# Patient Record
Sex: MALE | Race: WHITE | Employment: FULL TIME | URBAN - NONMETROPOLITAN AREA
[De-identification: names, ages, dates, MRNs, and addresses within clinical notes are randomized per-mention and may not be internally consistent; named-entity substitution may affect disease eponyms.]

---

## 2017-07-03 ENCOUNTER — HOSPITAL ENCOUNTER (OUTPATIENT)
Dept: GENERAL RADIOLOGY | Age: 42
Discharge: HOME | End: 2017-07-03
Attending: UROLOGY | Admitting: UROLOGY

## 2017-07-03 DIAGNOSIS — N20.0 CALCULUS OF KIDNEY: ICD-10-CM

## 2017-08-28 ENCOUNTER — HOSPITAL ENCOUNTER (OUTPATIENT)
Dept: OTHER | Age: 42
Discharge: HOME | End: 2017-08-28
Attending: UROLOGY | Admitting: UROLOGY

## 2017-09-03 LAB
STONE COMPOSITION: NORMAL
STONE DESCRIPTION: NORMAL
STONE MASS: 51
STONE NUMBER: 2
STONE SIZE: NORMAL

## 2020-08-15 ENCOUNTER — APPOINTMENT (OUTPATIENT)
Dept: CT IMAGING | Age: 45
End: 2020-08-15
Payer: COMMERCIAL

## 2020-08-15 ENCOUNTER — HOSPITAL ENCOUNTER (EMERGENCY)
Age: 45
Discharge: ANOTHER HOSPITAL | End: 2020-08-15
Attending: EMERGENCY MEDICINE
Payer: COMMERCIAL

## 2020-08-15 ENCOUNTER — HOSPITAL ENCOUNTER (INPATIENT)
Age: 45
LOS: 6 days | Discharge: HOME | DRG: 872 | End: 2020-08-21
Attending: INTERNAL MEDICINE | Admitting: INTERNAL MEDICINE
Payer: COMMERCIAL

## 2020-08-15 VITALS
HEIGHT: 71 IN | SYSTOLIC BLOOD PRESSURE: 130 MMHG | HEART RATE: 88 BPM | BODY MASS INDEX: 30.8 KG/M2 | RESPIRATION RATE: 18 BRPM | DIASTOLIC BLOOD PRESSURE: 84 MMHG | WEIGHT: 220 LBS | OXYGEN SATURATION: 97 % | TEMPERATURE: 99.5 F

## 2020-08-15 PROBLEM — K57.92 DIVERTICULITIS: Status: ACTIVE | Noted: 2020-08-15

## 2020-08-15 PROBLEM — K57.20 PERFORATION OF SIGMOID COLON DUE TO DIVERTICULITIS: Status: ACTIVE | Noted: 2020-08-15

## 2020-08-15 LAB
ALBUMIN SERPL-MCNC: 4 GM/DL (ref 3.4–5)
ALP BLD-CCNC: 63 IU/L (ref 40–129)
ALT SERPL-CCNC: 13 U/L (ref 10–40)
ANION GAP SERPL CALCULATED.3IONS-SCNC: 6 MMOL/L (ref 4–16)
AST SERPL-CCNC: 18 IU/L (ref 15–37)
BASOPHILS ABSOLUTE: 0 K/CU MM
BASOPHILS RELATIVE PERCENT: 0.2 % (ref 0–1)
BILIRUB SERPL-MCNC: 2.1 MG/DL (ref 0–1)
BUN BLDV-MCNC: 11 MG/DL (ref 6–23)
CALCIUM SERPL-MCNC: 8.9 MG/DL (ref 8.3–10.6)
CHLORIDE BLD-SCNC: 94 MMOL/L (ref 99–110)
CO2: 34 MMOL/L (ref 21–32)
CREAT SERPL-MCNC: 1.1 MG/DL (ref 0.9–1.3)
DIFFERENTIAL TYPE: ABNORMAL
EOSINOPHILS ABSOLUTE: 0 K/CU MM
EOSINOPHILS RELATIVE PERCENT: 0 % (ref 0–3)
GFR AFRICAN AMERICAN: >60 ML/MIN/1.73M2
GFR NON-AFRICAN AMERICAN: >60 ML/MIN/1.73M2
GLUCOSE BLD-MCNC: 95 MG/DL (ref 70–99)
HCT VFR BLD CALC: 50.1 % (ref 42–52)
HEMOGLOBIN: 16.6 GM/DL (ref 13.5–18)
IMMATURE NEUTROPHIL %: 0.4 % (ref 0–0.43)
LACTATE: 1.2 MMOL/L (ref 0.4–2)
LYMPHOCYTES ABSOLUTE: 1.6 K/CU MM
LYMPHOCYTES RELATIVE PERCENT: 9.8 % (ref 24–44)
MCH RBC QN AUTO: 30.9 PG (ref 27–31)
MCHC RBC AUTO-ENTMCNC: 33.1 % (ref 32–36)
MCV RBC AUTO: 93.1 FL (ref 78–100)
MONOCYTES ABSOLUTE: 1.3 K/CU MM
MONOCYTES RELATIVE PERCENT: 7.7 % (ref 0–4)
PDW BLD-RTO: 12.2 % (ref 11.7–14.9)
PLATELET # BLD: 220 K/CU MM (ref 140–440)
PMV BLD AUTO: 11.1 FL (ref 7.5–11.1)
POTASSIUM SERPL-SCNC: 4 MMOL/L (ref 3.5–5.1)
RBC # BLD: 5.38 M/CU MM (ref 4.6–6.2)
SEGMENTED NEUTROPHILS ABSOLUTE COUNT: 13.7 K/CU MM
SEGMENTED NEUTROPHILS RELATIVE PERCENT: 81.9 % (ref 36–66)
SODIUM BLD-SCNC: 134 MMOL/L (ref 135–145)
TOTAL IMMATURE NEUTOROPHIL: 0.07 K/CU MM
TOTAL PROTEIN: 7.7 GM/DL (ref 6.4–8.2)
TOTAL RETICULOCYTE COUNT: 0.07 K/CU MM
WBC # BLD: 16.8 K/CU MM (ref 4–10.5)

## 2020-08-15 PROCEDURE — 96375 TX/PRO/DX INJ NEW DRUG ADDON: CPT

## 2020-08-15 PROCEDURE — 83605 ASSAY OF LACTIC ACID: CPT

## 2020-08-15 PROCEDURE — 96376 TX/PRO/DX INJ SAME DRUG ADON: CPT

## 2020-08-15 PROCEDURE — 74176 CT ABD & PELVIS W/O CONTRAST: CPT

## 2020-08-15 PROCEDURE — 99284 EMERGENCY DEPT VISIT MOD MDM: CPT

## 2020-08-15 PROCEDURE — 6360000002 HC RX W HCPCS: Performed by: NURSE PRACTITIONER

## 2020-08-15 PROCEDURE — C9113 INJ PANTOPRAZOLE SODIUM, VIA: HCPCS | Performed by: NURSE PRACTITIONER

## 2020-08-15 PROCEDURE — 96365 THER/PROPH/DIAG IV INF INIT: CPT

## 2020-08-15 PROCEDURE — 6360000002 HC RX W HCPCS: Performed by: EMERGENCY MEDICINE

## 2020-08-15 PROCEDURE — 6360000002 HC RX W HCPCS: Performed by: SURGERY

## 2020-08-15 PROCEDURE — 2580000003 HC RX 258: Performed by: NURSE PRACTITIONER

## 2020-08-15 PROCEDURE — 99255 IP/OBS CONSLTJ NEW/EST HI 80: CPT | Performed by: SURGERY

## 2020-08-15 PROCEDURE — 1200000000 HC SEMI PRIVATE

## 2020-08-15 PROCEDURE — 80053 COMPREHEN METABOLIC PANEL: CPT

## 2020-08-15 PROCEDURE — 2500000003 HC RX 250 WO HCPCS: Performed by: SURGERY

## 2020-08-15 PROCEDURE — 2580000003 HC RX 258: Performed by: EMERGENCY MEDICINE

## 2020-08-15 PROCEDURE — 85025 COMPLETE CBC W/AUTO DIFF WBC: CPT

## 2020-08-15 RX ORDER — SODIUM CHLORIDE 0.9 % (FLUSH) 0.9 %
10 SYRINGE (ML) INJECTION PRN
Status: DISCONTINUED | OUTPATIENT
Start: 2020-08-15 | End: 2020-08-21 | Stop reason: HOSPADM

## 2020-08-15 RX ORDER — MORPHINE SULFATE 4 MG/ML
4 INJECTION, SOLUTION INTRAMUSCULAR; INTRAVENOUS EVERY 30 MIN PRN
Status: DISCONTINUED | OUTPATIENT
Start: 2020-08-15 | End: 2020-08-15 | Stop reason: HOSPADM

## 2020-08-15 RX ORDER — KETOROLAC TROMETHAMINE 30 MG/ML
30 INJECTION, SOLUTION INTRAMUSCULAR; INTRAVENOUS EVERY 6 HOURS PRN
Status: DISCONTINUED | OUTPATIENT
Start: 2020-08-15 | End: 2020-08-18

## 2020-08-15 RX ORDER — DIPHENHYDRAMINE HYDROCHLORIDE 50 MG/ML
50 INJECTION INTRAMUSCULAR; INTRAVENOUS ONCE
Status: COMPLETED | OUTPATIENT
Start: 2020-08-15 | End: 2020-08-15

## 2020-08-15 RX ORDER — KETOROLAC TROMETHAMINE 30 MG/ML
30 INJECTION, SOLUTION INTRAMUSCULAR; INTRAVENOUS ONCE
Status: COMPLETED | OUTPATIENT
Start: 2020-08-15 | End: 2020-08-15

## 2020-08-15 RX ORDER — ONDANSETRON 2 MG/ML
4 INJECTION INTRAMUSCULAR; INTRAVENOUS EVERY 30 MIN PRN
Status: DISCONTINUED | OUTPATIENT
Start: 2020-08-15 | End: 2020-08-15 | Stop reason: HOSPADM

## 2020-08-15 RX ORDER — SODIUM CHLORIDE 9 MG/ML
INJECTION, SOLUTION INTRAVENOUS CONTINUOUS
Status: DISCONTINUED | OUTPATIENT
Start: 2020-08-15 | End: 2020-08-15 | Stop reason: HOSPADM

## 2020-08-15 RX ORDER — SODIUM CHLORIDE 9 MG/ML
INJECTION, SOLUTION INTRAVENOUS CONTINUOUS
Status: DISCONTINUED | OUTPATIENT
Start: 2020-08-15 | End: 2020-08-21 | Stop reason: HOSPADM

## 2020-08-15 RX ORDER — ONDANSETRON 2 MG/ML
4 INJECTION INTRAMUSCULAR; INTRAVENOUS EVERY 6 HOURS PRN
Status: DISCONTINUED | OUTPATIENT
Start: 2020-08-15 | End: 2020-08-21 | Stop reason: HOSPADM

## 2020-08-15 RX ORDER — SODIUM CHLORIDE 0.9 % (FLUSH) 0.9 %
10 SYRINGE (ML) INJECTION EVERY 12 HOURS SCHEDULED
Status: DISCONTINUED | OUTPATIENT
Start: 2020-08-15 | End: 2020-08-21 | Stop reason: HOSPADM

## 2020-08-15 RX ORDER — PROMETHAZINE HYDROCHLORIDE 25 MG/1
12.5 TABLET ORAL EVERY 6 HOURS PRN
Status: DISCONTINUED | OUTPATIENT
Start: 2020-08-15 | End: 2020-08-21 | Stop reason: HOSPADM

## 2020-08-15 RX ORDER — POLYETHYLENE GLYCOL 3350 17 G/17G
17 POWDER, FOR SOLUTION ORAL DAILY PRN
Status: DISCONTINUED | OUTPATIENT
Start: 2020-08-15 | End: 2020-08-21 | Stop reason: HOSPADM

## 2020-08-15 RX ORDER — PANTOPRAZOLE SODIUM 40 MG/10ML
40 INJECTION, POWDER, LYOPHILIZED, FOR SOLUTION INTRAVENOUS DAILY
Status: DISCONTINUED | OUTPATIENT
Start: 2020-08-15 | End: 2020-08-21 | Stop reason: HOSPADM

## 2020-08-15 RX ORDER — ACETAMINOPHEN 325 MG/1
650 TABLET ORAL EVERY 6 HOURS PRN
Status: DISCONTINUED | OUTPATIENT
Start: 2020-08-15 | End: 2020-08-21 | Stop reason: HOSPADM

## 2020-08-15 RX ORDER — 0.9 % SODIUM CHLORIDE 0.9 %
1000 INTRAVENOUS SOLUTION INTRAVENOUS ONCE
Status: COMPLETED | OUTPATIENT
Start: 2020-08-15 | End: 2020-08-15

## 2020-08-15 RX ORDER — ACETAMINOPHEN 650 MG/1
650 SUPPOSITORY RECTAL EVERY 6 HOURS PRN
Status: DISCONTINUED | OUTPATIENT
Start: 2020-08-15 | End: 2020-08-21 | Stop reason: HOSPADM

## 2020-08-15 RX ADMIN — ONDANSETRON 4 MG: 2 INJECTION INTRAMUSCULAR; INTRAVENOUS at 13:37

## 2020-08-15 RX ADMIN — SODIUM CHLORIDE: 9 INJECTION, SOLUTION INTRAVENOUS at 18:33

## 2020-08-15 RX ADMIN — METRONIDAZOLE 500 MG: 500 INJECTION, SOLUTION INTRAVENOUS at 19:50

## 2020-08-15 RX ADMIN — SODIUM CHLORIDE, PRESERVATIVE FREE 10 ML: 5 INJECTION INTRAVENOUS at 19:48

## 2020-08-15 RX ADMIN — KETOROLAC TROMETHAMINE 30 MG: 30 INJECTION, SOLUTION INTRAMUSCULAR; INTRAVENOUS at 13:33

## 2020-08-15 RX ADMIN — MORPHINE SULFATE 4 MG: 4 INJECTION, SOLUTION INTRAMUSCULAR; INTRAVENOUS at 14:47

## 2020-08-15 RX ADMIN — PIPERACILLIN SODIUM AND TAZOBACTAM SODIUM 3.38 G: 3; .375 INJECTION, POWDER, LYOPHILIZED, FOR SOLUTION INTRAVENOUS at 14:28

## 2020-08-15 RX ADMIN — KETOROLAC TROMETHAMINE 30 MG: 30 INJECTION, SOLUTION INTRAMUSCULAR; INTRAVENOUS at 18:33

## 2020-08-15 RX ADMIN — ENOXAPARIN SODIUM 40 MG: 40 INJECTION, SOLUTION INTRAVENOUS; SUBCUTANEOUS at 19:50

## 2020-08-15 RX ADMIN — SODIUM CHLORIDE 1000 ML: 9 INJECTION, SOLUTION INTRAVENOUS at 13:37

## 2020-08-15 RX ADMIN — DIPHENHYDRAMINE HYDROCHLORIDE 50 MG: 50 INJECTION, SOLUTION INTRAMUSCULAR; INTRAVENOUS at 13:33

## 2020-08-15 RX ADMIN — PIPERACILLIN AND TAZOBACTAM 3.38 G: 3; .375 INJECTION, POWDER, LYOPHILIZED, FOR SOLUTION INTRAVENOUS at 21:44

## 2020-08-15 RX ADMIN — HYDROMORPHONE HYDROCHLORIDE 1 MG: 1 INJECTION, SOLUTION INTRAMUSCULAR; INTRAVENOUS; SUBCUTANEOUS at 22:06

## 2020-08-15 RX ADMIN — HYDROMORPHONE HYDROCHLORIDE 1 MG: 1 INJECTION, SOLUTION INTRAMUSCULAR; INTRAVENOUS; SUBCUTANEOUS at 19:48

## 2020-08-15 RX ADMIN — MORPHINE SULFATE 4 MG: 4 INJECTION, SOLUTION INTRAMUSCULAR; INTRAVENOUS at 13:37

## 2020-08-15 RX ADMIN — PANTOPRAZOLE SODIUM 40 MG: 40 INJECTION, POWDER, FOR SOLUTION INTRAVENOUS at 18:33

## 2020-08-15 RX ADMIN — SODIUM CHLORIDE: 9 INJECTION, SOLUTION INTRAVENOUS at 13:38

## 2020-08-15 ASSESSMENT — PAIN DESCRIPTION - PAIN TYPE: TYPE: ACUTE PAIN

## 2020-08-15 ASSESSMENT — PAIN DESCRIPTION - DESCRIPTORS: DESCRIPTORS: ACHING;THROBBING

## 2020-08-15 ASSESSMENT — PAIN SCALES - GENERAL
PAINLEVEL_OUTOF10: 8
PAINLEVEL_OUTOF10: 8
PAINLEVEL_OUTOF10: 0
PAINLEVEL_OUTOF10: 7
PAINLEVEL_OUTOF10: 6
PAINLEVEL_OUTOF10: 7

## 2020-08-15 ASSESSMENT — PAIN DESCRIPTION - LOCATION: LOCATION: ABDOMEN

## 2020-08-15 ASSESSMENT — PAIN DESCRIPTION - ORIENTATION: ORIENTATION: LOWER

## 2020-08-15 NOTE — H&P
History and Physical  URSULA Menjivar-BC   Internal Medicine Hospitalist      Name:  Jere Wallace /Age/Sex: 1975  (39 y.o. male)   MRN & CSN:  [id number] & [id number] Admission Date/Time: 8/15/2020  5:00 PM   [facility] P[facility]: No primary care provider on file. Hospital Day: 1      Supervising Physician: Dr. Guadalupe Ohara     Chief Complaint: Lower abdominal pain. .     Assessment and Plan:   Jere Wallace is a 39 y.o. male who presents with Perforation of sigmoid colon due to diverticulitis     Lower abdominal pain, 2/2 Acute Perforation of sigmoid colon due to diverticulitis - as per CT A/P.         - no abscess, no fever, leukocytosis (16.8)         - General Surgeon, Dr. Yarely Pyle, consulted in [facility] ED         - NPO effective now          - cont IVF NS         - cont Zosyn, initial dose in [facility] ED         - IV protonix         - control pain         - checks lab works in AM          - pending cx     Current diagnosis and plan of management discussed with the patient at the time of admission in lay language who agree to the above plan and disposition of admission for further care. All concerns and questions addressed. Patient assessment and plan in conjunction with supervising physician - Dr. Demarcus Parker Effective Now    DVT Prophylaxis [x] Lovenox, []  Heparin, [] SCDs, [] Ambulation  [] Long term AC   GI Prophylaxis [x] PPI,  [] H2 Blocker,  [] Carafate,  [] Diet,  [] No GI prophylaxis, N/A: patient is not under significant medical stress, non-ICU or is receiving a diet/tube feeds   Code Status Full Code    Disposition Patient requires continued admission due to Perforation of sigmoid colon due to diverticulitis. Discharge Plan: Patient plans to return home upon discharge.    MDM [] Low, [x] Moderate,[]  High  Patient's risk as above due to:      [x] One or more chronic illnesses with mild exacerbation progression      [] Two or more stable chronic illnesses      [] Undiagnosed new problem with uncertain prognosis      [] Elective major surgery      []Prescription drug management     History of Present Illness:     Principal Problem: Perforation of sigmoid colon due to diverticulitis  Nas Honeycutt is a 39 y.o. male with no known past medical history except for kidney stone was a direct admit from [facility] ED with complaints of lower abdominal pain that started 3 days ago. Patient reports that he was in good state of health until he experienced a dull and sharp pain across his lower abdomen, nonradiating pain, rates 7/10, and felt better with morphine. He denies fever, nausea, vomiting, diarrhea, constipation, dysuria, hematuria, and frequency or urgency. Denies cough, SOB or difficulty breathing. Upon interview, the patient provided the history as above. ROS: Ten point ROS reviewed and negative, unless as noted above per HPI. Objective:   No intake or output data in the 24 hours ending 08/15/20 1757     Vitals:   Vitals:    08/15/20 1700 08/15/20 1710   BP: 124/85    Pulse: 86    Resp: 16    Temp: 97 °F (36.1 °C)    TempSrc: Oral    SpO2: 97%    Weight:  220 lb (99.8 kg)   Height:  5' 11\" (1.803 m)     Physical Exam: 08/15/20    GEN  -Awake, alert, appearing male, lying in bed, cooperative, able to give adequate history. Appears given age. EYES -Pupils are equally round. No vision changes. No scleral erythema, discharge, or conjunctivitis. HENT -MM are moist. Oral pharynx without exudates, no evidence of thrush. NECK -Supple, no apparent thyromegaly or masses. RESP -LS CTA equal bilat, no wheezes, rales or rhonchi. Symmetric chest movement. No respiratory distress noted. C/V  -S1/S2 auscultated. RRR without appreciable M/R/G. No JVD or carotid bruits. Peripheral pulses equal bilaterally and palpable. Peripheral pulses equal bilaterally and palpable. No peripheral edema.     GI  -Abdomen is soft, round, non-distended, mild tenderness 08/15/20  1315   AST 18   ALT 13   BILITOT 2.1*   ALKPHOS 63     No results for input(s): INR in the last 72 hours. No results for input(s): CKTOTAL, CKMB, CKMBINDEX in the last 72 hours. Invalid input(s): Rayabeba Durand input(s): PRO-BNP    Radiology this visit:  Reviewed. Ct Abdomen Pelvis Wo Contrast    Result Date: 8/15/2020  EXAMINATION: CT OF THE ABDOMEN AND PELVIS WITHOUT CONTRAST 8/15/2020 1:49 pm TECHNIQUE: CT of the abdomen and pelvis was performed without the administration of intravenous contrast. Multiplanar reformatted images are provided for review. Dose modulation, iterative reconstruction, and/or weight based adjustment of the mA/kV was utilized to reduce the radiation dose to as low as reasonably achievable. COMPARISON: CT abdomen/pelvis 06/10/2017 HISTORY: ORDERING SYSTEM PROVIDED HISTORY: flank pain TECHNOLOGIST PROVIDED HISTORY: Reason for exam:->flank pain Reason for Exam: flank pain Acuity: Acute Type of Exam: Initial Additional signs and symptoms: pt sts lower abd pain x 3 days, hx of difficulty, pressure,and frequent with urintaion, several bilateral kidney stones. FINDINGS: Thorax base:  Normal heart size with no pericardial effusion. The distal esophagus demonstrates stable mild circumferential thickening without distention. No focal mass. The lung bases demonstrate no acute consolidation or pleural effusion. Abdomen:  Diffuse hepatic steatosis. Anterior left hepatic lobe stable cyst, no follow-up imaging is recommended. No intrahepatic biliary dilatation. Cholelithiasis with no adjacent inflammatory changes. The common bile duct, pancreas, adrenals, and spleen are normal.  The abdominal aorta is normal. Nonobstructing right staghorn calculus. No hydronephrosis. The right ureter is normal.  Nonobstructing left nephrolithiasis with no hydronephrosis. The ureter is nondistended with left retroperitoneal periureteral stranding.  Stomach, small bowel, and appendix are normal.  The ascending and transverse colon are normal.  Sigmoid colonic diverticulosis. In the mid sigmoid colon there is short segment circumferential wall thickening with eccentric focal fat inflammation and adjacent peritoneal free air. There is adjacent moderate mesenteric fat stranding with trace ascites. The distal sigmoid colon is normal. No abscess. Pelvis:  Borderline enlarged prostate. The bladder and rectum are normal. No abscess. Musculoskeletal structures:  Symmetric subareolar soft tissue density consistent with gynecomastia. No significant enlarged inguinal lymph nodes. Normal bone mineral density. Chronic bilateral L5 spondylolysis with grade 1 anterolisthesis L5 with respect to a transitional S1 vertebral body. No acute osseous abnormality. 1. Acute perforated sigmoid diverticulitis. No abscess. 2. Bilateral non-obstructing nephrolithiasis with a right staghorn calculus. 3. Stable circumferential thickening of the distal esophagus which is nonspecific, this could relate to esophagitis. 4. Cholelithiasis without evidence of acute cholecystitis or biliary obstruction. Critical results were called by Dr. Thais Minaya. Alyce Doyle MD to Jack Doe on 8/15/2020 at 14:07. EKG this visit:   EKG: No available ECG to review during assessment/interview. Please see ED notes. Current Treatment Team:  Treatment Team: Attending Provider: Nyla Monroy MD; Registered Nurse:  [facility], RN; Consulting Physician: MD Hanane Villalpando APRN-BC [facility]  8/15/2020 5:57 PM      Electronically signed by URSULA Tovar CNP on 8/15/2020 at 5:57 PM

## 2020-08-15 NOTE — ED PROVIDER NOTES
Emergency Department Encounter  Location: [facility] [address] [place] [address]    Patient: Inna Bose  MRN: [id number]  : 1975  Date of evaluation: 8/15/2020  ED Provider: Mirna Parker DO, FACEP    Chief Complaint:    Abdominal Pain (lower abd pain since  pt states he has had 40 + kidney stones and feels it is the same )    [place]:  Inna Bose is a 39 y.o. male that presents to the emergency department with complaints of low abdominal pain that started 3 days ago. The patient states he has had fevers with this pain. He states that started yesterday. He states his temperature was high as 101. The patient has been nauseated but denies vomiting. He has had many kidney stones and states this feels like his kidney stone pain. He sees Dr. Delphina Goodell. He denies urinary symptoms otherwise. ROS - see HPI, below listed is current ROS at time of my eval:  At least 10 systems reviewed and otherwise acutely negative except as in the [address] General:  No fevers, no chills, no weakness  Eyes:  No recent vison changes, no discharge  ENT:  No sore throat, no nasal congestion, no hearing changes  Cardiovascular:  No chest pain, no palpitations  Respiratory:  No shortness of breath, no cough, no wheezing  Gastrointestinal: Positive for suprapubic abdominal pain and nausea no vomiting, no diarrhea  Musculoskeletal:  No muscle pain, no joint pain  Skin:  No rash, no pruritis, no easy bruising  Neurologic:  No speech problems, no headache, no extremity numbness, no extremity tingling, no extremity weakness  Psychiatric:  No anxiety  Genitourinary:  No dysuria, no hematuria  Endocrine:  No unexpected weight gain, no unexpected weight loss  Extremities:  no edema, no pain    Past Medical History:   Diagnosis Date    Kidney stone      Past Surgical History:   Procedure Laterality Date    KIDNEY STONE SURGERY      WRIST SURGERY      left wrist     No family history on file.   Social History Take 1 tablet by mouth every 8 hours as needed for Nausea or Vomiting 20 tablet 0    ondansetron (ZOFRAN ODT) 4 MG disintegrating tablet Take 1 tablet by mouth every 8 hours as needed for Nausea 15 tablet 0     Allergies   Allergen Reactions    Vicodin [Hydrocodone-Acetaminophen] Other (See Comments)     Patient states that it is tolerable but state that makes his skin crawl       Nursing Notes Reviewed    Physical Exam:  ED Triage Vitals [08/15/20 1303]   Enc Vitals Group      /87      Pulse 91      Resp 18      Temp 99.6 °F (37.6 °C)      Temp Source Oral      SpO2 97 %      Weight 220 lb (99.8 kg)      Height 5' 11\" (1.803 m)      Head Circumference       Peak Flow       Pain Score       Pain Loc       Pain Edu? Excl. in [address]? GENERAL APPEARANCE: Awake and alert. Cooperative. No acute distress. Nontoxic in appearance  HEAD: Normocephalic. Atraumatic. EYES: EOM's grossly intact. Sclera anicteric. ENT: Tolerates saliva. No trismus. NECK: Supple. Trachea midline. CARDIO: RRR. Radial pulse 2+. LUNGS: Respirations unlabored. CTAB. ABDOMEN: Soft. Non-distended. Tenderness in his suprapubic region with no guarding or rebound. EXTREMITIES: No acute deformities. SKIN: Warm and dry. NEUROLOGICAL: No gross facial drooping. Moves all 4 extremities spontaneously. PSYCHIATRIC: Normal mood.      Labs:  Results for orders placed or performed during the hospital encounter of 08/15/20   Comprehensive Metabolic Panel   Result Value Ref Range    Sodium 134 (L) 135 - 145 MMOL/L    Potassium 4.0 3.5 - 5.1 MMOL/L    Chloride 94 (L) 99 - 110 mMol/L    CO2 34 (H) 21 - 32 MMOL/L    BUN 11 6 - 23 MG/DL    CREATININE 1.1 0.9 - 1.3 MG/DL    Glucose 95 70 - 99 MG/DL    Calcium 8.9 8.3 - 10.6 MG/DL    Alb 4.0 3.4 - 5.0 GM/DL    Total Protein 7.7 6.4 - 8.2 GM/DL    Total Bilirubin 2.1 (H) 0.0 - 1.0 MG/DL    ALT 13 10 - 40 U/L    AST 18 15 - 37 IU/L    Alkaline Phosphatase 63 40 - 129 IU/L    GFR Non- American >60 >60 mL/min/1.73m2    GFR African American >60 >60 mL/min/1.73m2    Anion Gap 6 4 - 16   Lactic Acid, Plasma   Result Value Ref Range    Lactate 1.2 0.4 - 2.0 mMOL/L   CBC Auto Differential   Result Value Ref Range    WBC 16.8 (H) 4.0 - 10.5 K/CU MM    RBC 5.38 4.6 - 6.2 M/CU MM    Hemoglobin 16.6 13.5 - 18.0 GM/DL    Hematocrit 50.1 42 - 52 %    MCV 93.1 78 - 100 FL    MCH 30.9 27 - 31 PG    MCHC 33.1 32.0 - 36.0 %    RDW 12.2 11.7 - 14.9 %    Platelets 667 338 - 574 K/CU MM    MPV 11.1 7.5 - 11.1 FL    Differential Type AUTOMATED DIFFERENTIAL     Segs Relative 81.9 (H) 36 - 66 %    Lymphocytes % 9.8 (L) 24 - 44 %    Monocytes % 7.7 (H) 0 - 4 %    Eosinophils % 0.0 0 - 3 %    Basophils % 0.2 0 - 1 %    Segs Absolute 13.7 K/CU MM    Lymphocytes Absolute 1.6 K/CU MM    Monocytes Absolute 1.3 K/CU MM    Eosinophils Absolute 0.0 K/CU MM    Basophils Absolute 0.0 K/CU MM    TRC 0.0737 K/CU MM    Immature Neutrophil % 0.4 0 - 0.43 %    Total Immature Neutrophil 0.07 K/CU MM           Radiographs (if obtained):  [] The following radiograph was interpreted by myself in the absence of a radiologist:  [x] Radiologist's Report reviewed at time of ED visit:  CT ABDOMEN PELVIS WO CONTRAST   Final Result   1. Acute perforated sigmoid diverticulitis. No abscess. 2. Bilateral non-obstructing nephrolithiasis with a right staghorn calculus. 3. Stable circumferential thickening of the distal esophagus which is   nonspecific, this could relate to esophagitis. 4. Cholelithiasis without evidence of acute cholecystitis or biliary   obstruction. Critical results were called by Dr. Tyler Kent. Nadia Redmond MD to Kelsi Duran on   8/15/2020 at 14:07. ED Course and MDM:  Patient appears to have perforated sigmoid diverticuli. His white count is 16,000. I discussed his case with Dr. Nirmal Cid who advised me to have this patient admitted to the hospitalist service in [facility] to keep him n.p.o.   The case then was discussed with Dr. Felicia Jackson, the hospitalist who has agreed accept this patient in transfer. The patient prefers to go by private auto rather than waiting for an ambulance service which could be several hours  And delay. A bed has been assigned. His IV was discontinued and his father is driving him to [facility]. He will be directly admitted once he arrives. He has been stable through his ER course. He received Zosyn Toradol morphine Zofran and Benadryl here in the ER and his pain has much improved. He will be admitted at [facility] for continued observation treatment. Final Impression:  1. Diverticulitis of colon    2. Perforation of colon ([address]      DISPOSITION Decision To Transfer    Patient referred to: No follow-up provider specified.   Discharge medications:  Discharge Medication List as of 8/15/2020  4:06 PM        (Please note that portions of this note may have been completed with a voice recognition program. Efforts were made to edit the dictations but occasionally words are mis-transcribed.)    Griselda Bast, DO, [address] [facility]  Board certified in [facility], [place]  08/15/20 9683

## 2020-08-15 NOTE — CONSULTS
SURGICAL CONSULTATION    Date of Admission:  8/15/2020  Date of Consultation:  8/15/2020    PCP:  No primary care provider on file. Thank you Dr. Jean Marie Magaña MD very much for your consultation, it's always appreciated. I had the privilege today to see your patient Nataliya Brasher. Chief Complaint / History of Present Illness:  Nataliya Brasher is a very pleasant 39 y.o. male who presents with pain in the Left Lower Quadrant and is acute, worsening and dull compared to patient's normal condition. It is severe in intensity for a duration of 2 days and is continuous with modifying factors increased by or worse with pressure. CT revealed perforated diverticulitis, counseled him in length, will keep him NPO x 2-3 days, on IV Abx, and IVF, and pain control, and then advance his diet from clears to low residue diet x 2 wks, and then in 6-8 wks do a colonoscopy, and probably robotic sigmoidectomy to follow. PMH:   has a past medical history of Kidney stone. PSH:   has a past surgical history that includes Wrist surgery and Kidney stone surgery. Allergies: Allergies   Allergen Reactions    Vicodin [Hydrocodone-Acetaminophen] Other (See Comments)     Patient states that it is tolerable but state that makes his skin crawl        Home Meds:    Prior to Admission medications    Medication Sig Start Date End Date Taking?  Authorizing Provider   ondansetron (ZOFRAN) 4 MG tablet Take 1 tablet by mouth every 8 hours as needed for Nausea or Vomiting 8/18/17   Solomon Vargas MD   ondansetron (ZOFRAN ODT) 4 MG disintegrating tablet Take 1 tablet by mouth every 8 hours as needed for Nausea 6/10/17   Ronda Smart MD   tamsulosin [facility]) 0.4 MG capsule Take 1 capsule by mouth daily for 10 doses 6/10/17 8/15/20  Ronda Smart MD        [facility] Meds:    Current Facility-Administered Medications   Medication Dose Route Frequency Provider Last Rate Last Dose    sodium chloride flush 0.9 % injection 10 mL Alcohol use: No    Drug use: No    Sexual activity: Never   Lifestyle    Physical activity     Days per week: Not on file     Minutes per session: Not on file    Stress: Not on file   Relationships    Social connections     Talks on phone: Not on file     Gets together: Not on file     Attends Church service: Not on file     Active member of club or organization: Not on file     Attends meetings of clubs or organizations: Not on file     Relationship status: Not on file    Intimate partner violence     Fear of current or ex partner: Not on file     Emotionally abused: Not on file     Physically abused: Not on file     Forced sexual activity: Not on file   Other Topics Concern    Not on file   Social History Narrative    Not on file      Family History   Problem Relation Age of Onset    Cancer Mother     Kidney stones Father     Diabetes Father     Diabetes Brother     otherwise irrelevant to this surgical issue. Review of Systems:  Constitutional: Negative for fever, chills, diaphoresis, appetite change and fatigue. HENT: Negative for sore throat, trouble swallowing and voice change. Respiratory: Negative for cough, positive for shortness of breath no wheezing. Cardiovascular: Negative for chest pain positive for SOB with one flight of stairs exertion, no pitting LE edema. Gastrointestinal: Positive for severe abdominal pain, Negative for nausea, vomiting, abdominal distention, constipation, no diarrhea, blood in stool, anal bleeding or rectal pain. Musculoskeletal: Negative for joint swelling and arthralgias. Skin: Warm and dry, well perfused. Neurological: Negative for seizures, syncope, speech difficulty and weakness. Hematological/Lymphatic: Negative for adenopathy. No history of DVT/PE. Does not bruise/bleed easily. Psychiatric/Behavioral: Negative for agitation. All others reviewed and negative.       Physical Exam:  Vital Signs:   Vitals:    08/15/20 1700   BP: 124/85 Pulse: 86   Resp: 16   Temp: 97 °F (36.1 °C)   SpO2: 97%     Body mass index is 30.68 kg/m². General appearance: Pt Alert and oriented, in no apparent acute distress. HEENT:  RYAN, Conjunctiva clear. EOMI, No JVDs, Bruits, Megalies: neither thyroid nor lymph nodes. Lungs: Clear to auscultation bilaterally. Heart: Regular rate and rhythm, S1, S2 normal, no murmur, rub or gallop. Abdomen: Very tender lower abdomen, with positive localized peritoneal signs. Non distended. Positive bowel sounds. No hernias noted, no masses palpable. Extremities: Warm, well perfused, no cyanosis or edema. Skin: Skin color, texture, turgor normal.  Neurologic: Grossly normal, Cranial nerves from II to XII intact, Deep tendon reflexes normal.  Lymph nodes: No palpable LNs, Cervical, groins, abdominal.  Musculoskeletal: Bilateral Upper and lower extremities ROM within normal limits. Radiologic / Imaging / TESTING  CT abd / pelvis today:  Impression    1. Acute perforated sigmoid diverticulitis.  No abscess. 2. Bilateral non-obstructing nephrolithiasis with a right staghorn calculus. 3. Stable circumferential thickening of the distal esophagus which is    nonspecific, this could relate to esophagitis. 4. Cholelithiasis without evidence of acute cholecystitis or biliary    obstruction. Critical results were called by Dr. Jovanni Guadalupe. Trevor Burnham MD to Anselmo Cuellar on    8/15/2020 at 14:07.         Labs:    Recent Results (from the past 24 hour(s))   Comprehensive Metabolic Panel    Collection Time: 08/15/20  1:15 PM   Result Value Ref Range    Sodium 134 (L) 135 - 145 MMOL/L    Potassium 4.0 3.5 - 5.1 MMOL/L    Chloride 94 (L) 99 - 110 mMol/L    CO2 34 (H) 21 - 32 MMOL/L    BUN 11 6 - 23 MG/DL    CREATININE 1.1 0.9 - 1.3 MG/DL    Glucose 95 70 - 99 MG/DL    Calcium 8.9 8.3 - 10.6 MG/DL    Alb 4.0 3.4 - 5.0 GM/DL    Total Protein 7.7 6.4 - 8.2 GM/DL    Total Bilirubin 2.1 (H) 0.0 - 1.0 MG/DL    ALT 13 10 - 40 U/L    AST 18 15 - 37 IU/L    Alkaline Phosphatase 63 40 - 129 IU/L    GFR Non-African American >60 >60 mL/min/1.73m2    GFR African American >60 >60 mL/min/1.73m2    Anion Gap 6 4 - 16   Lactic Acid, Plasma    Collection Time: 08/15/20  1:30 PM   Result Value Ref Range    Lactate 1.2 0.4 - 2.0 mMOL/L   CBC Auto Differential    Collection Time: 08/15/20  2:00 PM   Result Value Ref Range    WBC 16.8 (H) 4.0 - 10.5 K/CU MM    RBC 5.38 4.6 - 6.2 M/CU MM    Hemoglobin 16.6 13.5 - 18.0 GM/DL    Hematocrit 50.1 42 - 52 %    MCV 93.1 78 - 100 FL    MCH 30.9 27 - 31 PG    MCHC 33.1 32.0 - 36.0 %    RDW 12.2 11.7 - 14.9 %    Platelets 284 432 - 485 K/CU MM    MPV 11.1 7.5 - 11.1 FL    Differential Type AUTOMATED DIFFERENTIAL     Segs Relative 81.9 (H) 36 - 66 %    Lymphocytes % 9.8 (L) 24 - 44 %    Monocytes % 7.7 (H) 0 - 4 %    Eosinophils % 0.0 0 - 3 %    Basophils % 0.2 0 - 1 %    Segs Absolute 13.7 K/CU MM    Lymphocytes Absolute 1.6 K/CU MM    Monocytes Absolute 1.3 K/CU MM    Eosinophils Absolute 0.0 K/CU MM    Basophils Absolute 0.0 K/CU MM    TRC 0.0737 K/CU MM    Immature Neutrophil % 0.4 0 - 0.43 %    Total Immature Neutrophil 0.07 K/CU MM       Diagnosis:  Patient Active Problem List   Diagnosis    Perforation of sigmoid colon due to diverticulitis       Assessment & plan:  Matt Flowers is a very pleasant 39 y.o. male presenting with acute perforated diverticulitis, with free air, but no abscess and no fluid collection, . Continue NPO/Bowel rest, IV Fluids, Pain control, Nausea control, IV Antibiotics, Will keep him NPO x 2-3 days, on IV Abx, and IVF, and pain control, and then advance his diet from clears to low residue diet x 2 wks, and then in 6-8 wks do a colonoscopy, and probably robotic sigmoidectomy and cholecystectomy to follow.     Thank you doctor Mavis Whatley MD very much for your consultation and for the opportunity to take care of Mr Matt Flowers with you, I'll follow along with you and I'll update you on any new events in his care.    ____________________________________________    Zoey Lane MD, FACS, FICS    8/15/2020  6:58 PM      Patient was seen with total face to face time of 45 minutes. More than 50% of this visit was counseling and education as above in my assessment and plan section of my note.

## 2020-08-15 NOTE — ED NOTES
Dr. Nas Abebe, [facility] hospitalist returned call - transferred to Dr. Anum Laurent.      Kiley Saha RN  08/15/20 1447

## 2020-08-15 NOTE — ED NOTES
Dr. Yrn Clayton returned call - transferred to Dr. Maxwell Collet.      Carlos Virgen, POONAM  08/15/20 4331

## 2020-08-16 LAB
ANION GAP SERPL CALCULATED.3IONS-SCNC: 12 MMOL/L (ref 4–16)
BASOPHILS ABSOLUTE: 0 K/CU MM
BASOPHILS RELATIVE PERCENT: 0.2 % (ref 0–1)
BUN BLDV-MCNC: 15 MG/DL (ref 6–23)
CALCIUM SERPL-MCNC: 8.2 MG/DL (ref 8.3–10.6)
CHLORIDE BLD-SCNC: 101 MMOL/L (ref 99–110)
CO2: 25 MMOL/L (ref 21–32)
CREAT SERPL-MCNC: 1.1 MG/DL (ref 0.9–1.3)
DIFFERENTIAL TYPE: ABNORMAL
EOSINOPHILS ABSOLUTE: 0 K/CU MM
EOSINOPHILS RELATIVE PERCENT: 0 % (ref 0–3)
GFR AFRICAN AMERICAN: >60 ML/MIN/1.73M2
GFR NON-AFRICAN AMERICAN: >60 ML/MIN/1.73M2
GLUCOSE BLD-MCNC: 104 MG/DL (ref 70–99)
HCT VFR BLD CALC: 42.6 % (ref 42–52)
HEMOGLOBIN: 13.9 GM/DL (ref 13.5–18)
IMMATURE NEUTROPHIL %: 0.8 % (ref 0–0.43)
LYMPHOCYTES ABSOLUTE: 0.8 K/CU MM
LYMPHOCYTES RELATIVE PERCENT: 5.6 % (ref 24–44)
MAGNESIUM: 1.9 MG/DL (ref 1.8–2.4)
MCH RBC QN AUTO: 30.6 PG (ref 27–31)
MCHC RBC AUTO-ENTMCNC: 32.6 % (ref 32–36)
MCV RBC AUTO: 93.8 FL (ref 78–100)
MONOCYTES ABSOLUTE: 0.8 K/CU MM
MONOCYTES RELATIVE PERCENT: 5.2 % (ref 0–4)
NUCLEATED RBC %: 0 %
PDW BLD-RTO: 12.4 % (ref 11.7–14.9)
PLATELET # BLD: 191 K/CU MM (ref 140–440)
PMV BLD AUTO: 10.5 FL (ref 7.5–11.1)
POTASSIUM SERPL-SCNC: 3.5 MMOL/L (ref 3.5–5.1)
RBC # BLD: 4.54 M/CU MM (ref 4.6–6.2)
SEGMENTED NEUTROPHILS ABSOLUTE COUNT: 12.8 K/CU MM
SEGMENTED NEUTROPHILS RELATIVE PERCENT: 88.2 % (ref 36–66)
SODIUM BLD-SCNC: 138 MMOL/L (ref 135–145)
TOTAL IMMATURE NEUTOROPHIL: 0.11 K/CU MM
TOTAL NUCLEATED RBC: 0 K/CU MM
WBC # BLD: 14.5 K/CU MM (ref 4–10.5)

## 2020-08-16 PROCEDURE — 2500000003 HC RX 250 WO HCPCS: Performed by: SURGERY

## 2020-08-16 PROCEDURE — 1200000000 HC SEMI PRIVATE

## 2020-08-16 PROCEDURE — 99232 SBSQ HOSP IP/OBS MODERATE 35: CPT | Performed by: SURGERY

## 2020-08-16 PROCEDURE — 6360000002 HC RX W HCPCS: Performed by: NURSE PRACTITIONER

## 2020-08-16 PROCEDURE — 6370000000 HC RX 637 (ALT 250 FOR IP): Performed by: NURSE PRACTITIONER

## 2020-08-16 PROCEDURE — 2580000003 HC RX 258: Performed by: NURSE PRACTITIONER

## 2020-08-16 PROCEDURE — 94761 N-INVAS EAR/PLS OXIMETRY MLT: CPT

## 2020-08-16 PROCEDURE — C9113 INJ PANTOPRAZOLE SODIUM, VIA: HCPCS | Performed by: NURSE PRACTITIONER

## 2020-08-16 PROCEDURE — 83735 ASSAY OF MAGNESIUM: CPT

## 2020-08-16 PROCEDURE — 80048 BASIC METABOLIC PNL TOTAL CA: CPT

## 2020-08-16 PROCEDURE — 36415 COLL VENOUS BLD VENIPUNCTURE: CPT

## 2020-08-16 PROCEDURE — 6360000002 HC RX W HCPCS: Performed by: SURGERY

## 2020-08-16 PROCEDURE — 85025 COMPLETE CBC W/AUTO DIFF WBC: CPT

## 2020-08-16 RX ORDER — KETOROLAC TROMETHAMINE 30 MG/ML
15 INJECTION, SOLUTION INTRAMUSCULAR; INTRAVENOUS ONCE
Status: COMPLETED | OUTPATIENT
Start: 2020-08-16 | End: 2020-08-16

## 2020-08-16 RX ADMIN — SODIUM CHLORIDE, PRESERVATIVE FREE 10 ML: 5 INJECTION INTRAVENOUS at 22:35

## 2020-08-16 RX ADMIN — SODIUM CHLORIDE: 9 INJECTION, SOLUTION INTRAVENOUS at 15:56

## 2020-08-16 RX ADMIN — HYDROMORPHONE HYDROCHLORIDE 1 MG: 1 INJECTION, SOLUTION INTRAMUSCULAR; INTRAVENOUS; SUBCUTANEOUS at 12:20

## 2020-08-16 RX ADMIN — METRONIDAZOLE 500 MG: 500 INJECTION, SOLUTION INTRAVENOUS at 03:37

## 2020-08-16 RX ADMIN — SODIUM CHLORIDE: 9 INJECTION, SOLUTION INTRAVENOUS at 22:34

## 2020-08-16 RX ADMIN — HYDROMORPHONE HYDROCHLORIDE 1 MG: 1 INJECTION, SOLUTION INTRAMUSCULAR; INTRAVENOUS; SUBCUTANEOUS at 09:04

## 2020-08-16 RX ADMIN — PIPERACILLIN AND TAZOBACTAM 3.38 G: 3; .375 INJECTION, POWDER, LYOPHILIZED, FOR SOLUTION INTRAVENOUS at 12:20

## 2020-08-16 RX ADMIN — PIPERACILLIN AND TAZOBACTAM 3.38 G: 3; .375 INJECTION, POWDER, LYOPHILIZED, FOR SOLUTION INTRAVENOUS at 05:28

## 2020-08-16 RX ADMIN — HYDROMORPHONE HYDROCHLORIDE 1 MG: 1 INJECTION, SOLUTION INTRAMUSCULAR; INTRAVENOUS; SUBCUTANEOUS at 22:34

## 2020-08-16 RX ADMIN — HYDROMORPHONE HYDROCHLORIDE 1 MG: 1 INJECTION, SOLUTION INTRAMUSCULAR; INTRAVENOUS; SUBCUTANEOUS at 18:44

## 2020-08-16 RX ADMIN — PANTOPRAZOLE SODIUM 40 MG: 40 INJECTION, POWDER, FOR SOLUTION INTRAVENOUS at 09:04

## 2020-08-16 RX ADMIN — HYDROMORPHONE HYDROCHLORIDE 1 MG: 1 INJECTION, SOLUTION INTRAMUSCULAR; INTRAVENOUS; SUBCUTANEOUS at 01:16

## 2020-08-16 RX ADMIN — HYDROMORPHONE HYDROCHLORIDE 1 MG: 1 INJECTION, SOLUTION INTRAMUSCULAR; INTRAVENOUS; SUBCUTANEOUS at 15:56

## 2020-08-16 RX ADMIN — METRONIDAZOLE 500 MG: 500 INJECTION, SOLUTION INTRAVENOUS at 12:20

## 2020-08-16 RX ADMIN — ACETAMINOPHEN 650 MG: 650 SUPPOSITORY RECTAL at 04:26

## 2020-08-16 RX ADMIN — ENOXAPARIN SODIUM 40 MG: 40 INJECTION, SOLUTION INTRAVENOUS; SUBCUTANEOUS at 22:33

## 2020-08-16 RX ADMIN — PIPERACILLIN AND TAZOBACTAM 3.38 G: 3; .375 INJECTION, POWDER, LYOPHILIZED, FOR SOLUTION INTRAVENOUS at 22:34

## 2020-08-16 RX ADMIN — KETOROLAC TROMETHAMINE 15 MG: 30 INJECTION, SOLUTION INTRAMUSCULAR; INTRAVENOUS at 04:21

## 2020-08-16 ASSESSMENT — PAIN SCALES - GENERAL
PAINLEVEL_OUTOF10: 0
PAINLEVEL_OUTOF10: 8
PAINLEVEL_OUTOF10: 8
PAINLEVEL_OUTOF10: 7
PAINLEVEL_OUTOF10: 10
PAINLEVEL_OUTOF10: 9
PAINLEVEL_OUTOF10: 7
PAINLEVEL_OUTOF10: 0
PAINLEVEL_OUTOF10: 0

## 2020-08-16 NOTE — PROGRESS NOTES
GENERAL SURGERY PROGRESS NOTE    CC/HPI:           Patient feels the same significant abdominal pain. .. leukocytosis improving though. . continue NPO. Vitals:    08/16/20 0413 08/16/20 0430 08/16/20 0530 08/16/20 0858   BP: 119/71 119/71  116/64   Pulse: 109 102  71   Resp: 16 16 14   Temp: 103.2 °F (39.6 °C) 98.9 °F (37.2 °C) 99.2 °F (37.3 °C) 98.2 °F (36.8 °C)   TempSrc: Oral Oral Oral Oral   SpO2: 96%   97%   Weight: 213 lb 14.4 oz (97 kg)      Height:         I/O last 3 completed shifts: In: 1208.3 [I.V.:908.3; IV Piggyback:300]  Out: 400 [Urine:400]  No intake/output data recorded.     Diet NPO Effective Now    Recent Results (from the past 48 hour(s))   Comprehensive Metabolic Panel    Collection Time: 08/15/20  1:15 PM   Result Value Ref Range    Sodium 134 (L) 135 - 145 MMOL/L    Potassium 4.0 3.5 - 5.1 MMOL/L    Chloride 94 (L) 99 - 110 mMol/L    CO2 34 (H) 21 - 32 MMOL/L    BUN 11 6 - 23 MG/DL    CREATININE 1.1 0.9 - 1.3 MG/DL    Glucose 95 70 - 99 MG/DL    Calcium 8.9 8.3 - 10.6 MG/DL    Alb 4.0 3.4 - 5.0 GM/DL    Total Protein 7.7 6.4 - 8.2 GM/DL    Total Bilirubin 2.1 (H) 0.0 - 1.0 MG/DL    ALT 13 10 - 40 U/L    AST 18 15 - 37 IU/L    Alkaline Phosphatase 63 40 - 129 IU/L    GFR Non-African American >60 >60 mL/min/1.73m2    GFR African American >60 >60 mL/min/1.73m2    Anion Gap 6 4 - 16   Lactic Acid, Plasma    Collection Time: 08/15/20  1:30 PM   Result Value Ref Range    Lactate 1.2 0.4 - 2.0 mMOL/L   CBC Auto Differential    Collection Time: 08/15/20  2:00 PM   Result Value Ref Range    WBC 16.8 (H) 4.0 - 10.5 K/CU MM    RBC 5.38 4.6 - 6.2 M/CU MM    Hemoglobin 16.6 13.5 - 18.0 GM/DL    Hematocrit 50.1 42 - 52 %    MCV 93.1 78 - 100 FL    MCH 30.9 27 - 31 PG    MCHC 33.1 32.0 - 36.0 %    RDW 12.2 11.7 - 14.9 %    Platelets 799 074 - 971 K/CU MM    MPV 11.1 7.5 - 11.1 FL    Differential Type AUTOMATED DIFFERENTIAL     Segs Relative 81.9 (H) 36 - 66 %    Lymphocytes % 9.8 (L) 24 - 44 % Monocytes % 7.7 (H) 0 - 4 %    Eosinophils % 0.0 0 - 3 %    Basophils % 0.2 0 - 1 %    Segs Absolute 13.7 K/CU MM    Lymphocytes Absolute 1.6 K/CU MM    Monocytes Absolute 1.3 K/CU MM    Eosinophils Absolute 0.0 K/CU MM    Basophils Absolute 0.0 K/CU MM    TRC 0.0737 K/CU MM    Immature Neutrophil % 0.4 0 - 0.43 %    Total Immature Neutrophil 0.07 K/CU MM   Basic Metabolic Panel w/ Reflex to MG    Collection Time: 08/16/20  7:15 AM   Result Value Ref Range    Sodium 138 135 - 145 MMOL/L    Potassium 3.5 3.5 - 5.1 MMOL/L    Chloride 101 99 - 110 mMol/L    CO2 25 21 - 32 MMOL/L    Anion Gap 12 4 - 16    BUN 15 6 - 23 MG/DL    CREATININE 1.1 0.9 - 1.3 MG/DL    Glucose 104 (H) 70 - 99 MG/DL    Calcium 8.2 (L) 8.3 - 10.6 MG/DL    GFR Non-African American >60 >60 mL/min/1.73m2    GFR African American >60 >60 mL/min/1.73m2   CBC auto differential    Collection Time: 08/16/20  7:15 AM   Result Value Ref Range    WBC 14.5 (H) 4.0 - 10.5 K/CU MM    RBC 4.54 (L) 4.6 - 6.2 M/CU MM    Hemoglobin 13.9 13.5 - 18.0 GM/DL    Hematocrit 42.6 42 - 52 %    MCV 93.8 78 - 100 FL    MCH 30.6 27 - 31 PG    MCHC 32.6 32.0 - 36.0 %    RDW 12.4 11.7 - 14.9 %    Platelets 755 597 - 131 K/CU MM    MPV 10.5 7.5 - 11.1 FL    Differential Type AUTOMATED DIFFERENTIAL     Segs Relative 88.2 (H) 36 - 66 %    Lymphocytes % 5.6 (L) 24 - 44 %    Monocytes % 5.2 (H) 0 - 4 %    Eosinophils % 0.0 0 - 3 %    Basophils % 0.2 0 - 1 %    Segs Absolute 12.8 K/CU MM    Lymphocytes Absolute 0.8 K/CU MM    Monocytes Absolute 0.8 K/CU MM    Eosinophils Absolute 0.0 K/CU MM    Basophils Absolute 0.0 K/CU MM    Nucleated RBC % 0.0 %    Total Nucleated RBC 0.0 K/CU MM    Total Immature Neutrophil 0.11 K/CU MM    Immature Neutrophil % 0.8 (H) 0 - 0.43 %   Magnesium    Collection Time: 08/16/20  7:15 AM   Result Value Ref Range    Magnesium 1.9 1.8 - 2.4 mg/dl       Scheduled Meds:   sodium chloride flush  10 mL Intravenous 2 times per day    enoxaparin  40 mg Subcutaneous Nightly    pantoprazole  40 mg Intravenous Daily    piperacillin-tazobactam  3.375 g Intravenous Q8H    metroNIDAZOLE  500 mg Intravenous Q8H       Continuous Infusions:   sodium chloride 100 mL/hr at 08/15/20 1833       Physical Exam:  HEENT: Anicteric sclerae, Oropharyngeal mucosae moist, pink and intact. Heart:  Normal S1 and S2, RRR  Lungs: Clear to auscultation bilaterally, No audible Wheezes or Rales. Extremities: No edema. Neuro: Alert and Oriented x 3, Non focal.  Abdomen: Soft, VERY tender DIFFUSELY, Non distended, Positive bowel sounds. Principal Problem:    Perforation of sigmoid colon due to diverticulitis  Resolved Problems:    * No resolved hospital problems. *      Assessment and Plan:  Vitaliy Landrum is a 39 y.o. male with acute perforated sigmoid diverticulitis,   Patient feels the same significant abdominal pain. .. leukocytosis improving though. . continue NPO. Increase Ambulation to at least 4x/day walk in the hallways with assistance. Respiratory ryann-operative care: Incentive Spirometry / deep breathing and coughing 10x/hr while awake. Continue DVT prophylaxis with Teds and SCDs and SC Lovenox. Continue GI prophylaxis with Protonix IV till able to tolerate PO.   Continue IV Abx.    ___________________________________________    Dorinda Lin MD, FACS, FICS  8/16/2020  9:11 AM

## 2020-08-16 NOTE — PROGRESS NOTES
Hospitalist Progress Note      Name:  Vitaliy [facility] /Age/Sex: 1975  (39 y.o. male)   MRN & CSN:  [id number] & [id number] Admission Date/Time: 8/15/2020  5:00 PM   Location:  [address] [place] [address] PCP: No primary care provider on file. Hospital Day: 2    Assessment and Plan:       Acute abdominal pain secondary to acute perforation of sigmoid colon due to diverticulitis as demonstrated on CT abdomen pelvis  There is no abscess no fever  Has leukocytosis  Continue on IV antibiotics Zosyn and Flagyl and IV fluid  Patient currently n.p.o.  General surgery on board  On pain medication   DVT and GI prophylaxis  Further management as per general surgery        Bilateral non-obstructing nephrolithiasis with a right staghorn calculus. Urology consult  Follow urine culture      Diet Diet NPO Effective Now   DVT Prophylaxis [x] Lovenox, []  Heparin, [] SCDs, [] Ambulation   GI Prophylaxis [x] PPI,  [] H2 Blocker,  [] Carafate,  [] Diet/Tube Feeds   Code Status Full Code   Disposition Patient requires continued admission due to    MDM [] Low, [] Moderate,[]  High  Patient's risk as above due to      History of Present Illness:   Patient was seen and examined at the bedside today morning  Patient still complaining of diffuse abdominal pain  Currently n.p.o. on IV fluid  No fever    Objective: Intake/Output Summary (Last 24 hours) at 2020 0949  Last data filed at 2020 0541  Gross per 24 hour   Intake 1208.34 ml   Output 400 ml   Net 808.34 ml      Vitals:   Vitals:    20 0858   BP: 116/64   Pulse: 71   Resp: 14   Temp: 98.2 °F (36.8 °C)   SpO2: 97%     Physical Exam:   GEN Awake.  Alert , not in respiratory distress, not in pain  HEENT: PEERLA, , supple neck,   Chest: air entry equal bilaterally, no wheezing or crepitation  Heart: S1 and S2 heard, no murmur, no gallop or rub, regular rate  Abdomen: soft, ND , diffuse tenderness on palpation, +BS  Extremities: no cyanosis, tenderness or erythema,

## 2020-08-16 NOTE — CONSULTS
[facility] [address]   Consult Note  [facility] 1 2 3 4 5    Date: 2020   Patient: Kale Nelson   : 1975   DOA: 8/15/2020   MRN: [id number]   ROOM#: [id number]     Reason for Consult:  Bilateral non-obstructing nephrolithiasis with a right staghorn calculus/HEMATURIA   Requesting Physician:  Shaniqua Desai  Collaborating Urologist on Call at time of admission: Dr. Melissa Anna:  Abdominal pain    History Obtained From:  patient, electronic medical record    HISTORY OF PRESENT ILLNESS:                The patient is a 39 y.o. male with significant past medical history of kidney stones requiring intervention (s/p ureteroscopy in 2017) who presented with abdominal pain and gross hematuria since 4 days ago. Pt came to the ED because he thought he was passing another kidney stones. States he has passed over 40 stones in his lifetime, several of which required intervention. Pt was found to have a perforated sigmoid diverticuli and WBC of 16,000. Plan for colonoscopy and probable robotic sigmoidectomy in 6-8 weeks. His CT a/p also revealed several bilateral nephrolithiasis with a right staghorn calculus. Pt denies flank pain, passing blood clots, dysuria, or other urinary sx. ED Provider Note 8/15/20: Kale Nelson is a 39 y.o. male that presents to the emergency department with complaints of low abdominal pain that started 3 days ago. The patient states he has had fevers with this pain. He states that started yesterday. He states his temperature was high as 101. The patient has been nauseated but denies vomiting. He has had many kidney stones and states this feels like his kidney stone pain. He sees Dr. Macey Cruz. He denies urinary symptoms otherwise.     Past Medical History:        Diagnosis Date    Kidney stone      Past Surgical History:        Procedure Laterality Date    KIDNEY STONE SURGERY      WRIST SURGERY      left wrist     Current Medications:   Current Max: 103.3 °F (39.6 °C)  24HR BLOOD PRESSURE RANGE:  Systolic (07UPA), EXY:817 , Min:109 , YJZ:251   ; Diastolic (26VKK), KZT:43, Min:63, Max:87      General appearance: alert, appears stated age, cooperative and mild distress  Head: Normocephalic, without obvious abnormality, atraumatic  Back: No CVA tenderness  Abdomen: soft, non-distended, diffuse TTP, and guarding    DATA:    WBC:    Lab Results   Component Value Date    WBC 14.5 08/16/2020     Hemoglobin/Hematocrit:    Lab Results   Component Value Date    HGB 13.9 08/16/2020    HCT 42.6 08/16/2020     BMP:    Lab Results   Component Value Date     08/15/2020    K 4.0 08/15/2020    CL 94 08/15/2020    CO2 34 08/15/2020    BUN 11 08/15/2020    LABALBU 4.0 08/15/2020    CREATININE 1.1 08/15/2020    CALCIUM 8.9 08/15/2020    GFRAA >60 08/15/2020    LABGLOM >60 08/15/2020     Imaging:  Ct Abdomen Pelvis Wo Contrast    Result Date: 8/15/2020  EXAMINATION: CT OF THE ABDOMEN AND PELVIS WITHOUT CONTRAST 8/15/2020 1:49 pm TECHNIQUE: CT of the abdomen and pelvis was performed without the administration of intravenous contrast. Multiplanar reformatted images are provided for review. Dose modulation, iterative reconstruction, and/or weight based adjustment of the mA/kV was utilized to reduce the radiation dose to as low as reasonably achievable. COMPARISON: CT abdomen/pelvis 06/10/2017 HISTORY: ORDERING SYSTEM PROVIDED HISTORY: flank pain TECHNOLOGIST PROVIDED HISTORY: Reason for exam:->flank pain Reason for Exam: flank pain Acuity: Acute Type of Exam: Initial Additional signs and symptoms: pt sts lower abd pain x 3 days, hx of difficulty, pressure,and frequent with urintaion, several bilateral kidney stones. FINDINGS: Thorax base:  Normal heart size with no pericardial effusion. The distal esophagus demonstrates stable mild circumferential thickening without distention. No focal mass. The lung bases demonstrate no acute consolidation or pleural effusion.  Abdomen: Path 8/2019:  20% calcium oxalate dihydrate, 80% Calcium oxalate monohydrate  Stone Path 8/2017:  90% calcium oxalate monohydrate, 10% calcium oxalate dihydrate    1) Gross Hematuria: likely secondary to staghorn calculus   Hgb 13.9   UA and urine cx pending   Pt not passing any clots  2) Right Staghorn Calculus:   8/15/20 CT a/p: Bilateral non-obstructing nephrolithiasis with a right staghorn calculus   Cr 1.1   WBC 14.5   Temp of 103.2 early this am   On IV Flagyl and Zosyn d/t perforated sigmoid colon   No indication for  intervention at this time. Will plan to follow up as outpatient. Will follow    Patient seen and examined, chart reviewed.      Electronically signed by Marsha Wu PA-C on 8/16/2020 at 8:09 AM

## 2020-08-16 NOTE — PROGRESS NOTES
Temp 103.3 patient refusing tylenol via suppository asked physician if I could give with a small sip of water. Ashley Glez NP encouraged me to give Toradol as ordered and sponge. Patient is wanting to take a cold shower. Hematuria noted and notified provider new orders placed.

## 2020-08-17 LAB
ALBUMIN SERPL-MCNC: 3.2 GM/DL (ref 3.4–5)
ALP BLD-CCNC: 56 IU/L (ref 40–128)
ALT SERPL-CCNC: 8 U/L (ref 10–40)
ANION GAP SERPL CALCULATED.3IONS-SCNC: 9 MMOL/L (ref 4–16)
AST SERPL-CCNC: 8 IU/L (ref 15–37)
BILIRUB SERPL-MCNC: 1.3 MG/DL (ref 0–1)
BUN BLDV-MCNC: 12 MG/DL (ref 6–23)
CALCIUM SERPL-MCNC: 8 MG/DL (ref 8.3–10.6)
CHLORIDE BLD-SCNC: 102 MMOL/L (ref 99–110)
CO2: 26 MMOL/L (ref 21–32)
CREAT SERPL-MCNC: 1 MG/DL (ref 0.9–1.3)
GFR AFRICAN AMERICAN: >60 ML/MIN/1.73M2
GFR NON-AFRICAN AMERICAN: >60 ML/MIN/1.73M2
GLUCOSE BLD-MCNC: 95 MG/DL (ref 70–99)
HCT VFR BLD CALC: 41.5 % (ref 42–52)
HEMOGLOBIN: 13.3 GM/DL (ref 13.5–18)
MAGNESIUM: 2 MG/DL (ref 1.8–2.4)
MCH RBC QN AUTO: 30.5 PG (ref 27–31)
MCHC RBC AUTO-ENTMCNC: 32 % (ref 32–36)
MCV RBC AUTO: 95.2 FL (ref 78–100)
PDW BLD-RTO: 12.3 % (ref 11.7–14.9)
PHOSPHORUS: 1.6 MG/DL (ref 2.5–4.9)
PLATELET # BLD: 200 K/CU MM (ref 140–440)
PMV BLD AUTO: 10.5 FL (ref 7.5–11.1)
POTASSIUM SERPL-SCNC: 3.7 MMOL/L (ref 3.5–5.1)
RBC # BLD: 4.36 M/CU MM (ref 4.6–6.2)
SODIUM BLD-SCNC: 137 MMOL/L (ref 135–145)
TOTAL PROTEIN: 5.5 GM/DL (ref 6.4–8.2)
WBC # BLD: 10.8 K/CU MM (ref 4–10.5)

## 2020-08-17 PROCEDURE — 80053 COMPREHEN METABOLIC PANEL: CPT

## 2020-08-17 PROCEDURE — 84100 ASSAY OF PHOSPHORUS: CPT

## 2020-08-17 PROCEDURE — 6360000002 HC RX W HCPCS: Performed by: SURGERY

## 2020-08-17 PROCEDURE — 87040 BLOOD CULTURE FOR BACTERIA: CPT

## 2020-08-17 PROCEDURE — 6360000002 HC RX W HCPCS: Performed by: NURSE PRACTITIONER

## 2020-08-17 PROCEDURE — 85027 COMPLETE CBC AUTOMATED: CPT

## 2020-08-17 PROCEDURE — 99232 SBSQ HOSP IP/OBS MODERATE 35: CPT | Performed by: SURGERY

## 2020-08-17 PROCEDURE — 36415 COLL VENOUS BLD VENIPUNCTURE: CPT

## 2020-08-17 PROCEDURE — 94761 N-INVAS EAR/PLS OXIMETRY MLT: CPT

## 2020-08-17 PROCEDURE — 83735 ASSAY OF MAGNESIUM: CPT

## 2020-08-17 PROCEDURE — C9113 INJ PANTOPRAZOLE SODIUM, VIA: HCPCS | Performed by: NURSE PRACTITIONER

## 2020-08-17 PROCEDURE — 1200000000 HC SEMI PRIVATE

## 2020-08-17 PROCEDURE — 2580000003 HC RX 258: Performed by: NURSE PRACTITIONER

## 2020-08-17 RX ADMIN — PIPERACILLIN AND TAZOBACTAM 3.38 G: 3; .375 INJECTION, POWDER, LYOPHILIZED, FOR SOLUTION INTRAVENOUS at 22:59

## 2020-08-17 RX ADMIN — SODIUM CHLORIDE: 9 INJECTION, SOLUTION INTRAVENOUS at 13:06

## 2020-08-17 RX ADMIN — HYDROMORPHONE HYDROCHLORIDE 1 MG: 1 INJECTION, SOLUTION INTRAMUSCULAR; INTRAVENOUS; SUBCUTANEOUS at 18:10

## 2020-08-17 RX ADMIN — PANTOPRAZOLE SODIUM 40 MG: 40 INJECTION, POWDER, FOR SOLUTION INTRAVENOUS at 09:16

## 2020-08-17 RX ADMIN — HYDROMORPHONE HYDROCHLORIDE 1 MG: 1 INJECTION, SOLUTION INTRAMUSCULAR; INTRAVENOUS; SUBCUTANEOUS at 15:27

## 2020-08-17 RX ADMIN — PIPERACILLIN AND TAZOBACTAM 3.38 G: 3; .375 INJECTION, POWDER, LYOPHILIZED, FOR SOLUTION INTRAVENOUS at 06:07

## 2020-08-17 RX ADMIN — PIPERACILLIN AND TAZOBACTAM 3.38 G: 3; .375 INJECTION, POWDER, LYOPHILIZED, FOR SOLUTION INTRAVENOUS at 12:47

## 2020-08-17 RX ADMIN — SODIUM CHLORIDE: 9 INJECTION, SOLUTION INTRAVENOUS at 23:04

## 2020-08-17 RX ADMIN — HYDROMORPHONE HYDROCHLORIDE 1 MG: 1 INJECTION, SOLUTION INTRAMUSCULAR; INTRAVENOUS; SUBCUTANEOUS at 06:37

## 2020-08-17 RX ADMIN — HYDROMORPHONE HYDROCHLORIDE 1 MG: 1 INJECTION, SOLUTION INTRAMUSCULAR; INTRAVENOUS; SUBCUTANEOUS at 09:16

## 2020-08-17 RX ADMIN — SODIUM CHLORIDE, PRESERVATIVE FREE 10 ML: 5 INJECTION INTRAVENOUS at 09:17

## 2020-08-17 RX ADMIN — SODIUM CHLORIDE, PRESERVATIVE FREE 10 ML: 5 INJECTION INTRAVENOUS at 22:57

## 2020-08-17 RX ADMIN — HYDROMORPHONE HYDROCHLORIDE 1 MG: 1 INJECTION, SOLUTION INTRAMUSCULAR; INTRAVENOUS; SUBCUTANEOUS at 22:50

## 2020-08-17 RX ADMIN — HYDROMORPHONE HYDROCHLORIDE 1 MG: 1 INJECTION, SOLUTION INTRAMUSCULAR; INTRAVENOUS; SUBCUTANEOUS at 01:51

## 2020-08-17 ASSESSMENT — PAIN - FUNCTIONAL ASSESSMENT
PAIN_FUNCTIONAL_ASSESSMENT: PREVENTS OR INTERFERES SOME ACTIVE ACTIVITIES AND ADLS

## 2020-08-17 ASSESSMENT — PAIN SCALES - GENERAL
PAINLEVEL_OUTOF10: 5
PAINLEVEL_OUTOF10: 8
PAINLEVEL_OUTOF10: 8
PAINLEVEL_OUTOF10: 9
PAINLEVEL_OUTOF10: 7
PAINLEVEL_OUTOF10: 7
PAINLEVEL_OUTOF10: 0
PAINLEVEL_OUTOF10: 5
PAINLEVEL_OUTOF10: 8
PAINLEVEL_OUTOF10: 5

## 2020-08-17 ASSESSMENT — PAIN DESCRIPTION - ONSET
ONSET: ON-GOING
ONSET: ON-GOING
ONSET: GRADUAL
ONSET: ON-GOING

## 2020-08-17 ASSESSMENT — PAIN DESCRIPTION - PROGRESSION
CLINICAL_PROGRESSION: NOT CHANGED
CLINICAL_PROGRESSION: GRADUALLY WORSENING

## 2020-08-17 ASSESSMENT — PAIN DESCRIPTION - PAIN TYPE
TYPE: ACUTE PAIN

## 2020-08-17 ASSESSMENT — PAIN DESCRIPTION - DESCRIPTORS
DESCRIPTORS: DULL;SHARP
DESCRIPTORS: DULL
DESCRIPTORS: ACHING;SHOOTING;SHARP;THROBBING
DESCRIPTORS: ACHING;SHARP

## 2020-08-17 ASSESSMENT — PAIN DESCRIPTION - LOCATION
LOCATION: ABDOMEN

## 2020-08-17 ASSESSMENT — PAIN DESCRIPTION - ORIENTATION
ORIENTATION: MID;LEFT;RIGHT
ORIENTATION: RIGHT;LEFT
ORIENTATION: OTHER (COMMENT)
ORIENTATION: RIGHT;LEFT

## 2020-08-17 ASSESSMENT — PAIN DESCRIPTION - FREQUENCY
FREQUENCY: CONTINUOUS

## 2020-08-17 NOTE — PROGRESS NOTES
or rub, regular rate  Abdomen: soft, ND , diffuse tenderness on palpation, +BS  Extremities: no cyanosis, tenderness or erythema, peripheral pulses audible  Neurology: alert, oriented x3, able to move 4 limbs    Medications:   Medications:    sodium chloride flush  10 mL Intravenous 2 times per day    enoxaparin  40 mg Subcutaneous Nightly    pantoprazole  40 mg Intravenous Daily    piperacillin-tazobactam  3.375 g Intravenous Q8H      Infusions:    sodium chloride 100 mL/hr at 08/16/20 2234     PRN Meds: sodium chloride flush, 10 mL, PRN  acetaminophen, 650 mg, Q6H PRN    Or  acetaminophen, 650 mg, Q6H PRN  polyethylene glycol, 17 g, Daily PRN  promethazine, 12.5 mg, Q6H PRN    Or  ondansetron, 4 mg, Q6H PRN  ketorolac, 30 mg, Q6H PRN  HYDROmorphone, 1 mg, Q2H PRN          Electronically signed by Dexter Farley MD on 8/17/2020 at 7:37 AM

## 2020-08-17 NOTE — PROGRESS NOTES
Segs Absolute 13.7 K/CU MM    Lymphocytes Absolute 1.6 K/CU MM    Monocytes Absolute 1.3 K/CU MM    Eosinophils Absolute 0.0 K/CU MM    Basophils Absolute 0.0 K/CU MM    TRC 0.0737 K/CU MM    Immature Neutrophil % 0.4 0 - 0.43 %    Total Immature Neutrophil 0.07 K/CU MM   Basic Metabolic Panel w/ Reflex to MG    Collection Time: 08/16/20  7:15 AM   Result Value Ref Range    Sodium 138 135 - 145 MMOL/L    Potassium 3.5 3.5 - 5.1 MMOL/L    Chloride 101 99 - 110 mMol/L    CO2 25 21 - 32 MMOL/L    Anion Gap 12 4 - 16    BUN 15 6 - 23 MG/DL    CREATININE 1.1 0.9 - 1.3 MG/DL    Glucose 104 (H) 70 - 99 MG/DL    Calcium 8.2 (L) 8.3 - 10.6 MG/DL    GFR Non-African American >60 >60 mL/min/1.73m2    GFR African American >60 >60 mL/min/1.73m2   CBC auto differential    Collection Time: 08/16/20  7:15 AM   Result Value Ref Range    WBC 14.5 (H) 4.0 - 10.5 K/CU MM    RBC 4.54 (L) 4.6 - 6.2 M/CU MM    Hemoglobin 13.9 13.5 - 18.0 GM/DL    Hematocrit 42.6 42 - 52 %    MCV 93.8 78 - 100 FL    MCH 30.6 27 - 31 PG    MCHC 32.6 32.0 - 36.0 %    RDW 12.4 11.7 - 14.9 %    Platelets 222 317 - 388 K/CU MM    MPV 10.5 7.5 - 11.1 FL    Differential Type AUTOMATED DIFFERENTIAL     Segs Relative 88.2 (H) 36 - 66 %    Lymphocytes % 5.6 (L) 24 - 44 %    Monocytes % 5.2 (H) 0 - 4 %    Eosinophils % 0.0 0 - 3 %    Basophils % 0.2 0 - 1 %    Segs Absolute 12.8 K/CU MM    Lymphocytes Absolute 0.8 K/CU MM    Monocytes Absolute 0.8 K/CU MM    Eosinophils Absolute 0.0 K/CU MM    Basophils Absolute 0.0 K/CU MM    Nucleated RBC % 0.0 %    Total Nucleated RBC 0.0 K/CU MM    Total Immature Neutrophil 0.11 K/CU MM    Immature Neutrophil % 0.8 (H) 0 - 0.43 %   Magnesium    Collection Time: 08/16/20  7:15 AM   Result Value Ref Range    Magnesium 1.9 1.8 - 2.4 mg/dl   CBC    Collection Time: 08/17/20  8:27 AM   Result Value Ref Range    WBC 10.8 (H) 4.0 - 10.5 K/CU MM    RBC 4.36 (L) 4.6 - 6.2 M/CU MM    Hemoglobin 13.3 (L) 13.5 - 18.0 GM/DL    Hematocrit Patient feels better his abdominal pain has improved after he moved his bowels. .. leukocytosis improving . Haley Navarroaleta Continue NPO. Urology input appreciated. Increase Ambulation to at least 4x/day walk in the hallways with assistance. Respiratory ryann-operative care: Incentive Spirometry / deep breathing and coughing 10x/hr while awake. Continue DVT prophylaxis with Teds and SCDs and SC Lovenox. Continue GI prophylaxis with Protonix IV till able to tolerate PO.   Continue IV Abx.    ___________________________________________    Andrea Fraire MD, FACS, FICS  8/17/2020  11:18 AM

## 2020-08-17 NOTE — PROGRESS NOTES
[facility] [address], [address]   Progress Note  [facility] 0 1 2      Date: 2020   Patient: Serene Fritz   : 1975   DOA: 8/15/2020   MRN: [id number]   ROOM#: [id number]     Admit Date: 8/15/2020     Collaborating Urologist on Call at time of admission: Dr. Satish Cuenca  CC: Abdominal pain    Subjective:     Pain: mild, no nausea and no vomiting,   Bowel Movement/Flatus:   No  Voiding:  Easily with rust-colored urine    Pt resting in pain, states his urine appears more rust-colored today. Denies any pain or clots.     Objective:    Vitals:    /66   Pulse 87   Temp 99.1 °F (37.3 °C) (Oral)   Resp 18   Ht 5' 11\" (1.803 m)   Wt 213 lb 14.4 oz (97 kg)   SpO2 98%   BMI 29.83 kg/m²    Temp  Av.4 °F (37.4 °C)  Min: 98.2 °F (36.8 °C)  Max: 101 °F (38.3 °C)       Intake/Output Summary (Last 24 hours) at 2020 5634  Last data filed at 2020 6875  Gross per 24 hour   Intake 100 ml   Output 900 ml   Net -800 ml       Physical Exam:   General appearance: alert, appears stated age, cooperative and mild distress  Head: Normocephalic, without obvious abnormality, atraumatic  Back: No CVA tenderness  Abdomen: soft, non-distended, diffuse TTP, and guarding    Labs:   WBC:    Lab Results   Component Value Date    WBC 14.5 2020      Hemoglobin/Hematocrit:    Lab Results   Component Value Date    HGB 13.9 2020    HCT 42.6 2020      BMP:   Lab Results   Component Value Date     2020    K 3.5 2020     2020    CO2 25 2020    BUN 15 2020    LABALBU 4.0 08/15/2020    CREATININE 1.1 2020    CALCIUM 8.2 2020    GFRAA >60 2020    LABGLOM >60 2020       Imaging:   Ct Abdomen Pelvis Wo Contrast    Result Date: 8/15/2020  EXAMINATION: CT OF THE ABDOMEN AND PELVIS WITHOUT CONTRAST 8/15/2020 1:49 pm TECHNIQUE: CT of the abdomen and pelvis was performed without the administration of intravenous contrast. Multiplanar gynecomastia. No significant enlarged inguinal lymph nodes. Normal bone mineral density. Chronic bilateral L5 spondylolysis with grade 1 anterolisthesis L5 with respect to a transitional S1 vertebral body. No acute osseous abnormality. 1. Acute perforated sigmoid diverticulitis. No abscess. 2. Bilateral non-obstructing nephrolithiasis with a right staghorn calculus. 3. Stable circumferential thickening of the distal esophagus which is nonspecific, this could relate to esophagitis. 4. Cholelithiasis without evidence of acute cholecystitis or biliary obstruction. Critical results were called by Dr. Robina Gusman. Agnieszka Dwyer MD to [facility] on 8/15/2020 at 14:07. Assessment & Plan:      Cresencio Kiran is a 39y.o. year old male admitted 8/15/2020 for perforation of sigmoid colon due to diverticulitis. Prior stone events:  Julio Caba 8/2019:  20% calcium oxalate dihydrate, 80% Calcium oxalate monohydrate  Stone Path 8/2017:  90% calcium oxalate monohydrate, 10% calcium oxalate dihydrate     1) Gross Hematuria: likely secondary to staghorn calculus              Hgb 13.9              UA and urine cx pending              Pt not passing any clots  2) Right Staghorn Calculus:              8/15/20 CT a/p: Bilateral non-obstructing nephrolithiasis with a right staghorn calculus              Cr 1.0              WBC 10.8, afebrile              On IV Flagyl and Zosyn d/t perforated sigmoid colon              No indication for  intervention at this time as calculus noted in right lower pole is relatively small and does not fill the collecting system. . Will plan to follow up as outpatient. Pt stable from a  standpoint. Will sign off, please call with any questions. Pt to follow up with Dr. Esequiel Hatchet in 2 weeks. Patient seen and examined, chart reviewed.      Electronically signed by Debbie Frank PA-C on 8/17/2020 at 8:28 AM

## 2020-08-18 LAB
BASOPHILS ABSOLUTE: 0 K/CU MM
BASOPHILS RELATIVE PERCENT: 0.2 % (ref 0–1)
DIFFERENTIAL TYPE: ABNORMAL
EOSINOPHILS ABSOLUTE: 0.1 K/CU MM
EOSINOPHILS RELATIVE PERCENT: 0.4 % (ref 0–3)
HCT VFR BLD CALC: 45 % (ref 42–52)
HEMOGLOBIN: 14.5 GM/DL (ref 13.5–18)
IMMATURE NEUTROPHIL %: 0.7 % (ref 0–0.43)
LYMPHOCYTES ABSOLUTE: 1.4 K/CU MM
LYMPHOCYTES RELATIVE PERCENT: 11.2 % (ref 24–44)
MAGNESIUM: 2 MG/DL (ref 1.8–2.4)
MCH RBC QN AUTO: 30.9 PG (ref 27–31)
MCHC RBC AUTO-ENTMCNC: 32.2 % (ref 32–36)
MCV RBC AUTO: 95.9 FL (ref 78–100)
MONOCYTES ABSOLUTE: 0.6 K/CU MM
MONOCYTES RELATIVE PERCENT: 5.2 % (ref 0–4)
NUCLEATED RBC %: 0 %
PDW BLD-RTO: 12.5 % (ref 11.7–14.9)
PHOSPHORUS: 2.4 MG/DL (ref 2.5–4.9)
PLATELET # BLD: 237 K/CU MM (ref 140–440)
PMV BLD AUTO: 10.7 FL (ref 7.5–11.1)
RBC # BLD: 4.69 M/CU MM (ref 4.6–6.2)
SEGMENTED NEUTROPHILS ABSOLUTE COUNT: 10 K/CU MM
SEGMENTED NEUTROPHILS RELATIVE PERCENT: 82.3 % (ref 36–66)
TOTAL IMMATURE NEUTOROPHIL: 0.08 K/CU MM
TOTAL NUCLEATED RBC: 0 K/CU MM
WBC # BLD: 12.2 K/CU MM (ref 4–10.5)

## 2020-08-18 PROCEDURE — 6360000002 HC RX W HCPCS: Performed by: NURSE PRACTITIONER

## 2020-08-18 PROCEDURE — 83735 ASSAY OF MAGNESIUM: CPT

## 2020-08-18 PROCEDURE — 6360000002 HC RX W HCPCS: Performed by: INTERNAL MEDICINE

## 2020-08-18 PROCEDURE — C9113 INJ PANTOPRAZOLE SODIUM, VIA: HCPCS | Performed by: NURSE PRACTITIONER

## 2020-08-18 PROCEDURE — 6360000002 HC RX W HCPCS: Performed by: SURGERY

## 2020-08-18 PROCEDURE — 1200000000 HC SEMI PRIVATE

## 2020-08-18 PROCEDURE — 85027 COMPLETE CBC AUTOMATED: CPT

## 2020-08-18 PROCEDURE — 2580000003 HC RX 258: Performed by: NURSE PRACTITIONER

## 2020-08-18 PROCEDURE — 94761 N-INVAS EAR/PLS OXIMETRY MLT: CPT

## 2020-08-18 PROCEDURE — 84100 ASSAY OF PHOSPHORUS: CPT

## 2020-08-18 PROCEDURE — 99232 SBSQ HOSP IP/OBS MODERATE 35: CPT | Performed by: SURGERY

## 2020-08-18 PROCEDURE — 87040 BLOOD CULTURE FOR BACTERIA: CPT

## 2020-08-18 PROCEDURE — 85025 COMPLETE CBC W/AUTO DIFF WBC: CPT

## 2020-08-18 RX ORDER — KETOROLAC TROMETHAMINE 30 MG/ML
15 INJECTION, SOLUTION INTRAMUSCULAR; INTRAVENOUS EVERY 6 HOURS PRN
Status: DISPENSED | OUTPATIENT
Start: 2020-08-18 | End: 2020-08-19

## 2020-08-18 RX ORDER — MORPHINE SULFATE 2 MG/ML
2 INJECTION, SOLUTION INTRAMUSCULAR; INTRAVENOUS EVERY 4 HOURS PRN
Status: DISCONTINUED | OUTPATIENT
Start: 2020-08-18 | End: 2020-08-21 | Stop reason: HOSPADM

## 2020-08-18 RX ORDER — SIMETHICONE 80 MG
80 TABLET,CHEWABLE ORAL EVERY 6 HOURS PRN
Status: DISCONTINUED | OUTPATIENT
Start: 2020-08-18 | End: 2020-08-21 | Stop reason: HOSPADM

## 2020-08-18 RX ORDER — MORPHINE SULFATE 4 MG/ML
4 INJECTION, SOLUTION INTRAMUSCULAR; INTRAVENOUS EVERY 4 HOURS PRN
Status: DISCONTINUED | OUTPATIENT
Start: 2020-08-18 | End: 2020-08-18

## 2020-08-18 RX ADMIN — SODIUM CHLORIDE, PRESERVATIVE FREE 10 ML: 5 INJECTION INTRAVENOUS at 03:30

## 2020-08-18 RX ADMIN — SODIUM CHLORIDE, PRESERVATIVE FREE 10 ML: 5 INJECTION INTRAVENOUS at 06:15

## 2020-08-18 RX ADMIN — PIPERACILLIN AND TAZOBACTAM 3.38 G: 3; .375 INJECTION, POWDER, LYOPHILIZED, FOR SOLUTION INTRAVENOUS at 13:02

## 2020-08-18 RX ADMIN — HYDROMORPHONE HYDROCHLORIDE 1 MG: 1 INJECTION, SOLUTION INTRAMUSCULAR; INTRAVENOUS; SUBCUTANEOUS at 03:30

## 2020-08-18 RX ADMIN — MORPHINE SULFATE 2 MG: 2 INJECTION, SOLUTION INTRAMUSCULAR; INTRAVENOUS at 20:29

## 2020-08-18 RX ADMIN — ONDANSETRON 4 MG: 2 INJECTION INTRAMUSCULAR; INTRAVENOUS at 14:22

## 2020-08-18 RX ADMIN — SODIUM CHLORIDE, PRESERVATIVE FREE 10 ML: 5 INJECTION INTRAVENOUS at 08:51

## 2020-08-18 RX ADMIN — PIPERACILLIN AND TAZOBACTAM 3.38 G: 3; .375 INJECTION, POWDER, LYOPHILIZED, FOR SOLUTION INTRAVENOUS at 05:35

## 2020-08-18 RX ADMIN — KETOROLAC TROMETHAMINE 15 MG: 30 INJECTION, SOLUTION INTRAMUSCULAR; INTRAVENOUS at 13:01

## 2020-08-18 RX ADMIN — PIPERACILLIN AND TAZOBACTAM 3.38 G: 3; .375 INJECTION, POWDER, LYOPHILIZED, FOR SOLUTION INTRAVENOUS at 20:29

## 2020-08-18 RX ADMIN — HYDROMORPHONE HYDROCHLORIDE 1 MG: 1 INJECTION, SOLUTION INTRAMUSCULAR; INTRAVENOUS; SUBCUTANEOUS at 01:27

## 2020-08-18 RX ADMIN — SODIUM CHLORIDE: 9 INJECTION, SOLUTION INTRAVENOUS at 09:20

## 2020-08-18 RX ADMIN — SODIUM CHLORIDE, PRESERVATIVE FREE 10 ML: 5 INJECTION INTRAVENOUS at 01:28

## 2020-08-18 RX ADMIN — SODIUM CHLORIDE: 9 INJECTION, SOLUTION INTRAVENOUS at 20:29

## 2020-08-18 RX ADMIN — MORPHINE SULFATE 4 MG: 4 INJECTION, SOLUTION INTRAMUSCULAR; INTRAVENOUS at 06:15

## 2020-08-18 RX ADMIN — SODIUM CHLORIDE, PRESERVATIVE FREE 10 ML: 5 INJECTION INTRAVENOUS at 20:28

## 2020-08-18 RX ADMIN — ENOXAPARIN SODIUM 40 MG: 40 INJECTION, SOLUTION INTRAVENOUS; SUBCUTANEOUS at 20:29

## 2020-08-18 RX ADMIN — MORPHINE SULFATE 2 MG: 2 INJECTION, SOLUTION INTRAMUSCULAR; INTRAVENOUS at 14:22

## 2020-08-18 RX ADMIN — PANTOPRAZOLE SODIUM 40 MG: 40 INJECTION, POWDER, FOR SOLUTION INTRAVENOUS at 08:51

## 2020-08-18 ASSESSMENT — PAIN DESCRIPTION - LOCATION
LOCATION: ABDOMEN
LOCATION: ABDOMEN

## 2020-08-18 ASSESSMENT — PAIN DESCRIPTION - FREQUENCY
FREQUENCY: INTERMITTENT

## 2020-08-18 ASSESSMENT — PAIN DESCRIPTION - PROGRESSION
CLINICAL_PROGRESSION: NOT CHANGED
CLINICAL_PROGRESSION: NOT CHANGED

## 2020-08-18 ASSESSMENT — PAIN SCALES - GENERAL
PAINLEVEL_OUTOF10: 7
PAINLEVEL_OUTOF10: 4
PAINLEVEL_OUTOF10: 7
PAINLEVEL_OUTOF10: 6
PAINLEVEL_OUTOF10: 2
PAINLEVEL_OUTOF10: 8
PAINLEVEL_OUTOF10: 0
PAINLEVEL_OUTOF10: 6
PAINLEVEL_OUTOF10: 4
PAINLEVEL_OUTOF10: 2
PAINLEVEL_OUTOF10: 0

## 2020-08-18 ASSESSMENT — PAIN - FUNCTIONAL ASSESSMENT: PAIN_FUNCTIONAL_ASSESSMENT: PREVENTS OR INTERFERES SOME ACTIVE ACTIVITIES AND ADLS

## 2020-08-18 ASSESSMENT — PAIN DESCRIPTION - PAIN TYPE
TYPE: ACUTE PAIN
TYPE: ACUTE PAIN

## 2020-08-18 ASSESSMENT — PAIN DESCRIPTION - DESCRIPTORS
DESCRIPTORS: ACHING;THROBBING
DESCRIPTORS: ACHING

## 2020-08-18 ASSESSMENT — PAIN DESCRIPTION - ORIENTATION
ORIENTATION: MID;LEFT;RIGHT
ORIENTATION: MID;LEFT;RIGHT

## 2020-08-18 NOTE — PLAN OF CARE
Problem: Pain:  Goal: Pain level will decrease  Description: Pain level will decrease  Outcome: Ongoing  Goal: Control of acute pain  Description: Control of acute pain  Outcome: Ongoing  Goal: Control of chronic pain  Description: Control of chronic pain  Outcome: Ongoing  Goal: Patient's pain/discomfort is manageable  Description: Patient's pain/discomfort is manageable  Outcome: Ongoing     Problem: Discharge Planning:  Goal: Discharged to appropriate level of care  Description: Discharged to appropriate level of care  Outcome: Ongoing     Problem: Infection:  Goal: Will remain free from infection  Description: Will remain free from infection  Outcome: Ongoing     Problem: Safety:  Goal: Free from accidental physical injury  Description: Free from accidental physical injury  Outcome: Ongoing  Goal: Free from intentional harm  Description: Free from intentional harm  Outcome: Ongoing     Problem: Daily Care:  Goal: Daily care needs are met  Description: Daily care needs are met  Outcome: Ongoing     Problem: Skin Integrity:  Goal: Skin integrity will stabilize  Description: Skin integrity will stabilize  Outcome: Ongoing     Problem: Discharge Planning:  Goal: Patients continuum of care needs are met  Description: Patients continuum of care needs are met  Outcome: Ongoing

## 2020-08-18 NOTE — PROGRESS NOTES
On call notified per patient request to change pain medication and wants something to alleviate gas. Awaiting response.

## 2020-08-18 NOTE — PROGRESS NOTES
Hospitalist Progress Note      Name:  Danie Mcclain /Age/Sex: 1975  (39 y.o. male)   MRN & CSN:  [id number] & [id number] Admission Date/Time: 8/15/2020  5:00 PM   [facility] PCP: No primary care provider on file. Hospital Day: 4    ASSESSMENT & PLAN:   Danie Mcclain is a 39 y.o.  male  who presented with a lower quadrant abdominal pain. CT abdomen pelvis showed acute perforated sigmoid diverticulitis without abscess. Disposition: Home  Reason For Continued Hospitalization: Medically not ready. Awaiting surgical clearance for discharge. Remains n.p.o. Continues to have persistent abdominal pain. Acute complicated perforated sigmoid diverticulitis---no evidence of abscess. Has been n.p.o. since admission. Patient states his abdominal pain much better. However he is very tender to slight touch.  -N.p.o.  -Full liquid diet as of  per general surgery  - Toradol as needed  -Morphine PRN  -Continue Zosyn  -Continue IV Protonix    Bilateral nonobstructing nephrolithiasis---- seen by urology. No plans for intervention. Marijuana use--- does not use marijuana on a daily basis      MEDICAL DECISION MAKING:  -Labs reviewed  -Imaging reviewed  -Level of risk moderate  Diet Diet NPO Effective Now   DVT Prophylaxis [] Lovenox, []  Heparin, [] SCDs, [] Ambulation   GI Prophylaxis [] PPI,  [] H2 Blocker,  [] Carafate,  [] Diet/Tube Feeds   Code Status Full Code   Disposition  Home   MDM [] Low, [x] Moderate,[]  High     Chief complaint/Interval History/ROS     Chief Complaint: Abdominal pain, perforated sigmoid diverticulitis      INTERVAL HISTORY:  Clinically remains stable. Continues to have abdominal pain. Remains n.p.o.     ROS:  Complaint abdominal pain. Abdominal pain much improved compared to admission. Objective:        Intake/Output Summary (Last 24 hours) at 2020 1156  Last data filed at 2020 0252  Gross per 24 hour   Intake 1773 ml   Output 200 ml Net 1573 ml      Vitals:   Vitals:    08/18/20 0845   BP: 133/85   Pulse: 69   Resp: 18   Temp: 97.5 °F (36.4 °C)   SpO2: 96%     Physical Exam:   GEN: Awake male, laying flat in bed. Nontoxic-appearing. Answers questions appropriate. EYES: No eye discharge. Ocular muscles intact. HENT: Moist membranes. No nasal discharge. NECK: Supple  RESP: Clear to auscultation bilaterally. CV: Regular rate and rhythm. No pitting lower extremity edema. GI: Abdomen soft. Tender to palpation. :   Donnelly catheter is not present. MSK: No bony fractures. No gross deformities. SKIN: warm, dry, no rashes,  NEURO: Cranial nerves appear grossly intact, normal speech, no lateralizing weakness. PSYCH: Awake, alert, oriented x 4. Affect appropriate.     Medications:   Medications:    sodium chloride flush  10 mL Intravenous 2 times per day    enoxaparin  40 mg Subcutaneous Nightly    pantoprazole  40 mg Intravenous Daily    piperacillin-tazobactam  3.375 g Intravenous Q8H      Infusions:    sodium chloride 100 mL/hr at 08/18/20 0920     PRN Meds: morphine, 4 mg, Q4H PRN  simethicone, 80 mg, Q6H PRN  ketorolac, 15 mg, Q6H PRN  sodium chloride flush, 10 mL, PRN  acetaminophen, 650 mg, Q6H PRN    Or  acetaminophen, 650 mg, Q6H PRN  polyethylene glycol, 17 g, Daily PRN  promethazine, 12.5 mg, Q6H PRN    Or  ondansetron, 4 mg, Q6H PRN      Electronically signed by Ulises Angulo MD on 8/18/2020 at 11:56 AM

## 2020-08-18 NOTE — PROGRESS NOTES
Nucleated RBC % 0.0 %    Total Nucleated RBC 0.0 K/CU MM    Total Immature Neutrophil 0.11 K/CU MM    Immature Neutrophil % 0.8 (H) 0 - 0.43 %   Magnesium    Collection Time: 08/16/20  7:15 AM   Result Value Ref Range    Magnesium 1.9 1.8 - 2.4 mg/dl   CBC    Collection Time: 08/17/20  8:27 AM   Result Value Ref Range    WBC 10.8 (H) 4.0 - 10.5 K/CU MM    RBC 4.36 (L) 4.6 - 6.2 M/CU MM    Hemoglobin 13.3 (L) 13.5 - 18.0 GM/DL    Hematocrit 41.5 (L) 42 - 52 %    MCV 95.2 78 - 100 FL    MCH 30.5 27 - 31 PG    MCHC 32.0 32.0 - 36.0 %    RDW 12.3 11.7 - 14.9 %    Platelets 873 756 - 298 K/CU MM    MPV 10.5 7.5 - 11.1 FL   Comprehensive Metabolic Panel    Collection Time: 08/17/20  8:27 AM   Result Value Ref Range    Sodium 137 135 - 145 MMOL/L    Potassium 3.7 3.5 - 5.1 MMOL/L    Chloride 102 99 - 110 mMol/L    CO2 26 21 - 32 MMOL/L    BUN 12 6 - 23 MG/DL    CREATININE 1.0 0.9 - 1.3 MG/DL    Glucose 95 70 - 99 MG/DL    Calcium 8.0 (L) 8.3 - 10.6 MG/DL    Alb 3.2 (L) 3.4 - 5.0 GM/DL    Total Protein 5.5 (L) 6.4 - 8.2 GM/DL    Total Bilirubin 1.3 (H) 0.0 - 1.0 MG/DL    ALT 8 (L) 10 - 40 U/L    AST 8 (L) 15 - 37 IU/L    Alkaline Phosphatase 56 40 - 128 IU/L    GFR Non-African American >60 >60 mL/min/1.73m2    GFR African American >60 >60 mL/min/1.73m2    Anion Gap 9 4 - 16   Magnesium    Collection Time: 08/17/20  8:27 AM   Result Value Ref Range    Magnesium 2.0 1.8 - 2.4 mg/dl   Phosphorus    Collection Time: 08/17/20  8:27 AM   Result Value Ref Range    Phosphorus 1.6 (L) 2.5 - 4.9 MG/DL       Scheduled Meds:   sodium chloride flush  10 mL Intravenous 2 times per day    enoxaparin  40 mg Subcutaneous Nightly    pantoprazole  40 mg Intravenous Daily    piperacillin-tazobactam  3.375 g Intravenous Q8H       Continuous Infusions:   sodium chloride 100 mL/hr at 08/17/20 2304       Physical Exam:  HEENT: Anicteric sclerae, Oropharyngeal mucosae moist, pink and intact.   Heart:  Normal S1 and S2, RRR  Lungs: Clear to auscultation bilaterally, No audible Wheezes or Rales. Extremities: No edema. Neuro: Alert and Oriented x 3, Non focal.  Abdomen: Soft,  DIFFUSELY, BUT IMPROVING, Non distended, Positive bowel sounds. Principal Problem:    Perforation of sigmoid colon due to diverticulitis  Resolved Problems:    * No resolved hospital problems. *      Assessment and Plan:  Anthony Jin is a 39 y.o. male with acute perforated sigmoid diverticulitis,   Patient feels better his abdominal pain has improved after he moved his bowels. .. leukocytosis improving . Alla Tanner Continue NPO, till tomorrow and then start PO FULL LIQUIDS IN AM and follow closely. Urology input appreciated. Increase Ambulation to at least 4x/day walk in the hallways with assistance. Respiratory ryann-operative care: Incentive Spirometry / deep breathing and coughing 10x/hr while awake. Continue DVT prophylaxis with Teds and SCDs and SC Lovenox. Continue GI prophylaxis with Protonix IV till able to tolerate PO.   Continue IV Abx.    ___________________________________________    Isidoro Gould MD, FACS, FICS  8/18/2020  5:11 AM

## 2020-08-18 NOTE — PROGRESS NOTES
The patient requested for Dilaudid to be changed to morphine as he does not like the feeling Dilaudid gives to him.   Change per patient's request.

## 2020-08-19 ENCOUNTER — APPOINTMENT (OUTPATIENT)
Dept: CT IMAGING | Age: 45
DRG: 872 | End: 2020-08-19
Attending: INTERNAL MEDICINE
Payer: COMMERCIAL

## 2020-08-19 LAB
BASOPHILS ABSOLUTE: 0 K/CU MM
BASOPHILS RELATIVE PERCENT: 0.3 % (ref 0–1)
DIFFERENTIAL TYPE: ABNORMAL
EOSINOPHILS ABSOLUTE: 0.2 K/CU MM
EOSINOPHILS RELATIVE PERCENT: 1.7 % (ref 0–3)
GLUCOSE BLD-MCNC: 85 MG/DL (ref 70–99)
HCT VFR BLD CALC: 40.6 % (ref 42–52)
HEMOGLOBIN: 13.4 GM/DL (ref 13.5–18)
IMMATURE NEUTROPHIL %: 0.2 % (ref 0–0.43)
LYMPHOCYTES ABSOLUTE: 1.4 K/CU MM
LYMPHOCYTES RELATIVE PERCENT: 15 % (ref 24–44)
MCH RBC QN AUTO: 30.9 PG (ref 27–31)
MCHC RBC AUTO-ENTMCNC: 33 % (ref 32–36)
MCV RBC AUTO: 93.5 FL (ref 78–100)
MONOCYTES ABSOLUTE: 0.6 K/CU MM
MONOCYTES RELATIVE PERCENT: 6.4 % (ref 0–4)
NUCLEATED RBC %: 0 %
PDW BLD-RTO: 12.7 % (ref 11.7–14.9)
PLATELET # BLD: 257 K/CU MM (ref 140–440)
PMV BLD AUTO: 10.4 FL (ref 7.5–11.1)
RBC # BLD: 4.34 M/CU MM (ref 4.6–6.2)
SEGMENTED NEUTROPHILS ABSOLUTE COUNT: 7.1 K/CU MM
SEGMENTED NEUTROPHILS RELATIVE PERCENT: 76.4 % (ref 36–66)
TOTAL IMMATURE NEUTOROPHIL: 0.02 K/CU MM
TOTAL NUCLEATED RBC: 0 K/CU MM
WBC # BLD: 9.3 K/CU MM (ref 4–10.5)

## 2020-08-19 PROCEDURE — 2580000003 HC RX 258: Performed by: NURSE PRACTITIONER

## 2020-08-19 PROCEDURE — 2580000003 HC RX 258: Performed by: SURGERY

## 2020-08-19 PROCEDURE — 2500000003 HC RX 250 WO HCPCS: Performed by: SURGERY

## 2020-08-19 PROCEDURE — C9113 INJ PANTOPRAZOLE SODIUM, VIA: HCPCS | Performed by: NURSE PRACTITIONER

## 2020-08-19 PROCEDURE — 74177 CT ABD & PELVIS W/CONTRAST: CPT

## 2020-08-19 PROCEDURE — 6360000002 HC RX W HCPCS: Performed by: NURSE PRACTITIONER

## 2020-08-19 PROCEDURE — 82962 GLUCOSE BLOOD TEST: CPT

## 2020-08-19 PROCEDURE — C1751 CATH, INF, PER/CENT/MIDLINE: HCPCS

## 2020-08-19 PROCEDURE — 36569 INSJ PICC 5 YR+ W/O IMAGING: CPT

## 2020-08-19 PROCEDURE — 1200000000 HC SEMI PRIVATE

## 2020-08-19 PROCEDURE — 02HV33Z INSERTION OF INFUSION DEVICE INTO SUPERIOR VENA CAVA, PERCUTANEOUS APPROACH: ICD-10-PCS | Performed by: SURGERY

## 2020-08-19 PROCEDURE — 99233 SBSQ HOSP IP/OBS HIGH 50: CPT | Performed by: SURGERY

## 2020-08-19 PROCEDURE — 6360000004 HC RX CONTRAST MEDICATION: Performed by: SURGERY

## 2020-08-19 PROCEDURE — 6360000002 HC RX W HCPCS: Performed by: INTERNAL MEDICINE

## 2020-08-19 PROCEDURE — 36415 COLL VENOUS BLD VENIPUNCTURE: CPT

## 2020-08-19 PROCEDURE — 76937 US GUIDE VASCULAR ACCESS: CPT

## 2020-08-19 PROCEDURE — 85025 COMPLETE CBC W/AUTO DIFF WBC: CPT

## 2020-08-19 RX ORDER — LIDOCAINE HYDROCHLORIDE 10 MG/ML
5 INJECTION, SOLUTION EPIDURAL; INFILTRATION; INTRACAUDAL; PERINEURAL ONCE
Status: COMPLETED | OUTPATIENT
Start: 2020-08-19 | End: 2020-08-19

## 2020-08-19 RX ORDER — SODIUM CHLORIDE 0.9 % (FLUSH) 0.9 %
10 SYRINGE (ML) INJECTION PRN
Status: DISCONTINUED | OUTPATIENT
Start: 2020-08-19 | End: 2020-08-21 | Stop reason: HOSPADM

## 2020-08-19 RX ORDER — SODIUM CHLORIDE 0.9 % (FLUSH) 0.9 %
10 SYRINGE (ML) INJECTION EVERY 12 HOURS SCHEDULED
Status: DISCONTINUED | OUTPATIENT
Start: 2020-08-19 | End: 2020-08-21 | Stop reason: HOSPADM

## 2020-08-19 RX ADMIN — SODIUM CHLORIDE, PRESERVATIVE FREE 10 ML: 5 INJECTION INTRAVENOUS at 22:40

## 2020-08-19 RX ADMIN — PIPERACILLIN AND TAZOBACTAM 3.38 G: 3; .375 INJECTION, POWDER, LYOPHILIZED, FOR SOLUTION INTRAVENOUS at 14:21

## 2020-08-19 RX ADMIN — SODIUM CHLORIDE, PRESERVATIVE FREE 10 ML: 5 INJECTION INTRAVENOUS at 20:48

## 2020-08-19 RX ADMIN — LIDOCAINE HYDROCHLORIDE ANHYDROUS 5 ML: 10 INJECTION, SOLUTION INFILTRATION at 16:11

## 2020-08-19 RX ADMIN — PANTOPRAZOLE SODIUM 40 MG: 40 INJECTION, POWDER, FOR SOLUTION INTRAVENOUS at 12:21

## 2020-08-19 RX ADMIN — MORPHINE SULFATE 2 MG: 2 INJECTION, SOLUTION INTRAMUSCULAR; INTRAVENOUS at 01:22

## 2020-08-19 RX ADMIN — MORPHINE SULFATE 2 MG: 2 INJECTION, SOLUTION INTRAMUSCULAR; INTRAVENOUS at 06:00

## 2020-08-19 RX ADMIN — SODIUM CHLORIDE, PRESERVATIVE FREE 10 ML: 5 INJECTION INTRAVENOUS at 01:22

## 2020-08-19 RX ADMIN — ASCORBIC ACID, VITAMIN A PALMITATE, CHOLECALCIFEROL, THIAMINE HYDROCHLORIDE, RIBOFLAVIN-5 PHOSPHATE SODIUM, PYRIDOXINE HYDROCHLORIDE, NIACINAMIDE, DEXPANTHENOL, ALPHA-TOCOPHEROL ACETATE, VITAMIN K1, FOLIC ACID, BIOTIN, CYANOCOBALAMIN: 200; 3300; 200; 6; 3.6; 6; 40; 15; 10; 150; 600; 60; 5 INJECTION, SOLUTION INTRAVENOUS at 18:31

## 2020-08-19 RX ADMIN — SODIUM CHLORIDE: 9 INJECTION, SOLUTION INTRAVENOUS at 22:39

## 2020-08-19 RX ADMIN — ENOXAPARIN SODIUM 40 MG: 40 INJECTION, SOLUTION INTRAVENOUS; SUBCUTANEOUS at 20:36

## 2020-08-19 RX ADMIN — SODIUM CHLORIDE, PRESERVATIVE FREE 10 ML: 5 INJECTION INTRAVENOUS at 06:00

## 2020-08-19 RX ADMIN — PIPERACILLIN AND TAZOBACTAM 3.38 G: 3; .375 INJECTION, POWDER, LYOPHILIZED, FOR SOLUTION INTRAVENOUS at 20:36

## 2020-08-19 RX ADMIN — SODIUM CHLORIDE: 9 INJECTION, SOLUTION INTRAVENOUS at 12:20

## 2020-08-19 RX ADMIN — SODIUM CHLORIDE, PRESERVATIVE FREE 10 ML: 5 INJECTION INTRAVENOUS at 20:37

## 2020-08-19 RX ADMIN — PIPERACILLIN AND TAZOBACTAM 3.38 G: 3; .375 INJECTION, POWDER, LYOPHILIZED, FOR SOLUTION INTRAVENOUS at 05:16

## 2020-08-19 RX ADMIN — KETOROLAC TROMETHAMINE 15 MG: 30 INJECTION, SOLUTION INTRAMUSCULAR; INTRAVENOUS at 14:56

## 2020-08-19 RX ADMIN — IOPAMIDOL 80 ML: 755 INJECTION, SOLUTION INTRAVENOUS at 10:39

## 2020-08-19 RX ADMIN — MORPHINE SULFATE 2 MG: 2 INJECTION, SOLUTION INTRAMUSCULAR; INTRAVENOUS at 22:39

## 2020-08-19 ASSESSMENT — PAIN DESCRIPTION - PAIN TYPE: TYPE: ACUTE PAIN

## 2020-08-19 ASSESSMENT — PAIN DESCRIPTION - FREQUENCY: FREQUENCY: CONTINUOUS

## 2020-08-19 ASSESSMENT — PAIN SCALES - GENERAL
PAINLEVEL_OUTOF10: 5
PAINLEVEL_OUTOF10: 6
PAINLEVEL_OUTOF10: 0
PAINLEVEL_OUTOF10: 2
PAINLEVEL_OUTOF10: 5
PAINLEVEL_OUTOF10: 0
PAINLEVEL_OUTOF10: 5
PAINLEVEL_OUTOF10: 7
PAINLEVEL_OUTOF10: 5
PAINLEVEL_OUTOF10: 0

## 2020-08-19 ASSESSMENT — PAIN DESCRIPTION - LOCATION
LOCATION: ABDOMEN
LOCATION: ABDOMEN

## 2020-08-19 ASSESSMENT — PAIN DESCRIPTION - PROGRESSION: CLINICAL_PROGRESSION: GRADUALLY IMPROVING

## 2020-08-19 ASSESSMENT — PAIN DESCRIPTION - ONSET: ONSET: ON-GOING

## 2020-08-19 ASSESSMENT — PAIN DESCRIPTION - ORIENTATION: ORIENTATION: MID;LEFT;RIGHT

## 2020-08-19 NOTE — PLAN OF CARE
Problem: Pain:  Goal: Pain level will decrease  Description: Pain level will decrease  8/19/2020 0416 by Juanita Perez RN  Outcome: Ongoing  8/18/2020 1856 by Jeffrey Rice RN  Outcome: Ongoing  Goal: Control of acute pain  Description: Control of acute pain  8/19/2020 0416 by Juanita Perez RN  Outcome: Ongoing  8/18/2020 1856 by Jeffrey Rice RN  Outcome: Ongoing  Goal: Control of chronic pain  Description: Control of chronic pain  8/19/2020 0416 by Juanita Perez RN  Outcome: Ongoing  8/18/2020 1856 by Jeffrey Rice RN  Outcome: Ongoing  Goal: Patient's pain/discomfort is manageable  Description: Patient's pain/discomfort is manageable  8/19/2020 0416 by Juanita Perez RN  Outcome: Ongoing  8/18/2020 1856 by Jeffrey Rice RN  Outcome: Ongoing     Problem: Discharge Planning:  Goal: Discharged to appropriate level of care  Description: Discharged to appropriate level of care  8/19/2020 0416 by Juanita Perez RN  Outcome: Ongoing  8/18/2020 1856 by Jeffrey Rice RN  Outcome: Ongoing     Problem: Infection:  Goal: Will remain free from infection  Description: Will remain free from infection  8/19/2020 0416 by Juanita Perez RN  Outcome: Ongoing  8/18/2020 1856 by Jeffrey Rice RN  Outcome: Ongoing     Problem: Safety:  Goal: Free from accidental physical injury  Description: Free from accidental physical injury  8/19/2020 0416 by Juanita Perez RN  Outcome: Ongoing  8/18/2020 1856 by Jeffrey Rice RN  Outcome: Ongoing  Goal: Free from intentional harm  Description: Free from intentional harm  8/19/2020 0416 by Juanita Perez RN  Outcome: Ongoing  8/18/2020 1856 by Jeffrey Rice RN  Outcome: Ongoing     Problem: Daily Care:  Goal: Daily care needs are met  Description: Daily care needs are met  8/19/2020 0416 by Juanita Perez RN  Outcome: Ongoing  8/18/2020 1856 by Jeffrey Rice RN  Outcome: Ongoing     Problem: Skin Integrity:  Goal: Skin integrity will stabilize  Description: Skin integrity will stabilize  8/19/2020 0416 by Maria Del Carmen Napoles RN  Outcome: Ongoing  8/18/2020 1856 by Triny Ng RN  Outcome: Ongoing     Problem: Discharge Planning:  Goal: Patients continuum of care needs are met  Description: Patients continuum of care needs are met  8/19/2020 0416 by Maria Del Carmen Napoles RN  Outcome: Ongoing  8/18/2020 1856 by Triny Ng RN  Outcome: Ongoing     Problem: Falls - Risk of:  Goal: Will remain free from falls  Description: Will remain free from falls  Outcome: Ongoing  Goal: Absence of physical injury  Description: Absence of physical injury  Outcome: Ongoing

## 2020-08-19 NOTE — PROGRESS NOTES
GENERAL SURGERY PROGRESS NOTE    CC/HPI:           Patient feels better his abdominal pain is decreasing. .. leukocytosis was improving but this morning is higher than yesterday. . still NPO, will start him on full liquids today and follow closely AFTER a repeat CT. Vitals:    08/18/20 0845 08/18/20 1814 08/18/20 2020 08/19/20 0513   BP: 133/85 127/80 133/87 123/74   Pulse: 69 69 75 72   Resp: 18 16 16 16   Temp: 97.5 °F (36.4 °C) 97.7 °F (36.5 °C) 98.4 °F (36.9 °C) 98.4 °F (36.9 °C)   TempSrc: Oral Oral Oral Oral   SpO2: 96% 96% 99% 97%   Weight:    217 lb 1 oz (98.5 kg)   Height:         I/O last 3 completed shifts: In: 950 [I.V.:900; IV Piggyback:50]  Out: -   I/O this shift:   In: 780 [I.V.:730; IV Piggyback:50]  Out: -     Diet NPO Effective Now    Recent Results (from the past 48 hour(s))   CBC    Collection Time: 08/17/20  8:27 AM   Result Value Ref Range    WBC 10.8 (H) 4.0 - 10.5 K/CU MM    RBC 4.36 (L) 4.6 - 6.2 M/CU MM    Hemoglobin 13.3 (L) 13.5 - 18.0 GM/DL    Hematocrit 41.5 (L) 42 - 52 %    MCV 95.2 78 - 100 FL    MCH 30.5 27 - 31 PG    MCHC 32.0 32.0 - 36.0 %    RDW 12.3 11.7 - 14.9 %    Platelets 122 954 - 110 K/CU MM    MPV 10.5 7.5 - 11.1 FL   Comprehensive Metabolic Panel    Collection Time: 08/17/20  8:27 AM   Result Value Ref Range    Sodium 137 135 - 145 MMOL/L    Potassium 3.7 3.5 - 5.1 MMOL/L    Chloride 102 99 - 110 mMol/L    CO2 26 21 - 32 MMOL/L    BUN 12 6 - 23 MG/DL    CREATININE 1.0 0.9 - 1.3 MG/DL    Glucose 95 70 - 99 MG/DL    Calcium 8.0 (L) 8.3 - 10.6 MG/DL    Alb 3.2 (L) 3.4 - 5.0 GM/DL    Total Protein 5.5 (L) 6.4 - 8.2 GM/DL    Total Bilirubin 1.3 (H) 0.0 - 1.0 MG/DL    ALT 8 (L) 10 - 40 U/L    AST 8 (L) 15 - 37 IU/L    Alkaline Phosphatase 56 40 - 128 IU/L    GFR Non-African American >60 >60 mL/min/1.73m2    GFR African American >60 >60 mL/min/1.73m2    Anion Gap 9 4 - 16   Magnesium    Collection Time: 08/17/20  8:27 AM   Result Value Ref Range    Magnesium 2.0 1.8 - 2.4 mg/dl   Phosphorus    Collection Time: 08/17/20  8:27 AM   Result Value Ref Range    Phosphorus 1.6 (L) 2.5 - 4.9 MG/DL   CBC Auto Differential    Collection Time: 08/18/20  5:14 AM   Result Value Ref Range    WBC 12.2 (H) 4.0 - 10.5 K/CU MM    RBC 4.69 4.6 - 6.2 M/CU MM    Hemoglobin 14.5 13.5 - 18.0 GM/DL    Hematocrit 45.0 42 - 52 %    MCV 95.9 78 - 100 FL    MCH 30.9 27 - 31 PG    MCHC 32.2 32.0 - 36.0 %    RDW 12.5 11.7 - 14.9 %    Platelets 125 693 - 585 K/CU MM    MPV 10.7 7.5 - 11.1 FL    Differential Type AUTOMATED DIFFERENTIAL     Segs Relative 82.3 (H) 36 - 66 %    Lymphocytes % 11.2 (L) 24 - 44 %    Monocytes % 5.2 (H) 0 - 4 %    Eosinophils % 0.4 0 - 3 %    Basophils % 0.2 0 - 1 %    Segs Absolute 10.0 K/CU MM    Lymphocytes Absolute 1.4 K/CU MM    Monocytes Absolute 0.6 K/CU MM    Eosinophils Absolute 0.1 K/CU MM    Basophils Absolute 0.0 K/CU MM    Nucleated RBC % 0.0 %    Total Nucleated RBC 0.0 K/CU MM    Total Immature Neutrophil 0.08 K/CU MM    Immature Neutrophil % 0.7 (H) 0 - 0.43 %   Magnesium    Collection Time: 08/18/20  7:49 PM   Result Value Ref Range    Magnesium 2.0 1.8 - 2.4 mg/dl   Phosphorus    Collection Time: 08/18/20  7:49 PM   Result Value Ref Range    Phosphorus 2.4 (L) 2.5 - 4.9 MG/DL       Scheduled Meds:   sodium chloride flush  10 mL Intravenous 2 times per day    enoxaparin  40 mg Subcutaneous Nightly    pantoprazole  40 mg Intravenous Daily    piperacillin-tazobactam  3.375 g Intravenous Q8H       Continuous Infusions:   sodium chloride 100 mL/hr at 08/18/20 2029       Physical Exam:  HEENT: Anicteric sclerae, Oropharyngeal mucosae moist, pink and intact. Heart:  Normal S1 and S2, RRR  Lungs: Clear to auscultation bilaterally, No audible Wheezes or Rales. Extremities: No edema. Neuro: Alert and Oriented x 3, Non focal.  Abdomen: Soft,  DIFFUSELY  Non distended, Positive bowel sounds. CT 8/15:     Impression    1.  Acute perforated sigmoid diverticulitis.  No abscess. 2. Bilateral non-obstructing nephrolithiasis with a right staghorn calculus. 3. Stable circumferential thickening of the distal esophagus which is    nonspecific, this could relate to esophagitis. 4. Cholelithiasis without evidence of acute cholecystitis or biliary    obstruction. Principal Problem:    Perforation of sigmoid colon due to diverticulitis  Resolved Problems:    * No resolved hospital problems. *      Assessment and Plan:  Danie Mcclain is a 39 y.o. male with acute perforated sigmoid diverticulitis,   Patient feels better his abdominal pain has improved after he moved his bowels. .. Leukocytosis was improving but this morning is higher than yesterday. . and still is more tender than expected, still NPO, will start him on full liquids today and follow closely AFTER a repeat CT. Increase Ambulation to at least 4x/day walk in the hallways with assistance. Respiratory ryann-operative care: Incentive Spirometry / deep breathing and coughing 10x/hr while awake. Continue DVT prophylaxis with Teds and SCDs and SC Lovenox. Continue GI prophylaxis with Protonix IV till able to tolerate PO. Continue IV Abx. Addendum @ 11:41 AM  Discussed the case in length with the radiologist, after I reviewed his CT, and I will keep him NPO, start TPN, and watch VERY CLOSELY. I explained this to the patient and he understands. Impression    An air and fluid collection adjacent to the mid sigmoid colon is increased in    size, and now tracks superiorly into the mesentery and anterior peritoneum. There is a moderate to large amount of free intraperitoneal air, new from    prior exam.         Dilated mid small bowel loops, which may represent focal ileus from adjacent    inflammation versus small bowel obstruction.         Small amount of ascites.         ___________________________________________    Zoey Lane MD, FACS, FICS  8/19/2020  6:07 AM

## 2020-08-19 NOTE — PROGRESS NOTES
Hospitalist Progress Note      Name:  Delonte Bowens /Age/Sex: 1975  (39 y.o. male)   MRN & CSN:  [id number] & [id number] Admission Date/Time: 8/15/2020  5:00 PM   Location:  [address] [place] [address] PCP: No primary care provider on file. Hospital Day: 5    ASSESSMENT & PLAN:   Delonte Bowens is a 39 y.o.  male  who presented with a lower quadrant abdominal pain. CT abdomen pelvis showed acute perforated sigmoid diverticulitis without abscess. Disposition: Home  Reason For Continued Hospitalization: Medically not ready. Awaiting surgical clearance for discharge. diet advanced to full liquid diet as of . continues to have persistent abdominal pain. Abdomen tender on exam.  CT abdomen pelvis ordered and pending. Acute complicated perforated sigmoid diverticulitis---no evidence of abscess. N.p.o. since admission. Diet advanced at on  to full liquid diet. Continues to have persistent abdominal pain. Abdomen is very tender on exam.  -Full liquid diet  -CT abdomen pelvis  - Toradol as needed  -Morphine PRN  -Continue Zosyn  -Continue IV Protonix    Bilateral nonobstructing nephrolithiasis---- seen by urology. No plans for intervention. Marijuana use--- does not use marijuana on a daily basis      MEDICAL DECISION MAKING:  -Labs reviewed  -Imaging reviewed  -Level of risk moderate  Diet Diet NPO Effective Now   DVT Prophylaxis [] Lovenox, []  Heparin, [] SCDs, [] Ambulation   GI Prophylaxis [] PPI,  [] H2 Blocker,  [] Carafate,  [] Diet/Tube Feeds   Code Status Full Code   Disposition  Home   MDM [] Low, [x] Moderate,[]  High     Chief complaint/Interval History/ROS     Chief Complaint: Abdominal pain, perforated sigmoid diverticulitis      INTERVAL HISTORY: CT abdomen pelvis ordered today. Continues to have persistent abdominal pain. ROS:  Complaint abdominal pain. No vomiting. No chest pains. No fevers or chills. Objective:        Intake/Output Summary (Last 24 hours) at 8/19/2020 1040  Last data filed at 8/19/2020 0417  Gross per 24 hour   Intake 780 ml   Output --   Net 780 ml      Vitals:   Vitals:    08/19/20 0806   BP: 138/72   Pulse: 78   Resp: 16   Temp: 98.2 °F (36.8 °C)   SpO2:      Physical Exam:   GEN: Awake male, laying flat in bed. Answers questions appropriately. EYES: No eye discharge. Ocular muscles intact. HENT: Moist membranes. No nasal discharge. NECK: Supple  RESP: Clear to auscultation bilaterally. CV: Regular rate and rhythm. No pitting lower extremity edema. GI: Abdomen soft. Tender to palpation. :   Donnelly catheter is not present. MSK: No bony fractures. No gross deformities. SKIN: warm, dry, no rashes,  NEURO: Cranial nerves appear grossly intact, normal speech, no lateralizing weakness. PSYCH: Awake, alert, oriented x 4. Affect appropriate.     Medications:   Medications:    sodium chloride flush  10 mL Intravenous 2 times per day    enoxaparin  40 mg Subcutaneous Nightly    pantoprazole  40 mg Intravenous Daily    piperacillin-tazobactam  3.375 g Intravenous Q8H      Infusions:    sodium chloride 100 mL/hr at 08/18/20 2029     PRN Meds: sodium chloride flush, 10 mL, PRN  simethicone, 80 mg, Q6H PRN  ketorolac, 15 mg, Q6H PRN  morphine, 2 mg, Q4H PRN  sodium chloride flush, 10 mL, PRN  acetaminophen, 650 mg, Q6H PRN    Or  acetaminophen, 650 mg, Q6H PRN  polyethylene glycol, 17 g, Daily PRN  promethazine, 12.5 mg, Q6H PRN    Or  ondansetron, 4 mg, Q6H PRN      Electronically signed by Shan Tolbert MD on 8/19/2020 at 10:40 AM

## 2020-08-19 NOTE — PROGRESS NOTES
Acetate  40meq Sodium Chloride  NO Sodium Phosphate  NO Potassium Acetate  20meq Potassium Chloride  30mmol (44meq) Potassium Phosphate  2gm Calcium Gluconate  1gm Magnesium Sulfate      Pharmacy will continue to follow and adjust as appropriate.    Thank you for the consult,    Purnima Parrish MS, [facility]  8/19/2020 1:52 PM

## 2020-08-19 NOTE — CONSULTS
PICC Consult complete. Education regarding insertion of PICC reviewed with patient. Risk/benefit/and alternatives discussed. verbalized understanding. Verbal Consent given by patient as he is unable to use his right hand. Time out done at 1515. PICC line inserted left upper arm  without difficulty per protocol. Pt tolerated well. Good blood return and flushes easily. ECG tip confirmation system utilized for tip placement with P wave changes noted by RN during insertion and ECG strips left on chart. Educational booklet left at bedside. PICC Line OK to use at this time. Patient has a significant infiltration in right arm, instructed patient to keep right arm elevated on 2 pillows while he is in bed. Patient demonstrated understanding.     Elaine Rivera

## 2020-08-20 LAB
ANION GAP SERPL CALCULATED.3IONS-SCNC: 9 MMOL/L (ref 4–16)
BASOPHILS ABSOLUTE: 0 K/CU MM
BASOPHILS RELATIVE PERCENT: 0.5 % (ref 0–1)
BUN BLDV-MCNC: 8 MG/DL (ref 6–23)
CALCIUM SERPL-MCNC: 7.7 MG/DL (ref 8.3–10.6)
CHLORIDE BLD-SCNC: 102 MMOL/L (ref 99–110)
CO2: 28 MMOL/L (ref 21–32)
CREAT SERPL-MCNC: 0.8 MG/DL (ref 0.9–1.3)
DIFFERENTIAL TYPE: ABNORMAL
EOSINOPHILS ABSOLUTE: 0.2 K/CU MM
EOSINOPHILS RELATIVE PERCENT: 2.5 % (ref 0–3)
GFR AFRICAN AMERICAN: >60 ML/MIN/1.73M2
GFR NON-AFRICAN AMERICAN: >60 ML/MIN/1.73M2
GLUCOSE BLD-MCNC: 106 MG/DL (ref 70–99)
GLUCOSE BLD-MCNC: 107 MG/DL (ref 70–99)
GLUCOSE BLD-MCNC: 110 MG/DL (ref 70–99)
GLUCOSE BLD-MCNC: 113 MG/DL (ref 70–99)
GLUCOSE BLD-MCNC: 120 MG/DL (ref 70–99)
GLUCOSE BLD-MCNC: 86 MG/DL (ref 70–99)
HCT VFR BLD CALC: 35.2 % (ref 42–52)
HEMOGLOBIN: 11.4 GM/DL (ref 13.5–18)
IMMATURE NEUTROPHIL %: 0.5 % (ref 0–0.43)
LYMPHOCYTES ABSOLUTE: 1.7 K/CU MM
LYMPHOCYTES RELATIVE PERCENT: 20.9 % (ref 24–44)
MAGNESIUM: 1.8 MG/DL (ref 1.8–2.4)
MCH RBC QN AUTO: 30.4 PG (ref 27–31)
MCHC RBC AUTO-ENTMCNC: 32.4 % (ref 32–36)
MCV RBC AUTO: 93.9 FL (ref 78–100)
MONOCYTES ABSOLUTE: 0.6 K/CU MM
MONOCYTES RELATIVE PERCENT: 7.5 % (ref 0–4)
PDW BLD-RTO: 12.4 % (ref 11.7–14.9)
PHOSPHORUS: 3.4 MG/DL (ref 2.5–4.9)
PLATELET # BLD: 253 K/CU MM (ref 140–440)
PMV BLD AUTO: 10.8 FL (ref 7.5–11.1)
POTASSIUM SERPL-SCNC: 3.2 MMOL/L (ref 3.5–5.1)
RBC # BLD: 3.75 M/CU MM (ref 4.6–6.2)
SEGMENTED NEUTROPHILS ABSOLUTE COUNT: 5.4 K/CU MM
SEGMENTED NEUTROPHILS RELATIVE PERCENT: 68.1 % (ref 36–66)
SODIUM BLD-SCNC: 139 MMOL/L (ref 135–145)
TOTAL IMMATURE NEUTOROPHIL: 0.04 K/CU MM
TRIGL SERPL-MCNC: 92 MG/DL
WBC # BLD: 7.9 K/CU MM (ref 4–10.5)
WBC # BLD: ABNORMAL 10*3/UL

## 2020-08-20 PROCEDURE — 1200000000 HC SEMI PRIVATE

## 2020-08-20 PROCEDURE — 82962 GLUCOSE BLOOD TEST: CPT

## 2020-08-20 PROCEDURE — 2580000003 HC RX 258: Performed by: SURGERY

## 2020-08-20 PROCEDURE — 2500000003 HC RX 250 WO HCPCS: Performed by: SURGERY

## 2020-08-20 PROCEDURE — 6370000000 HC RX 637 (ALT 250 FOR IP): Performed by: NURSE PRACTITIONER

## 2020-08-20 PROCEDURE — 80048 BASIC METABOLIC PNL TOTAL CA: CPT

## 2020-08-20 PROCEDURE — 83735 ASSAY OF MAGNESIUM: CPT

## 2020-08-20 PROCEDURE — 94761 N-INVAS EAR/PLS OXIMETRY MLT: CPT

## 2020-08-20 PROCEDURE — C9113 INJ PANTOPRAZOLE SODIUM, VIA: HCPCS | Performed by: NURSE PRACTITIONER

## 2020-08-20 PROCEDURE — 85025 COMPLETE CBC W/AUTO DIFF WBC: CPT

## 2020-08-20 PROCEDURE — 6360000002 HC RX W HCPCS: Performed by: NURSE PRACTITIONER

## 2020-08-20 PROCEDURE — 84478 ASSAY OF TRIGLYCERIDES: CPT

## 2020-08-20 PROCEDURE — 84100 ASSAY OF PHOSPHORUS: CPT

## 2020-08-20 PROCEDURE — 99232 SBSQ HOSP IP/OBS MODERATE 35: CPT | Performed by: SURGERY

## 2020-08-20 PROCEDURE — 6360000002 HC RX W HCPCS: Performed by: INTERNAL MEDICINE

## 2020-08-20 PROCEDURE — 2580000003 HC RX 258: Performed by: NURSE PRACTITIONER

## 2020-08-20 RX ADMIN — PIPERACILLIN AND TAZOBACTAM 3.38 G: 3; .375 INJECTION, POWDER, LYOPHILIZED, FOR SOLUTION INTRAVENOUS at 07:01

## 2020-08-20 RX ADMIN — MORPHINE SULFATE 2 MG: 2 INJECTION, SOLUTION INTRAMUSCULAR; INTRAVENOUS at 08:23

## 2020-08-20 RX ADMIN — SODIUM CHLORIDE: 9 INJECTION, SOLUTION INTRAVENOUS at 19:01

## 2020-08-20 RX ADMIN — SODIUM CHLORIDE, PRESERVATIVE FREE 10 ML: 5 INJECTION INTRAVENOUS at 08:15

## 2020-08-20 RX ADMIN — MORPHINE SULFATE 2 MG: 2 INJECTION, SOLUTION INTRAMUSCULAR; INTRAVENOUS at 15:42

## 2020-08-20 RX ADMIN — I.V. FAT EMULSION 250 ML: 20 EMULSION INTRAVENOUS at 17:37

## 2020-08-20 RX ADMIN — MORPHINE SULFATE 2 MG: 2 INJECTION, SOLUTION INTRAMUSCULAR; INTRAVENOUS at 22:21

## 2020-08-20 RX ADMIN — PIPERACILLIN AND TAZOBACTAM 3.38 G: 3; .375 INJECTION, POWDER, LYOPHILIZED, FOR SOLUTION INTRAVENOUS at 21:54

## 2020-08-20 RX ADMIN — PIPERACILLIN AND TAZOBACTAM 3.38 G: 3; .375 INJECTION, POWDER, LYOPHILIZED, FOR SOLUTION INTRAVENOUS at 12:30

## 2020-08-20 RX ADMIN — ASCORBIC ACID, VITAMIN A PALMITATE, CHOLECALCIFEROL, THIAMINE HYDROCHLORIDE, RIBOFLAVIN-5 PHOSPHATE SODIUM, PYRIDOXINE HYDROCHLORIDE, NIACINAMIDE, DEXPANTHENOL, ALPHA-TOCOPHEROL ACETATE, VITAMIN K1, FOLIC ACID, BIOTIN, CYANOCOBALAMIN: 200; 3300; 200; 6; 3.6; 6; 40; 15; 10; 150; 600; 60; 5 INJECTION, SOLUTION INTRAVENOUS at 17:36

## 2020-08-20 RX ADMIN — SIMETHICONE CHEW TAB 80 MG 80 MG: 80 TABLET ORAL at 15:46

## 2020-08-20 RX ADMIN — ENOXAPARIN SODIUM 40 MG: 40 INJECTION, SOLUTION INTRAVENOUS; SUBCUTANEOUS at 21:55

## 2020-08-20 RX ADMIN — PANTOPRAZOLE SODIUM 40 MG: 40 INJECTION, POWDER, FOR SOLUTION INTRAVENOUS at 08:15

## 2020-08-20 ASSESSMENT — PAIN SCALES - GENERAL
PAINLEVEL_OUTOF10: 6
PAINLEVEL_OUTOF10: 0
PAINLEVEL_OUTOF10: 6
PAINLEVEL_OUTOF10: 0
PAINLEVEL_OUTOF10: 7
PAINLEVEL_OUTOF10: 0

## 2020-08-20 ASSESSMENT — PAIN DESCRIPTION - PROGRESSION
CLINICAL_PROGRESSION: NOT CHANGED
CLINICAL_PROGRESSION: NOT CHANGED

## 2020-08-20 ASSESSMENT — PAIN DESCRIPTION - PAIN TYPE
TYPE: ACUTE PAIN
TYPE: ACUTE PAIN

## 2020-08-20 ASSESSMENT — PAIN DESCRIPTION - ORIENTATION: ORIENTATION: RIGHT;LEFT;LOWER;MID

## 2020-08-20 ASSESSMENT — PAIN DESCRIPTION - LOCATION
LOCATION: ABDOMEN
LOCATION: ABDOMEN

## 2020-08-20 ASSESSMENT — PAIN DESCRIPTION - ONSET
ONSET: ON-GOING
ONSET: ON-GOING

## 2020-08-20 ASSESSMENT — PAIN DESCRIPTION - DESCRIPTORS: DESCRIPTORS: ACHING

## 2020-08-20 ASSESSMENT — PAIN DESCRIPTION - FREQUENCY: FREQUENCY: INTERMITTENT

## 2020-08-20 NOTE — PROGRESS NOTES
Pt stated that he wants to be transported to [facility]; Dr. Bessie Vera notified regarding this. Pt, and his girlfriend at the bedside, called me in the room again and stated that they still want to be moved to [facility]. Will jesusita Vera via [employer].

## 2020-08-20 NOTE — PLAN OF CARE
Problem: Pain:  Goal: Pain level will decrease  Description: Pain level will decrease  8/20/2020 1030 by Alex Mcgovern RN  Outcome: Ongoing  8/20/2020 0354 by Sourav Prince RN  Outcome: Ongoing  Goal: Control of acute pain  Description: Control of acute pain  8/20/2020 1030 by Alex Mcgovern RN  Outcome: Ongoing  8/20/2020 0354 by Sourav Prince RN  Outcome: Ongoing  Goal: Control of chronic pain  Description: Control of chronic pain  8/20/2020 1030 by Alex Mcgovern RN  Outcome: Ongoing  8/20/2020 0354 by Sourav Prince RN  Outcome: Ongoing  Goal: Patient's pain/discomfort is manageable  Description: Patient's pain/discomfort is manageable  8/20/2020 1030 by Alex Mcgovern RN  Outcome: Ongoing  8/20/2020 0354 by Sourav Prince RN  Outcome: Ongoing     Problem: Discharge Planning:  Goal: Discharged to appropriate level of care  Description: Discharged to appropriate level of care  8/20/2020 1030 by Alex Mcgovern RN  Outcome: Ongoing  8/20/2020 0354 by Sourav Prince RN  Outcome: Ongoing     Problem: Infection:  Goal: Will remain free from infection  Description: Will remain free from infection  8/20/2020 1030 by Alex Mcgovern RN  Outcome: Ongoing  8/20/2020 0354 by Sourav Prince RN  Outcome: Ongoing     Problem: Safety:  Goal: Free from accidental physical injury  Description: Free from accidental physical injury  8/20/2020 1030 by Alex Mcgovern RN  Outcome: Ongoing  8/20/2020 0354 by Sourav Prince RN  Outcome: Ongoing  Goal: Free from intentional harm  Description: Free from intentional harm  8/20/2020 1030 by Alex Mcgovern RN  Outcome: Ongoing  8/20/2020 0354 by Sourav Prince RN  Outcome: Ongoing     Problem: Daily Care:  Goal: Daily care needs are met  Description: Daily care needs are met  8/20/2020 1030 by Alex Mcgovern RN  Outcome: Ongoing  8/20/2020 0354 by Sourav Prince RN  Outcome: Ongoing     Problem: Skin Integrity:  Goal: Skin integrity will stabilize  Description: Skin integrity will stabilize  8/20/2020 1030 by Manfred Jorge RN  Outcome: Ongoing  8/20/2020 0354 by Ki Perez RN  Outcome: Ongoing     Problem: Discharge Planning:  Goal: Patients continuum of care needs are met  Description: Patients continuum of care needs are met  8/20/2020 1030 by Manfred Jorge RN  Outcome: Ongoing  8/20/2020 0354 by Ki Perez RN  Outcome: Ongoing     Problem: Falls - Risk of:  Goal: Will remain free from falls  Description: Will remain free from falls  8/20/2020 1030 by Manfred Jorge RN  Outcome: Ongoing  8/20/2020 0354 by Ki Perez RN  Outcome: Ongoing  Goal: Absence of physical injury  Description: Absence of physical injury  8/20/2020 1030 by Manfred Jorge RN  Outcome: Ongoing  8/20/2020 0354 by Ki Perez RN  Outcome: Ongoing

## 2020-08-20 NOTE — PROGRESS NOTES
Time: 08/19/20  8:41 PM   Result Value Ref Range    POC Glucose 85 70 - 99 MG/DL   POCT Glucose    Collection Time: 08/20/20  2:33 AM   Result Value Ref Range    POC Glucose 107 (H) 70 - 99 MG/DL       Scheduled Meds:   sodium chloride flush  10 mL Intravenous 2 times per day    fat emulsion  250 mL Intravenous Once per day on Mon Tue Thu Fri    sodium chloride flush  10 mL Intravenous 2 times per day    enoxaparin  40 mg Subcutaneous Nightly    pantoprazole  40 mg Intravenous Daily    piperacillin-tazobactam  3.375 g Intravenous Q8H       Continuous Infusions:   PN-Adult Premix 5/15 - Standard Electrolytes - Central Line 42 mL/hr at 08/19/20 1831    sodium chloride 100 mL/hr at 08/19/20 2239       Physical Exam:  HEENT: Anicteric sclerae, Oropharyngeal mucosae moist, pink and intact. Heart:  Normal S1 and S2, RRR  Lungs: Clear to auscultation bilaterally, No audible Wheezes or Rales. Extremities: No edema. Neuro: Alert and Oriented x 3, Non focal.  Abdomen: Soft,  DIFFUSELY  Non distended, Positive bowel sounds. CT 8/15:     Impression    1. Acute perforated sigmoid diverticulitis.  No abscess. 2. Bilateral non-obstructing nephrolithiasis with a right staghorn calculus. 3. Stable circumferential thickening of the distal esophagus which is    nonspecific, this could relate to esophagitis. 4. Cholelithiasis without evidence of acute cholecystitis or biliary    obstruction. Principal Problem:    Perforation of sigmoid colon due to diverticulitis  Resolved Problems:    * No resolved hospital problems. *      Assessment and Plan:  Anthony Jin is a 39 y.o. male with acute perforated sigmoid diverticulitis,   Patient feels better his abdominal pain has improved after he moved his bowels. .. Leukocytosis HAS RESOLVED, will start him on full liquids today and follow closely. Increase Ambulation to at least 4x/day walk in the hallways with assistance.   Respiratory ryann-operative care: Incentive Spirometry / deep breathing and coughing 10x/hr while awake. Continue DVT prophylaxis with Teds and SCDs and SC Lovenox. Continue GI prophylaxis with Protonix IV till able to tolerate PO.   Continue IV Abx.    ___________________________________________    Vanessa Deluna MD, FACS, FICS  8/20/2020  7:35 AM

## 2020-08-20 NOTE — PROGRESS NOTES
TPN LAB EVALUATION  Recent Labs     08/20/20  0700      K 3.2*      CALCIUM 7.7*   MG 1.8   PHOS 3.4   GLUCOSE 120*     Recent Labs     08/19/20  2041 08/20/20  0233 08/20/20  0700 08/20/20  0750 08/20/20  1108   POCGLU 85 107*  --  106* 110*   GLUCOSE  --   --  120*  --   --      Triglycerides   Date Value Ref Range Status   08/20/2020 92 <150 MG/DL Final     Total Bilirubin   Date Value Ref Range Status   08/17/2020 1.3 (H) 0.0 - 1.0 MG/DL Final     RENAL LAB EVALUATION  Estimated Creatinine Clearance: 145 mL/min (A) (based on SCr of 0.8 mg/dL (L)). Recent Labs     08/20/20  0700   BUN 8   CREATININE 0.8*       Pharmacy to dose TPN per Dr. Maryjo Figueroa Day #2    Indication: Acute perforated diverticulitis; NPO    Goal: 5/15 running @ 80 ml/hr    Central line: PICC, double lumen    Diet: Full liquids to start today    Maintenance Fluids: 0.9% NaCl running @ 100 ml/hr    Glucose:   Goal <180    0 readings below/above goal    0 units of sliding scale insulin used    0 units of insulin in TPN     Triglycerides:   Goal < 400    8/20/20 @ 80   Patient is not currently receiving propofol   Continue standard lipid replacement at this time     Hepatic Function:   Previously WNL   AST/ALT low   Alk phos WNL   Total bilirubin < 3 on 8/17, trace added to PN    LIVER FUNCTION LAB EVALUATION  No results for input(s): AST, ALT, ALKPHOS, BILITOT, INR in the last 72 hours. Renal Function and Electrolytes:   K, slightly low @ 3.2, increasing TPN to goal rate   Mg, WNL   Phos, WNL   Renal function (Scr and BUN trends) stable at this time    Formulation: will continue 5/15 TPN with standard electrolytes and increase rate to 80 ml/hr (goal per dietician). Patient is tolerating PN per electrolyte and glucose trends. Dr. Alyssa Agustin noted to start patient on a full liquid diet today.     +Standard Electrolytes per 2,000ml Bag+  30meq Sodium Acetate  40meq Sodium Chloride  NO Sodium Phosphate  NO Potassium Acetate  20meq Potassium Chloride  30mmol (44meq) Potassium Phosphate  2gm Calcium Gluconate  1gm Magnesium Sulfate    Pharmacy will continue to follow and adjust as appropriate.    Thank you for the consult,    Kerstin Burkitt, PharmD, [facility]

## 2020-08-20 NOTE — PROGRESS NOTES
Hospitalist Progress Note      Name:  Chase Gaucher /Age/Sex: 1975  (39 y.o. male)   MRN & CSN:  [id number] & [id number] Admission Date/Time: 8/15/2020  5:00 PM   Location:  [address] [place] [address] PCP: No primary care provider on file. Hospital Day: 6    ASSESSMENT & PLAN:   Chase Gaucher is a 39 y.o.  male  who presented with a lower quadrant abdominal pain. CT abdomen pelvis showed acute perforated sigmoid diverticulitis without abscess. Disposition: Home  Reason For Continued Hospitalization: Medically not ready. Awaiting surgical clearance for discharge. CT abdomen pelvis on  with nonspecific findings. TPN started on . Appears to be doing much better compared to yesterday. Acute complicated perforated sigmoid diverticulitis---no evidence of abscess. N.p.o. since admission. Diet advanced at on  to full liquid diet. Continues to have persistent abdominal pain. Abdomen is very tender on exam.  -TPN as of   -Bariatric liquid diet as of   -Toradol as needed  -Morphine PRN  -Continue Zosyn  -Continue IV Protonix    Bilateral nonobstructing nephrolithiasis---- seen by urology. No plans for intervention. Marijuana use--- does not use marijuana on a daily basis      MEDICAL DECISION MAKING:  -Labs reviewed  -Imaging reviewed  -Level of risk moderate  Diet PN-Adult Premix 5/15 - Standardard Electrolytes - Central Line  Diet NPO Effective Now   DVT Prophylaxis [] Lovenox, []  Heparin, [] SCDs, [] Ambulation   GI Prophylaxis [] PPI,  [] H2 Blocker,  [] Carafate,  [] Diet/Tube Feeds   Code Status Full Code   Disposition  Home   MDM [] Low, [x] Moderate,[]  High     Chief complaint/Interval History/ROS     Chief Complaint: Abdominal pain, perforated sigmoid diverticulitis      INTERVAL HISTORY: CT abdomen pelvis with nonspecific findings. Symptomatically improving. TPN started on . ROS:  Complaint abdominal pain. No vomiting. No chest pains.   No fevers or

## 2020-08-20 NOTE — CONSULTS
cm)  · Current Body Weight: 224 lb 3.3 oz (101.7 kg)   · Admission Body Weight: 213 lb 13.5 oz (97 kg)(first measured weight this admission per documentation)      · Ideal Body Weight: 172 lbs;   · % Ideal Body Weight 130.4 %   · BMI: 31.3  · Adjusted Body Weight:  ; No Adjustment   · BMI Categories: Obese Class 1 (BMI 30.0-34. 9)       Nutrition Diagnosis:   · Inadequate oral intake related to altered GI function as evidenced by NPO or clear liquid status due to medical condition    Nutrition Interventions:   Food and/or Nutrient Delivery:  Modify Parenteral Nutrition  Nutrition Education/Counseling:  No recommendation at this time   Coordination of Nutrition Care:  Continued Inpatient Monitoring    Goals:  Pt will meet greater than 75% of estimated nutrient needs via PN       Nutrition Monitoring and Evaluation:    Food/Nutrient Intake Outcomes:  Diet Advancement/Tolerance, Parenteral Nutrition Intake/Tolerance  Physical Signs/Symptoms Outcomes:  Biochemical Data, GI Status, Weight     Discharge Planning:     Too soon to determine     Electronically signed by Augie Figueroa MS, RD, LD on 8/20/20 at 9:32 AM EDT    Contact: [phone]

## 2020-08-20 NOTE — PROGRESS NOTES
Paged Dr. Satish Glass regarding Pt transfer to [address] [place] [address]; waiting for response.

## 2020-08-20 NOTE — PLAN OF CARE
Problem: Pain:  Goal: Pain level will decrease  Description: Pain level will decrease  Outcome: Ongoing  Goal: Control of acute pain  Description: Control of acute pain  Outcome: Ongoing  Goal: Control of chronic pain  Description: Control of chronic pain  Outcome: Ongoing  Goal: Patient's pain/discomfort is manageable  Description: Patient's pain/discomfort is manageable  Outcome: Ongoing     Problem: Discharge Planning:  Goal: Discharged to appropriate level of care  Description: Discharged to appropriate level of care  Outcome: Ongoing     Problem: Infection:  Goal: Will remain free from infection  Description: Will remain free from infection  Outcome: Ongoing     Problem: Safety:  Goal: Free from accidental physical injury  Description: Free from accidental physical injury  Outcome: Ongoing  Goal: Free from intentional harm  Description: Free from intentional harm  Outcome: Ongoing     Problem: Daily Care:  Goal: Daily care needs are met  Description: Daily care needs are met  Outcome: Ongoing     Problem: Skin Integrity:  Goal: Skin integrity will stabilize  Description: Skin integrity will stabilize  Outcome: Ongoing     Problem: Discharge Planning:  Goal: Patients continuum of care needs are met  Description: Patients continuum of care needs are met  Outcome: Ongoing     Problem: Falls - Risk of:  Goal: Will remain free from falls  Description: Will remain free from falls  Outcome: Ongoing  Goal: Absence of physical injury  Description: Absence of physical injury  Outcome: Ongoing

## 2020-08-21 VITALS
DIASTOLIC BLOOD PRESSURE: 78 MMHG | WEIGHT: 224.31 LBS | RESPIRATION RATE: 18 BRPM | TEMPERATURE: 98.1 F | BODY MASS INDEX: 31.4 KG/M2 | HEART RATE: 72 BPM | SYSTOLIC BLOOD PRESSURE: 125 MMHG | HEIGHT: 71 IN | OXYGEN SATURATION: 96 %

## 2020-08-21 LAB
ANION GAP SERPL CALCULATED.3IONS-SCNC: 11 MMOL/L (ref 4–16)
BASOPHILS ABSOLUTE: 0.1 K/CU MM
BASOPHILS ABSOLUTE: NORMAL K/CU MM
BASOPHILS RELATIVE PERCENT: 0.6 % (ref 0–1)
BASOPHILS RELATIVE PERCENT: NORMAL % (ref 0–1)
BUN BLDV-MCNC: 5 MG/DL (ref 6–23)
CALCIUM SERPL-MCNC: 8.2 MG/DL (ref 8.3–10.6)
CHLORIDE BLD-SCNC: 102 MMOL/L (ref 99–110)
CO2: 29 MMOL/L (ref 21–32)
CREAT SERPL-MCNC: 0.8 MG/DL (ref 0.9–1.3)
DIFFERENTIAL TYPE: ABNORMAL
DIFFERENTIAL TYPE: NORMAL
EOSINOPHILS ABSOLUTE: 0.3 K/CU MM
EOSINOPHILS ABSOLUTE: NORMAL K/CU MM
EOSINOPHILS RELATIVE PERCENT: 3.4 % (ref 0–3)
EOSINOPHILS RELATIVE PERCENT: NORMAL % (ref 0–3)
GFR AFRICAN AMERICAN: >60 ML/MIN/1.73M2
GFR NON-AFRICAN AMERICAN: >60 ML/MIN/1.73M2
GLUCOSE BLD-MCNC: 105 MG/DL (ref 70–99)
GLUCOSE BLD-MCNC: 120 MG/DL (ref 70–99)
HCT VFR BLD CALC: 39 % (ref 42–52)
HCT VFR BLD CALC: NORMAL % (ref 42–52)
HEMOGLOBIN: 13 GM/DL (ref 13.5–18)
HEMOGLOBIN: NORMAL GM/DL (ref 13.5–18)
IMMATURE NEUTROPHIL %: 0.7 % (ref 0–0.43)
IMMATURE NEUTROPHIL %: NORMAL % (ref 0–0.43)
LYMPHOCYTES ABSOLUTE: 1.5 K/CU MM
LYMPHOCYTES ABSOLUTE: NORMAL K/CU MM
LYMPHOCYTES RELATIVE PERCENT: 18.5 % (ref 24–44)
LYMPHOCYTES RELATIVE PERCENT: NORMAL % (ref 24–44)
MAGNESIUM: 1.9 MG/DL (ref 1.8–2.4)
MCH RBC QN AUTO: 31.3 PG (ref 27–31)
MCH RBC QN AUTO: NORMAL PG (ref 27–31)
MCHC RBC AUTO-ENTMCNC: 33.3 % (ref 32–36)
MCHC RBC AUTO-ENTMCNC: NORMAL % (ref 32–36)
MCV RBC AUTO: 93.8 FL (ref 78–100)
MCV RBC AUTO: NORMAL FL (ref 78–100)
MONOCYTES ABSOLUTE: 0.6 K/CU MM
MONOCYTES ABSOLUTE: NORMAL K/CU MM
MONOCYTES RELATIVE PERCENT: 7.3 % (ref 0–4)
MONOCYTES RELATIVE PERCENT: NORMAL % (ref 0–4)
NUCLEATED RBC %: 0 %
NUCLEATED RBC %: NORMAL %
PDW BLD-RTO: 12.3 % (ref 11.7–14.9)
PDW BLD-RTO: NORMAL % (ref 11.7–14.9)
PHOSPHORUS: 4 MG/DL (ref 2.5–4.9)
PLATELET # BLD: 229 K/CU MM (ref 140–440)
PLATELET # BLD: NORMAL K/CU MM (ref 140–440)
PMV BLD AUTO: 10.3 FL (ref 7.5–11.1)
PMV BLD AUTO: NORMAL FL (ref 7.5–11.1)
POTASSIUM SERPL-SCNC: 3.2 MMOL/L (ref 3.5–5.1)
RBC # BLD: 4.16 M/CU MM (ref 4.6–6.2)
RBC # BLD: NORMAL M/CU MM (ref 4.6–6.2)
REASON FOR REJECTION: NORMAL
REJECTED TEST: NORMAL
SEGMENTED NEUTROPHILS ABSOLUTE COUNT: 5.8 K/CU MM
SEGMENTED NEUTROPHILS ABSOLUTE COUNT: NORMAL K/CU MM
SEGMENTED NEUTROPHILS RELATIVE PERCENT: 69.5 % (ref 36–66)
SEGMENTED NEUTROPHILS RELATIVE PERCENT: NORMAL % (ref 36–66)
SODIUM BLD-SCNC: 142 MMOL/L (ref 135–145)
TOTAL IMMATURE NEUTOROPHIL: 0.06 K/CU MM
TOTAL IMMATURE NEUTOROPHIL: NORMAL K/CU MM
TOTAL NUCLEATED RBC: 0 K/CU MM
TOTAL NUCLEATED RBC: NORMAL K/CU MM
TOTAL RETICULOCYTE COUNT: NORMAL K/CU MM
WBC # BLD: 8.3 K/CU MM (ref 4–10.5)
WBC # BLD: NORMAL K/CU MM (ref 4–10.5)

## 2020-08-21 PROCEDURE — 99232 SBSQ HOSP IP/OBS MODERATE 35: CPT | Performed by: SURGERY

## 2020-08-21 PROCEDURE — 85025 COMPLETE CBC W/AUTO DIFF WBC: CPT

## 2020-08-21 PROCEDURE — 6360000002 HC RX W HCPCS: Performed by: NURSE PRACTITIONER

## 2020-08-21 PROCEDURE — 6360000002 HC RX W HCPCS: Performed by: INTERNAL MEDICINE

## 2020-08-21 PROCEDURE — 94761 N-INVAS EAR/PLS OXIMETRY MLT: CPT

## 2020-08-21 PROCEDURE — 80048 BASIC METABOLIC PNL TOTAL CA: CPT

## 2020-08-21 PROCEDURE — 82962 GLUCOSE BLOOD TEST: CPT

## 2020-08-21 PROCEDURE — 84100 ASSAY OF PHOSPHORUS: CPT

## 2020-08-21 PROCEDURE — 83735 ASSAY OF MAGNESIUM: CPT

## 2020-08-21 PROCEDURE — C9113 INJ PANTOPRAZOLE SODIUM, VIA: HCPCS | Performed by: NURSE PRACTITIONER

## 2020-08-21 PROCEDURE — 2580000003 HC RX 258: Performed by: NURSE PRACTITIONER

## 2020-08-21 PROCEDURE — 6370000000 HC RX 637 (ALT 250 FOR IP): Performed by: INTERNAL MEDICINE

## 2020-08-21 RX ORDER — ONDANSETRON 4 MG/1
4 TABLET, ORALLY DISINTEGRATING ORAL EVERY 8 HOURS PRN
Qty: 15 TABLET | Refills: 0 | Status: SHIPPED | OUTPATIENT
Start: 2020-08-21 | End: 2020-10-05

## 2020-08-21 RX ORDER — METRONIDAZOLE 500 MG/1
500 TABLET ORAL 3 TIMES DAILY
Qty: 42 TABLET | Refills: 0 | Status: SHIPPED | OUTPATIENT
Start: 2020-08-21 | End: 2020-09-04

## 2020-08-21 RX ORDER — CIPROFLOXACIN 500 MG/1
500 TABLET, FILM COATED ORAL 2 TIMES DAILY
Qty: 28 TABLET | Refills: 0 | Status: SHIPPED | OUTPATIENT
Start: 2020-08-21 | End: 2020-09-04

## 2020-08-21 RX ORDER — OXYCODONE HYDROCHLORIDE AND ACETAMINOPHEN 5; 325 MG/1; MG/1
1 TABLET ORAL EVERY 6 HOURS PRN
Qty: 8 TABLET | Refills: 0 | Status: SHIPPED | OUTPATIENT
Start: 2020-08-21 | End: 2020-08-23

## 2020-08-21 RX ORDER — POTASSIUM CHLORIDE 20 MEQ/1
40 TABLET, EXTENDED RELEASE ORAL ONCE
Status: COMPLETED | OUTPATIENT
Start: 2020-08-21 | End: 2020-08-21

## 2020-08-21 RX ADMIN — MORPHINE SULFATE 2 MG: 2 INJECTION, SOLUTION INTRAMUSCULAR; INTRAVENOUS at 11:34

## 2020-08-21 RX ADMIN — PIPERACILLIN AND TAZOBACTAM 3.38 G: 3; .375 INJECTION, POWDER, LYOPHILIZED, FOR SOLUTION INTRAVENOUS at 06:02

## 2020-08-21 RX ADMIN — MORPHINE SULFATE 2 MG: 2 INJECTION, SOLUTION INTRAMUSCULAR; INTRAVENOUS at 06:02

## 2020-08-21 RX ADMIN — PANTOPRAZOLE SODIUM 40 MG: 40 INJECTION, POWDER, FOR SOLUTION INTRAVENOUS at 10:22

## 2020-08-21 RX ADMIN — PIPERACILLIN AND TAZOBACTAM 3.38 G: 3; .375 INJECTION, POWDER, LYOPHILIZED, FOR SOLUTION INTRAVENOUS at 11:35

## 2020-08-21 RX ADMIN — SODIUM CHLORIDE: 9 INJECTION, SOLUTION INTRAVENOUS at 10:22

## 2020-08-21 RX ADMIN — POTASSIUM CHLORIDE 40 MEQ: 1500 TABLET, EXTENDED RELEASE ORAL at 15:37

## 2020-08-21 ASSESSMENT — PAIN SCALES - GENERAL
PAINLEVEL_OUTOF10: 5
PAINLEVEL_OUTOF10: 8
PAINLEVEL_OUTOF10: 6

## 2020-08-21 NOTE — PROGRESS NOTES
Helped provide patient care for this client. Gave all medications as prescribed. Obtained vitals and accucheck before leaving the floor. The bed is in the lowest locked position, 2/4 side rails are up, call light is within reach, the patient showered and I changed his bed linens. He reported some pain and I advised his nurse of his request as I was leaving the floor. The curtain was pulled and he requested his door be closed. Gave report to the nurse. All other information was documented in the appropriate location.

## 2020-08-21 NOTE — PROGRESS NOTES
Hospitalist Progress Note      Name:  Nataliya Brasher /Age/Sex: 1975  (39 y.o. male)   MRN & CSN:  [id number] & [id number] Admission Date/Time: 8/15/2020  5:00 PM   [facility] PCP: No primary care provider on file. Hospital Day: 7    ASSESSMENT & PLAN:   Nataliya Brasher is a 39 y.o.  male  who presented with a lower quadrant abdominal pain. CT abdomen pelvis showed acute perforated sigmoid diverticulitis without abscess. Disposition: Home  Reason For Continued Hospitalization: Medically not ready. Awaiting surgical clearance for discharge. CT abdomen pelvis on  with nonspecific findings. TPN started on . Abdominal pain is much better. He is passing gas. He tolerated full liquid diet yesterday. He started on low fiber diet today. Possible discharge later today. Acute complicated perforated sigmoid diverticulitis---no evidence of abscess. N.p.o. since admission. Diet advanced at on  to full liquid diet. Passing gas. Abdominal pain much better. Tolerated full liquid diet. Started on low fiber diet today. Plan is to discharge the patient hopefully later in the day with 2 weeks of antibiotics and to follow-up with general surgery on outpatient basis for colonoscopy and possible robotic assisted sigmoidectomy on outpatient basis. -TPN as of   -Low fiber diet as of   -Toradol as needed  -Morphine PRN  -Continue Zosyn    Bilateral nonobstructing nephrolithiasis---- seen by urology. No plans for intervention.     Marijuana use--- does not use marijuana on a daily basis      MEDICAL DECISION MAKING:  -Labs reviewed  -Imaging reviewed  -Level of risk moderate  Diet PN-Adult Premix 5/15 - Standardard Electrolytes - Central Line  DIET LOW FIBER;   DVT Prophylaxis [] Lovenox, []  Heparin, [] SCDs, [] Ambulation   GI Prophylaxis [] PPI,  [] H2 Blocker,  [] Carafate,  [] Diet/Tube Feeds   Code Status Full Code   Disposition  Home   MDM [] Low, [x] Moderate,[]  High     Chief complaint/Interval History/ROS     Chief Complaint: Abdominal pain, perforated sigmoid diverticulitis      INTERVAL HISTORY: No acute events overnight. Clinically doing much better. ROS:  Abdominal pain much better. Tolerating full liquid diet. Objective: Intake/Output Summary (Last 24 hours) at 8/21/2020 0940  Last data filed at 8/20/2020 1946  Gross per 24 hour   Intake 1918.63 ml   Output 250 ml   Net 1668.63 ml      Vitals:   Vitals:    08/20/20 2200   BP: 117/76   Pulse: 85   Resp: 16   Temp: 98.9 °F (37.2 °C)   SpO2: 96%     Physical Exam:   GEN: Awake male, no acute distress. EYES: No eye discharge. Ocular muscles intact. HENT: Moist membranes. No nasal discharge. NECK: Supple  RESP: Clear to auscultation bilaterally. CV: Regular rate and rhythm. No pitting lower extremity edema. GI: Abdomen soft. Nondistended. :   Donnelly catheter is not present. MSK: No bony fractures. No gross deformities. SKIN: warm, dry, no rashes,  NEURO: Cranial nerves appear grossly intact, normal speech, no lateralizing weakness.   PSYCH: Awake, alert, oriented, normal affect, normal mood    Medications:   Medications:    sodium chloride flush  10 mL Intravenous 2 times per day    fat emulsion  250 mL Intravenous Once per day on Mon Tue Thu Fri    sodium chloride flush  10 mL Intravenous 2 times per day    enoxaparin  40 mg Subcutaneous Nightly    pantoprazole  40 mg Intravenous Daily    piperacillin-tazobactam  3.375 g Intravenous Q8H      Infusions:    PN-Adult Premix 5/15 - Standard Electrolytes - Central Line 80 mL/hr at 08/20/20 1736    sodium chloride 100 mL/hr at 08/20/20 1901     PRN Meds: sodium chloride flush, 10 mL, PRN  sodium chloride flush, 10 mL, PRN  simethicone, 80 mg, Q6H PRN  morphine, 2 mg, Q4H PRN  sodium chloride flush, 10 mL, PRN  acetaminophen, 650 mg, Q6H PRN    Or  acetaminophen, 650 mg, Q6H PRN  polyethylene glycol, 17 g, Daily PRN  promethazine, 12.5 mg, Q6H PRN    Or  ondansetron, 4 mg, Q6H PRN      Electronically signed by Adrian Jenkins MD on 8/21/2020 at 9:40 AM

## 2020-08-21 NOTE — PROGRESS NOTES
GENERAL SURGERY PROGRESS NOTE    CC/HPI:           Patient feels better his abdominal pain is decreasing. .. leukocytosis was improving. . tolerating PO, full liquids yesterday and will advance to low residue diet today, and will follow closely. Vitals:    08/20/20 0903 08/20/20 1530 08/20/20 1633 08/20/20 2200   BP:  120/73  117/76   Pulse:  75  85   Resp:  14 14 16   Temp:  98.1 °F (36.7 °C)  98.9 °F (37.2 °C)   TempSrc:  Oral  Oral   SpO2:  95% 95% 96%   Weight:       Height: 5' 10.98\" (1.803 m)        I/O last 3 completed shifts: In: 1928.6 [I.V.:1210; IV Piggyback:100]  Out: 250 [Urine:250]  No intake/output data recorded.     PN-Adult Premix 5/15 - Standardard Electrolytes - Central Line  DIET FULL LIQUID;    Recent Results (from the past 48 hour(s))   POCT Glucose    Collection Time: 08/19/20  8:41 PM   Result Value Ref Range    POC Glucose 85 70 - 99 MG/DL   POCT Glucose    Collection Time: 08/20/20  2:33 AM   Result Value Ref Range    POC Glucose 107 (H) 70 - 99 MG/DL   CBC Auto Differential    Collection Time: 08/20/20  7:00 AM   Result Value Ref Range    WBC 7.9 4.0 - 10.5 K/CU MM    RBC 3.75 (L) 4.6 - 6.2 M/CU MM    Hemoglobin 11.4 (L) 13.5 - 18.0 GM/DL    Hematocrit 35.2 (L) 42 - 52 %    MCV 93.9 78 - 100 FL    MCH 30.4 27 - 31 PG    MCHC 32.4 32.0 - 36.0 %    RDW 12.4 11.7 - 14.9 %    Platelets 983 787 - 195 K/CU MM    MPV 10.8 7.5 - 11.1 FL    Immature Neutrophil % 0.5 (H) 0 - 0.43 %    Segs Relative 68.1 (H) 36 - 66 %    Eosinophils % 2.5 0 - 3 %    Basophils % 0.5 0 - 1 %    Lymphocytes % 20.9 (L) 24 - 44 %    Monocytes % 7.5 (H) 0 - 4 %    Total Immature Neutrophil 0.04 K/CU MM    Segs Absolute 5.4 K/CU MM    Eosinophils Absolute 0.2 K/CU MM    Basophils Absolute 0.0 K/CU MM    Lymphocytes Absolute 1.7 K/CU MM    Monocytes Absolute 0.6 K/CU MM    Differential Type       AUTOMATED DIFF RESULTS CONFIRMED BY SMEAR REVIEW  AUTOMATED DIFFERENTIAL      WBC Morphology FEW    Basic Metabolic Panel    Collection Time: 08/20/20  7:00 AM   Result Value Ref Range    Sodium 139 135 - 145 MMOL/L    Potassium 3.2 (L) 3.5 - 5.1 MMOL/L    Chloride 102 99 - 110 mMol/L    CO2 28 21 - 32 MMOL/L    Anion Gap 9 4 - 16    BUN 8 6 - 23 MG/DL    CREATININE 0.8 (L) 0.9 - 1.3 MG/DL    Glucose 120 (H) 70 - 99 MG/DL    Calcium 7.7 (L) 8.3 - 10.6 MG/DL    GFR Non-African American >60 >60 mL/min/1.73m2    GFR African American >60 >60 mL/min/1.73m2   Magnesium    Collection Time: 08/20/20  7:00 AM   Result Value Ref Range    Magnesium 1.8 1.8 - 2.4 mg/dl   Phosphorus    Collection Time: 08/20/20  7:00 AM   Result Value Ref Range    Phosphorus 3.4 2.5 - 4.9 MG/DL   Triglyceride    Collection Time: 08/20/20  7:00 AM   Result Value Ref Range    Triglycerides 92 <150 MG/DL   POCT Glucose    Collection Time: 08/20/20  7:50 AM   Result Value Ref Range    POC Glucose 106 (H) 70 - 99 MG/DL   POCT Glucose    Collection Time: 08/20/20 11:08 AM   Result Value Ref Range    POC Glucose 110 (H) 70 - 99 MG/DL   POCT Glucose    Collection Time: 08/20/20  3:10 PM   Result Value Ref Range    POC Glucose 113 (H) 70 - 99 MG/DL   POCT Glucose    Collection Time: 08/20/20  9:47 PM   Result Value Ref Range    POC Glucose 86 70 - 99 MG/DL   CBC Auto Differential    Collection Time: 08/21/20  6:10 AM   Result Value Ref Range    WBC 9.3 4.0 - 10.5 K/CU MM    RBC 2.67 (L) 4.6 - 6.2 M/CU MM    Hemoglobin 8.3 (L) 13.5 - 18.0 GM/DL    Hematocrit 28.9 (L) 42 - 52 %    .2 (H) 78 - 100 FL    MCH 31.1 (H) 27 - 31 PG    MCHC 28.7 (L) 32.0 - 36.0 %    RDW 13.0 11.7 - 14.9 %    Platelets 957 481 - 385 K/CU MM    MPV 11.7 (H) 7.5 - 11.1 FL    Differential Type AUTOMATED DIFFERENTIAL     Segs Relative 67.3 (H) 36 - 66 %    Lymphocytes % 21.6 (L) 24 - 44 %    Monocytes % 6.9 (H) 0 - 4 %    Eosinophils % 2.8 0 - 3 %    Basophils % 0.4 0 - 1 %    Segs Absolute 6.3 K/CU MM    Lymphocytes Absolute 2.0 K/CU MM    Monocytes Absolute 0.6 K/CU MM    Eosinophils

## 2020-08-21 NOTE — DISCHARGE SUMMARY
Discharge Summary    Name:  Lois Damon /Age/Sex: 1975  (39 y.o. male)   MRN & CSN:  [id number] & [id number] Admission Date/Time: 8/15/2020  5:00 PM   Attending:  Sakina Alicea MD Discharging Physician: Sakina Alicea MD     Hospital Course:   Lois Damon is a 39 y.o.  male  who presented with a lower quadrant abdominal pain. CT abdomen pelvis showed acute perforated sigmoid diverticulitis without abscess. Acute complicated perforated sigmoid diverticulitis---no evidence of abscess. N.p.o. since admission. Diet advanced at on  to full liquid diet. Passing gas. Abdominal pain much better. Tolerated full liquid diet. Started on low fiber diet today. Plan is to discharge the patient hopefully later in the day with 2 weeks of antibiotics and to follow-up with general surgery on outpatient basis for colonoscopy and possible robotic assisted sigmoidectomy on outpatient basis. He tolerated diet on the day of discharge and was feeling much better. Strongly advised the patient to follow up with Gen. surg and to continue with low fiber diet. Return precautions also discussed with the patient. Bilateral nonobstructing nephrolithiasis---- seen by urology. No plans for intervention.     Marijuana use--- does not use marijuana on a daily basis    The patient expressed appropriate understanding of and agreement with the discharge recommendations, medications, and plan. Consults this admission:  Michael 98 CONSULT TO UROLOGY  IP CONSULT TO PHARMACY  IP CONSULT TO DIETITIAN    Discharge Instruction:   Follow up W/Gen Surg in one week.      Diet: low fiber diet  Activity: activity as tolerated  Disposition: Discharged to:   [x]Home, []C, []SNF, []Acute Rehab, []Hospice   Condition on discharge: Stable    Discharge Medications:      Tigist Gill, [address] Medication Instructions [facility]:[id number]    Printed on:20 0274   Medication Information ciprofloxacin (CIPRO) 500 MG tablet  Take 1 tablet by mouth 2 times daily for 14 days             metroNIDAZOLE (FLAGYL) 500 MG tablet  Take 1 tablet by mouth 3 times daily for 14 days             ondansetron (ZOFRAN ODT) 4 MG disintegrating tablet  Take 1 tablet by mouth every 8 hours as needed for Nausea             oxyCODONE-acetaminophen (PERCOCET) 5-325 MG per tablet  Take 1 tablet by mouth every 6 hours as needed for Pain for up to 2 days. Intended supply: 3 days.  Take lowest dose possible to manage pain                 Objective Findings at Discharge:   /78   Pulse 72   Temp 98.1 °F (36.7 °C) (Oral)   Resp 18   Ht 5' 10.98\" (1.803 m)   Wt 224 lb 5 oz (101.7 kg)   SpO2 96%   BMI 31.30 kg/m²            PHYSICAL EXAM       BMP/CBC  Recent Labs     08/20/20  0700 08/21/20  0610 08/21/20  1145     --  142   K 3.2*  --  3.2*     --  102   CO2 28  --  29   BUN 8  --  5*   CREATININE 0.8*  --  0.8*   WBC 7.9 ? 8.3   HCT 35.2* ? 39.0*    ? 229       IMAGING:  All imaging reviewed before discharge    Discharge Time of 28 minutes    Electronically signed by Destiny Orellaan MD on 8/21/2020 at 2:44 PM

## 2020-08-23 LAB
CULTURE: NORMAL
Lab: NORMAL
SPECIMEN: NORMAL

## 2020-08-25 ENCOUNTER — TELEPHONE (OUTPATIENT)
Dept: BARIATRICS/WEIGHT MGMT | Age: 45
End: 2020-08-25

## 2020-08-25 NOTE — ADT AUTH CERT
Diverticulitis, Acute - Care Day 6 (8/20/2020) by Rj Ocampo RN         Review Status  Review Entered    Completed  8/25/2020 14:17        Criteria Review       Care Day: 6 Care Date: 8/20/2020 Level of Care: Inpatient Floor    Guideline Day 3    Clinical Status    (X) * Hemodynamic stability    (X) * Pain absent or managed    (X) * Stable Hgb/Hct    ( ) * No evidence of peritonitis or abscess    (X) * Surgery not indicated    ( ) * Diet tolerated    8/25/2020 2:17 PM EDT by Phi Torres still NPO, will start him on full liquids today and follow closely. (X) * Afebrile or fever improved    ( ) * Discharge plans and education understood    Activity    ( ) * Ambulatory or acceptable for next level of care    Routes    ( ) * Oral hydration, medications, [E] and diet    8/25/2020 2:17 PM EDT by Phi Torres still NPO, will start him on full liquids today and follow closely. Interventions    (X) CBC    Medications    (X) Possible parenteral or oral antibiotics    * Milestone    Additional Notes    8/20    BP:    (!) 147/85    Pulse:    88    Resp:    18    Temp:    98.4 °F (36.9 °C)       Labs       Sodium: 139    Potassium: 3.2 (L)    Chloride: 102    CO2: 28    BUN: 8    Creatinine: 0.8 (L)    Anion Gap: 9    GFR Non-: >60    GFR African American: >60    Magnesium: 1.8    Glucose: 120 (H)    Calcium: 7.7 (L)    Phosphorus: 3.4    Triglycerides: 92    WBC: 7.9    RBC: 3.75 (L)    Hemoglobin Quant: 11.4 (L)    Hematocrit: 35.2 (L)       Orders    NPO- advance to full liquid diet today  Zosyn IV q8h    Protonix IV daily    IV  ml/h    fat emulsion 20 % infusion 250 mL  IV once    PN-Adult Premix 5/15 - Standardard Electrolytes - Central Line      Rate: 42 mL/hr    Morphine IV prn given x 3       Per surgery    Patient feels better his abdominal pain is decreasing. .. leukocytosis was improving. . still NPO, will start him on full liquids today and follow closely.     Diet NPO Effective Now    PN-Adult Premix 5/15 - Standardard Electrolytes - Central Line       Assessment and Plan:    Lore Garcia a 39 y. o. male with acute perforated sigmoid diverticulitis,    Patient feels better his abdominal pain has improved after he moved his bowels. ..         Leukocytosis HAS RESOLVED, will start him on full liquids today and follow closely.         Increase Ambulation to at least 4x/day walk in the hallways with assistance. Respiratory ryann-operative care: Incentive Spirometry / deep breathing and coughing 10x/hr while awake.      Continue DVT prophylaxis with Teds and SCDs and SC Lovenox. Continue GI prophylaxis with Protonix IV till able to tolerate PO. Continue IV Abx.         ___________________________________________                         Per IM  Acute complicated perforated sigmoid diverticulitis---no evidence of abscess.  N.p.o. since admission.  Diet advanced at on 8/19 to full liquid diet.  Continues to have persistent abdominal pain. [place] is very tender on exam.    -TPN as of 8/19    -Bariatric liquid diet as of 8/21    -Toradol as needed    -Morphine PRN    -Continue Zosyn    -Continue IV Protonix         Bilateral nonobstructing nephrolithiasis---- seen by urology. Rosanne Alfaro plans for intervention.           Diverticulitis, Acute - Care Day 5 (8/19/2020) by Davidson Alonzo RN         Review Status  Review Entered    Completed  8/25/2020 14:10        Criteria Review       Care Day: 5 Care Date: 8/19/2020 Level of Care: Inpatient Floor    Guideline Day 3    Clinical Status    (X) * Hemodynamic stability    (X) * Pain absent or managed    (X) * Stable Hgb/Hct    ( ) * No evidence of peritonitis or abscess    8/25/2020 2:10 PM EDT by Tanya Everett An air and fluid collection adjacent to the mid sigmoid colon is increased in   size, and now tracks superiorly into the mesentery and anterior peritoneum.    There is a moderate to large amount of free intraperitoneal air, new from   prior exam.    (X) * Surgery not indicated    ( ) * Diet tolerated    8/25/2020 2:10 PM EDT by Ugo Zee      NPO    (X) * Afebrile or fever improved    ( ) * Discharge plans and education understood    Activity    ( ) * Ambulatory or acceptable for next level of care    Routes    ( ) * Oral hydration, medications, [E] and diet    8/25/2020 2:10 PM EDT by Ugo Zee      NPO    Interventions    (X) CBC    Medications    (X) Possible parenteral or oral antibiotics    * Milestone    Additional Notes    8/19    BP: 138/72    Pulse: 78    Resp: 16    Temp: 98.2 °F (36.8 °C)       Labs       WBC: 9.3    RBC: 4.34 (L)    Hemoglobin Quant: 13.4 (L)    Hematocrit: 40.6 (L)       CT abd pelvis    An air and fluid collection adjacent to the mid sigmoid colon is increased in    size, and now tracks superiorly into the mesentery and anterior peritoneum. There is a moderate to large amount of free intraperitoneal air, new from    prior exam.         Dilated mid small bowel loops, which may represent focal ileus from adjacent    inflammation versus small bowel obstruction.         Small amount of ascites. Orders    NPO  Zosyn IV q8h    Protonix IV daily    IV  ml/h    PN-Adult Premix 5/15 - Standardard Electrolytes - Central Line      Rate: 42 mL/hr    ketorolac (TORADOL) injection 15 mg  IV prn given x 1    Morphine IV prn given x 3       Per surgery    Patient feels better his abdominal pain is decreasing. .. leukocytosis was improving but this morning is higher than yesterday. . still NPO, will start him on full liquids today and follow closely AFTER a repeat CT. Assessment and Plan:    Shilpi Torres a 39 y. o. male with acute perforated sigmoid diverticulitis,    Patient feels better his abdominal pain has improved after he moved his bowels. ..          Leukocytosis was improving but this morning is higher than yesterday. . and still is more tender than expected, still NPO, will start him on full liquids today and follow closely AFTER a repeat CT.         Increase Ambulation to at least 4x/day walk in the hallways with assistance. Respiratory ryann-operative care: Incentive Spirometry / deep breathing and coughing 10x/hr while awake.      Continue DVT prophylaxis with Teds and SCDs and SC Lovenox. Continue GI prophylaxis with Protonix IV till able to tolerate PO. Continue IV Abx.         Addendum @ 11:41 AM    Discussed the case in length with the radiologist, after I reviewed his CT, and I will keep him NPO, start TPN, and watch VERY CLOSELY.

## 2020-09-04 ENCOUNTER — OFFICE VISIT (OUTPATIENT)
Dept: BARIATRICS/WEIGHT MGMT | Age: 45
End: 2020-09-04
Payer: COMMERCIAL

## 2020-09-04 VITALS
DIASTOLIC BLOOD PRESSURE: 82 MMHG | HEIGHT: 71 IN | BODY MASS INDEX: 29.57 KG/M2 | SYSTOLIC BLOOD PRESSURE: 124 MMHG | TEMPERATURE: 98.1 F | WEIGHT: 211.2 LBS | HEART RATE: 80 BPM

## 2020-09-04 PROBLEM — K63.1 PERFORATED SIGMOID COLON (HCC): Status: ACTIVE | Noted: 2020-08-15

## 2020-09-04 PROCEDURE — 99214 OFFICE O/P EST MOD 30 MIN: CPT | Performed by: SURGERY

## 2020-09-04 ASSESSMENT — ENCOUNTER SYMPTOMS
SORE THROAT: 0
VOMITING: 0
ANAL BLEEDING: 0
COLOR CHANGE: 0
BLOOD IN STOOL: 0
CONSTIPATION: 0
ABDOMINAL PAIN: 1
NAUSEA: 0
DIARRHEA: 0
TROUBLE SWALLOWING: 0
VOICE CHANGE: 0
SHORTNESS OF BREATH: 0
COUGH: 0
PHOTOPHOBIA: 0
WHEEZING: 0

## 2020-09-04 NOTE — PROGRESS NOTES
Smokeless tobacco: Never Used   Substance and Sexual Activity    Alcohol use: No    Drug use: No    Sexual activity: Never   Lifestyle    Physical activity     Days per week: Not on file     Minutes per session: Not on file    Stress: Not on file   Relationships    Social connections     Talks on phone: Not on file     Gets together: Not on file     Attends Hindu service: Not on file     Active member of club or organization: Not on file     Attends meetings of clubs or organizations: Not on file     Relationship status: Not on file    Intimate partner violence     Fear of current or ex partner: Not on file     Emotionally abused: Not on file     Physically abused: Not on file     Forced sexual activity: Not on file   Other Topics Concern    Not on file   Social History Narrative    Not on file        Allergies   Allergen Reactions    Vicodin [Hydrocodone-Acetaminophen] Other (See Comments)     Patient states that it is tolerable but state that makes his skin crawl        The patient has a family history of    Current Outpatient Medications   Medication Sig Dispense Refill    ondansetron (ZOFRAN ODT) 4 MG disintegrating tablet Take 1 tablet by mouth every 8 hours as needed for Nausea 15 tablet 0    metroNIDAZOLE (FLAGYL) 500 MG tablet Take 1 tablet by mouth 3 times daily for 14 days 42 tablet 0    ciprofloxacin (CIPRO) 500 MG tablet Take 1 tablet by mouth 2 times daily for 14 days 28 tablet 0     No current facility-administered medications for this visit. Review of Systems   Constitutional: Negative for activity change, chills, diaphoresis and fever. HENT: Negative for sore throat, trouble swallowing and voice change. Eyes: Negative for photophobia and visual disturbance. Respiratory: Negative for cough, shortness of breath and wheezing. Cardiovascular: Negative for chest pain, palpitations and leg swelling. Gastrointestinal: Positive for abdominal pain.  Negative for anal bleeding, blood in stool, constipation, diarrhea, nausea and vomiting. Endocrine: Negative for cold intolerance, heat intolerance, polydipsia and polyuria. Genitourinary: Negative for dysuria, frequency and hematuria. Musculoskeletal: Negative for joint swelling, myalgias and neck stiffness. Skin: Negative for color change and rash. Neurological: Negative for seizures, speech difficulty, light-headedness and numbness. Hematological: Negative for adenopathy. Does not bruise/bleed easily. OBJECTIVE:    /82 (Site: Left Upper Arm, Position: Sitting, Cuff Size: Medium Adult)   Pulse 80   Temp 98.1 °F (36.7 °C) (Infrared)   Ht 5' 11\" (1.803 m)   Wt 211 lb 3.2 oz (95.8 kg)   BMI 29.46 kg/m²    Body mass index is 29.46 kg/m². Physical Exam  Vitals signs reviewed. Constitutional:       General: He is not in acute distress. Appearance: He is well-developed. He is not diaphoretic. HENT:      Head: Normocephalic and atraumatic. Eyes:      General: No scleral icterus. Conjunctiva/sclera: Conjunctivae normal.      Pupils: Pupils are equal, round, and reactive to light. Neck:      Musculoskeletal: Normal range of motion. Thyroid: No thyromegaly. Vascular: No JVD. Trachea: No tracheal deviation. Cardiovascular:      Rate and Rhythm: Normal rate and regular rhythm. Heart sounds: Normal heart sounds. No murmur. No friction rub. No gallop. Pulmonary:      Effort: Pulmonary effort is normal. No respiratory distress. Breath sounds: No stridor. No wheezing or rales. Chest:      Chest wall: No tenderness. Abdominal:      General: Bowel sounds are normal. There is no distension. Palpations: Abdomen is soft. There is no mass. Tenderness: There is abdominal tenderness (Mild lower abdomen). There is no guarding or rebound. Musculoskeletal: Normal range of motion. General: No tenderness.    Lymphadenopathy:      Cervical: No cervical adenopathy. Skin:     General: Skin is warm and dry. Coloration: Skin is not pale. Findings: No erythema or rash. Neurological:      Mental Status: He is alert and oriented to person, place, and time. Cranial Nerves: No cranial nerve deficit. Coordination: Coordination normal.   Psychiatric:         Behavior: Behavior normal.         Thought Content: Thought content normal.         Judgment: Judgment normal.         ASSESSMENT & PLAN:    1. Perforated sigmoid colon ([address]         His exam is concerning for the perforated diverticulitis, and his symptoms are NOT resolved, he NEEDS TO KEEP OFF work and strenuous activities, and stick to a low residue diet, and continue Antibiotics, and will proceed with the colonoscopy in 6 wks, and the surgery the following day. Will do FIRST colonoscopy, and then sigmoidectomy laparoscopic possible stoma, possible open. Patient counseled on the risks, benefits, and alternatives of treatment plan at length while in the office today. Patient states an understanding and willingness to proceed with the plan. Follow Up:  Return in about 4 weeks (around 10/2/2020), or Colonoscopy and then Robotic Surgery, for Surgery.       Luda Everett MD, FACS, FICS.    9/4/20

## 2020-09-08 ENCOUNTER — TELEPHONE (OUTPATIENT)
Dept: BARIATRICS/WEIGHT MGMT | Age: 45
End: 2020-09-08

## 2020-09-08 NOTE — TELEPHONE ENCOUNTER
LVM for pt regarding time off work before procedure. Dr Curtis Chavis not does not state pt needs to be off work prior to procedure. Message was sent to Dr Juan Jose Cervantes, awaiting response.

## 2020-09-16 ENCOUNTER — TELEPHONE (OUTPATIENT)
Dept: BARIATRICS/WEIGHT MGMT | Age: 45
End: 2020-09-16

## 2020-09-16 NOTE — TELEPHONE ENCOUNTER
LEFT MESSAGE FOR Diogo Tijerina REGARDING SURGERY (cscope/katerin sig colon resection) SCHEDULED @ [facility].  NOTIFIED OF DATES, TIMES AND LOCATION    PHONE ASSESSMENT   covid 9/28/20 @ 1  SURGERY - 10/5/20 @ 9711 cscope  10/6/20 @ 1200, arrival @ 10  P/O - call after discharge      suprep and clear liquids start 10/4/20 4pm/7am, remain on clear liquids after cscope  NPO AFTER MIDNIGHT  HOLD BLOOD THINNERS 5 days prior

## 2020-09-28 ENCOUNTER — NURSE ONLY (OUTPATIENT)
Dept: SURGERY | Age: 45
End: 2020-09-28
Payer: COMMERCIAL

## 2020-09-28 ENCOUNTER — HOSPITAL ENCOUNTER (OUTPATIENT)
Age: 45
Setting detail: SPECIMEN
Discharge: HOME | End: 2020-09-28
Payer: COMMERCIAL

## 2020-09-28 VITALS
DIASTOLIC BLOOD PRESSURE: 80 MMHG | TEMPERATURE: 97.9 F | HEART RATE: 77 BPM | OXYGEN SATURATION: 99 % | SYSTOLIC BLOOD PRESSURE: 122 MMHG

## 2020-09-28 PROCEDURE — 99211 OFF/OP EST MAY X REQ PHY/QHP: CPT | Performed by: SURGERY

## 2020-09-28 PROCEDURE — U0002 COVID-19 LAB TEST NON-CDC: HCPCS

## 2020-09-28 NOTE — PATIENT INSTRUCTIONS
Pre-Procedure COVID-19 Self Testing  Quarantine Instructions  Day of Surgery Instructions         What to do before my surgery:    All patients scheduled for elective surgery must test for COVID19 72-96 hours prior to the surgery date.  Pre-Procedure COVID-19 Self-Test will be scheduled for you by your provider.  You can receive your Pre-Procedure COVID-19 Self-Test at:  [facility]. [address] [facility], [facility], [facility], [address]   If you do not have the COVID-19 test we will cancel or reschedule your procedure   Once you test you must quarantine at home until after your procedure with only your immediate family members or whoever lives with you.  If you must work during your quarantine period, we ask that you continue to practice social distancing, wear a mask that covers your mouth and nose and perform all hand hygiene as recommended by the CDC.  If you must go to the grocery, etc. and cannot get someone to do this for you please wear a mask that covers your mouth and nose and perform all hand hygiene as recommended by the CDC.  Your surgeon's office will notify you with any concerns about your test result. What can I expect on the day of surgery?  Arrive at the time the office or hospital staff tell you on the day of your procedure.  Wear a mask when entering the hospital.     A member of the hospital staff will take your temperature and ask you a few questions as you enter the building.  In abundance of caution for the safety of all our patients and staff, please follow all hospital visitor guidelines in place at the time of your procedure. The staff caring for you will stay in close communication with your loved one and keep them updated on progress.  Please provide a phone number for us to use when communicating with your family or ride home.    When you are ready to discharge, we will notify your family/person with you to bring the car to the front entrance. We will take you to them after you receive all of your discharge instructions.

## 2020-09-28 NOTE — PROGRESS NOTES
Patient collected pre-op COVID-19 screening instruction and collection supplies given to patient accordingly. Patient denies fever/cough/sob or recent travels. Patient voiced understanding of collection/quarantine instructions. COVID screening lab ordered, collection completed without difficulty, identifiers placed on specimen. AVS given at discharge. Results will be given via mychart or telephone call [facility] will manage any positive test results, the procedure will be rescheduled at a later date).

## 2020-09-29 ENCOUNTER — TELEPHONE (OUTPATIENT)
Dept: SURGERY | Age: 45
End: 2020-09-29

## 2020-09-29 LAB
SARS-COV-2: NOT DETECTED
SOURCE: NORMAL

## 2020-09-29 NOTE — TELEPHONE ENCOUNTER
AUTH# [id number]    CPT: 47350  ICD-10: K63.1, K57.92    [facility] 10/5/2020 OUTPATIENT

## 2020-10-01 ENCOUNTER — TELEPHONE (OUTPATIENT)
Dept: BARIATRICS/WEIGHT MGMT | Age: 45
End: 2020-10-01

## 2020-10-01 NOTE — TELEPHONE ENCOUNTER
Angela Carrasco #[id number]     10/6/2020 thru 10/7/2020     CPT      ICD 10      [facility] Inpatient 10/6/2020

## 2020-10-02 ENCOUNTER — TELEPHONE (OUTPATIENT)
Dept: BARIATRICS/WEIGHT MGMT | Age: 45
End: 2020-10-02

## 2020-10-02 RX ORDER — SODIUM, POTASSIUM,MAG SULFATES 17.5-3.13G
SOLUTION, RECONSTITUTED, ORAL ORAL
Qty: 1 KIT | Refills: 0 | Status: ON HOLD | OUTPATIENT
Start: 2020-10-02 | End: 2020-10-06

## 2020-10-02 NOTE — TELEPHONE ENCOUNTER
Pt needs suprep sent in to [facility] in [facility] for his prep on 10/4

## 2020-10-05 ENCOUNTER — ANESTHESIA EVENT (OUTPATIENT)
Dept: OPERATING ROOM | Age: 45
DRG: 329 | End: 2020-10-05
Payer: COMMERCIAL

## 2020-10-05 ENCOUNTER — HOSPITAL ENCOUNTER (INPATIENT)
Age: 45
LOS: 5 days | Discharge: HOSPICE | DRG: 329 | End: 2020-10-10
Attending: INTERNAL MEDICINE | Admitting: INTERNAL MEDICINE
Payer: COMMERCIAL

## 2020-10-05 ENCOUNTER — HOSPITAL ENCOUNTER (EMERGENCY)
Age: 45
Discharge: ANOTHER HOSPITAL | End: 2020-10-05
Attending: EMERGENCY MEDICINE
Payer: COMMERCIAL

## 2020-10-05 ENCOUNTER — APPOINTMENT (OUTPATIENT)
Dept: CT IMAGING | Age: 45
End: 2020-10-05
Payer: COMMERCIAL

## 2020-10-05 VITALS
OXYGEN SATURATION: 96 % | WEIGHT: 212 LBS | DIASTOLIC BLOOD PRESSURE: 65 MMHG | RESPIRATION RATE: 16 BRPM | HEART RATE: 83 BPM | BODY MASS INDEX: 29.68 KG/M2 | TEMPERATURE: 97.8 F | SYSTOLIC BLOOD PRESSURE: 109 MMHG | HEIGHT: 71 IN

## 2020-10-05 PROBLEM — K57.80 PERFORATED DIVERTICULUM: Status: ACTIVE | Noted: 2020-10-05

## 2020-10-05 LAB
ALBUMIN SERPL-MCNC: 4.2 GM/DL (ref 3.4–5)
ALP BLD-CCNC: 65 IU/L (ref 40–129)
ALT SERPL-CCNC: 42 U/L (ref 10–40)
ANION GAP SERPL CALCULATED.3IONS-SCNC: 14 MMOL/L (ref 4–16)
AST SERPL-CCNC: 21 IU/L (ref 15–37)
BASOPHILS ABSOLUTE: 0 K/CU MM
BASOPHILS RELATIVE PERCENT: 0.2 % (ref 0–1)
BILIRUB SERPL-MCNC: 1.2 MG/DL (ref 0–1)
BUN BLDV-MCNC: 8 MG/DL (ref 6–23)
CALCIUM SERPL-MCNC: 8.8 MG/DL (ref 8.3–10.6)
CHLORIDE BLD-SCNC: 101 MMOL/L (ref 99–110)
CO2: 22 MMOL/L (ref 21–32)
CREAT SERPL-MCNC: 0.9 MG/DL (ref 0.9–1.3)
DIFFERENTIAL TYPE: ABNORMAL
EOSINOPHILS ABSOLUTE: 0 K/CU MM
EOSINOPHILS RELATIVE PERCENT: 0.2 % (ref 0–3)
GFR AFRICAN AMERICAN: >60 ML/MIN/1.73M2
GFR NON-AFRICAN AMERICAN: >60 ML/MIN/1.73M2
GLUCOSE BLD-MCNC: 118 MG/DL (ref 70–99)
HCT VFR BLD CALC: 47.8 % (ref 42–52)
HEMOGLOBIN: 16.3 GM/DL (ref 13.5–18)
IMMATURE NEUTROPHIL %: 0.3 % (ref 0–0.43)
LIPASE: 33 IU/L (ref 13–60)
LYMPHOCYTES ABSOLUTE: 1.3 K/CU MM
LYMPHOCYTES RELATIVE PERCENT: 10.1 % (ref 24–44)
MCH RBC QN AUTO: 31 PG (ref 27–31)
MCHC RBC AUTO-ENTMCNC: 34.1 % (ref 32–36)
MCV RBC AUTO: 91 FL (ref 78–100)
MONOCYTES ABSOLUTE: 0.6 K/CU MM
MONOCYTES RELATIVE PERCENT: 4.5 % (ref 0–4)
PDW BLD-RTO: 13 % (ref 11.7–14.9)
PLATELET # BLD: 223 K/CU MM (ref 140–440)
PMV BLD AUTO: 10.6 FL (ref 7.5–11.1)
POTASSIUM SERPL-SCNC: 3.7 MMOL/L (ref 3.5–5.1)
RBC # BLD: 5.25 M/CU MM (ref 4.6–6.2)
SEGMENTED NEUTROPHILS ABSOLUTE COUNT: 10.4 K/CU MM
SEGMENTED NEUTROPHILS RELATIVE PERCENT: 84.7 % (ref 36–66)
SODIUM BLD-SCNC: 137 MMOL/L (ref 135–145)
TOTAL IMMATURE NEUTOROPHIL: 0.04 K/CU MM
TOTAL PROTEIN: 7.2 GM/DL (ref 6.4–8.2)
WBC # BLD: 12.3 K/CU MM (ref 4–10.5)

## 2020-10-05 PROCEDURE — 2580000003 HC RX 258: Performed by: INTERNAL MEDICINE

## 2020-10-05 PROCEDURE — 2000000000 HC ICU R&B

## 2020-10-05 PROCEDURE — 85025 COMPLETE CBC W/AUTO DIFF WBC: CPT

## 2020-10-05 PROCEDURE — 6360000002 HC RX W HCPCS: Performed by: INTERNAL MEDICINE

## 2020-10-05 PROCEDURE — 99255 IP/OBS CONSLTJ NEW/EST HI 80: CPT | Performed by: SURGERY

## 2020-10-05 PROCEDURE — 96376 TX/PRO/DX INJ SAME DRUG ADON: CPT

## 2020-10-05 PROCEDURE — 6360000004 HC RX CONTRAST MEDICATION: Performed by: EMERGENCY MEDICINE

## 2020-10-05 PROCEDURE — 2580000003 HC RX 258: Performed by: EMERGENCY MEDICINE

## 2020-10-05 PROCEDURE — 96375 TX/PRO/DX INJ NEW DRUG ADDON: CPT

## 2020-10-05 PROCEDURE — 96365 THER/PROPH/DIAG IV INF INIT: CPT

## 2020-10-05 PROCEDURE — 6360000002 HC RX W HCPCS: Performed by: SURGERY

## 2020-10-05 PROCEDURE — 2500000003 HC RX 250 WO HCPCS: Performed by: SURGERY

## 2020-10-05 PROCEDURE — 74177 CT ABD & PELVIS W/CONTRAST: CPT

## 2020-10-05 PROCEDURE — 99284 EMERGENCY DEPT VISIT MOD MDM: CPT

## 2020-10-05 PROCEDURE — 80053 COMPREHEN METABOLIC PANEL: CPT

## 2020-10-05 PROCEDURE — 6360000002 HC RX W HCPCS: Performed by: EMERGENCY MEDICINE

## 2020-10-05 PROCEDURE — 83690 ASSAY OF LIPASE: CPT

## 2020-10-05 PROCEDURE — 83605 ASSAY OF LACTIC ACID: CPT

## 2020-10-05 RX ORDER — DIPHENHYDRAMINE HYDROCHLORIDE 50 MG/ML
50 INJECTION INTRAMUSCULAR; INTRAVENOUS ONCE
Status: COMPLETED | OUTPATIENT
Start: 2020-10-05 | End: 2020-10-05

## 2020-10-05 RX ORDER — SODIUM CHLORIDE, SODIUM LACTATE, POTASSIUM CHLORIDE, CALCIUM CHLORIDE 600; 310; 30; 20 MG/100ML; MG/100ML; MG/100ML; MG/100ML
INJECTION, SOLUTION INTRAVENOUS CONTINUOUS
Status: DISCONTINUED | OUTPATIENT
Start: 2020-10-05 | End: 2020-10-10 | Stop reason: HOSPADM

## 2020-10-05 RX ORDER — ONDANSETRON 4 MG/1
4 TABLET, ORALLY DISINTEGRATING ORAL EVERY 8 HOURS PRN
Status: DISCONTINUED | OUTPATIENT
Start: 2020-10-05 | End: 2020-10-10 | Stop reason: HOSPADM

## 2020-10-05 RX ORDER — SODIUM CHLORIDE 0.9 % (FLUSH) 0.9 %
10 SYRINGE (ML) INJECTION PRN
Status: DISCONTINUED | OUTPATIENT
Start: 2020-10-05 | End: 2020-10-10 | Stop reason: HOSPADM

## 2020-10-05 RX ORDER — MORPHINE SULFATE 4 MG/ML
4 INJECTION, SOLUTION INTRAMUSCULAR; INTRAVENOUS ONCE
Status: COMPLETED | OUTPATIENT
Start: 2020-10-05 | End: 2020-10-05

## 2020-10-05 RX ORDER — 0.9 % SODIUM CHLORIDE 0.9 %
1000 INTRAVENOUS SOLUTION INTRAVENOUS ONCE
Status: DISCONTINUED | OUTPATIENT
Start: 2020-10-05 | End: 2020-10-05 | Stop reason: HOSPADM

## 2020-10-05 RX ORDER — MORPHINE SULFATE 2 MG/ML
2 INJECTION, SOLUTION INTRAMUSCULAR; INTRAVENOUS ONCE
Status: COMPLETED | OUTPATIENT
Start: 2020-10-05 | End: 2020-10-05

## 2020-10-05 RX ORDER — METOCLOPRAMIDE HYDROCHLORIDE 5 MG/ML
10 INJECTION INTRAMUSCULAR; INTRAVENOUS ONCE
Status: COMPLETED | OUTPATIENT
Start: 2020-10-05 | End: 2020-10-05

## 2020-10-05 RX ORDER — ONDANSETRON 2 MG/ML
4 INJECTION INTRAMUSCULAR; INTRAVENOUS EVERY 6 HOURS PRN
Status: DISCONTINUED | OUTPATIENT
Start: 2020-10-05 | End: 2020-10-10 | Stop reason: HOSPADM

## 2020-10-05 RX ORDER — 0.9 % SODIUM CHLORIDE 0.9 %
1000 INTRAVENOUS SOLUTION INTRAVENOUS ONCE
Status: COMPLETED | OUTPATIENT
Start: 2020-10-05 | End: 2020-10-05

## 2020-10-05 RX ORDER — SODIUM CHLORIDE 0.9 % (FLUSH) 0.9 %
10 SYRINGE (ML) INJECTION EVERY 12 HOURS SCHEDULED
Status: DISCONTINUED | OUTPATIENT
Start: 2020-10-05 | End: 2020-10-10 | Stop reason: HOSPADM

## 2020-10-05 RX ORDER — MORPHINE SULFATE 2 MG/ML
2 INJECTION, SOLUTION INTRAMUSCULAR; INTRAVENOUS EVERY 4 HOURS PRN
Status: DISCONTINUED | OUTPATIENT
Start: 2020-10-05 | End: 2020-10-05

## 2020-10-05 RX ORDER — ONDANSETRON 2 MG/ML
4 INJECTION INTRAMUSCULAR; INTRAVENOUS ONCE
Status: COMPLETED | OUTPATIENT
Start: 2020-10-05 | End: 2020-10-05

## 2020-10-05 RX ADMIN — SODIUM CHLORIDE, POTASSIUM CHLORIDE, SODIUM LACTATE AND CALCIUM CHLORIDE: 600; 310; 30; 20 INJECTION, SOLUTION INTRAVENOUS at 17:51

## 2020-10-05 RX ADMIN — IOPAMIDOL 100 ML: 755 INJECTION, SOLUTION INTRAVENOUS at 01:54

## 2020-10-05 RX ADMIN — HYDROMORPHONE HYDROCHLORIDE 1 MG: 1 INJECTION, SOLUTION INTRAMUSCULAR; INTRAVENOUS; SUBCUTANEOUS at 18:30

## 2020-10-05 RX ADMIN — MORPHINE SULFATE 2 MG: 2 INJECTION, SOLUTION INTRAMUSCULAR; INTRAVENOUS at 03:43

## 2020-10-05 RX ADMIN — METRONIDAZOLE 500 MG: 500 INJECTION, SOLUTION INTRAVENOUS at 11:58

## 2020-10-05 RX ADMIN — SODIUM CHLORIDE, POTASSIUM CHLORIDE, SODIUM LACTATE AND CALCIUM CHLORIDE: 600; 310; 30; 20 INJECTION, SOLUTION INTRAVENOUS at 08:02

## 2020-10-05 RX ADMIN — METOCLOPRAMIDE HYDROCHLORIDE 10 MG: 5 INJECTION INTRAMUSCULAR; INTRAVENOUS at 00:58

## 2020-10-05 RX ADMIN — MORPHINE SULFATE 2 MG: 2 INJECTION, SOLUTION INTRAMUSCULAR; INTRAVENOUS at 07:57

## 2020-10-05 RX ADMIN — PIPERACILLIN SODIUM AND TAZOBACTAM SODIUM 4.5 G: 4; .5 INJECTION, POWDER, LYOPHILIZED, FOR SOLUTION INTRAVENOUS at 03:43

## 2020-10-05 RX ADMIN — PIPERACILLIN AND TAZOBACTAM 3.38 G: 3; .375 INJECTION, POWDER, LYOPHILIZED, FOR SOLUTION INTRAVENOUS at 09:43

## 2020-10-05 RX ADMIN — SODIUM CHLORIDE, POTASSIUM CHLORIDE, SODIUM LACTATE AND CALCIUM CHLORIDE: 600; 310; 30; 20 INJECTION, SOLUTION INTRAVENOUS at 16:42

## 2020-10-05 RX ADMIN — DIPHENHYDRAMINE HYDROCHLORIDE 50 MG: 50 INJECTION, SOLUTION INTRAMUSCULAR; INTRAVENOUS at 00:53

## 2020-10-05 RX ADMIN — HYDROMORPHONE HYDROCHLORIDE 1 MG: 1 INJECTION, SOLUTION INTRAMUSCULAR; INTRAVENOUS; SUBCUTANEOUS at 11:52

## 2020-10-05 RX ADMIN — PIPERACILLIN AND TAZOBACTAM 3.38 G: 3; .375 INJECTION, POWDER, LYOPHILIZED, FOR SOLUTION INTRAVENOUS at 18:14

## 2020-10-05 RX ADMIN — HYDROMORPHONE HYDROCHLORIDE 1 MG: 1 INJECTION, SOLUTION INTRAMUSCULAR; INTRAVENOUS; SUBCUTANEOUS at 22:45

## 2020-10-05 RX ADMIN — SODIUM CHLORIDE 1000 ML: 9 INJECTION, SOLUTION INTRAVENOUS at 01:06

## 2020-10-05 RX ADMIN — MORPHINE SULFATE 4 MG: 4 INJECTION, SOLUTION INTRAMUSCULAR; INTRAVENOUS at 00:55

## 2020-10-05 RX ADMIN — ONDANSETRON 4 MG: 2 INJECTION INTRAMUSCULAR; INTRAVENOUS at 03:43

## 2020-10-05 RX ADMIN — ENOXAPARIN SODIUM 40 MG: 40 INJECTION SUBCUTANEOUS at 16:42

## 2020-10-05 RX ADMIN — HYDROMORPHONE HYDROCHLORIDE 1 MG: 1 INJECTION, SOLUTION INTRAMUSCULAR; INTRAVENOUS; SUBCUTANEOUS at 20:48

## 2020-10-05 RX ADMIN — HYDROMORPHONE HYDROCHLORIDE 1 MG: 1 INJECTION, SOLUTION INTRAMUSCULAR; INTRAVENOUS; SUBCUTANEOUS at 15:46

## 2020-10-05 ASSESSMENT — ENCOUNTER SYMPTOMS
HEMATOCHEZIA: 0
RHINORRHEA: 0
COLOR CHANGE: 0
VOMITING: 0
DIARRHEA: 0
NAUSEA: 1
EYE ITCHING: 0
COUGH: 0
SINUS PRESSURE: 0
ABDOMINAL PAIN: 0
EYE DISCHARGE: 0
SORE THROAT: 0
BACK PAIN: 0

## 2020-10-05 ASSESSMENT — PAIN SCALES - GENERAL
PAINLEVEL_OUTOF10: 7
PAINLEVEL_OUTOF10: 8
PAINLEVEL_OUTOF10: 6
PAINLEVEL_OUTOF10: 7
PAINLEVEL_OUTOF10: 8
PAINLEVEL_OUTOF10: 3
PAINLEVEL_OUTOF10: 8
PAINLEVEL_OUTOF10: 7

## 2020-10-05 ASSESSMENT — PAIN DESCRIPTION - PAIN TYPE
TYPE: ACUTE PAIN
TYPE: ACUTE PAIN

## 2020-10-05 ASSESSMENT — LIFESTYLE VARIABLES: SMOKING_STATUS: 1

## 2020-10-05 ASSESSMENT — PAIN DESCRIPTION - PROGRESSION: CLINICAL_PROGRESSION: NOT CHANGED

## 2020-10-05 ASSESSMENT — PAIN DESCRIPTION - ONSET: ONSET: ON-GOING

## 2020-10-05 ASSESSMENT — PAIN DESCRIPTION - LOCATION
LOCATION: ABDOMEN
LOCATION: ABDOMEN

## 2020-10-05 ASSESSMENT — PAIN DESCRIPTION - DESCRIPTORS
DESCRIPTORS: ACHING;CRAMPING

## 2020-10-05 ASSESSMENT — PAIN - FUNCTIONAL ASSESSMENT: PAIN_FUNCTIONAL_ASSESSMENT: PREVENTS OR INTERFERES SOME ACTIVE ACTIVITIES AND ADLS

## 2020-10-05 ASSESSMENT — PAIN DESCRIPTION - FREQUENCY: FREQUENCY: CONTINUOUS

## 2020-10-05 NOTE — H&P
SURGICAL CONSULTATION    Date of Admission:  10/5/2020  Date of Consultation:  10/5/2020    PCP:  No primary care provider on file. Thank you  very much for your consultation, it's always appreciated. I had the privilege today to see your patient Alona Lynch. Chief Complaint / History of Present Illness:  Alona Lynch is a very pleasant 39 y.o. male who presents with pain in the Left Lower Quadrant and is acute, worsening, dull and stabbing compared to patient's normal condition. It is severe in intensity for a duration of 1 day and is intermittent with modifying factors increased by or worse with pressure, he thinks it started only AFTER the bowel prep. RECURRENT Perf sigmoid diverticulitis, with abscess, probably few days old. . will skip the colonoscopy today, and proceed with surgery tomorrow, lap robotic sigmoidectomy, possible stoma, possible open. PMH:   has a past medical history of Diverticulitis and Kidney stone. PSH:   has a past surgical history that includes Wrist surgery (2012) and Kidney stone surgery. Allergies: Allergies   Allergen Reactions    Vicodin [Hydrocodone-Acetaminophen] Other (See Comments)     Patient states that it is tolerable but state that makes his skin crawl        Home Meds:    Prior to Admission medications    Medication Sig Start Date End Date Taking?  Authorizing Provider   Na Sulfate-K Sulfate-Mg Sulf (SUPREP BOWEL PREP KIT) 17.5-3.13-1.6 GM/177ML SOLN Use as directed for bowel prep 10/2/20   David Linder MD        [facility] Meds:    Current Facility-Administered Medications   Medication Dose Route Frequency Provider Last Rate Last Dose    sodium chloride flush 0.9 % injection 10 mL  10 mL Intravenous 2 times per day James Chi MD        sodium chloride flush 0.9 % injection 10 mL  10 mL Intravenous PRN Mayank Monita Soulier, MD        ondansetron (ZOFRAN-ODT) disintegrating tablet 4 mg  4 mg Oral Q8H PRN James Chi MD        Or  ondansetron (ZOFRAN) injection 4 mg  4 mg Intravenous Q6H PRN Nilson Hernandez MD        lactated ringers infusion   Intravenous Continuous Nilsonnadja Hernandez  mL/hr at 10/05/20 0802      piperacillin-tazobactam (ZOSYN) 3.375 g in dextrose 5 % 50 mL IVPB extended infusion (mini-bag)  3.375 g Intravenous Q8H Rocky Seth MD        enoxaparin (LOVENOX) injection 40 mg  40 mg Subcutaneous Q24H Rocky Seth MD        HYDROmorphone (DILAUDID) injection 1 mg  1 mg Intravenous Q2H PRN Zelda Fuentes MD          lactated ringers 100 mL/hr at 10/05/20 0802       Social History / Family History:     Social History     Socioeconomic History    Marital status:      Spouse name: Not on file    Number of children: Not on file    Years of education: Not on file    Highest education level: Not on file   Occupational History    Not on file   Social Needs    Financial resource strain: Not on file    Food insecurity     Worry: Not on file     Inability: Not on file   [employer] needs     Medical: Not on file     Non-medical: Not on file   Tobacco Use    Smoking status: Never Smoker    Smokeless tobacco: Never Used   Substance and Sexual Activity    Alcohol use: No    Drug use: Yes     Types: Marijuana     Comment: \"last used last week of Sept 2020\"    Sexual activity: Never   Lifestyle    Physical activity     Days per week: Not on file     Minutes per session: Not on file    Stress: Not on file   Relationships    Social connections     Talks on phone: Not on file     Gets together: Not on file     Attends Confucianism service: Not on file     Active member of club or organization: Not on file     Attends meetings of clubs or organizations: Not on file     Relationship status: Not on file    Intimate partner violence     Fear of current or ex partner: Not on file     Emotionally abused: Not on file     Physically abused: Not on file     Forced sexual activity: Not on file   Other Topics Concern    Not on file   Social History Narrative    Not on file      Family History   Problem Relation Age of Onset    Cancer Mother         ovarian cancer     Early Death Mother     Kidney stones Father     Diabetes Father     Diabetes Brother     otherwise irrelevant to this surgical issue. Review of Systems:  Constitutional: Negative for fever, chills, diaphoresis, appetite change and fatigue. HENT: Negative for sore throat, trouble swallowing and voice change. Respiratory: Negative for cough, positive for shortness of breath no wheezing. Cardiovascular: Negative for chest pain positive for SOB with one flight of stairs exertion, no pitting LE edema. Gastrointestinal: Positive for major abdominal pain, Negative for nausea, vomiting, abdominal distention, constipation, no diarrhea, blood in stool, anal bleeding or rectal pain. Musculoskeletal: Negative for joint swelling and arthralgias. Skin: Warm and dry, well perfused. Neurological: Negative for seizures, syncope, speech difficulty and weakness. Hematological/Lymphatic: Negative for adenopathy. No history of DVT/PE. Does not bruise/bleed easily. Psychiatric/Behavioral: Negative for agitation. All others reviewed and negative. Physical Exam:  Vital Signs:   Vitals:    10/05/20 0800   BP: 113/70   Pulse: 69   Resp: 17   Temp: 98 °F (36.7 °C)   SpO2: 95%     Body mass index is 29.78 kg/m². General appearance: Pt Alert and oriented, in no apparent acute distress. HEENT:  RYAN, Conjunctiva clear. EOMI, No JVDs, Bruits, Megalies: neither thyroid nor lymph nodes. Lungs: Clear to auscultation bilaterally. Heart: Regular rate and rhythm, S1, S2 normal, no murmur, rub or gallop. Abdomen: Significantly tender lower abdomen, but NO peritoneal signs. Non distended. Positive bowel sounds. No hernias noted, no masses palpable. Extremities: Warm, well perfused, no cyanosis or edema.   Skin: Skin color, texture, turgor normal.  Neurologic: Grossly normal, Cranial nerves from II to XII intact, Deep tendon reflexes normal.  Lymph nodes: No palpable LNs, Cervical, groins, abdominal.  Musculoskeletal: Bilateral Upper and lower extremities ROM within normal limits. Radiologic / Imaging / TESTING  CT Abd/pelv: this morning.       Impression    1. There is redemonstration of perforated sigmoid diverticulitis.  Minimal    free air, stranding and fluid is seen within the mesentery.  Rim enhancing    collection with gas is noted adjacent to the proximal sigmoid colon in the    region of the perforated diverticulitis, consistent with an abscess which    extends into the mesentery.  This measures approximately 3.5 x 1.5 cm.    2. Trace free fluid within the pelvis. 3. Nonobstructing bilateral renal calculi. 4. Cholelithiasis. Findings were discussed with Corrie Hair at 2:28 am on 10/5/2020.           I reviewed.      Labs:    Recent Results (from the past 24 hour(s))   CBC Auto Differential    Collection Time: 10/05/20 12:56 AM   Result Value Ref Range    WBC 12.3 (H) 4.0 - 10.5 K/CU MM    RBC 5.25 4.6 - 6.2 M/CU MM    Hemoglobin 16.3 13.5 - 18.0 GM/DL    Hematocrit 47.8 42 - 52 %    MCV 91.0 78 - 100 FL    MCH 31.0 27 - 31 PG    MCHC 34.1 32.0 - 36.0 %    RDW 13.0 11.7 - 14.9 %    Platelets 685 649 - 052 K/CU MM    MPV 10.6 7.5 - 11.1 FL    Differential Type AUTOMATED DIFFERENTIAL     Segs Relative 84.7 (H) 36 - 66 %    Lymphocytes % 10.1 (L) 24 - 44 %    Monocytes % 4.5 (H) 0 - 4 %    Eosinophils % 0.2 0 - 3 %    Basophils % 0.2 0 - 1 %    Segs Absolute 10.4 K/CU MM    Lymphocytes Absolute 1.3 K/CU MM    Monocytes Absolute 0.6 K/CU MM    Eosinophils Absolute 0.0 K/CU MM    Basophils Absolute 0.0 K/CU MM    Immature Neutrophil % 0.3 0 - 0.43 %    Total Immature Neutrophil 0.04 K/CU MM   Comprehensive Metabolic Panel    Collection Time: 10/05/20 12:56 AM   Result Value Ref Range    Sodium 137 135 - 145 MMOL/L    Potassium 3.7 3.5 - 5.1 MMOL/L    Chloride 101 99 - 110 mMol/L    CO2 22 21 - 32 MMOL/L    BUN 8 6 - 23 MG/DL    CREATININE 0.9 0.9 - 1.3 MG/DL    Glucose 118 (H) 70 - 99 MG/DL    Calcium 8.8 8.3 - 10.6 MG/DL    Alb 4.2 3.4 - 5.0 GM/DL    Total Protein 7.2 6.4 - 8.2 GM/DL    Total Bilirubin 1.2 (H) 0.0 - 1.0 MG/DL    ALT 42 (H) 10 - 40 U/L    AST 21 15 - 37 IU/L    Alkaline Phosphatase 65 40 - 129 IU/L    GFR Non-African American >60 >60 mL/min/1.73m2    GFR African American >60 >60 mL/min/1.73m2    Anion Gap 14 4 - 16   Lipase    Collection Time: 10/05/20 12:56 AM   Result Value Ref Range    Lipase 33 13 - 60 IU/L       Diagnosis:  Patient Active Problem List   Diagnosis    Perforated sigmoid colon (HCC)    Perforated diverticulum       Assessment & plan:  Riddhi Hopper is a very pleasant 39 y.o. male presenting with RECURRENT Perf sigmoid diverticulitis, with abscess, probably few days old. . will skip the colonoscopy today, and proceed with surgery tomorrow, lap robotic sigmoidectomy, possible stoma, possible open. Continue NPO/Bowel rest, IV Fluids, Pain control, Nausea control, IV Antibiotics. Thank you doctor very much for your consultation and for the opportunity to take care of Mr Riddhi Hopper with you, I'll follow along with you and I'll update you on any new events in his care.    ____________________________________________    Florin Mauricio MD, FACS, FICS    10/5/2020  8:32 AM      Patient was seen with total face to face time of 45 minutes. More than 50% of this visit was counseling and education as above in my assessment and plan section of my note.

## 2020-10-05 NOTE — ANESTHESIA PRE PROCEDURE
Department of Anesthesiology  Preprocedure Note       Name:  Tara Landers   Age:  39 y.o.  :  1975                                          MRN:  [id number]         Date:  10/5/2020      Surgeon: Don Huynh):  Aye Beltre MD    Procedure: Procedure(s):  BOWEL RESECTION SIGMOID LAPAROSCOPIC ROBOTIC POSS STOMA POSS OPEN    Medications prior to admission:   Prior to Admission medications    Medication Sig Start Date End Date Taking? Authorizing Provider   Na Sulfate-K Sulfate-Mg Sulf (SUPREP BOWEL PREP KIT) 17.5-3.13-1.6 GM/177ML SOLN Use as directed for bowel prep 10/2/20   Aye Beltre MD       Current medications:    No current facility-administered medications for this encounter. No current outpatient medications on file. Facility-Administered Medications Ordered in Other Encounters   Medication Dose Route Frequency Provider Last Rate Last Dose    sodium chloride flush 0.9 % injection 10 mL  10 mL Intravenous 2 times per day Yvette PERDUE MD        sodium chloride flush 0.9 % injection 10 mL  10 mL Intravenous PRN Nilson Vieyra MD        ondansetron (ZOFRAN-ODT) disintegrating tablet 4 mg  4 mg Oral Q8H PRN Nilson Vieyra MD        Or    ondansetron (ZOFRAN) injection 4 mg  4 mg Intravenous Q6H PRN Nilson Vieyra MD        lactated ringers infusion   Intravenous Continuous Nilson Vieyra  mL/hr at 10/05/20 0802      piperacillin-tazobactam (ZOSYN) 3.375 g in dextrose 5 % 50 mL IVPB extended infusion (mini-bag)  3.375 g Intravenous Q8H Astrid Carter MD 12.5 mL/hr at 10/05/20 0943 3.375 g at 10/05/20 0943    enoxaparin (LOVENOX) injection 40 mg  40 mg Subcutaneous Q24H Astrid Carter MD        HYDROmorphone (DILAUDID) injection 1 mg  1 mg Intravenous Q2H PRN Aye Beltre MD           Allergies:     Allergies   Allergen Reactions    Vicodin [Hydrocodone-Acetaminophen] Other (See Comments)     Patient states that it is tolerable but state that makes his skin crawl       Problem List:    Patient Active Problem List   Diagnosis Code    Perforated sigmoid colon ([facility] [address] K63.1    Perforated diverticulum K57.80       Past Medical History:        Diagnosis Date    Diverticulitis     Kidney stone     \"had kidney stone now- had them since age 17-had surgery and able to pass some\" follows with dr Ke Reynolds       Past Surgical History:        Procedure Laterality Date    KIDNEY STONE SURGERY      \"had surgery multiple times for kidney stones last time 2018?  WRIST SURGERY  2012    left wrist\"no metal\"       Social History:    Social History     Tobacco Use    Smoking status: Never Smoker    Smokeless tobacco: Never Used   Substance Use Topics    Alcohol use: No                                Counseling given: Not Answered      Vital Signs (Current): There were no vitals filed for this visit.                                            BP Readings from Last 3 Encounters:   10/05/20 104/63   10/05/20 109/65   09/28/20 122/80       NPO Status:                                                                                 BMI:   Wt Readings from Last 3 Encounters:   10/05/20 213 lb 8 oz (96.8 kg)   10/05/20 212 lb (96.2 kg)   09/04/20 211 lb 3.2 oz (95.8 kg)     There is no height or weight on file to calculate BMI.    CBC:   Lab Results   Component Value Date    WBC 12.3 10/05/2020    RBC 5.25 10/05/2020    HGB 16.3 10/05/2020    HCT 47.8 10/05/2020    MCV 91.0 10/05/2020    RDW 13.0 10/05/2020     10/05/2020       CMP:   Lab Results   Component Value Date     10/05/2020    K 3.7 10/05/2020     10/05/2020    CO2 22 10/05/2020    BUN 8 10/05/2020    CREATININE 0.9 10/05/2020    GFRAA >60 10/05/2020    LABGLOM >60 10/05/2020    GLUCOSE 118 10/05/2020    PROT 7.2 10/05/2020    CALCIUM 8.8 10/05/2020    BILITOT 1.2 10/05/2020    ALKPHOS 65 10/05/2020    AST 21 10/05/2020    ALT 42 10/05/2020       POC Tests: No results for input(s): POCGLU, POCNA, POCK, POCCL, POCBUN, POCHEMO, POCHCT in the last 72 hours. Coags: No results found for: PROTIME, INR, APTT    HCG (If Applicable): No results found for: PREGTESTUR, PREGSERUM, HCG, HCGQUANT     ABGs: No results found for: PHART, PO2ART, UTY0DNP, FKU2ISA, BEART, Y2SHYFQV     Type & Screen (If Applicable):  No results found for: LABABO, LABRH    Drug/Infectious Status (If Applicable):  No results found for: HIV, HEPCAB    COVID-19 Screening (If Applicable):   Lab Results   Component Value Date    COVID19 NOT DETECTED 09/28/2020         Anesthesia Evaluation  Patient summary reviewed and Nursing notes reviewed  Airway:         Dental:          Pulmonary:   (+) current smoker                          ROS comment: thc    Cardiovascular:                      Neuro/Psych:               GI/Hepatic/Renal:   (+) renal disease: kidney stones,           Endo/Other:              Pt had no PAT visit       Abdominal:           Vascular:                                        Anesthesia Plan      general     ASA 2     (Chart review only  covid - 9/28    In [facility] ed this am ? Colonoscopy first then sx? )  Induction: intravenous. Plan discussed with GARFIELD.               URSULA Guerin - GARFIELD   10/5/2020

## 2020-10-05 NOTE — ED PROVIDER NOTES
The history is provided by the patient. Abdominal Pain   Pain location:  Generalized  Pain quality: tearing    Pain radiates to:  Back  Pain severity:  Severe  Onset quality:  Sudden  Timing:  Constant  Progression:  Unchanged  Chronicity:  New  Context comment:  Patient was preparing a bowel prep after taking first dose he had abdominal cramps and then intense tearing like pain felt like he could not take a deep breath patient did not take any further bowel prep medication  Relieved by:  Nothing  Worsened by:  Deep breathing and movement  Ineffective treatments:  Position changes  Associated symptoms: nausea    Associated symptoms: no anorexia, no chest pain, no cough, no diarrhea, no fever, no hematochezia, no sore throat and no vomiting    Associated symptoms comment:  Has a known history of a ruptured diverticulum. He was scheduled to have colonoscopy in the morning. The patient was then most likely going to have to have a colectomy however colonoscopy was going to be performed first.      Review of Systems   Constitutional: Negative for activity change, appetite change and fever. HENT: Negative for congestion, rhinorrhea, sinus pressure and sore throat. Eyes: Negative for discharge and itching. Respiratory: Negative for cough. Cardiovascular: Negative for chest pain, palpitations and leg swelling. Gastrointestinal: Positive for nausea. Negative for abdominal pain, anorexia, diarrhea, hematochezia and vomiting. Genitourinary: Negative for decreased urine volume, difficulty urinating and urgency. Musculoskeletal: Negative for arthralgias and back pain. Skin: Negative for color change, pallor, rash and wound. Neurological: Negative for dizziness, speech difficulty and light-headedness. Hematological: Negative for adenopathy. Psychiatric/Behavioral: Negative for agitation, behavioral problems, confusion and decreased concentration.    All other systems reviewed and are pass some\" follows with dr Hoda Osborn Vicodin [Hydrocodone-Acetaminophen] Other (See Comments)     Patient states that it is tolerable but state that makes his skin crawl     Prior to Admission medications    Medication Sig Start Date End Date Taking? Authorizing Provider   Na Sulfate-K Sulfate-Mg Sulf (SUPREP BOWEL PREP KIT) 17.5-3.13-1.6 GM/177ML SOLN Use as directed for bowel prep 10/2/20   Khadijah Castorena MD       /74   Pulse 83   Temp 97.8 °F (36.6 °C) (Oral)   Resp 16   Ht 5' 11\" (1.803 m)   Wt 212 lb (96.2 kg)   SpO2 99%   BMI 29.57 kg/m²     Physical Exam  Vitals signs and nursing note reviewed. Constitutional:       Appearance: He is well-developed. He is ill-appearing and diaphoretic. HENT:      Head: Normocephalic and atraumatic. Right Ear: External ear normal.      Left Ear: External ear normal.      Nose: Nose normal.   Eyes:      Conjunctiva/sclera: Conjunctivae normal.      Pupils: Pupils are equal, round, and reactive to light. Neck:      Musculoskeletal: Normal range of motion and neck supple. Cardiovascular:      Rate and Rhythm: Normal rate and regular rhythm. Heart sounds: Normal heart sounds. Pulmonary:      Effort: Pulmonary effort is normal.      Breath sounds: Normal breath sounds. Abdominal:      General: Bowel sounds are normal.      Palpations: Abdomen is soft. Tenderness: There is abdominal tenderness in the periumbilical area. There is guarding and rebound. Musculoskeletal: Normal range of motion. Skin:     General: Skin is warm. Neurological:      Mental Status: He is alert and oriented to person, place, and time. GCS: GCS eye subscore is 4. GCS verbal subscore is 5. GCS motor subscore is 6. Psychiatric:         Behavior: Behavior normal.         Thought Content:  Thought content normal.         Judgment: Judgment normal.         MDM:    Results for orders placed or performed during the hospital encounter of 10/05/20   CBC Auto Differential   Result Value Ref Range    WBC 12.3 (H) 4.0 - 10.5 K/CU MM    RBC 5.25 4.6 - 6.2 M/CU MM    Hemoglobin 16.3 13.5 - 18.0 GM/DL    Hematocrit 47.8 42 - 52 %    MCV 91.0 78 - 100 FL    MCH 31.0 27 - 31 PG    MCHC 34.1 32.0 - 36.0 %    RDW 13.0 11.7 - 14.9 %    Platelets 116 362 - 891 K/CU MM    MPV 10.6 7.5 - 11.1 FL    Differential Type AUTOMATED DIFFERENTIAL     Segs Relative 84.7 (H) 36 - 66 %    Lymphocytes % 10.1 (L) 24 - 44 %    Monocytes % 4.5 (H) 0 - 4 %    Eosinophils % 0.2 0 - 3 %    Basophils % 0.2 0 - 1 %    Segs Absolute 10.4 K/CU MM    Lymphocytes Absolute 1.3 K/CU MM    Monocytes Absolute 0.6 K/CU MM    Eosinophils Absolute 0.0 K/CU MM    Basophils Absolute 0.0 K/CU MM    Immature Neutrophil % 0.3 0 - 0.43 %    Total Immature Neutrophil 0.04 K/CU MM   Comprehensive Metabolic Panel   Result Value Ref Range    Sodium 137 135 - 145 MMOL/L    Potassium 3.7 3.5 - 5.1 MMOL/L    Chloride 101 99 - 110 mMol/L    CO2 22 21 - 32 MMOL/L    BUN 8 6 - 23 MG/DL    CREATININE 0.9 0.9 - 1.3 MG/DL    Glucose 118 (H) 70 - 99 MG/DL    Calcium 8.8 8.3 - 10.6 MG/DL    Alb 4.2 3.4 - 5.0 GM/DL    Total Protein 7.2 6.4 - 8.2 GM/DL    Total Bilirubin 1.2 (H) 0.0 - 1.0 MG/DL    ALT 42 (H) 10 - 40 U/L    AST 21 15 - 37 IU/L    Alkaline Phosphatase 65 40 - 129 IU/L    GFR Non-African American >60 >60 mL/min/1.73m2    GFR African American >60 >60 mL/min/1.73m2    Anion Gap 14 4 - 16   Lipase   Result Value Ref Range    Lipase 33 13 - 60 IU/L     CT ABDOMEN PELVIS W IV CONTRAST Additional Contrast? None   Preliminary Result   1. There is redemonstration of perforated sigmoid diverticulitis. Minimal   free air, stranding and fluid is seen within the mesentery. Rim enhancing   collection with gas is noted adjacent to the proximal sigmoid colon in the   region of the perforated diverticulitis, consistent with an abscess which   extends into the mesentery.   This measures approximately 3.5 x 1. 5 cm.   2. Trace free fluid within the pelvis. 3. Nonobstructing bilateral renal calculi. 4. Cholelithiasis. Findings were discussed with Emily Encinas at 2:28 am on 10/5/2020. My typical dicussion, presentation,and considerations for this patients' chief complaint, diagnosis, differential diagnosis, medications, medication use,  medication safety and medication interactions have been explained and outlined to this patient for thispatient encounter. I have discussed with Dr. Mai Clayton who is covering for Dr. Ulices Menchaca surgery and she agreed with the plan. We will  Transfer to [facility] and Zosyn antibiotics have been given. Dr. Td Abbott from the hospitalist group at [facility] and the patient will be accepted. An OR has been booked for 11 am. Pain medication and antinausea medication has been given and the patient is comfortable and stable. He is much more comfortable and rexamination of abdomen reveals tnederness but the guarding has resolved. Final Impression    1. Diverticulitis of colon    2. Abscess of sigmoid colon    3.  Nausea              287 Syntjuana Boyle DO  10/05/20 [id number]

## 2020-10-05 NOTE — H&P
developed progressively worsening cramping abdominal pain, severe in intensity, nonradiating, no known aggravating or relieving factor, associated with nausea and one episode of emesis. He also had diarrhea which he attributes to the bowel prep. He presented to [place] emergency room where a CT abdomen and pelvis redemonstrated perforated sigmoid diverticulitis but this time also shows an abscess. He was given analgesics and started on empiric Zosyn subsequently transferred to [address] for surgical evaluation. He denies fever or chills. Currently reports pain at 7/10 intensity. ROS: As per HPI, otherwise 10-systems reviewed negative, unless as noted above. Objective:   No intake or output data in the 24 hours ending 10/05/20 0811     Vitals:   Vitals:    10/05/20 0600 10/05/20 0601 10/05/20 0620 10/05/20 0700   BP: 119/77 118/76  120/75   Pulse: 76 73 68 77   Resp: 21 24  24   Temp: 97.9 °F (36.6 °C)      TempSrc: Oral      SpO2: 96% 95%  96%   Weight: 213 lb 8 oz (96.8 kg)      Height: 5' 11\" (1.803 m)        Physical Exam: 10/05/20    GEN  -Awake, alert, male, lying in bed. EYES -Pupils are equally round. No vision changes. No scleral erythema, discharge, or conjunctivitis. HENT -MM are moist. Oral pharynx without exudates, no evidence of thrush. NECK -Supple, no apparent thyromegaly or masses. RESP breathing comfortably on room air. C/V  -S1/S2 auscultated. RRR without appreciable M/R/G. No JVD or carotid bruits. No peripheral edema. GI  -Abdomen is soft non-distended, generalized tenderness present with guarding. + BS in all quadrants.   -No CVA tenderness. MS  -B/L extremities strong muscles strength. Full movements. No gross joint deformities. No swelling, intact sensation symmetrical.   SKIN  -Normal coloration, warm, dry. No open wounds or ulcers. NEURO  -normal speech, no lateralizing weakness. PSYC  -Awake, alert, oriented x 3.     Past Medical History:      Past Medical History: Diagnosis Date    Diverticulitis     Kidney stone     \"had kidney stone now- had them since age 17-had surgery and able to pass some\" follows with dr Letty Rachel     Past Surgery History:  Patient  has a past surgical history that includes Wrist surgery (2012) and Kidney stone surgery. Social History:    FAM HX: Assessed: family history includes Cancer in his mother; Diabetes in his brother and father; Early Death in his mother; Kidney stones in his father. Soc HX:   Social History     Socioeconomic History    Marital status:      Spouse name: Not on file    Number of children: Not on file    Years of education: Not on file    Highest education level: Not on file   Occupational History    Not on file   Social Needs    Financial resource strain: Not on file    Food insecurity     Worry: Not on file     Inability: Not on file   [employer] needs     Medical: Not on file     Non-medical: Not on file   Tobacco Use    Smoking status: Never Smoker    Smokeless tobacco: Never Used   Substance and Sexual Activity    Alcohol use: No    Drug use: Yes     Types: Marijuana     Comment: \"last used last week of Sept 2020\"    Sexual activity: Never   Lifestyle    Physical activity     Days per week: Not on file     Minutes per session: Not on file    Stress: Not on file   Relationships    Social connections     Talks on phone: Not on file     Gets together: Not on file     Attends Che[employer] service: Not on file     Active member of club or organization: Not on file     Attends meetings of clubs or organizations: Not on file     Relationship status: Not on file    Intimate partner violence     Fear of current or ex partner: Not on file     Emotionally abused: Not on file     Physically abused: Not on file     Forced sexual activity: Not on file   Other Topics Concern    Not on file   Social History Narrative    Not on file     TOBACCO:   reports that he has never smoked.  He has never used smokeless pelvis dated August 15, 2020 and August 19, 2020. HISTORY: ORDERING SYSTEM PROVIDED HISTORY: Pain TECHNOLOGIST PROVIDED HISTORY: I just want IV contrast, Reason for exam:-> Pain Additional Contrast?->None Reason for Exam: Abdominal Pain (patient states that he was beginning the bowel prep for upcoming colonoscopy at 1630 and began to have abdominal cramping that is so intense he feels like he can't breathe. Denies any blood in stool. Emesis X 4 , denies any blood in vomit). Acuity: Acute Type of Exam: Initial Additional signs and symptoms: Abdominal Pain (patient states that he was beginning the bowel prep for upcoming colonoscopy at 1630 and began to have abdominal cramping that is so intense he feels like he can't breathe. Denies any blood in stool. Emesis X 4 , denies any blood in vomit). Relevant Medical/Surgical History: Abdominal Pain (patient states that he was beginning the bowel prep for upcoming colonoscopy at 1630 and began to have abdominal cramping that is so intense he feels like he can't breathe. Denies any blood in stool. Emesis X 4 , denies any blood in vomit). 100 mL Isovue 370 inj by mv rt. ext 0145. FINDINGS: Lower Chest: Bibasilar atelectasis is noted. Organs: Cholelithiasis is noted. The liver, spleen, adrenal glands, and pancreas are without acute findings. Nonobstructing bilateral renal calculi are noted. There is no hydronephrosis. GI/Bowel: Colonic diverticulosis is noted. There is a rim enhancing collection with gas adjacent to the proximal sigmoid colon in the region of the prior diverticulitis, consistent with a diverticular abscess which extends into the mesentery. This measures approximately 3.5 x 1.5 cm. There is no evidence of bowel obstruction. The appendix is normal. Pelvis: Trace free fluid is seen within the pelvis. The bladder is unremarkable. Peritoneum/Retroperitoneum: A small amount of free fluid is seen within the left lower quadrant and in the mesentery.   Stranding is seen within the mesentery. Trace free air is noted. There has been interval improvement in the free air seen on the prior examination. Bones/Soft Tissues: No destructive osseous lesions are identified. Transitional lumbosacral anatomy is noted with 6 lumbar type vertebral bodies. There is bilateral pars defects of L5 with grade 1 anterolisthesis of L5 with respect to L6.     1. There is redemonstration of perforated sigmoid diverticulitis. Minimal free air, stranding and fluid is seen within the mesentery. Rim enhancing collection with gas is noted adjacent to the proximal sigmoid colon in the region of the perforated diverticulitis, consistent with an abscess which extends into the mesentery. This measures approximately 3.5 x 1.5 cm. 2. Trace free fluid within the pelvis. 3. Nonobstructing bilateral renal calculi. 4. Cholelithiasis. Findings were discussed with Chepe Tolentinoer at 2:28 am on 10/5/2020.      EKG this visit:   EKG: None    Current Treatment Team:  Treatment Team: Attending Provider: Maximino Lemus MD; Utilization Reviewer: Arnulfo Colbert RN; Registered Nurse: Dimas Mcburney, RN; Consulting Physician: MD Maximino Cotter Md, MS [facility]  10/5/2020 8:11 AM      Electronically signed by Maximino Lemus MD on 10/5/2020 at 8:11 AM

## 2020-10-05 NOTE — ED NOTES
Dr. Barnett Deayolande will be performing colonoscopy.  Patient NPO prior to taking diluted [id number] Matt Shaffer RN  10/05/20 5116

## 2020-10-05 NOTE — ED NOTES
Dr. Bro Kelly general surgery responded. Alda Briones will not respond even to his patients until after 7. [facility] will page hospitalist at [address] [place] [address].       Brianda Caceres RN  10/05/20 0037

## 2020-10-05 NOTE — LETTER
[facility] Emergency Department  [address] [place] [address]  Phone: [phone]  Fax: [phone]               October 5, 2020    Patient: Betsy Goodell   YOB: 1975   Date of Visit: 10/5/2020       To Whom It May Concern:    Adrian Craig was seen and treated in our emergency department on 10/5/2020. He may return to work on 10/07/2020.       Sincerely,       Maria G Carvalho RN         Signature:__________________________________

## 2020-10-06 ENCOUNTER — ANESTHESIA (OUTPATIENT)
Dept: OPERATING ROOM | Age: 45
DRG: 329 | End: 2020-10-06
Payer: COMMERCIAL

## 2020-10-06 VITALS
DIASTOLIC BLOOD PRESSURE: 98 MMHG | SYSTOLIC BLOOD PRESSURE: 130 MMHG | OXYGEN SATURATION: 100 % | TEMPERATURE: 100.4 F | RESPIRATION RATE: 3 BRPM

## 2020-10-06 LAB
ALBUMIN SERPL-MCNC: 3.7 GM/DL (ref 3.4–5)
ALP BLD-CCNC: 51 IU/L (ref 40–128)
ALT SERPL-CCNC: 23 U/L (ref 10–40)
ANION GAP SERPL CALCULATED.3IONS-SCNC: 11 MMOL/L (ref 4–16)
AST SERPL-CCNC: 11 IU/L (ref 15–37)
BASOPHILS ABSOLUTE: 0 K/CU MM
BASOPHILS RELATIVE PERCENT: 0.2 % (ref 0–1)
BILIRUB SERPL-MCNC: 1.8 MG/DL (ref 0–1)
BUN BLDV-MCNC: 9 MG/DL (ref 6–23)
CALCIUM SERPL-MCNC: 8.1 MG/DL (ref 8.3–10.6)
CHLORIDE BLD-SCNC: 101 MMOL/L (ref 99–110)
CO2: 24 MMOL/L (ref 21–32)
CREAT SERPL-MCNC: 1 MG/DL (ref 0.9–1.3)
DIFFERENTIAL TYPE: ABNORMAL
EOSINOPHILS ABSOLUTE: 0.1 K/CU MM
EOSINOPHILS RELATIVE PERCENT: 0.6 % (ref 0–3)
GFR AFRICAN AMERICAN: >60 ML/MIN/1.73M2
GFR NON-AFRICAN AMERICAN: >60 ML/MIN/1.73M2
GLUCOSE BLD-MCNC: 80 MG/DL (ref 70–99)
HCT VFR BLD CALC: 43 % (ref 42–52)
HEMOGLOBIN: 14.3 GM/DL (ref 13.5–18)
IMMATURE NEUTROPHIL %: 0.2 % (ref 0–0.43)
LYMPHOCYTES ABSOLUTE: 1.6 K/CU MM
LYMPHOCYTES RELATIVE PERCENT: 16.3 % (ref 24–44)
MCH RBC QN AUTO: 31.3 PG (ref 27–31)
MCHC RBC AUTO-ENTMCNC: 33.3 % (ref 32–36)
MCV RBC AUTO: 94.1 FL (ref 78–100)
MONOCYTES ABSOLUTE: 0.8 K/CU MM
MONOCYTES RELATIVE PERCENT: 7.6 % (ref 0–4)
NUCLEATED RBC %: 0 %
PDW BLD-RTO: 13.1 % (ref 11.7–14.9)
PLATELET # BLD: 204 K/CU MM (ref 140–440)
PMV BLD AUTO: 10.4 FL (ref 7.5–11.1)
POTASSIUM SERPL-SCNC: 4.2 MMOL/L (ref 3.5–5.1)
RBC # BLD: 4.57 M/CU MM (ref 4.6–6.2)
SEGMENTED NEUTROPHILS ABSOLUTE COUNT: 7.6 K/CU MM
SEGMENTED NEUTROPHILS RELATIVE PERCENT: 75.1 % (ref 36–66)
SODIUM BLD-SCNC: 136 MMOL/L (ref 135–145)
TOTAL IMMATURE NEUTOROPHIL: 0.02 K/CU MM
TOTAL NUCLEATED RBC: 0 K/CU MM
TOTAL PROTEIN: 5.6 GM/DL (ref 6.4–8.2)
WBC # BLD: 10.1 K/CU MM (ref 4–10.5)

## 2020-10-06 PROCEDURE — 3700000000 HC ANESTHESIA ATTENDED CARE: Performed by: SURGERY

## 2020-10-06 PROCEDURE — 85025 COMPLETE CBC W/AUTO DIFF WBC: CPT

## 2020-10-06 PROCEDURE — 2580000003 HC RX 258: Performed by: SURGERY

## 2020-10-06 PROCEDURE — 2580000003 HC RX 258: Performed by: INTERNAL MEDICINE

## 2020-10-06 PROCEDURE — 6360000002 HC RX W HCPCS: Performed by: SURGERY

## 2020-10-06 PROCEDURE — 6360000002 HC RX W HCPCS: Performed by: INTERNAL MEDICINE

## 2020-10-06 PROCEDURE — 94761 N-INVAS EAR/PLS OXIMETRY MLT: CPT

## 2020-10-06 PROCEDURE — 2500000003 HC RX 250 WO HCPCS: Performed by: NURSE ANESTHETIST, CERTIFIED REGISTERED

## 2020-10-06 PROCEDURE — 0DNU4ZZ RELEASE OMENTUM, PERCUTANEOUS ENDOSCOPIC APPROACH: ICD-10-PCS | Performed by: SURGERY

## 2020-10-06 PROCEDURE — S2900 ROBOTIC SURGICAL SYSTEM: HCPCS | Performed by: SURGERY

## 2020-10-06 PROCEDURE — 0W9D3ZZ DRAINAGE OF PERICARDIAL CAVITY, PERCUTANEOUS APPROACH: ICD-10-PCS | Performed by: SURGERY

## 2020-10-06 PROCEDURE — 2700000000 HC OXYGEN THERAPY PER DAY

## 2020-10-06 PROCEDURE — 2000000000 HC ICU R&B

## 2020-10-06 PROCEDURE — 0DTP4ZZ RESECTION OF RECTUM, PERCUTANEOUS ENDOSCOPIC APPROACH: ICD-10-PCS | Performed by: SURGERY

## 2020-10-06 PROCEDURE — C9290 INJ, BUPIVACAINE LIPOSOME: HCPCS | Performed by: SURGERY

## 2020-10-06 PROCEDURE — 3600000019 HC SURGERY ROBOT ADDTL 15MIN: Performed by: SURGERY

## 2020-10-06 PROCEDURE — 44213 LAP MOBIL SPLENIC FL ADD-ON: CPT | Performed by: SURGERY

## 2020-10-06 PROCEDURE — 80053 COMPREHEN METABOLIC PANEL: CPT

## 2020-10-06 PROCEDURE — 2709999900 HC NON-CHARGEABLE SUPPLY: Performed by: SURGERY

## 2020-10-06 PROCEDURE — 2500000003 HC RX 250 WO HCPCS: Performed by: SURGERY

## 2020-10-06 PROCEDURE — 3700000001 HC ADD 15 MINUTES (ANESTHESIA): Performed by: SURGERY

## 2020-10-06 PROCEDURE — 51702 INSERT TEMP BLADDER CATH: CPT

## 2020-10-06 PROCEDURE — 36415 COLL VENOUS BLD VENIPUNCTURE: CPT

## 2020-10-06 PROCEDURE — 2720000010 HC SURG SUPPLY STERILE: Performed by: SURGERY

## 2020-10-06 PROCEDURE — 6360000002 HC RX W HCPCS: Performed by: NURSE ANESTHETIST, CERTIFIED REGISTERED

## 2020-10-06 PROCEDURE — 0DTN4ZZ RESECTION OF SIGMOID COLON, PERCUTANEOUS ENDOSCOPIC APPROACH: ICD-10-PCS | Performed by: SURGERY

## 2020-10-06 PROCEDURE — 44207 L COLECTOMY/COLOPROCTOSTOMY: CPT | Performed by: SURGERY

## 2020-10-06 PROCEDURE — 3600000009 HC SURGERY ROBOT BASE: Performed by: SURGERY

## 2020-10-06 PROCEDURE — 0DNN3ZZ RELEASE SIGMOID COLON, PERCUTANEOUS APPROACH: ICD-10-PCS | Performed by: SURGERY

## 2020-10-06 PROCEDURE — 8E0W4CZ ROBOTIC ASSISTED PROCEDURE OF TRUNK REGION, PERCUTANEOUS ENDOSCOPIC APPROACH: ICD-10-PCS | Performed by: SURGERY

## 2020-10-06 PROCEDURE — 88307 TISSUE EXAM BY PATHOLOGIST: CPT

## 2020-10-06 PROCEDURE — 6370000000 HC RX 637 (ALT 250 FOR IP): Performed by: SURGERY

## 2020-10-06 RX ORDER — POTASSIUM CHLORIDE 7.45 MG/ML
10 INJECTION INTRAVENOUS PRN
Status: DISCONTINUED | OUTPATIENT
Start: 2020-10-06 | End: 2020-10-10 | Stop reason: HOSPADM

## 2020-10-06 RX ORDER — FENTANYL CITRATE 50 UG/ML
INJECTION, SOLUTION INTRAMUSCULAR; INTRAVENOUS PRN
Status: DISCONTINUED | OUTPATIENT
Start: 2020-10-06 | End: 2020-10-06 | Stop reason: SDUPTHER

## 2020-10-06 RX ORDER — ONDANSETRON 2 MG/ML
INJECTION INTRAMUSCULAR; INTRAVENOUS PRN
Status: DISCONTINUED | OUTPATIENT
Start: 2020-10-06 | End: 2020-10-06 | Stop reason: SDUPTHER

## 2020-10-06 RX ORDER — LIDOCAINE HYDROCHLORIDE 20 MG/ML
INJECTION, SOLUTION INTRAVENOUS PRN
Status: DISCONTINUED | OUTPATIENT
Start: 2020-10-06 | End: 2020-10-06 | Stop reason: SDUPTHER

## 2020-10-06 RX ORDER — MAGNESIUM SULFATE 1 G/100ML
1 INJECTION INTRAVENOUS PRN
Status: DISCONTINUED | OUTPATIENT
Start: 2020-10-06 | End: 2020-10-10 | Stop reason: HOSPADM

## 2020-10-06 RX ORDER — DEXTROSE, SODIUM CHLORIDE, AND POTASSIUM CHLORIDE 5; .45; .15 G/100ML; G/100ML; G/100ML
INJECTION INTRAVENOUS CONTINUOUS
Status: DISCONTINUED | OUTPATIENT
Start: 2020-10-06 | End: 2020-10-09

## 2020-10-06 RX ORDER — DEXAMETHASONE SODIUM PHOSPHATE 4 MG/ML
INJECTION, SOLUTION INTRA-ARTICULAR; INTRALESIONAL; INTRAMUSCULAR; INTRAVENOUS; SOFT TISSUE PRN
Status: DISCONTINUED | OUTPATIENT
Start: 2020-10-06 | End: 2020-10-06 | Stop reason: SDUPTHER

## 2020-10-06 RX ORDER — PROPOFOL 10 MG/ML
INJECTION, EMULSION INTRAVENOUS PRN
Status: DISCONTINUED | OUTPATIENT
Start: 2020-10-06 | End: 2020-10-06 | Stop reason: SDUPTHER

## 2020-10-06 RX ORDER — ROCURONIUM BROMIDE 10 MG/ML
INJECTION, SOLUTION INTRAVENOUS PRN
Status: DISCONTINUED | OUTPATIENT
Start: 2020-10-06 | End: 2020-10-06 | Stop reason: SDUPTHER

## 2020-10-06 RX ORDER — SODIUM CHLORIDE 0.9 % (FLUSH) 0.9 %
10 SYRINGE (ML) INJECTION PRN
Status: DISCONTINUED | OUTPATIENT
Start: 2020-10-06 | End: 2020-10-10 | Stop reason: HOSPADM

## 2020-10-06 RX ORDER — SODIUM CHLORIDE 0.9 % (FLUSH) 0.9 %
10 SYRINGE (ML) INJECTION EVERY 12 HOURS SCHEDULED
Status: DISCONTINUED | OUTPATIENT
Start: 2020-10-06 | End: 2020-10-10 | Stop reason: HOSPADM

## 2020-10-06 RX ORDER — SCOLOPAMINE TRANSDERMAL SYSTEM 1 MG/1
1 PATCH, EXTENDED RELEASE TRANSDERMAL
Status: DISCONTINUED | OUTPATIENT
Start: 2020-10-06 | End: 2020-10-10 | Stop reason: HOSPADM

## 2020-10-06 RX ORDER — HYDROMORPHONE HCL 110MG/55ML
1 PATIENT CONTROLLED ANALGESIA SYRINGE INTRAVENOUS ONCE
Status: COMPLETED | OUTPATIENT
Start: 2020-10-06 | End: 2020-10-06

## 2020-10-06 RX ORDER — ALVIMOPAN 12 MG/1
12 CAPSULE ORAL ONCE
Status: COMPLETED | OUTPATIENT
Start: 2020-10-06 | End: 2020-10-06

## 2020-10-06 RX ORDER — MIDAZOLAM HYDROCHLORIDE 1 MG/ML
INJECTION INTRAMUSCULAR; INTRAVENOUS PRN
Status: DISCONTINUED | OUTPATIENT
Start: 2020-10-06 | End: 2020-10-06 | Stop reason: SDUPTHER

## 2020-10-06 RX ORDER — ALVIMOPAN 12 MG/1
12 CAPSULE ORAL 2 TIMES DAILY
Status: DISCONTINUED | OUTPATIENT
Start: 2020-10-07 | End: 2020-10-10 | Stop reason: HOSPADM

## 2020-10-06 RX ADMIN — PIPERACILLIN AND TAZOBACTAM 3.38 G: 3; .375 INJECTION, POWDER, LYOPHILIZED, FOR SOLUTION INTRAVENOUS at 08:37

## 2020-10-06 RX ADMIN — SODIUM CHLORIDE, PRESERVATIVE FREE 10 ML: 5 INJECTION INTRAVENOUS at 08:37

## 2020-10-06 RX ADMIN — FENTANYL CITRATE 100 MCG: 50 INJECTION INTRAMUSCULAR; INTRAVENOUS at 13:04

## 2020-10-06 RX ADMIN — HYDROMORPHONE HYDROCHLORIDE 1 MG: 1 INJECTION, SOLUTION INTRAMUSCULAR; INTRAVENOUS; SUBCUTANEOUS at 04:25

## 2020-10-06 RX ADMIN — METRONIDAZOLE 500 MG: 500 INJECTION, SOLUTION INTRAVENOUS at 17:58

## 2020-10-06 RX ADMIN — ENOXAPARIN SODIUM 40 MG: 40 INJECTION SUBCUTANEOUS at 17:39

## 2020-10-06 RX ADMIN — PIPERACILLIN AND TAZOBACTAM 3.38 G: 3; .375 INJECTION, POWDER, LYOPHILIZED, FOR SOLUTION INTRAVENOUS at 01:38

## 2020-10-06 RX ADMIN — FENTANYL CITRATE 50 MCG: 50 INJECTION INTRAMUSCULAR; INTRAVENOUS at 17:30

## 2020-10-06 RX ADMIN — HYDROMORPHONE HYDROCHLORIDE 1 MG: 1 INJECTION, SOLUTION INTRAMUSCULAR; INTRAVENOUS; SUBCUTANEOUS at 21:09

## 2020-10-06 RX ADMIN — ROCURONIUM BROMIDE 10 MG: 10 INJECTION INTRAVENOUS at 16:22

## 2020-10-06 RX ADMIN — SODIUM CHLORIDE, PRESERVATIVE FREE 10 ML: 5 INJECTION INTRAVENOUS at 21:09

## 2020-10-06 RX ADMIN — ONDANSETRON 4 MG: 2 INJECTION INTRAMUSCULAR; INTRAVENOUS at 17:00

## 2020-10-06 RX ADMIN — LIDOCAINE HYDROCHLORIDE 100 MG: 20 INJECTION, SOLUTION INTRAVENOUS at 13:04

## 2020-10-06 RX ADMIN — FENTANYL CITRATE 50 MCG: 50 INJECTION INTRAMUSCULAR; INTRAVENOUS at 16:31

## 2020-10-06 RX ADMIN — ROCURONIUM BROMIDE 20 MG: 10 INJECTION INTRAVENOUS at 13:34

## 2020-10-06 RX ADMIN — SODIUM CHLORIDE, POTASSIUM CHLORIDE, SODIUM LACTATE AND CALCIUM CHLORIDE: 600; 310; 30; 20 INJECTION, SOLUTION INTRAVENOUS at 04:24

## 2020-10-06 RX ADMIN — PIPERACILLIN AND TAZOBACTAM 3.38 G: 3; .375 INJECTION, POWDER, LYOPHILIZED, FOR SOLUTION INTRAVENOUS at 17:58

## 2020-10-06 RX ADMIN — ROCURONIUM BROMIDE 20 MG: 10 INJECTION INTRAVENOUS at 14:47

## 2020-10-06 RX ADMIN — PIPERACILLIN AND TAZOBACTAM 3.38 G: 3; .375 INJECTION, POWDER, LYOPHILIZED, FOR SOLUTION INTRAVENOUS at 13:15

## 2020-10-06 RX ADMIN — ROCURONIUM BROMIDE 10 MG: 10 INJECTION INTRAVENOUS at 15:50

## 2020-10-06 RX ADMIN — ALVIMOPAN 12 MG: 12 CAPSULE ORAL at 11:22

## 2020-10-06 RX ADMIN — FENTANYL CITRATE 50 MCG: 50 INJECTION INTRAMUSCULAR; INTRAVENOUS at 17:20

## 2020-10-06 RX ADMIN — HYDROMORPHONE HYDROCHLORIDE 1 MG: 1 INJECTION, SOLUTION INTRAMUSCULAR; INTRAVENOUS; SUBCUTANEOUS at 23:14

## 2020-10-06 RX ADMIN — ROCURONIUM BROMIDE 20 MG: 10 INJECTION INTRAVENOUS at 14:15

## 2020-10-06 RX ADMIN — SODIUM CHLORIDE, POTASSIUM CHLORIDE, SODIUM LACTATE AND CALCIUM CHLORIDE: 600; 310; 30; 20 INJECTION, SOLUTION INTRAVENOUS at 15:38

## 2020-10-06 RX ADMIN — FENTANYL CITRATE 50 MCG: 50 INJECTION INTRAMUSCULAR; INTRAVENOUS at 14:45

## 2020-10-06 RX ADMIN — ROCURONIUM BROMIDE 10 MG: 10 INJECTION INTRAVENOUS at 15:09

## 2020-10-06 RX ADMIN — HYDROMORPHONE HYDROCHLORIDE 1 MG: 2 INJECTION, SOLUTION INTRAMUSCULAR; INTRAVENOUS; SUBCUTANEOUS at 11:48

## 2020-10-06 RX ADMIN — ROCURONIUM BROMIDE 50 MG: 10 INJECTION INTRAVENOUS at 13:04

## 2020-10-06 RX ADMIN — HYDROMORPHONE HYDROCHLORIDE 1 MG: 1 INJECTION, SOLUTION INTRAMUSCULAR; INTRAVENOUS; SUBCUTANEOUS at 01:38

## 2020-10-06 RX ADMIN — HYDROMORPHONE HYDROCHLORIDE 1 MG: 1 INJECTION, SOLUTION INTRAMUSCULAR; INTRAVENOUS; SUBCUTANEOUS at 17:57

## 2020-10-06 RX ADMIN — FENTANYL CITRATE 50 MCG: 50 INJECTION INTRAMUSCULAR; INTRAVENOUS at 14:32

## 2020-10-06 RX ADMIN — HYDROMORPHONE HYDROCHLORIDE 1 MG: 1 INJECTION, SOLUTION INTRAMUSCULAR; INTRAVENOUS; SUBCUTANEOUS at 08:37

## 2020-10-06 RX ADMIN — POTASSIUM CHLORIDE, DEXTROSE MONOHYDRATE AND SODIUM CHLORIDE: 150; 5; 450 INJECTION, SOLUTION INTRAVENOUS at 18:02

## 2020-10-06 RX ADMIN — PROPOFOL 200 MG: 10 INJECTION, EMULSION INTRAVENOUS at 13:04

## 2020-10-06 RX ADMIN — FENTANYL CITRATE 50 MCG: 50 INJECTION INTRAMUSCULAR; INTRAVENOUS at 15:49

## 2020-10-06 RX ADMIN — MIDAZOLAM 2 MG: 1 INJECTION INTRAMUSCULAR; INTRAVENOUS at 12:55

## 2020-10-06 RX ADMIN — DEXAMETHASONE SODIUM PHOSPHATE 4 MG: 4 INJECTION, SOLUTION INTRAMUSCULAR; INTRAVENOUS at 13:12

## 2020-10-06 ASSESSMENT — PULMONARY FUNCTION TESTS
PIF_VALUE: 32
PIF_VALUE: 18
PIF_VALUE: 31
PIF_VALUE: 24
PIF_VALUE: 31
PIF_VALUE: 22
PIF_VALUE: 30
PIF_VALUE: 30
PIF_VALUE: 32
PIF_VALUE: 29
PIF_VALUE: 29
PIF_VALUE: 30
PIF_VALUE: 32
PIF_VALUE: 31
PIF_VALUE: 31
PIF_VALUE: 21
PIF_VALUE: 30
PIF_VALUE: 31
PIF_VALUE: 31
PIF_VALUE: 30
PIF_VALUE: 26
PIF_VALUE: 31
PIF_VALUE: 20
PIF_VALUE: 32
PIF_VALUE: 3
PIF_VALUE: 31
PIF_VALUE: 23
PIF_VALUE: 27
PIF_VALUE: 17
PIF_VALUE: 30
PIF_VALUE: 30
PIF_VALUE: 31
PIF_VALUE: 22
PIF_VALUE: 32
PIF_VALUE: 31
PIF_VALUE: 17
PIF_VALUE: 0
PIF_VALUE: 31
PIF_VALUE: 18
PIF_VALUE: 31
PIF_VALUE: 31
PIF_VALUE: 32
PIF_VALUE: 21
PIF_VALUE: 30
PIF_VALUE: 17
PIF_VALUE: 24
PIF_VALUE: 30
PIF_VALUE: 31
PIF_VALUE: 31
PIF_VALUE: 0
PIF_VALUE: 30
PIF_VALUE: 24
PIF_VALUE: 31
PIF_VALUE: 31
PIF_VALUE: 18
PIF_VALUE: 21
PIF_VALUE: 0
PIF_VALUE: 31
PIF_VALUE: 17
PIF_VALUE: 29
PIF_VALUE: 31
PIF_VALUE: 32
PIF_VALUE: 24
PIF_VALUE: 32
PIF_VALUE: 30
PIF_VALUE: 30
PIF_VALUE: 23
PIF_VALUE: 21
PIF_VALUE: 31
PIF_VALUE: 18
PIF_VALUE: 31
PIF_VALUE: 32
PIF_VALUE: 2
PIF_VALUE: 31
PIF_VALUE: 30
PIF_VALUE: 31
PIF_VALUE: 21
PIF_VALUE: 18
PIF_VALUE: 31
PIF_VALUE: 0
PIF_VALUE: 29
PIF_VALUE: 31
PIF_VALUE: 0
PIF_VALUE: 1
PIF_VALUE: 32
PIF_VALUE: 27
PIF_VALUE: 31
PIF_VALUE: 31
PIF_VALUE: 32
PIF_VALUE: 24
PIF_VALUE: 29
PIF_VALUE: 31
PIF_VALUE: 31
PIF_VALUE: 18
PIF_VALUE: 2
PIF_VALUE: 31
PIF_VALUE: 32
PIF_VALUE: 21
PIF_VALUE: 18
PIF_VALUE: 12
PIF_VALUE: 31
PIF_VALUE: 32
PIF_VALUE: 31
PIF_VALUE: 18
PIF_VALUE: 30
PIF_VALUE: 20
PIF_VALUE: 31
PIF_VALUE: 23
PIF_VALUE: 30
PIF_VALUE: 31
PIF_VALUE: 29
PIF_VALUE: 18
PIF_VALUE: 11
PIF_VALUE: 30
PIF_VALUE: 31
PIF_VALUE: 31
PIF_VALUE: 12
PIF_VALUE: 21
PIF_VALUE: 31
PIF_VALUE: 12
PIF_VALUE: 18
PIF_VALUE: 29
PIF_VALUE: 31
PIF_VALUE: 17
PIF_VALUE: 22
PIF_VALUE: 30
PIF_VALUE: 31
PIF_VALUE: 32
PIF_VALUE: 21
PIF_VALUE: 32
PIF_VALUE: 21
PIF_VALUE: 31
PIF_VALUE: 0
PIF_VALUE: 23
PIF_VALUE: 31
PIF_VALUE: 29
PIF_VALUE: 27
PIF_VALUE: 22
PIF_VALUE: 31
PIF_VALUE: 31
PIF_VALUE: 20
PIF_VALUE: 31
PIF_VALUE: 30
PIF_VALUE: 31
PIF_VALUE: 29
PIF_VALUE: 21
PIF_VALUE: 31
PIF_VALUE: 29
PIF_VALUE: 18
PIF_VALUE: 32
PIF_VALUE: 29
PIF_VALUE: 21
PIF_VALUE: 31
PIF_VALUE: 32
PIF_VALUE: 31
PIF_VALUE: 23
PIF_VALUE: 31
PIF_VALUE: 18
PIF_VALUE: 32
PIF_VALUE: 22
PIF_VALUE: 30
PIF_VALUE: 31
PIF_VALUE: 30
PIF_VALUE: 31
PIF_VALUE: 22
PIF_VALUE: 1
PIF_VALUE: 32
PIF_VALUE: 30
PIF_VALUE: 31
PIF_VALUE: 30
PIF_VALUE: 31
PIF_VALUE: 25
PIF_VALUE: 32
PIF_VALUE: 30
PIF_VALUE: 21
PIF_VALUE: 31
PIF_VALUE: 22
PIF_VALUE: 18
PIF_VALUE: 32
PIF_VALUE: 31
PIF_VALUE: 17
PIF_VALUE: 31
PIF_VALUE: 31
PIF_VALUE: 32
PIF_VALUE: 21
PIF_VALUE: 32
PIF_VALUE: 23
PIF_VALUE: 31
PIF_VALUE: 31
PIF_VALUE: 18
PIF_VALUE: 0
PIF_VALUE: 22
PIF_VALUE: 31
PIF_VALUE: 32
PIF_VALUE: 22
PIF_VALUE: 1
PIF_VALUE: 29
PIF_VALUE: 22
PIF_VALUE: 31
PIF_VALUE: 29
PIF_VALUE: 31
PIF_VALUE: 17
PIF_VALUE: 31
PIF_VALUE: 29
PIF_VALUE: 31
PIF_VALUE: 31
PIF_VALUE: 29
PIF_VALUE: 31
PIF_VALUE: 26
PIF_VALUE: 31
PIF_VALUE: 0
PIF_VALUE: 31
PIF_VALUE: 31
PIF_VALUE: 21
PIF_VALUE: 32
PIF_VALUE: 31
PIF_VALUE: 18
PIF_VALUE: 30
PIF_VALUE: 18
PIF_VALUE: 23
PIF_VALUE: 1
PIF_VALUE: 31
PIF_VALUE: 31
PIF_VALUE: 32
PIF_VALUE: 19
PIF_VALUE: 22
PIF_VALUE: 32
PIF_VALUE: 31
PIF_VALUE: 17
PIF_VALUE: 31
PIF_VALUE: 18
PIF_VALUE: 28
PIF_VALUE: 0
PIF_VALUE: 0
PIF_VALUE: 32
PIF_VALUE: 30
PIF_VALUE: 31
PIF_VALUE: 0
PIF_VALUE: 31
PIF_VALUE: 31
PIF_VALUE: 32
PIF_VALUE: 32
PIF_VALUE: 31
PIF_VALUE: 32
PIF_VALUE: 31

## 2020-10-06 ASSESSMENT — PAIN - FUNCTIONAL ASSESSMENT: PAIN_FUNCTIONAL_ASSESSMENT: PREVENTS OR INTERFERES WITH ALL ACTIVE AND SOME PASSIVE ACTIVITIES

## 2020-10-06 ASSESSMENT — PAIN SCALES - GENERAL
PAINLEVEL_OUTOF10: 8
PAINLEVEL_OUTOF10: 6
PAINLEVEL_OUTOF10: 9
PAINLEVEL_OUTOF10: 10
PAINLEVEL_OUTOF10: 7
PAINLEVEL_OUTOF10: 10
PAINLEVEL_OUTOF10: 7
PAINLEVEL_OUTOF10: 0
PAINLEVEL_OUTOF10: 7

## 2020-10-06 ASSESSMENT — PAIN DESCRIPTION - ORIENTATION
ORIENTATION: MID
ORIENTATION: LEFT
ORIENTATION: LEFT

## 2020-10-06 ASSESSMENT — PAIN DESCRIPTION - ONSET
ONSET: ON-GOING

## 2020-10-06 ASSESSMENT — PAIN DESCRIPTION - LOCATION
LOCATION: ABDOMEN

## 2020-10-06 ASSESSMENT — PAIN DESCRIPTION - DESCRIPTORS
DESCRIPTORS: ACHING
DESCRIPTORS: ACHING;STABBING
DESCRIPTORS: ACHING;CRAMPING;SHARP
DESCRIPTORS: ACHING;PRESSURE

## 2020-10-06 ASSESSMENT — PAIN DESCRIPTION - FREQUENCY
FREQUENCY: CONTINUOUS
FREQUENCY: INTERMITTENT

## 2020-10-06 ASSESSMENT — PAIN DESCRIPTION - PAIN TYPE
TYPE: ACUTE PAIN
TYPE: ACUTE PAIN;SURGICAL PAIN
TYPE: ACUTE PAIN
TYPE: ACUTE PAIN

## 2020-10-06 ASSESSMENT — PAIN DESCRIPTION - PROGRESSION
CLINICAL_PROGRESSION: NOT CHANGED

## 2020-10-06 ASSESSMENT — LIFESTYLE VARIABLES: SMOKING_STATUS: 1

## 2020-10-06 NOTE — CARE COORDINATION
Cm in to see pt to initiate discharge planning. Pt is to be going to surgery today for perforated diverticulum. Pt standing/walking in room upon CM entering. Pt has been independent PTA and works for [employer]. Pt has PCP and insurance to assist with cost of Rxs. Cm anticipates discharge to home. If pt requires an ostomy while in surgery will plan for homecare. CM to follow.

## 2020-10-06 NOTE — PROGRESS NOTES
GENERAL SURGERY PROGRESS NOTE    CC/HPI:           Patient feels better from yesterday's NPO. Vitals:    10/05/20 2302 10/06/20 0100 10/06/20 0203 10/06/20 0300   BP: 114/75  110/69 118/77   Pulse: 82 73 84 91   Resp: 20 17 18   Temp:       TempSrc:       SpO2:       Weight:       Height:         I/O last 3 completed shifts: In: 4244 [I.V.:1386; IV Piggyback:100]  Out: -   I/O this shift:   In: 48 [IV Piggyback:50]  Out: -     Diet NPO Effective Now    Recent Results (from the past 48 hour(s))   CBC Auto Differential    Collection Time: 10/05/20 12:56 AM   Result Value Ref Range    WBC 12.3 (H) 4.0 - 10.5 K/CU MM    RBC 5.25 4.6 - 6.2 M/CU MM    Hemoglobin 16.3 13.5 - 18.0 GM/DL    Hematocrit 47.8 42 - 52 %    MCV 91.0 78 - 100 FL    MCH 31.0 27 - 31 PG    MCHC 34.1 32.0 - 36.0 %    RDW 13.0 11.7 - 14.9 %    Platelets 834 316 - 737 K/CU MM    MPV 10.6 7.5 - 11.1 FL    Differential Type AUTOMATED DIFFERENTIAL     Segs Relative 84.7 (H) 36 - 66 %    Lymphocytes % 10.1 (L) 24 - 44 %    Monocytes % 4.5 (H) 0 - 4 %    Eosinophils % 0.2 0 - 3 %    Basophils % 0.2 0 - 1 %    Segs Absolute 10.4 K/CU MM    Lymphocytes Absolute 1.3 K/CU MM    Monocytes Absolute 0.6 K/CU MM    Eosinophils Absolute 0.0 K/CU MM    Basophils Absolute 0.0 K/CU MM    Immature Neutrophil % 0.3 0 - 0.43 %    Total Immature Neutrophil 0.04 K/CU MM   Comprehensive Metabolic Panel    Collection Time: 10/05/20 12:56 AM   Result Value Ref Range    Sodium 137 135 - 145 MMOL/L    Potassium 3.7 3.5 - 5.1 MMOL/L    Chloride 101 99 - 110 mMol/L    CO2 22 21 - 32 MMOL/L    BUN 8 6 - 23 MG/DL    CREATININE 0.9 0.9 - 1.3 MG/DL    Glucose 118 (H) 70 - 99 MG/DL    Calcium 8.8 8.3 - 10.6 MG/DL    Alb 4.2 3.4 - 5.0 GM/DL    Total Protein 7.2 6.4 - 8.2 GM/DL    Total Bilirubin 1.2 (H) 0.0 - 1.0 MG/DL    ALT 42 (H) 10 - 40 U/L    AST 21 15 - 37 IU/L    Alkaline Phosphatase 65 40 - 129 IU/L    GFR Non-African American >60 >60 mL/min/1.73m2    GFR African American >60 >60 mL/min/1.73m2    Anion Gap 14 4 - 16   Lipase    Collection Time: 10/05/20 12:56 AM   Result Value Ref Range    Lipase 33 13 - 60 IU/L   CBC auto differential    Collection Time: 10/06/20  5:58 AM   Result Value Ref Range    WBC 10.1 4.0 - 10.5 K/CU MM    RBC 4.57 (L) 4.6 - 6.2 M/CU MM    Hemoglobin 14.3 13.5 - 18.0 GM/DL    Hematocrit 43.0 42 - 52 %    MCV 94.1 78 - 100 FL    MCH 31.3 (H) 27 - 31 PG    MCHC 33.3 32.0 - 36.0 %    RDW 13.1 11.7 - 14.9 %    Platelets 053 585 - 232 K/CU MM    MPV 10.4 7.5 - 11.1 FL    Differential Type AUTOMATED DIFFERENTIAL     Segs Relative 75.1 (H) 36 - 66 %    Lymphocytes % 16.3 (L) 24 - 44 %    Monocytes % 7.6 (H) 0 - 4 %    Eosinophils % 0.6 0 - 3 %    Basophils % 0.2 0 - 1 %    Segs Absolute 7.6 K/CU MM    Lymphocytes Absolute 1.6 K/CU MM    Monocytes Absolute 0.8 K/CU MM    Eosinophils Absolute 0.1 K/CU MM    Basophils Absolute 0.0 K/CU MM    Nucleated RBC % 0.0 %    Total Nucleated RBC 0.0 K/CU MM    Total Immature Neutrophil 0.02 K/CU MM    Immature Neutrophil % 0.2 0 - 0.43 %       Scheduled Meds:   sodium chloride flush  10 mL Intravenous 2 times per day    piperacillin-tazobactam  3.375 g Intravenous Q8H    enoxaparin  40 mg Subcutaneous Q24H       Continuous Infusions:   lactated ringers 100 mL/hr at 10/06/20 0424       Physical Exam:  HEENT: Anicteric sclerae, Oropharyngeal mucosae moist, pink and intact. Heart:  Normal S1 and S2, RRR  Lungs: Clear to auscultation bilaterally, No audible Wheezes or Rales. Extremities: No edema. Neuro: Alert and Oriented x 3, Non focal.  Abdomen: Soft, Benign,  lower abdomen, Non distended, Positive bowel sounds. Incision: Nicely healing: No erythema, No discharge. Active Problems:    Perforated diverticulum  Resolved Problems:    * No resolved hospital problems.  *      Assessment and Plan:  Manuela Barbosa is a 39 y.o. male presented yesterday with RECURRENT Perf sigmoid diverticulitis, with abscess, probably few days old. . skipped the colonoscopy yesterday, and will proceed with surgery today, lap robotic sigmoidectomy, possible stoma, possible open.     Continue NPO/Bowel rest, IVF, IV Abx, pain control, and nausea control. Increase Ambulation to at least 4x/day walk in the hallways with assistance. Respiratory ryann-operative care: Incentive Spirometery / deep breathing and coughing 10x/hr while awake. Continue DVT prophylaxis with Teds and SCDs and SC Lovenox. Continue GI prophylaxis with Protonix IV till able to tolerate PO.   Continue ryann-op Abx.    ___________________________________________    Princess Stewart MD, FACS, FICS  10/6/2020  6:29 AM

## 2020-10-06 NOTE — ANESTHESIA PRE PROCEDURE
Department of Anesthesiology  Preprocedure Note       Name:  Ita Cabello   Age:  39 y.o.  :  1975                                          MRN:  [id number]         Date:  10/6/2020      Surgeon: Washington Lama):  Kelechi Ferguson MD    Procedure: Procedure(s):  BOWEL RESECTION SIGMOID LAPAROSCOPIC ROBOTIC POSS STOMA POSS OPEN    Medications prior to admission:   Prior to Admission medications    Not on File       Current medications:    No current facility-administered medications for this visit. No current outpatient medications on file.      Facility-Administered Medications Ordered in Other Visits   Medication Dose Route Frequency Provider Last Rate Last Dose    scopolamine (TRANSDERM-SCOP) transdermal patch 1 patch  1 patch Transdermal Q72H Kelechi Ferguson MD   1 patch at 10/06/20 1122    [START ON 10/7/2020] alvimopan (ENTEREG) capsule 12 mg  12 mg Oral BID Kelechi Ferguson MD        propofol injection    PRN Carylon Polio, APRN - CRNA   200 mg at 10/06/20 1304    fentaNYL (SUBLIMAZE) injection    PRN Carylon Polio, APRN - CRNA   100 mcg at 10/06/20 1304    dexamethasone (DECADRON) injection    PRN Carylon Polio, APRN - CRNA   4 mg at 10/06/20 1312    lidocaine (cardiac) (XYLOCAINE) injection    PRN Carylon Polio, APRN - CRNA   100 mg at 10/06/20 1304    rocuronium (ZEMURON) injection    PRN Carylon Polio, APRN - CRNA   20 mg at 10/06/20 1334    midazolam (VERSED) injection    PRN Carylon Polio, APRN - CRNA   2 mg at 10/06/20 1255    sodium chloride flush 0.9 % injection 10 mL  10 mL Intravenous 2 times per day Rob Neri MD   10 mL at 10/06/20 0837    sodium chloride flush 0.9 % injection 10 mL  10 mL Intravenous PRN Nilson Thornton MD        ondansetron (ZOFRAN-ODT) disintegrating tablet 4 mg  4 mg Oral Q8H PRN Nilson Thornton MD        Or    ondansetron (ZOFRAN) injection 4 mg  4 mg Intravenous Q6H PRN Nilson Thornton MD        lactated ringers infusion Intravenous Continuous Nilson Femi Ralph  mL/hr at 10/06/20 0424      piperacillin-tazobactam (ZOSYN) 3.375 g in dextrose 5 % 50 mL IVPB extended infusion (mini-bag)  3.375 g Intravenous Q8H Yola Morton MD 0 mL/hr at 10/06/20 1230 3.375 g at 10/06/20 1315    enoxaparin (LOVENOX) injection 40 mg  40 mg Subcutaneous Q24H Yola Morton MD   40 mg at 10/05/20 1642    HYDROmorphone (DILAUDID) injection 1 mg  1 mg Intravenous Q2H PRN Michael Lucia MD   1 mg at 10/06/20 4546       Allergies: Allergies   Allergen Reactions    Vicodin [Hydrocodone-Acetaminophen] Other (See Comments)     Patient states that it is tolerable but state that makes his skin crawl       Problem List:    Patient Active Problem List   Diagnosis Code    Perforated sigmoid colon ([facility] [address] K63.1    Perforated diverticulum K57.80       Past Medical History:        Diagnosis Date    Diverticulitis     Kidney stone     \"had kidney stone now- had them since age 17-had surgery and able to pass some\" follows with dr Simpson Canavan       Past Surgical History:        Procedure Laterality Date    KIDNEY STONE SURGERY      \"had surgery multiple times for kidney stones last time 2018?  WRIST SURGERY  2012    left wrist\"no metal\"       Social History:    Social History     Tobacco Use    Smoking status: Never Smoker    Smokeless tobacco: Never Used   Substance Use Topics    Alcohol use: No                                Counseling given: Not Answered      Vital Signs (Current): There were no vitals filed for this visit.                                            BP Readings from Last 3 Encounters:   10/06/20 115/70   10/05/20 109/65   09/28/20 122/80       NPO Status:                                                                                 BMI:   Wt Readings from Last 3 Encounters:   10/06/20 212 lb 9.6 oz (96.4 kg)   10/05/20 212 lb (96.2 kg)   09/04/20 211 lb 3.2 oz (95.8 kg)     There is no height or weight on file to calculate BMI.    CBC:   Lab Results   Component Value Date    WBC 10.1 10/06/2020    RBC 4.57 10/06/2020    HGB 14.3 10/06/2020    HCT 43.0 10/06/2020    MCV 94.1 10/06/2020    RDW 13.1 10/06/2020     10/06/2020       CMP:   Lab Results   Component Value Date     10/06/2020    K 4.2 10/06/2020     10/06/2020    CO2 24 10/06/2020    BUN 9 10/06/2020    CREATININE 1.0 10/06/2020    GFRAA >60 10/06/2020    LABGLOM >60 10/06/2020    GLUCOSE 80 10/06/2020    PROT 5.6 10/06/2020    CALCIUM 8.1 10/06/2020    BILITOT 1.8 10/06/2020    ALKPHOS 51 10/06/2020    AST 11 10/06/2020    ALT 23 10/06/2020       POC Tests: No results for input(s): POCGLU, POCNA, POCK, POCCL, POCBUN, POCHEMO, POCHCT in the last 72 hours. Coags: No results found for: PROTIME, INR, APTT    HCG (If Applicable): No results found for: PREGTESTUR, PREGSERUM, HCG, HCGQUANT     ABGs: No results found for: PHART, PO2ART, XJP8ITU, HLM7NQM, BEART, C8GXITMN     Type & Screen (If Applicable):  No results found for: LABABO, LABRH    Drug/Infectious Status (If Applicable):  No results found for: HIV, HEPCAB    COVID-19 Screening (If Applicable):   Lab Results   Component Value Date    COVID19 NOT DETECTED 09/28/2020         Anesthesia Evaluation  Patient summary reviewed and Nursing notes reviewed  Airway: Mallampati: II  TM distance: >3 FB   Neck ROM: full  Mouth opening: > = 3 FB Dental: normal exam         Pulmonary:normal exam    (+) current smoker                          ROS comment: thc    Cardiovascular:  Exercise tolerance: good (>4 METS),           Rhythm: regular  Rate: normal                    Neuro/Psych:   Negative Neuro/Psych ROS              GI/Hepatic/Renal:   (+) GERD:, renal disease: kidney stones,           Endo/Other: Negative Endo/Other ROS             Pt had no PAT visit       Abdominal:           Vascular: negative vascular ROS.                                          Anesthesia Plan      general     ASA 2     (Chart review only  covid - 9/28    In [facility] ed this am ? Colonoscopy first then sx? )  Induction: intravenous. Anesthetic plan and risks discussed with patient. Plan discussed with CRNA.                   Delia Members, APRN - CRNA   10/6/2020

## 2020-10-06 NOTE — PROGRESS NOTES
soft without significant tenderness, masses, or guarding. Bowel sounds are normoactive. Rectal exam deferred.  No costovertebral angle tenderness. Normal appearing external genitalia. Donnelly catheter is not present. HEME/LYMPH No palpable cervical lymphadenopathy and no hepatosplenomegaly. No petechiae or ecchymoses. MSK No gross joint deformities. SKIN Normal coloration, warm, dry. NEURO Cranial nerves appear grossly intact, normal speech, no lateralizing weakness. PSYCH Awake, alert, oriented x 4. Affect appropriate.     Medications:   Medications:    scopolamine  1 patch Transdermal Q72H    [START ON 10/7/2020] alvimopan  12 mg Oral BID    sodium chloride flush  10 mL Intravenous 2 times per day    piperacillin-tazobactam  3.375 g Intravenous Q8H    enoxaparin  40 mg Subcutaneous Q24H      Infusions:    lactated ringers 100 mL/hr at 10/06/20 0424     PRN Meds: sodium chloride flush, 10 mL, PRN  ondansetron, 4 mg, Q8H PRN    Or  ondansetron, 4 mg, Q6H PRN  HYDROmorphone, 1 mg, Q2H PRN          Electronically signed by Phani Maria MD on 10/6/2020 at 1:13 PM

## 2020-10-06 NOTE — OP NOTE
OPERATIVE / PROCEDURE NOTE       Patient: Jennifer Connelly  YOB: 1975  MRN: [id number]    Date of Procedure: 10/6/2020     PREOPERATIVE DIAGNOSIS:  COMPLICATED episode of acute diverticulitis, WITH PERFORATED SIGMOID. POSTOPERATIVE DIAGNOSIS:  SAME WITH PUS DRAINED FROM THE PERICOLONIC ABSCESS. Akira Ra PROCEDURE DONE:    1.  Laparoscopic da Azam robotic-assisted rectosigmoidectomy with 2 LAYERS HAND SEWN anastomosis. 2.  Drainage of the pericolonic abscess. 3.  Rigid proctocolonoscopy. 4.  Extensive lysis of adhesions which consumed 2.5 hrs of the  total operative time. SURGEON:  Raj Ramos MD, FACS, FICS      ASSISTANT:  Chery Galarza, APRN- CNP  The use of a first assistant was necessary for the proper positioning, prepping, and draping of the patient, intraoperative retraction, passing sutures and implants(like mesh), stapling bowel and vessels using  devises when necessary, and suction using laparoscopic instruments, exchanging DaVinci robotic instruments, passing sutures and closure of skin and subcutaneous tissues. ANESTHESIA:  General endotracheal anesthesia as well as local to the wounds  preemptively and postoperatively using 1% Xylocaine with epinephrine and 0.5%  Marcaine, 50:50 mixture. ESTIMATED BLOOD LOSS:  Less than 20 mL. SPECIMEN:  Rectosigmoid colon with the abscess. DRAINS:  19 Fr Donavan drain. COMPLICATIONS:  None. CONDITION:  Stable extubated to the PACU.      OPERATIVE DESCRIPTION:  After proper informed consent was signed by the  patient, knowing all the risks, benefits, potential complications, and  possible alternatives of the procedure, well aware of the risks including,  but not limited to, bleeding, infection, need for diverting ileostomy or  colostomy during her procedure or postoperatively in the recovery period,  strictures and long-term complications of anastomosis,  postoperative  hernias, postoperative wound infections, among other cardiac and pulmonary  risks. The patient, who has been having multiple recurrent episodes of acute  sigmoid and left colonic diverticulitis with recurrent episodes of emergency  room visits and hospitalizations for IV antibiotics and courses of p.o.  antibiotics repeatedly in the last few years in a gradual fashion closer  episodes and longer episodes of diverticulitis. The day before his   procedure, he was admitted with a new episode of perforated diverticulitis, with mesenteric abscess, and could not undergo a colonoscopy to rule out any neoplastic pathology  But will proceed post operatively. A decision was  made to proceed with rectosigmoidectomy, laparoscopically da Azam assisted  robotically. The patient in the holding area was appropriately identified. He received Zosyn and Flagyl IV. TEDs and SCDs were placed on his legs and  activated bilaterally. He received antibiotic preoperatively. An abdominal  Hibiclens skin prep was performed. He was taken to the operating room, was  placed supine on the operating room table, and after the institution of  general endotracheal anesthesia a Donnelly catheter was placed under sterile  conditions. He was placed in low lithotomy position and his abdomen was  prepped and draped in the usual sterile fashion after clipping of his abdominal and suprapubic hair was done. An Ioban drape was used. The Visiport  was used to enter the peritoneal cavity in a vision area of the abdomen. The  right anterior rectus sheath, rectus muscle, posterior rectus sheath, and  peritoneum were traversed under direct visualization. The peritoneal cavity  was entered under direct visualization. Pneumoperitoneum was instituted  using CO2 insufflation up to 15 mmHg pressure. Patient was placed in  Trendelenburg position, left side up, right side down, and significant  omental adhesions were performed.   A right epigastric 8-mm port was placed, a  5-mm right lower quadrant port was placed, a suprapubic midline port was  placed, 8-mm da Azam port, and a right upper abdomen 8-mm port was placed. All ports were 8 to 10 cm apart, one from the other, on a semilunar line. Significant omental adhesions had to be taken down to be able to place all  the ports, but after placement of all the ports and docking the [employer]  robot, further more lysis of adhesions from his previous episodes of  extensive diverticulitis had to be taken down before the colon could be  mobilized. Significant sigmoid adhesions to the lateral pelvic wall were  noted, but no urinary bladder involvement whatsoever a small intestine loop was involved but released safely. Clinically, he also  denied any urinary bladder symptoms. the lysis of adhesions consumed more than 2.5 hrs of the total operative time. The sigmoid colon was then exposed and the significant adhesions were taken  down and mobilization was carried from lateral to medial.  The rectosigmoid  junction was identified. The left ureter was identified and safeguarded through out the whole procedure The confluence of the taenia coli was noted. At the mid rectum, the  decision was made to proceed with the resection site at that level of the  mid  rectum. After a posterior meso-rectal window was created, the [employer]  robotic stapler, 60 green, was brought into the field and passed across the  mid rectum. It was fired once uneventfully to completely doubly staple  On each side and divide the rectum without any difficulty. The mesorectum at this level  was electrocauterized and sealed and divided using the da Azam vessel  sealer. The proximal end of the planned resection was marked as well  proximal to all the thickened and adhesed, chronically inflamed area of the  sigmoid colon and then the mesosigmoid colon was taken down meticulously  using the robotic DaVinci vessel sealer device.   The left ureter was identified and  safeguarded throughout the whole procedure. After adequate mobilization of the  left colon and partial left splenic mobilization, (mostly laterally but not  Inferiorly), was carried down meticulously, again using a combination of hot  electrocautery ashley as well as the LigaSure device. Another green Davinci stapler was used to divide the proximal resection end of the rectosigmoid, about 20-25 cm were resected. The right lower quadrant 12 mm port  incision was extended for a total of about 4 cm, the wound protector was placed, and the  stapled end of the proximal rectum was delivered through the wound all the  way and sent to pathology. The fascia was then  reapproximated using #1 Stratafix in a running continuous fashion. Pneumoperitoneum was reinstituted and without any twists or turns, without  any tension whatsoever, the anvil could reach the deep pelvis without any  difficulty. It was then proved to be very adequately mobilized. 2 layers hand sewn anastomosis using 3-0 Stratafixes x 4, with  special attention paid to proper orientation of the left colon, without any twists or turns.      The colon proximal to the  stapled anastomosis was then clamped with an intestinal clamp, then under  saline imersion of the anastomosis, a rigid proctosigmoidoscopy revealed an  adequate circumferential staple line visually, and air insufflation under saline  imersion revealed no air bubbles, denoting an airtight/watertight colorectal  anastomosis. The colon was decompressed. Profuse abdominal / pelvic irrigation was used until  clear return was noted. A 19 Fr Donavan drain was placed and secured to the skin using 2-0 Prolene x 2.     All ports were removed under direct visualization  without any evidence of port site bleeding. The 12-mm camera port and the  10-mm assistant port were then re approximated at the fascial level using 0-Vicryl.   The skin of  all wounds was approximated using 4-0 Vicryl in a running subcuticular  fashion followed by Dermabond skin glue after further more local anesthetic  was injected.   Patient tolerated the procedure very well without any  complications, was extubated in the OR and was sent to the PACU in a stable  condition.     ____________________________________________     Kwan Nava MD, FACS, FICS

## 2020-10-06 NOTE — ANESTHESIA POSTPROCEDURE EVALUATION
Department of Anesthesiology  Postprocedure Note    Patient: Manuela Barbosa  MRN: [id number]  YOB: 1975  Date of evaluation: 10/6/2020  Time:  5:37 PM     Procedure Summary     Date:  10/06/20 Room / Location:  [address] [place] [address]    Anesthesia Start:  3358 Anesthesia Stop:  3468    Procedure:  BOWEL RESECTION SIGMOID LAPAROSCOPIC ROBOTIC (N/A ) Diagnosis:  (DIVERTICULITIS)    Surgeon:  Angelica Kramer MD Responsible Provider:  URSULA Mccoy CRNA    Anesthesia Type:  general ASA Status:  2          Anesthesia Type: general    Kwesi Phase I:      Kwesi Phase II:      Last vitals: Reviewed and per EMR flowsheets.        Anesthesia Post Evaluation    Patient location during evaluation: ICU  Patient participation: complete - patient participated  Level of consciousness: awake and alert  Pain score: 4  Airway patency: patent  Nausea & Vomiting: no vomiting and no nausea  Complications: no  Cardiovascular status: blood pressure returned to baseline and hemodynamically stable  Respiratory status: acceptable, nasal cannula, spontaneous ventilation and nonlabored ventilation  Hydration status: stable

## 2020-10-07 LAB
ANION GAP SERPL CALCULATED.3IONS-SCNC: 9 MMOL/L (ref 4–16)
BASOPHILS ABSOLUTE: 0 K/CU MM
BASOPHILS RELATIVE PERCENT: 0.1 % (ref 0–1)
BUN BLDV-MCNC: 6 MG/DL (ref 6–23)
CALCIUM SERPL-MCNC: 8.2 MG/DL (ref 8.3–10.6)
CHLORIDE BLD-SCNC: 101 MMOL/L (ref 99–110)
CO2: 27 MMOL/L (ref 21–32)
CREAT SERPL-MCNC: 0.9 MG/DL (ref 0.9–1.3)
DIFFERENTIAL TYPE: ABNORMAL
EOSINOPHILS ABSOLUTE: 0 K/CU MM
EOSINOPHILS RELATIVE PERCENT: 0 % (ref 0–3)
GFR AFRICAN AMERICAN: >60 ML/MIN/1.73M2
GFR NON-AFRICAN AMERICAN: >60 ML/MIN/1.73M2
GLUCOSE BLD-MCNC: 181 MG/DL (ref 70–99)
HCT VFR BLD CALC: 44.9 % (ref 42–52)
HEMOGLOBIN: 15.2 GM/DL (ref 13.5–18)
IMMATURE NEUTROPHIL %: 0.3 % (ref 0–0.43)
LYMPHOCYTES ABSOLUTE: 0.7 K/CU MM
LYMPHOCYTES RELATIVE PERCENT: 6.2 % (ref 24–44)
MCH RBC QN AUTO: 31.3 PG (ref 27–31)
MCHC RBC AUTO-ENTMCNC: 33.9 % (ref 32–36)
MCV RBC AUTO: 92.4 FL (ref 78–100)
MONOCYTES ABSOLUTE: 1 K/CU MM
MONOCYTES RELATIVE PERCENT: 8.4 % (ref 0–4)
NUCLEATED RBC %: 0 %
PDW BLD-RTO: 13 % (ref 11.7–14.9)
PLATELET # BLD: 242 K/CU MM (ref 140–440)
PMV BLD AUTO: 10.5 FL (ref 7.5–11.1)
POTASSIUM SERPL-SCNC: 4.4 MMOL/L (ref 3.5–5.1)
RBC # BLD: 4.86 M/CU MM (ref 4.6–6.2)
SEGMENTED NEUTROPHILS ABSOLUTE COUNT: 9.8 K/CU MM
SEGMENTED NEUTROPHILS RELATIVE PERCENT: 85 % (ref 36–66)
SODIUM BLD-SCNC: 137 MMOL/L (ref 135–145)
TOTAL IMMATURE NEUTOROPHIL: 0.03 K/CU MM
TOTAL NUCLEATED RBC: 0 K/CU MM
WBC # BLD: 11.5 K/CU MM (ref 4–10.5)

## 2020-10-07 PROCEDURE — 36415 COLL VENOUS BLD VENIPUNCTURE: CPT

## 2020-10-07 PROCEDURE — 44207 L COLECTOMY/COLOPROCTOSTOMY: CPT | Performed by: NURSE PRACTITIONER

## 2020-10-07 PROCEDURE — 99024 POSTOP FOLLOW-UP VISIT: CPT | Performed by: SURGERY

## 2020-10-07 PROCEDURE — 2580000003 HC RX 258: Performed by: SURGERY

## 2020-10-07 PROCEDURE — 2000000000 HC ICU R&B

## 2020-10-07 PROCEDURE — 6370000000 HC RX 637 (ALT 250 FOR IP): Performed by: SURGERY

## 2020-10-07 PROCEDURE — 6360000002 HC RX W HCPCS: Performed by: SURGERY

## 2020-10-07 PROCEDURE — 94150 VITAL CAPACITY TEST: CPT

## 2020-10-07 PROCEDURE — 6360000002 HC RX W HCPCS: Performed by: NURSE PRACTITIONER

## 2020-10-07 PROCEDURE — 85025 COMPLETE CBC W/AUTO DIFF WBC: CPT

## 2020-10-07 PROCEDURE — 94761 N-INVAS EAR/PLS OXIMETRY MLT: CPT

## 2020-10-07 PROCEDURE — 1200000000 HC SEMI PRIVATE

## 2020-10-07 PROCEDURE — 6360000002 HC RX W HCPCS

## 2020-10-07 PROCEDURE — 2700000000 HC OXYGEN THERAPY PER DAY

## 2020-10-07 PROCEDURE — 44213 LAP MOBIL SPLENIC FL ADD-ON: CPT | Performed by: NURSE PRACTITIONER

## 2020-10-07 PROCEDURE — 80048 BASIC METABOLIC PNL TOTAL CA: CPT

## 2020-10-07 PROCEDURE — 2500000003 HC RX 250 WO HCPCS: Performed by: SURGERY

## 2020-10-07 RX ORDER — FENTANYL CITRATE 50 UG/ML
12.5 INJECTION, SOLUTION INTRAMUSCULAR; INTRAVENOUS
Status: DISCONTINUED | OUTPATIENT
Start: 2020-10-07 | End: 2020-10-08

## 2020-10-07 RX ORDER — FENTANYL CITRATE 50 UG/ML
INJECTION, SOLUTION INTRAMUSCULAR; INTRAVENOUS
Status: COMPLETED
Start: 2020-10-07 | End: 2020-10-07

## 2020-10-07 RX ADMIN — HYDROMORPHONE HYDROCHLORIDE 1 MG: 1 INJECTION, SOLUTION INTRAMUSCULAR; INTRAVENOUS; SUBCUTANEOUS at 10:45

## 2020-10-07 RX ADMIN — HYDROMORPHONE HYDROCHLORIDE 1 MG: 1 INJECTION, SOLUTION INTRAMUSCULAR; INTRAVENOUS; SUBCUTANEOUS at 13:30

## 2020-10-07 RX ADMIN — PIPERACILLIN AND TAZOBACTAM 3.38 G: 3; .375 INJECTION, POWDER, LYOPHILIZED, FOR SOLUTION INTRAVENOUS at 10:46

## 2020-10-07 RX ADMIN — METRONIDAZOLE 500 MG: 500 INJECTION, SOLUTION INTRAVENOUS at 18:23

## 2020-10-07 RX ADMIN — ENOXAPARIN SODIUM 40 MG: 40 INJECTION SUBCUTANEOUS at 18:22

## 2020-10-07 RX ADMIN — PIPERACILLIN AND TAZOBACTAM 3.38 G: 3; .375 INJECTION, POWDER, LYOPHILIZED, FOR SOLUTION INTRAVENOUS at 01:49

## 2020-10-07 RX ADMIN — FENTANYL CITRATE 12.5 MCG: 50 INJECTION INTRAMUSCULAR; INTRAVENOUS at 20:03

## 2020-10-07 RX ADMIN — HYDROMORPHONE HYDROCHLORIDE 1 MG: 1 INJECTION, SOLUTION INTRAMUSCULAR; INTRAVENOUS; SUBCUTANEOUS at 08:19

## 2020-10-07 RX ADMIN — HYDROMORPHONE HYDROCHLORIDE 1 MG: 1 INJECTION, SOLUTION INTRAMUSCULAR; INTRAVENOUS; SUBCUTANEOUS at 04:00

## 2020-10-07 RX ADMIN — SODIUM CHLORIDE, PRESERVATIVE FREE 10 ML: 5 INJECTION INTRAVENOUS at 10:47

## 2020-10-07 RX ADMIN — HYDROMORPHONE HYDROCHLORIDE 1 MG: 1 INJECTION, SOLUTION INTRAMUSCULAR; INTRAVENOUS; SUBCUTANEOUS at 16:21

## 2020-10-07 RX ADMIN — METRONIDAZOLE 500 MG: 500 INJECTION, SOLUTION INTRAVENOUS at 10:46

## 2020-10-07 RX ADMIN — PIPERACILLIN AND TAZOBACTAM 3.38 G: 3; .375 INJECTION, POWDER, LYOPHILIZED, FOR SOLUTION INTRAVENOUS at 18:23

## 2020-10-07 RX ADMIN — ALVIMOPAN 12 MG: 12 CAPSULE ORAL at 10:45

## 2020-10-07 RX ADMIN — HYDROMORPHONE HYDROCHLORIDE 1 MG: 1 INJECTION, SOLUTION INTRAMUSCULAR; INTRAVENOUS; SUBCUTANEOUS at 01:48

## 2020-10-07 RX ADMIN — ALVIMOPAN 12 MG: 12 CAPSULE ORAL at 21:31

## 2020-10-07 RX ADMIN — FENTANYL CITRATE 12.5 MCG: 50 INJECTION INTRAMUSCULAR; INTRAVENOUS at 22:20

## 2020-10-07 RX ADMIN — POTASSIUM CHLORIDE, DEXTROSE MONOHYDRATE AND SODIUM CHLORIDE: 150; 5; 450 INJECTION, SOLUTION INTRAVENOUS at 02:13

## 2020-10-07 RX ADMIN — METRONIDAZOLE 500 MG: 500 INJECTION, SOLUTION INTRAVENOUS at 01:48

## 2020-10-07 RX ADMIN — POTASSIUM CHLORIDE, DEXTROSE MONOHYDRATE AND SODIUM CHLORIDE: 150; 5; 450 INJECTION, SOLUTION INTRAVENOUS at 10:00

## 2020-10-07 RX ADMIN — HYDROMORPHONE HYDROCHLORIDE 1 MG: 1 INJECTION, SOLUTION INTRAMUSCULAR; INTRAVENOUS; SUBCUTANEOUS at 19:00

## 2020-10-07 RX ADMIN — HYDROMORPHONE HYDROCHLORIDE 1 MG: 1 INJECTION, SOLUTION INTRAMUSCULAR; INTRAVENOUS; SUBCUTANEOUS at 06:03

## 2020-10-07 RX ADMIN — POTASSIUM CHLORIDE, DEXTROSE MONOHYDRATE AND SODIUM CHLORIDE: 150; 5; 450 INJECTION, SOLUTION INTRAVENOUS at 18:27

## 2020-10-07 ASSESSMENT — PAIN DESCRIPTION - PROGRESSION
CLINICAL_PROGRESSION: NOT CHANGED
CLINICAL_PROGRESSION: GRADUALLY WORSENING
CLINICAL_PROGRESSION: NOT CHANGED

## 2020-10-07 ASSESSMENT — PAIN SCALES - GENERAL
PAINLEVEL_OUTOF10: 9
PAINLEVEL_OUTOF10: 9
PAINLEVEL_OUTOF10: 7
PAINLEVEL_OUTOF10: 7
PAINLEVEL_OUTOF10: 9
PAINLEVEL_OUTOF10: 8
PAINLEVEL_OUTOF10: 8
PAINLEVEL_OUTOF10: 3
PAINLEVEL_OUTOF10: 8
PAINLEVEL_OUTOF10: 5
PAINLEVEL_OUTOF10: 9
PAINLEVEL_OUTOF10: 8
PAINLEVEL_OUTOF10: 8
PAINLEVEL_OUTOF10: 7

## 2020-10-07 ASSESSMENT — PAIN DESCRIPTION - FREQUENCY
FREQUENCY: CONTINUOUS
FREQUENCY: CONTINUOUS
FREQUENCY: INTERMITTENT
FREQUENCY: CONTINUOUS

## 2020-10-07 ASSESSMENT — PAIN DESCRIPTION - LOCATION
LOCATION: ABDOMEN

## 2020-10-07 ASSESSMENT — PAIN DESCRIPTION - ONSET
ONSET: ON-GOING

## 2020-10-07 ASSESSMENT — PAIN DESCRIPTION - DESCRIPTORS
DESCRIPTORS: DULL;ACHING;SHARP
DESCRIPTORS: ACHING;SORE

## 2020-10-07 ASSESSMENT — PAIN - FUNCTIONAL ASSESSMENT
PAIN_FUNCTIONAL_ASSESSMENT: PREVENTS OR INTERFERES SOME ACTIVE ACTIVITIES AND ADLS

## 2020-10-07 ASSESSMENT — PAIN DESCRIPTION - PAIN TYPE
TYPE: SURGICAL PAIN
TYPE: ACUTE PAIN;SURGICAL PAIN
TYPE: ACUTE PAIN;SURGICAL PAIN
TYPE: SURGICAL PAIN

## 2020-10-07 ASSESSMENT — PAIN DESCRIPTION - ORIENTATION
ORIENTATION: MID
ORIENTATION: MID;LOWER
ORIENTATION: RIGHT;LEFT;LOWER;MID
ORIENTATION: MID

## 2020-10-07 NOTE — PROGRESS NOTES
GENERAL SURGERY PROGRESS NOTE    CC/HPI:           Patient feels better from yesterday's surgery. Vitals:    10/06/20 2000 10/07/20 0000 10/07/20 0339 10/07/20 0400   BP: 131/73 104/69  118/82   Pulse: 80 85 72 83   Resp: 19 19 17 26   Temp: 98.6 °F (37 °C) 98.6 °F (37 °C)  99.2 °F (37.3 °C)   TempSrc: Oral Oral  Oral   SpO2: 98% 97% 91% 94%   Weight:       Height:         I/O last 3 completed shifts:   In: 3043.3 [I.V.:2943.3; IV Piggyback:100]  Out: 670 [Urine:600; Drains:30; Blood:40]  I/O this shift:  In: -   Out: 2050 [Urine:2000; Drains:50]    Diet NPO Effective Now    Recent Results (from the past 48 hour(s))   Comprehensive Metabolic Panel w/ Reflex to MG    Collection Time: 10/06/20  5:58 AM   Result Value Ref Range    Sodium 136 135 - 145 MMOL/L    Potassium 4.2 3.5 - 5.1 MMOL/L    Chloride 101 99 - 110 mMol/L    CO2 24 21 - 32 MMOL/L    BUN 9 6 - 23 MG/DL    CREATININE 1.0 0.9 - 1.3 MG/DL    Glucose 80 70 - 99 MG/DL    Calcium 8.1 (L) 8.3 - 10.6 MG/DL    Alb 3.7 3.4 - 5.0 GM/DL    Total Protein 5.6 (L) 6.4 - 8.2 GM/DL    Total Bilirubin 1.8 (H) 0.0 - 1.0 MG/DL    ALT 23 10 - 40 U/L    AST 11 (L) 15 - 37 IU/L    Alkaline Phosphatase 51 40 - 128 IU/L    GFR Non-African American >60 >60 mL/min/1.73m2    GFR African American >60 >60 mL/min/1.73m2    Anion Gap 11 4 - 16   CBC auto differential    Collection Time: 10/06/20  5:58 AM   Result Value Ref Range    WBC 10.1 4.0 - 10.5 K/CU MM    RBC 4.57 (L) 4.6 - 6.2 M/CU MM    Hemoglobin 14.3 13.5 - 18.0 GM/DL    Hematocrit 43.0 42 - 52 %    MCV 94.1 78 - 100 FL    MCH 31.3 (H) 27 - 31 PG    MCHC 33.3 32.0 - 36.0 %    RDW 13.1 11.7 - 14.9 %    Platelets 799 516 - 696 K/CU MM    MPV 10.4 7.5 - 11.1 FL    Differential Type AUTOMATED DIFFERENTIAL     Segs Relative 75.1 (H) 36 - 66 %    Lymphocytes % 16.3 (L) 24 - 44 %    Monocytes % 7.6 (H) 0 - 4 %    Eosinophils % 0.6 0 - 3 %    Basophils % 0.2 0 - 1 %    Segs Absolute 7.6 K/CU MM    Lymphocytes Absolute 1.6 K/CU MM    Monocytes Absolute 0.8 K/CU MM    Eosinophils Absolute 0.1 K/CU MM    Basophils Absolute 0.0 K/CU MM    Nucleated RBC % 0.0 %    Total Nucleated RBC 0.0 K/CU MM    Total Immature Neutrophil 0.02 K/CU MM    Immature Neutrophil % 0.2 0 - 0.43 %   Basic Metabolic Panel w/ Reflex to MG    Collection Time: 10/07/20  3:43 AM   Result Value Ref Range    Sodium 137 135 - 145 MMOL/L    Potassium 4.4 3.5 - 5.1 MMOL/L    Chloride 101 99 - 110 mMol/L    CO2 27 21 - 32 MMOL/L    Anion Gap 9 4 - 16    BUN 6 6 - 23 MG/DL    CREATININE 0.9 0.9 - 1.3 MG/DL    Glucose 181 (H) 70 - 99 MG/DL    Calcium 8.2 (L) 8.3 - 10.6 MG/DL    GFR Non-African American >60 >60 mL/min/1.73m2    GFR African American >60 >60 mL/min/1.73m2   CBC auto differential    Collection Time: 10/07/20  3:43 AM   Result Value Ref Range    WBC 11.5 (H) 4.0 - 10.5 K/CU MM    RBC 4.86 4.6 - 6.2 M/CU MM    Hemoglobin 15.2 13.5 - 18.0 GM/DL    Hematocrit 44.9 42 - 52 %    MCV 92.4 78 - 100 FL    MCH 31.3 (H) 27 - 31 PG    MCHC 33.9 32.0 - 36.0 %    RDW 13.0 11.7 - 14.9 %    Platelets 679 366 - 893 K/CU MM    MPV 10.5 7.5 - 11.1 FL    Differential Type AUTOMATED DIFFERENTIAL     Segs Relative 85.0 (H) 36 - 66 %    Lymphocytes % 6.2 (L) 24 - 44 %    Monocytes % 8.4 (H) 0 - 4 %    Eosinophils % 0.0 0 - 3 %    Basophils % 0.1 0 - 1 %    Segs Absolute 9.8 K/CU MM    Lymphocytes Absolute 0.7 K/CU MM    Monocytes Absolute 1.0 K/CU MM    Eosinophils Absolute 0.0 K/CU MM    Basophils Absolute 0.0 K/CU MM    Nucleated RBC % 0.0 %    Total Nucleated RBC 0.0 K/CU MM    Total Immature Neutrophil 0.03 K/CU MM    Immature Neutrophil % 0.3 0 - 0.43 %       Scheduled Meds:   scopolamine  1 patch Transdermal Q72H    alvimopan  12 mg Oral BID    metroNIDAZOLE  500 mg Intravenous Q8H    sodium chloride flush  10 mL Intravenous 2 times per day    sodium chloride flush  10 mL Intravenous 2 times per day    piperacillin-tazobactam  3.375 g Intravenous Q8H    enoxaparin  40 mg Subcutaneous Q24H       Continuous Infusions:   dextrose 5% and 0.45% NaCl with KCl 20 mEq 125 mL/hr at 10/07/20 0213    lactated ringers Stopped (10/06/20 1801)       Physical Exam:  HEENT: Anicteric sclerae, Oropharyngeal mucosae moist, pink and intact. Heart:  Normal S1 and S2, RRR  Lungs: Clear to auscultation bilaterally, No audible Wheezes or Rales. Extremities: No edema. Neuro: Alert and Oriented x 3, Non focal.  Abdomen: Soft, Benign, Non tender, Non distended, Positive bowel sounds. Incision: Nicely healing: No erythema, No discharge. Active Problems:    Perforated diverticulum  Resolved Problems:    * No resolved hospital problems. *      Assessment and Plan:  Barbara Ortiz is a 39 y.o. male who is POD # 1 status post:  1. [facility] robotic-assisted rectosigmoidectomy with 2 LAYERS HAND SEWN anastomosis. 2.  Drainage of the pericolonic abscess. 3.  Rigid proctocolonoscopy. 4.  Extensive lysis of adhesions which consumed 2.5 hrs of the  total operative time. Afebrile and vitals stable, WBC ok, continue NPO, till BM, and then start PO full liquids. Increase Ambulation to at least 4x/day walk in the hallways with assistance. Respiratory ryann-operative care: Incentive Spirometry / deep breathing and coughing 10x/hr while awake. Continue DVT prophylaxis with Teds and SCDs and SC Lovenox. Continue GI prophylaxis with Protonix IV till able to tolerate PO.   Continue ryann-op Abx for 24 hrs after surgery then dc.    ___________________________________________    Riki Holm MD, FACS, FICS  10/7/2020  6:44 AM

## 2020-10-07 NOTE — PROGRESS NOTES
Pt ambulated twice in hallway this shift, tolerated fairly well.  Will encourage patient to ambulate once more before RN shift change at 7PM.

## 2020-10-07 NOTE — PROGRESS NOTES
Hospitalist Progress Note      Name:  Betsy Goodell /Age/Sex: 1975  (39 y.o. male)   MRN & CSN:  [id number] & [id number] Admission Date/Time: 10/5/2020  5:56 AM   Location:  -A PCP: No primary care provider on file. Hospital Day: 3    Assessment and Plan:   Samantha Doctor a 39 y.o.  male who presents with abdominal pain.    1) Acute Perforated sigmoid diverticulitis with abscess  -Vitally stable, no sepsis  -General Surgery on board; had Lap rectosigmoidectomy with pericolonic abscess drainage 10/06/2020  -Continue IV Zosyn     Other diagnoses  Nonobstructing bilateral renal calculi  Cholelithiasis       Diet Diet NPO Effective Now   DVT Prophylaxis [] Lovenox, []  Heparin, [] SCDs, [] Ambulation   GI Prophylaxis [] PPI,  [] H2 Blocker,  [] Carafate,  [] Diet/Tube Feeds   Code Status Full Code   Disposition Home   MDM      History of Present Illness:     Patient was seen and examined  Reported his abdominal pain has improved  No fever, chills, N/V/D  Still yet to pass gas, No BM        Ten point ROS reviewed negative, unless as noted above    Objective: Intake/Output Summary (Last 24 hours) at 10/7/2020 1336  Last data filed at 10/7/2020 1250  Gross per 24 hour   Intake 1810 ml   Output 4150 ml   Net -2340 ml      Vitals:   Vitals:    10/07/20 1125   BP:    Pulse:    Resp:    Temp:    SpO2: 95%     Physical Exam:   GEN Awake male, sitting upright in bed in no apparent distress. Appears given age. EYES Pupils are equally round. No scleral erythema, discharge, or conjunctivitis. HENT Mucous membranes are moist. Oral pharynx without exudates, no evidence of thrush. NECK Supple, no apparent thyromegaly or masses. RESP Clear to auscultation, no wheezes, rales or rhonchi. Symmetric chest movement while on room air. CARDIO/VASC S1/S2 auscultated. Regular rate without appreciable murmurs, rubs, or gallops. No JVD or carotid bruits.  Peripheral pulses equal bilaterally and palpable. No peripheral edema. GI Abdomen is soft without significant tenderness, masses, or guarding. Bowel sounds are normoactive. Rectal exam deferred.  No costovertebral angle tenderness. Normal appearing external genitalia. Donnelly catheter is not present. HEME/LYMPH No palpable cervical lymphadenopathy and no hepatosplenomegaly. No petechiae or ecchymoses. MSK No gross joint deformities. SKIN Normal coloration, warm, dry. NEURO Cranial nerves appear grossly intact, normal speech, no lateralizing weakness. PSYCH Awake, alert, oriented x 4. Affect appropriate.     Medications:   Medications:    scopolamine  1 patch Transdermal Q72H    alvimopan  12 mg Oral BID    metroNIDAZOLE  500 mg Intravenous Q8H    sodium chloride flush  10 mL Intravenous 2 times per day    sodium chloride flush  10 mL Intravenous 2 times per day    piperacillin-tazobactam  3.375 g Intravenous Q8H    enoxaparin  40 mg Subcutaneous Q24H      Infusions:    dextrose 5% and 0.45% NaCl with KCl 20 mEq 125 mL/hr at 10/07/20 1000    lactated ringers Stopped (10/06/20 1801)     PRN Meds: sodium chloride flush, 10 mL, PRN  potassium chloride, 10 mEq, PRN  magnesium sulfate, 1 g, PRN  HYDROmorphone, 1 mg, Q2H PRN  benzocaine, , 4x Daily PRN  sodium chloride flush, 10 mL, PRN  ondansetron, 4 mg, Q8H PRN    Or  ondansetron, 4 mg, Q6H PRN  HYDROmorphone, 1 mg, Q2H PRN          Electronically signed by Farnaz Valencia MD on 10/7/2020 at 1:36 PM

## 2020-10-08 LAB
ANION GAP SERPL CALCULATED.3IONS-SCNC: 13 MMOL/L (ref 4–16)
BASOPHILS ABSOLUTE: 0 K/CU MM
BASOPHILS RELATIVE PERCENT: 0.3 % (ref 0–1)
BUN BLDV-MCNC: 5 MG/DL (ref 6–23)
CALCIUM SERPL-MCNC: 8.2 MG/DL (ref 8.3–10.6)
CHLORIDE BLD-SCNC: 104 MMOL/L (ref 99–110)
CO2: 20 MMOL/L (ref 21–32)
CREAT SERPL-MCNC: 0.9 MG/DL (ref 0.9–1.3)
DIFFERENTIAL TYPE: ABNORMAL
EOSINOPHILS ABSOLUTE: 0 K/CU MM
EOSINOPHILS RELATIVE PERCENT: 0.1 % (ref 0–3)
GFR AFRICAN AMERICAN: >60 ML/MIN/1.73M2
GFR NON-AFRICAN AMERICAN: >60 ML/MIN/1.73M2
GLUCOSE BLD-MCNC: 130 MG/DL (ref 70–99)
HCT VFR BLD CALC: 48.4 % (ref 42–52)
HEMOGLOBIN: 14.7 GM/DL (ref 13.5–18)
IMMATURE NEUTROPHIL %: 0.2 % (ref 0–0.43)
LYMPHOCYTES ABSOLUTE: 1.2 K/CU MM
LYMPHOCYTES RELATIVE PERCENT: 13.6 % (ref 24–44)
MCH RBC QN AUTO: 31.3 PG (ref 27–31)
MCHC RBC AUTO-ENTMCNC: 30.4 % (ref 32–36)
MCV RBC AUTO: 103 FL (ref 78–100)
MONOCYTES ABSOLUTE: 0.7 K/CU MM
MONOCYTES RELATIVE PERCENT: 7.6 % (ref 0–4)
NUCLEATED RBC %: 0 %
PDW BLD-RTO: 13.1 % (ref 11.7–14.9)
PLATELET # BLD: 215 K/CU MM (ref 140–440)
PMV BLD AUTO: 10.5 FL (ref 7.5–11.1)
POTASSIUM SERPL-SCNC: 4.5 MMOL/L (ref 3.5–5.1)
RBC # BLD: 4.7 M/CU MM (ref 4.6–6.2)
SEGMENTED NEUTROPHILS ABSOLUTE COUNT: 6.8 K/CU MM
SEGMENTED NEUTROPHILS RELATIVE PERCENT: 78.2 % (ref 36–66)
SODIUM BLD-SCNC: 137 MMOL/L (ref 135–145)
TOTAL IMMATURE NEUTOROPHIL: 0.02 K/CU MM
TOTAL NUCLEATED RBC: 0 K/CU MM
WBC # BLD: 8.7 K/CU MM (ref 4–10.5)

## 2020-10-08 PROCEDURE — 94761 N-INVAS EAR/PLS OXIMETRY MLT: CPT

## 2020-10-08 PROCEDURE — 2500000003 HC RX 250 WO HCPCS: Performed by: SURGERY

## 2020-10-08 PROCEDURE — 2580000003 HC RX 258: Performed by: SURGERY

## 2020-10-08 PROCEDURE — 80048 BASIC METABOLIC PNL TOTAL CA: CPT

## 2020-10-08 PROCEDURE — 94150 VITAL CAPACITY TEST: CPT

## 2020-10-08 PROCEDURE — 6360000002 HC RX W HCPCS: Performed by: SURGERY

## 2020-10-08 PROCEDURE — 6360000002 HC RX W HCPCS: Performed by: NURSE PRACTITIONER

## 2020-10-08 PROCEDURE — 85025 COMPLETE CBC W/AUTO DIFF WBC: CPT

## 2020-10-08 PROCEDURE — 36415 COLL VENOUS BLD VENIPUNCTURE: CPT

## 2020-10-08 PROCEDURE — 1200000000 HC SEMI PRIVATE

## 2020-10-08 PROCEDURE — 99024 POSTOP FOLLOW-UP VISIT: CPT | Performed by: SURGERY

## 2020-10-08 RX ORDER — FENTANYL CITRATE 50 UG/ML
25 INJECTION, SOLUTION INTRAMUSCULAR; INTRAVENOUS
Status: DISCONTINUED | OUTPATIENT
Start: 2020-10-08 | End: 2020-10-10 | Stop reason: HOSPADM

## 2020-10-08 RX ADMIN — FENTANYL CITRATE 12.5 MCG: 50 INJECTION INTRAMUSCULAR; INTRAVENOUS at 00:12

## 2020-10-08 RX ADMIN — FENTANYL CITRATE 25 MCG: 50 INJECTION INTRAMUSCULAR; INTRAVENOUS at 20:15

## 2020-10-08 RX ADMIN — ENOXAPARIN SODIUM 40 MG: 40 INJECTION SUBCUTANEOUS at 18:13

## 2020-10-08 RX ADMIN — SODIUM CHLORIDE, PRESERVATIVE FREE 10 ML: 5 INJECTION INTRAVENOUS at 08:08

## 2020-10-08 RX ADMIN — POTASSIUM CHLORIDE, DEXTROSE MONOHYDRATE AND SODIUM CHLORIDE: 150; 5; 450 INJECTION, SOLUTION INTRAVENOUS at 21:38

## 2020-10-08 RX ADMIN — PIPERACILLIN AND TAZOBACTAM 3.38 G: 3; .375 INJECTION, POWDER, LYOPHILIZED, FOR SOLUTION INTRAVENOUS at 10:32

## 2020-10-08 RX ADMIN — ONDANSETRON 4 MG: 2 INJECTION INTRAMUSCULAR; INTRAVENOUS at 16:11

## 2020-10-08 RX ADMIN — ONDANSETRON 4 MG: 2 INJECTION INTRAMUSCULAR; INTRAVENOUS at 21:38

## 2020-10-08 RX ADMIN — FENTANYL CITRATE 25 MCG: 50 INJECTION INTRAMUSCULAR; INTRAVENOUS at 02:23

## 2020-10-08 RX ADMIN — POTASSIUM CHLORIDE, DEXTROSE MONOHYDRATE AND SODIUM CHLORIDE: 150; 5; 450 INJECTION, SOLUTION INTRAVENOUS at 13:45

## 2020-10-08 RX ADMIN — FENTANYL CITRATE 25 MCG: 50 INJECTION INTRAMUSCULAR; INTRAVENOUS at 23:07

## 2020-10-08 RX ADMIN — FENTANYL CITRATE 25 MCG: 50 INJECTION INTRAMUSCULAR; INTRAVENOUS at 04:12

## 2020-10-08 RX ADMIN — POTASSIUM CHLORIDE, DEXTROSE MONOHYDRATE AND SODIUM CHLORIDE: 150; 5; 450 INJECTION, SOLUTION INTRAVENOUS at 02:31

## 2020-10-08 RX ADMIN — FENTANYL CITRATE 25 MCG: 50 INJECTION INTRAMUSCULAR; INTRAVENOUS at 16:05

## 2020-10-08 RX ADMIN — PIPERACILLIN AND TAZOBACTAM 3.38 G: 3; .375 INJECTION, POWDER, LYOPHILIZED, FOR SOLUTION INTRAVENOUS at 18:13

## 2020-10-08 RX ADMIN — FENTANYL CITRATE 25 MCG: 50 INJECTION INTRAMUSCULAR; INTRAVENOUS at 18:13

## 2020-10-08 RX ADMIN — FENTANYL CITRATE 25 MCG: 50 INJECTION INTRAMUSCULAR; INTRAVENOUS at 13:44

## 2020-10-08 RX ADMIN — METRONIDAZOLE 500 MG: 500 INJECTION, SOLUTION INTRAVENOUS at 18:13

## 2020-10-08 RX ADMIN — FENTANYL CITRATE 25 MCG: 50 INJECTION INTRAMUSCULAR; INTRAVENOUS at 06:09

## 2020-10-08 RX ADMIN — ONDANSETRON 4 MG: 2 INJECTION INTRAMUSCULAR; INTRAVENOUS at 02:31

## 2020-10-08 RX ADMIN — FENTANYL CITRATE 25 MCG: 50 INJECTION INTRAMUSCULAR; INTRAVENOUS at 10:32

## 2020-10-08 RX ADMIN — METRONIDAZOLE 500 MG: 500 INJECTION, SOLUTION INTRAVENOUS at 02:23

## 2020-10-08 RX ADMIN — METRONIDAZOLE 500 MG: 500 INJECTION, SOLUTION INTRAVENOUS at 10:32

## 2020-10-08 RX ADMIN — HYDROMORPHONE HYDROCHLORIDE 1 MG: 1 INJECTION, SOLUTION INTRAMUSCULAR; INTRAVENOUS; SUBCUTANEOUS at 21:38

## 2020-10-08 RX ADMIN — PIPERACILLIN AND TAZOBACTAM 3.38 G: 3; .375 INJECTION, POWDER, LYOPHILIZED, FOR SOLUTION INTRAVENOUS at 02:23

## 2020-10-08 RX ADMIN — FENTANYL CITRATE 25 MCG: 50 INJECTION INTRAMUSCULAR; INTRAVENOUS at 08:07

## 2020-10-08 ASSESSMENT — PAIN DESCRIPTION - FREQUENCY
FREQUENCY: CONTINUOUS

## 2020-10-08 ASSESSMENT — PAIN DESCRIPTION - LOCATION
LOCATION: ABDOMEN

## 2020-10-08 ASSESSMENT — PAIN DESCRIPTION - ORIENTATION
ORIENTATION: MID
ORIENTATION: MID;LOWER
ORIENTATION: MID

## 2020-10-08 ASSESSMENT — PAIN DESCRIPTION - PAIN TYPE
TYPE: SURGICAL PAIN

## 2020-10-08 ASSESSMENT — PAIN - FUNCTIONAL ASSESSMENT
PAIN_FUNCTIONAL_ASSESSMENT: ACTIVITIES ARE NOT PREVENTED

## 2020-10-08 ASSESSMENT — PAIN DESCRIPTION - PROGRESSION
CLINICAL_PROGRESSION: NOT CHANGED

## 2020-10-08 ASSESSMENT — PAIN SCALES - GENERAL
PAINLEVEL_OUTOF10: 5
PAINLEVEL_OUTOF10: 8
PAINLEVEL_OUTOF10: 8
PAINLEVEL_OUTOF10: 5
PAINLEVEL_OUTOF10: 9
PAINLEVEL_OUTOF10: 8
PAINLEVEL_OUTOF10: 5
PAINLEVEL_OUTOF10: 9
PAINLEVEL_OUTOF10: 0
PAINLEVEL_OUTOF10: 8
PAINLEVEL_OUTOF10: 8
PAINLEVEL_OUTOF10: 9
PAINLEVEL_OUTOF10: 8
PAINLEVEL_OUTOF10: 9
PAINLEVEL_OUTOF10: 7
PAINLEVEL_OUTOF10: 8
PAINLEVEL_OUTOF10: 9

## 2020-10-08 ASSESSMENT — PAIN DESCRIPTION - ONSET
ONSET: ON-GOING

## 2020-10-08 ASSESSMENT — PAIN DESCRIPTION - DESCRIPTORS
DESCRIPTORS: ACHING
DESCRIPTORS: SORE;ACHING
DESCRIPTORS: DULL;CONSTANT
DESCRIPTORS: ACHING;TENDER
DESCRIPTORS: DULL;CONSTANT;SHARP

## 2020-10-08 NOTE — PROGRESS NOTES
GENERAL SURGERY PROGRESS NOTE    CC/HPI:           Patient feels better from the day before yesterday's surgery and had 2 BMs! .       Vitals:    10/07/20 1125 10/07/20 1345 10/07/20 2116 10/08/20 0612   BP:  117/81 124/84    Pulse:  68 86    Resp:  21 20    Temp:  98.5 °F (36.9 °C) 98.5 °F (36.9 °C)    TempSrc:  Oral Oral    SpO2: 95%  97%    Weight:    211 lb 10.3 oz (96 kg)   Height:         I/O last 3 completed shifts:  In: -   Out: 3510 [Urine:3400; Drains:110]  I/O this shift:  In: 1270.7 [I.V.:1120.7;  IV Piggyback:150]  Out: 40 [Drains:40]    DIET FULL LIQUID;    Recent Results (from the past 48 hour(s))   Basic Metabolic Panel w/ Reflex to MG    Collection Time: 10/07/20  3:43 AM   Result Value Ref Range    Sodium 137 135 - 145 MMOL/L    Potassium 4.4 3.5 - 5.1 MMOL/L    Chloride 101 99 - 110 mMol/L    CO2 27 21 - 32 MMOL/L    Anion Gap 9 4 - 16    BUN 6 6 - 23 MG/DL    CREATININE 0.9 0.9 - 1.3 MG/DL    Glucose 181 (H) 70 - 99 MG/DL    Calcium 8.2 (L) 8.3 - 10.6 MG/DL    GFR Non-African American >60 >60 mL/min/1.73m2    GFR African American >60 >60 mL/min/1.73m2   CBC auto differential    Collection Time: 10/07/20  3:43 AM   Result Value Ref Range    WBC 11.5 (H) 4.0 - 10.5 K/CU MM    RBC 4.86 4.6 - 6.2 M/CU MM    Hemoglobin 15.2 13.5 - 18.0 GM/DL    Hematocrit 44.9 42 - 52 %    MCV 92.4 78 - 100 FL    MCH 31.3 (H) 27 - 31 PG    MCHC 33.9 32.0 - 36.0 %    RDW 13.0 11.7 - 14.9 %    Platelets 092 628 - 438 K/CU MM    MPV 10.5 7.5 - 11.1 FL    Differential Type AUTOMATED DIFFERENTIAL     Segs Relative 85.0 (H) 36 - 66 %    Lymphocytes % 6.2 (L) 24 - 44 %    Monocytes % 8.4 (H) 0 - 4 %    Eosinophils % 0.0 0 - 3 %    Basophils % 0.1 0 - 1 %    Segs Absolute 9.8 K/CU MM    Lymphocytes Absolute 0.7 K/CU MM    Monocytes Absolute 1.0 K/CU MM    Eosinophils Absolute 0.0 K/CU MM    Basophils Absolute 0.0 K/CU MM    Nucleated RBC % 0.0 %    Total Nucleated RBC 0.0 K/CU MM    Total Immature Neutrophil 0.03 K/CU MM    Immature Neutrophil % 0.3 0 - 0.43 %       Scheduled Meds:   scopolamine  1 patch Transdermal Q72H    alvimopan  12 mg Oral BID    metroNIDAZOLE  500 mg Intravenous Q8H    sodium chloride flush  10 mL Intravenous 2 times per day    sodium chloride flush  10 mL Intravenous 2 times per day    piperacillin-tazobactam  3.375 g Intravenous Q8H    enoxaparin  40 mg Subcutaneous Q24H       Continuous Infusions:   dextrose 5% and 0.45% NaCl with KCl 20 mEq 125 mL/hr at 10/08/20 0231    lactated ringers Stopped (10/06/20 1801)       Physical Exam:  HEENT: Anicteric sclerae, Oropharyngeal mucosae moist, pink and intact. Heart:  Normal S1 and S2, RRR  Lungs: Clear to auscultation bilaterally, No audible Wheezes or Rales. Extremities: No edema. Neuro: Alert and Oriented x 3, Non focal.  Abdomen: Soft, Benign, Non tender, Non distended, Positive bowel sounds. Incision: Nicely healing: No erythema, No discharge. Active Problems:    Perforated diverticulum  Resolved Problems:    * No resolved hospital problems. *      Assessment and Plan:  Olive Singleton is a 39 y.o. male who is POD # 2 status post:  1. [facility] robotic-assisted rectosigmoidectomy with 2 LAYERS HAND SEWN anastomosis. 2.  Drainage of the pericolonic abscess. 3.  Rigid proctocolonoscopy. 4.  Extensive lysis of adhesions which consumed 2.5 hrs of the  total operative time. Afebrile and vitals stable, WBC ok, same like yesterday, had 2 BMs, will start PO full liquids. Increase Ambulation to at least 4x/day walk in the hallways with assistance. Respiratory ryann-operative care: Incentive Spirometry / deep breathing and coughing 10x/hr while awake. Continue DVT prophylaxis with Teds and SCDs and SC Lovenox. Continue GI prophylaxis with Protonix IV till able to tolerate PO.   Continue ryann-op Abx, till leukocytosis completely resolves - he had pus in his abdomen /

## 2020-10-08 NOTE — PROGRESS NOTES
Knocked on patient's door. Dr. Trina Hernandez and this nurse entered patients room for physician to assess patients complaints of increased pain and no relief from pain medication. Patient had gotten self up to bathroom. Patient yelling at this nurse and Dr. Trina Hernandez \"Can't you give me some fucking privacy to take a shit\". Explained physician was here to assess him and  Patient yelled \"Can't you get the fuck out, just get the fuck out\".   Onelia Cruz RN

## 2020-10-08 NOTE — PROGRESS NOTES
Answered patient's call light, patient requesting pain medication. Patient stated that his bowels moved. Mod amount of \"loose brown stool with some turds in it\". This nurse did not see the bowel movement. Patient had flushed toilet. Denies passing gas. Patient stated that it took some of the pressure off of his stomach. Medications administered per eMAR. This nurse will continue to monitor.

## 2020-10-08 NOTE — ADT AUTH CERT
2.  Drainage of the pericolonic abscess. 3.  Rigid proctocolonoscopy. 4.  Extensive lysis of adhesions which consumed 2.5 hrs of the    total operative time.         Afebrile and vitals stable, WBC ok, continue NPO, till BM, and then start PO full liquids.        Per IM       1) Acute Perforated sigmoid diverticulitis with abscess    -Vitally stable, no sepsis    -General Surgery on board; had Lap rectosigmoidectomy with pericolonic abscess drainage 10/06/2020    -Continue IV Zosyn

## 2020-10-08 NOTE — PROGRESS NOTES
Answered patient's call light. Went in the room and Pt stated he needed to \"go to the restroom, get pain medication, and get the beeping noise from his IV Pump to stop beeping. \" This NA said \"alright, I will let your nurse know about being in pain and I cannot touch the IV Pump but will notify the nurse but this NA can help you go to the bathroom. Pt asked me \"to leave the room because it is hard to shit with others in the room. Just stand outside with the fucking door shut\" This NA stated \"I needed to be in the room with him for his safety and incase anything happened to him\". Pt responded with \"me as the fucking patient I am telling you to leave the fucking room. \" I did let Jose Alejandro Dennis RN know about this and also let her know about the need for pain medications and the IV Pump.    Lb Stubbs, NA

## 2020-10-08 NOTE — PROGRESS NOTES
Patient up to bathroom per self. Stated \"I had another shit\"  Instructed patient to inform the staff so we can chart what it looked like for the physician. Patient then stated \"What do you want me to do, wipe my ass so you can see the toilet paper. \"  Instructed patient not to flush commode. Verbalized understanding. This nurse has instructed patient multiple times not to get up by himself, to call the nurse for assistance. Patient refuses to do this.   Jennifer Vernon RN

## 2020-10-08 NOTE — PROGRESS NOTES
Hospitalist Progress Note      Name:  Julissa Beckman /Age/Sex: 1975  (39 y.o. male)   MRN & CSN:  [id number] & [id number] Admission Date/Time: 10/5/2020  5:56 AM   Location:  -A PCP: No primary care provider on file. Hospital Day: 4    Assessment and Plan:   Juan C Gonzalez a 39 y.o.  male who presents with abdominal pain.    1) Acute Perforated sigmoid diverticulitis with abscess  -Vitally stable, no sepsis  -General Surgery on board; had Lap rectosigmoidectomy with pericolonic abscess drainage 10/06/2020  -No immediate post op complication  -Started on full liquid today  -Continue IV Zosyn     Other diagnoses  Nonobstructing bilateral renal calculi  Cholelithiasis       Diet DIET FULL LIQUID;  Dietary Nutrition Supplements: Standard Oral Supplement   DVT Prophylaxis [] Lovenox, []  Heparin, [] SCDs, [] Ambulation   GI Prophylaxis [] PPI,  [] H2 Blocker,  [] Carafate,  [] Diet/Tube Feeds   Code Status Full Code   Disposition Home   MDM      History of Present Illness:     Patient was seen and examined  Reported he had BM yesterday  Denied any worsening abdominal pain  No fever, chills, N/V/D    Ten point ROS reviewed negative, unless as noted above    Objective: Intake/Output Summary (Last 24 hours) at 10/8/2020 1032  Last data filed at 10/8/2020 0417  Gross per 24 hour   Intake 1270.67 ml   Output 100 ml   Net 1170.67 ml      Vitals:   Vitals:    10/08/20 0836   BP:    Pulse:    Resp: 17   Temp:    SpO2:      Physical Exam:   GEN Awake male, sitting upright in bed in no apparent distress. Appears given age. EYES Pupils are equally round. No scleral erythema, discharge, or conjunctivitis. HENT Mucous membranes are moist. Oral pharynx without exudates, no evidence of thrush. NECK Supple, no apparent thyromegaly or masses. RESP Clear to auscultation, no wheezes, rales or rhonchi. Symmetric chest movement while on room air. CARDIO/VASC S1/S2 auscultated.  Regular rate

## 2020-10-08 NOTE — PLAN OF CARE
Problem: Pain:  Description: Pain management should include both nonpharmacologic and pharmacologic interventions.   Goal: Pain level will decrease  Description: Pain level will decrease  Outcome: Ongoing  Goal: Control of acute pain  Description: Control of acute pain  Outcome: Ongoing  Goal: Control of chronic pain  Description: Control of chronic pain  Outcome: Ongoing  Goal: Patient's pain/discomfort is manageable  Description: Patient's pain/discomfort is manageable  Outcome: Ongoing     Problem: Infection:  Goal: Will remain free from infection  Description: Will remain free from infection  Outcome: Ongoing     Problem: Safety:  Goal: Free from accidental physical injury  Description: Free from accidental physical injury  Outcome: Ongoing  Goal: Free from intentional harm  Description: Free from intentional harm  Outcome: Ongoing     Problem: Daily Care:  Goal: Daily care needs are met  Description: Daily care needs are met  Outcome: Ongoing     Problem: Skin Integrity:  Goal: Skin integrity will stabilize  Description: Skin integrity will stabilize  Outcome: Ongoing     Problem: Discharge Planning:  Goal: Patients continuum of care needs are met  Description: Patients continuum of care needs are met  Outcome: Ongoing     Problem: Falls - Risk of:  Goal: Will remain free from falls  Description: Will remain free from falls  Outcome: Ongoing  Goal: Absence of physical injury  Description: Absence of physical injury  Outcome: Ongoing

## 2020-10-09 LAB
ANION GAP SERPL CALCULATED.3IONS-SCNC: 9 MMOL/L (ref 4–16)
BASOPHILS ABSOLUTE: 0 K/CU MM
BASOPHILS RELATIVE PERCENT: 0.4 % (ref 0–1)
BUN BLDV-MCNC: 3 MG/DL (ref 6–23)
CALCIUM SERPL-MCNC: 8.6 MG/DL (ref 8.3–10.6)
CHLORIDE BLD-SCNC: 103 MMOL/L (ref 99–110)
CO2: 30 MMOL/L (ref 21–32)
CREAT SERPL-MCNC: 0.9 MG/DL (ref 0.9–1.3)
DIFFERENTIAL TYPE: ABNORMAL
EOSINOPHILS ABSOLUTE: 0.2 K/CU MM
EOSINOPHILS RELATIVE PERCENT: 2.6 % (ref 0–3)
GFR AFRICAN AMERICAN: >60 ML/MIN/1.73M2
GFR NON-AFRICAN AMERICAN: >60 ML/MIN/1.73M2
GLUCOSE BLD-MCNC: 100 MG/DL (ref 70–99)
HCT VFR BLD CALC: 41.5 % (ref 42–52)
HEMOGLOBIN: 13.7 GM/DL (ref 13.5–18)
IMMATURE NEUTROPHIL %: 0.3 % (ref 0–0.43)
LYMPHOCYTES ABSOLUTE: 1.9 K/CU MM
LYMPHOCYTES RELATIVE PERCENT: 26 % (ref 24–44)
MCH RBC QN AUTO: 31.8 PG (ref 27–31)
MCHC RBC AUTO-ENTMCNC: 33 % (ref 32–36)
MCV RBC AUTO: 96.3 FL (ref 78–100)
MONOCYTES ABSOLUTE: 0.5 K/CU MM
MONOCYTES RELATIVE PERCENT: 6.3 % (ref 0–4)
NUCLEATED RBC %: 0 %
PDW BLD-RTO: 13.1 % (ref 11.7–14.9)
PLATELET # BLD: 230 K/CU MM (ref 140–440)
PMV BLD AUTO: 10 FL (ref 7.5–11.1)
POTASSIUM SERPL-SCNC: 4 MMOL/L (ref 3.5–5.1)
RBC # BLD: 4.31 M/CU MM (ref 4.6–6.2)
SEGMENTED NEUTROPHILS ABSOLUTE COUNT: 4.7 K/CU MM
SEGMENTED NEUTROPHILS RELATIVE PERCENT: 64.4 % (ref 36–66)
SODIUM BLD-SCNC: 142 MMOL/L (ref 135–145)
TOTAL IMMATURE NEUTOROPHIL: 0.02 K/CU MM
TOTAL NUCLEATED RBC: 0 K/CU MM
WBC # BLD: 7.3 K/CU MM (ref 4–10.5)

## 2020-10-09 PROCEDURE — 1200000000 HC SEMI PRIVATE

## 2020-10-09 PROCEDURE — 6360000002 HC RX W HCPCS: Performed by: SURGERY

## 2020-10-09 PROCEDURE — 99024 POSTOP FOLLOW-UP VISIT: CPT | Performed by: SURGERY

## 2020-10-09 PROCEDURE — 36415 COLL VENOUS BLD VENIPUNCTURE: CPT

## 2020-10-09 PROCEDURE — 6370000000 HC RX 637 (ALT 250 FOR IP): Performed by: SURGERY

## 2020-10-09 PROCEDURE — 85025 COMPLETE CBC W/AUTO DIFF WBC: CPT

## 2020-10-09 PROCEDURE — 2580000003 HC RX 258: Performed by: SURGERY

## 2020-10-09 PROCEDURE — 2500000003 HC RX 250 WO HCPCS: Performed by: SURGERY

## 2020-10-09 PROCEDURE — 94761 N-INVAS EAR/PLS OXIMETRY MLT: CPT

## 2020-10-09 PROCEDURE — 6360000002 HC RX W HCPCS: Performed by: NURSE PRACTITIONER

## 2020-10-09 PROCEDURE — 80048 BASIC METABOLIC PNL TOTAL CA: CPT

## 2020-10-09 RX ORDER — OXYCODONE HYDROCHLORIDE AND ACETAMINOPHEN 5; 325 MG/1; MG/1
1-2 TABLET ORAL EVERY 6 HOURS PRN
Qty: 35 TABLET | Refills: 0 | Status: SHIPPED | OUTPATIENT
Start: 2020-10-09 | End: 2020-10-10 | Stop reason: SDUPTHER

## 2020-10-09 RX ORDER — CIPROFLOXACIN 500 MG/1
500 TABLET, FILM COATED ORAL 2 TIMES DAILY
Qty: 20 TABLET | Refills: 0 | Status: SHIPPED | OUTPATIENT
Start: 2020-10-09 | End: 2020-10-10 | Stop reason: SDUPTHER

## 2020-10-09 RX ORDER — AMOXICILLIN 250 MG
2 CAPSULE ORAL DAILY
Qty: 60 TABLET | Refills: 3 | Status: SHIPPED | OUTPATIENT
Start: 2020-10-09 | End: 2020-10-10 | Stop reason: SDUPTHER

## 2020-10-09 RX ORDER — METRONIDAZOLE 500 MG/1
500 TABLET ORAL 3 TIMES DAILY
Qty: 30 TABLET | Refills: 0 | Status: SHIPPED | OUTPATIENT
Start: 2020-10-09 | End: 2020-10-10 | Stop reason: SDUPTHER

## 2020-10-09 RX ADMIN — METRONIDAZOLE 500 MG: 500 INJECTION, SOLUTION INTRAVENOUS at 18:10

## 2020-10-09 RX ADMIN — HYDROMORPHONE HYDROCHLORIDE 1 MG: 1 INJECTION, SOLUTION INTRAMUSCULAR; INTRAVENOUS; SUBCUTANEOUS at 21:04

## 2020-10-09 RX ADMIN — HYDROMORPHONE HYDROCHLORIDE 1 MG: 1 INJECTION, SOLUTION INTRAMUSCULAR; INTRAVENOUS; SUBCUTANEOUS at 05:28

## 2020-10-09 RX ADMIN — POTASSIUM CHLORIDE, DEXTROSE MONOHYDRATE AND SODIUM CHLORIDE: 150; 5; 450 INJECTION, SOLUTION INTRAVENOUS at 05:31

## 2020-10-09 RX ADMIN — ALVIMOPAN 12 MG: 12 CAPSULE ORAL at 20:57

## 2020-10-09 RX ADMIN — HYDROMORPHONE HYDROCHLORIDE 1 MG: 1 INJECTION, SOLUTION INTRAMUSCULAR; INTRAVENOUS; SUBCUTANEOUS at 10:39

## 2020-10-09 RX ADMIN — ENOXAPARIN SODIUM 40 MG: 40 INJECTION SUBCUTANEOUS at 18:09

## 2020-10-09 RX ADMIN — SODIUM CHLORIDE, PRESERVATIVE FREE 10 ML: 5 INJECTION INTRAVENOUS at 08:22

## 2020-10-09 RX ADMIN — PIPERACILLIN AND TAZOBACTAM 3.38 G: 3; .375 INJECTION, POWDER, LYOPHILIZED, FOR SOLUTION INTRAVENOUS at 18:10

## 2020-10-09 RX ADMIN — FENTANYL CITRATE 25 MCG: 50 INJECTION INTRAMUSCULAR; INTRAVENOUS at 08:22

## 2020-10-09 RX ADMIN — METRONIDAZOLE 500 MG: 500 INJECTION, SOLUTION INTRAVENOUS at 10:18

## 2020-10-09 RX ADMIN — METRONIDAZOLE 500 MG: 500 INJECTION, SOLUTION INTRAVENOUS at 01:13

## 2020-10-09 RX ADMIN — HYDROMORPHONE HYDROCHLORIDE 1 MG: 1 INJECTION, SOLUTION INTRAMUSCULAR; INTRAVENOUS; SUBCUTANEOUS at 18:09

## 2020-10-09 RX ADMIN — FENTANYL CITRATE 25 MCG: 50 INJECTION INTRAMUSCULAR; INTRAVENOUS at 13:07

## 2020-10-09 RX ADMIN — HYDROMORPHONE HYDROCHLORIDE 1 MG: 1 INJECTION, SOLUTION INTRAMUSCULAR; INTRAVENOUS; SUBCUTANEOUS at 15:52

## 2020-10-09 RX ADMIN — FENTANYL CITRATE 25 MCG: 50 INJECTION INTRAMUSCULAR; INTRAVENOUS at 03:26

## 2020-10-09 RX ADMIN — HYDROMORPHONE HYDROCHLORIDE 1 MG: 1 INJECTION, SOLUTION INTRAMUSCULAR; INTRAVENOUS; SUBCUTANEOUS at 01:13

## 2020-10-09 RX ADMIN — PIPERACILLIN AND TAZOBACTAM 3.38 G: 3; .375 INJECTION, POWDER, LYOPHILIZED, FOR SOLUTION INTRAVENOUS at 10:18

## 2020-10-09 RX ADMIN — PIPERACILLIN AND TAZOBACTAM 3.38 G: 3; .375 INJECTION, POWDER, LYOPHILIZED, FOR SOLUTION INTRAVENOUS at 01:12

## 2020-10-09 ASSESSMENT — PAIN DESCRIPTION - PROGRESSION
CLINICAL_PROGRESSION: GRADUALLY WORSENING
CLINICAL_PROGRESSION: GRADUALLY IMPROVING
CLINICAL_PROGRESSION: GRADUALLY WORSENING
CLINICAL_PROGRESSION: GRADUALLY IMPROVING
CLINICAL_PROGRESSION: GRADUALLY WORSENING
CLINICAL_PROGRESSION: GRADUALLY IMPROVING

## 2020-10-09 ASSESSMENT — PAIN SCALES - GENERAL
PAINLEVEL_OUTOF10: 4
PAINLEVEL_OUTOF10: 0
PAINLEVEL_OUTOF10: 7
PAINLEVEL_OUTOF10: 7
PAINLEVEL_OUTOF10: 4
PAINLEVEL_OUTOF10: 7
PAINLEVEL_OUTOF10: 7
PAINLEVEL_OUTOF10: 4
PAINLEVEL_OUTOF10: 7
PAINLEVEL_OUTOF10: 8
PAINLEVEL_OUTOF10: 7

## 2020-10-09 ASSESSMENT — PAIN - FUNCTIONAL ASSESSMENT
PAIN_FUNCTIONAL_ASSESSMENT: ACTIVITIES ARE NOT PREVENTED

## 2020-10-09 ASSESSMENT — PAIN DESCRIPTION - FREQUENCY
FREQUENCY: CONTINUOUS

## 2020-10-09 ASSESSMENT — PAIN DESCRIPTION - ORIENTATION
ORIENTATION: MID

## 2020-10-09 ASSESSMENT — PAIN DESCRIPTION - DESCRIPTORS
DESCRIPTORS: CONSTANT;DULL;SHARP

## 2020-10-09 ASSESSMENT — PAIN DESCRIPTION - LOCATION
LOCATION: ABDOMEN

## 2020-10-09 ASSESSMENT — PAIN DESCRIPTION - PAIN TYPE
TYPE: SURGICAL PAIN
TYPE: ACUTE PAIN

## 2020-10-09 ASSESSMENT — PAIN DESCRIPTION - ONSET
ONSET: ON-GOING

## 2020-10-09 NOTE — PROGRESS NOTES
Hospitalist Progress Note      Name:  Brooke Ortega /Age/Sex: 1975  (39 y.o. male)   MRN & CSN:  [id number] & [id number] Admission Date/Time: 10/5/2020  5:56 AM   Location:  -A PCP: No primary care provider on file. Hospital Day: 5    Assessment and Plan:   Maureen Rodriguez a 39 y.o.  male who presents with abdominal pain.    1) Acute Perforated sigmoid diverticulitis with abscess  -Vitally stable, no sepsis  -General Surgery on board; had Lap rectosigmoidectomy with pericolonic abscess drainage 10/06/2020  -No immediate post op complication  -General Surgery advancing diet  -Continue IV Zosyn     Other diagnoses  Nonobstructing bilateral renal calculi  Cholelithiasis    Diet Dietary Nutrition Supplements: Standard Oral Supplement  DIET LOW FIBER;   DVT Prophylaxis [] Lovenox, []  Heparin, [] SCDs, [] Ambulation   GI Prophylaxis [] PPI,  [] H2 Blocker,  [] Carafate,  [] Diet/Tube Feeds   Code Status Full Code   Disposition Home   MDM      History of Present Illness:     Patient was seen and examined  Denied any worsening abdominal pain  No dizziness, palpitations  No fever, chills, N/V/D       Ten point ROS reviewed negative, unless as noted above    Objective: Intake/Output Summary (Last 24 hours) at 10/9/2020 1228  Last data filed at 10/9/2020 0947  Gross per 24 hour   Intake 2180 ml   Output 45 ml   Net 2135 ml      Vitals:   Vitals:    10/09/20 0813   BP: 114/63   Pulse: 71   Resp: 22   Temp: 97.9 °F (36.6 °C)   SpO2: 97%     Physical Exam:   GEN Awake male, sitting upright in bed in no apparent distress. Appears given age. EYES Pupils are equally round. No scleral erythema, discharge, or conjunctivitis. HENT Mucous membranes are moist. Oral pharynx without exudates, no evidence of thrush. NECK Supple, no apparent thyromegaly or masses. RESP Clear to auscultation, no wheezes, rales or rhonchi. Symmetric chest movement while on room air.   CARDIO/VASC S1/S2 auscultated. Regular rate without appreciable murmurs, rubs, or gallops. No JVD or carotid bruits. Peripheral pulses equal bilaterally and palpable. No peripheral edema. GI Abdomen is soft without significant tenderness, masses, or guarding. Bowel sounds are normoactive. Rectal exam deferred.  No costovertebral angle tenderness. Normal appearing external genitalia. Donnelly catheter is not present. HEME/LYMPH No palpable cervical lymphadenopathy and no hepatosplenomegaly. No petechiae or ecchymoses. MSK No gross joint deformities. SKIN Normal coloration, warm, dry. NEURO Cranial nerves appear grossly intact, normal speech, no lateralizing weakness. PSYCH Awake, alert, oriented x 4. Affect appropriate.     Medications:   Medications:    scopolamine  1 patch Transdermal Q72H    alvimopan  12 mg Oral BID    metroNIDAZOLE  500 mg Intravenous Q8H    sodium chloride flush  10 mL Intravenous 2 times per day    sodium chloride flush  10 mL Intravenous 2 times per day    piperacillin-tazobactam  3.375 g Intravenous Q8H    enoxaparin  40 mg Subcutaneous Q24H      Infusions:    lactated ringers Stopped (10/06/20 1801)     PRN Meds: fentanNYL, 25 mcg, Q2H PRN  sodium chloride flush, 10 mL, PRN  potassium chloride, 10 mEq, PRN  magnesium sulfate, 1 g, PRN  HYDROmorphone, 1 mg, Q2H PRN  benzocaine, , 4x Daily PRN  sodium chloride flush, 10 mL, PRN  ondansetron, 4 mg, Q8H PRN    Or  ondansetron, 4 mg, Q6H PRN  HYDROmorphone, 1 mg, Q2H PRN          Electronically signed by Gaudencio Renee MD on 10/9/2020 at 12:28 PM

## 2020-10-09 NOTE — ADT AUTH CERT
Diverticulitis, Acute - Care Day 5 (10/9/2020) by Shama Singh RN         Review Status  Review Entered    Completed  10/9/2020 08:09        Criteria Review       Care Day: 5 Care Date: 10/9/2020 Level of Care: ICU    Guideline Day 3    Clinical Status    (X) * Hemodynamic stability    (X) * Pain absent or managed    (X) * Stable Hgb/Hct    (X) * No evidence of peritonitis or abscess    (X) * Surgery not indicated    10/9/2020 8:09 AM EDT by Brody Shine      POD # 3    (X) * Diet tolerated    10/9/2020 8:09 AM EDT by Brody Shine      FL diet    (X) * Afebrile or fever improved    ( ) * Discharge plans and education understood    Activity    (X) * Ambulatory or acceptable for next level of care    Routes    ( ) * Oral hydration, medications, [E] and diet    10/9/2020 8:09 AM EDT by Brody Shine      FL diet  dextrose 5 % and 0.45 % NaCl with KCl 20 mEq infusion    Rate: 125 mL/hr  Fentanyl IV prn given x 10  scopolamine 1 [id number]  alvimopan 12 mgOralBID    Interventions    ( ) CBC    10/9/2020 8:09 AM EDT by Brody Shine      wbc 8.7  Hematocrit: 48.4  hgb 14.7    Medications    ( ) Possible parenteral or oral antibiotics    10/9/2020 8:09 AM EDT by Brody Shine      piperacillin-tazobactam (ZOSYN) 3.375 g in dextrose 5 % 50 mL IVPB extended infusion (mini-bag)  q8h    metronidazole (FLAGYL) 500 mg in NaCl 100 mL IVPB premix  q8h    * Milestone    Additional Notes    10/9       VS: T 98.1 P 75 r 14 bp 102/74 spo2 97%       Orders    Advance diet Low fiber    Telemetry     daily labs    Fentanyl IV q2h prn given x 1 so far today (x11 on 10/8)    Dilaudid IV q2h prn given x 2 so far today (x 1 on 10/8)       Patient feels better from the day before yesterday's surgery and had MULTIPLE BMs, and passing flatus. Assessment and Plan:    Rae Chaidez a 39 y. o. male who is POD # 3 status post:    1. [facility] robotic-assisted rectosigmoidectomy with 2 LAYERS HAND SEWN anastomosis. 2.  Drainage of the pericolonic abscess. 3.  Rigid proctocolonoscopy. 4.  Extensive lysis of adhesions which consumed 2.5 hrs of the    total operative time.         Afebrile and vitals stable, WBC NORMAL, had multiple BMs, and passing flatus, started PO full liquids yesterday, and well tolerated, continue Zosyn for one more day, and advance diet today, and will follow.

## 2020-10-09 NOTE — PLAN OF CARE
Problem: Pain:  Goal: Pain level will decrease  Description: Pain level will decrease  Outcome: Ongoing  Goal: Control of acute pain  Description: Control of acute pain  Outcome: Ongoing  Goal: Control of chronic pain  Description: Control of chronic pain  Outcome: Ongoing  Goal: Patient's pain/discomfort is manageable  Description: Patient's pain/discomfort is manageable  Outcome: Ongoing     Problem: Infection:  Goal: Will remain free from infection  Description: Will remain free from infection  Outcome: Ongoing     Problem: Safety:  Goal: Free from accidental physical injury  Description: Free from accidental physical injury  Outcome: Ongoing  Goal: Free from intentional harm  Description: Free from intentional harm  Outcome: Ongoing     Problem: Daily Care:  Goal: Daily care needs are met  Description: Daily care needs are met  Outcome: Ongoing     Problem: Skin Integrity:  Goal: Skin integrity will stabilize  Description: Skin integrity will stabilize  Outcome: Ongoing     Problem: Discharge Planning:  Goal: Patients continuum of care needs are met  Description: Patients continuum of care needs are met  Outcome: Ongoing     Problem: Falls - Risk of:  Goal: Will remain free from falls  Description: Will remain free from falls  Outcome: Ongoing  Goal: Absence of physical injury  Description: Absence of physical injury  Outcome: Ongoing

## 2020-10-09 NOTE — PROGRESS NOTES
GENERAL SURGERY PROGRESS NOTE    CC/HPI:           Patient feels better from the day before yesterday's surgery and had MULTIPLE BMs, and passing flatus. Vitals:    10/08/20 0836 10/08/20 1936 10/08/20 2310 10/09/20 0329   BP:  127/70 95/78 102/74   Pulse:  87 71 75   Resp: 17 21 25 14   Temp:  99.1 °F (37.3 °C) 98.5 °F (36.9 °C) 98.1 °F (36.7 °C)   TempSrc:  Oral Oral Oral   SpO2:  98% 97% 97%   Weight:   208 lb 5.4 oz (94.5 kg)    Height:         I/O last 3 completed shifts: In: 1510.7 [P.O.:240; I.V.:1120.7;  IV Piggyback:150]  Out: 70 [Drains:70]  I/O this shift:  In: -   Out: 15 [Drains:15]    DIET FULL LIQUID;  Dietary Nutrition Supplements: Standard Oral Supplement    Recent Results (from the past 48 hour(s))   CBC Auto Differential    Collection Time: 10/08/20  9:53 AM   Result Value Ref Range    WBC 8.7 4.0 - 10.5 K/CU MM    RBC 4.70 4.6 - 6.2 M/CU MM    Hemoglobin 14.7 13.5 - 18.0 GM/DL    Hematocrit 48.4 42 - 52 %    .0 (H) 78 - 100 FL    MCH 31.3 (H) 27 - 31 PG    MCHC 30.4 (L) 32.0 - 36.0 %    RDW 13.1 11.7 - 14.9 %    Platelets 586 575 - 807 K/CU MM    MPV 10.5 7.5 - 11.1 FL    Differential Type AUTOMATED DIFFERENTIAL     Segs Relative 78.2 (H) 36 - 66 %    Lymphocytes % 13.6 (L) 24 - 44 %    Monocytes % 7.6 (H) 0 - 4 %    Eosinophils % 0.1 0 - 3 %    Basophils % 0.3 0 - 1 %    Segs Absolute 6.8 K/CU MM    Lymphocytes Absolute 1.2 K/CU MM    Monocytes Absolute 0.7 K/CU MM    Eosinophils Absolute 0.0 K/CU MM    Basophils Absolute 0.0 K/CU MM    Nucleated RBC % 0.0 %    Total Nucleated RBC 0.0 K/CU MM    Total Immature Neutrophil 0.02 K/CU MM    Immature Neutrophil % 0.2 0 - 0.43 %   Basic Metabolic Panel    Collection Time: 10/08/20  9:53 AM   Result Value Ref Range    Sodium 137 135 - 145 MMOL/L    Potassium 4.5 3.5 - 5.1 MMOL/L    Chloride 104 99 - 110 mMol/L    CO2 20 (L) 21 - 32 MMOL/L    Anion Gap 13 4 - 16    BUN 5 (L) 6 - 23 MG/DL    CREATININE 0.9 0.9 - 1.3 MG/DL    Glucose 130 (H) 70 - 99 MG/DL    Calcium 8.2 (L) 8.3 - 10.6 MG/DL    GFR Non-African American >60 >60 mL/min/1.73m2    GFR African American >60 >60 mL/min/1.73m2       Scheduled Meds:   scopolamine  1 patch Transdermal Q72H    alvimopan  12 mg Oral BID    metroNIDAZOLE  500 mg Intravenous Q8H    sodium chloride flush  10 mL Intravenous 2 times per day    sodium chloride flush  10 mL Intravenous 2 times per day    piperacillin-tazobactam  3.375 g Intravenous Q8H    enoxaparin  40 mg Subcutaneous Q24H       Continuous Infusions:   dextrose 5% and 0.45% NaCl with KCl 20 mEq 125 mL/hr at 10/09/20 0531    lactated ringers Stopped (10/06/20 1801)       Physical Exam:  HEENT: Anicteric sclerae, Oropharyngeal mucosae moist, pink and intact. Heart:  Normal S1 and S2, RRR  Lungs: Clear to auscultation bilaterally, No audible Wheezes or Rales. Extremities: No edema. Neuro: Alert and Oriented x 3, Non focal.  Abdomen: Soft, Benign, Non tender, Non distended, Positive bowel sounds. Drain minimal amount, and NOT purulent. Incision: Nicely healing: No erythema, No discharge. Active Problems:    Perforated diverticulum  Resolved Problems:    * No resolved hospital problems. *      Assessment and Plan:  Jigna Ba is a 39 y.o. male who is POD # 3 status post:  1. [facility] robotic-assisted rectosigmoidectomy with 2 LAYERS HAND SEWN anastomosis. 2.  Drainage of the pericolonic abscess. 3.  Rigid proctocolonoscopy. 4.  Extensive lysis of adhesions which consumed 2.5 hrs of the  total operative time. Afebrile and vitals stable, WBC NORMAL, had multiple BMs, and passing flatus, started PO full liquids yesterday, and well tolerated, continue Zosyn for one more day, and advance diet today, and will follow. Increase Ambulation to at least 4x/day walk in the hallways with assistance. Respiratory ryann-operative care: Incentive Spirometry / deep breathing and coughing 10x/hr while awake.     Continue DVT prophylaxis with Teds and SCDs and SC Lovenox. Continue GI prophylaxis with Protonix IV till able to tolerate PO. Continue ryann-op Abx, till tomorrow, leukocytosis resolved but he had pus in his abdomen / pelvis. .    ___________________________________________    Manjeet Alejo MD, FACS, FICS  10/9/2020  6:36 AM

## 2020-10-10 VITALS
HEART RATE: 68 BPM | SYSTOLIC BLOOD PRESSURE: 126 MMHG | DIASTOLIC BLOOD PRESSURE: 81 MMHG | RESPIRATION RATE: 18 BRPM | WEIGHT: 207 LBS | TEMPERATURE: 98.5 F | HEIGHT: 71 IN | BODY MASS INDEX: 28.98 KG/M2 | OXYGEN SATURATION: 97 %

## 2020-10-10 LAB
ANION GAP SERPL CALCULATED.3IONS-SCNC: 11 MMOL/L (ref 4–16)
BASOPHILS ABSOLUTE: 0 K/CU MM
BASOPHILS RELATIVE PERCENT: 0.5 % (ref 0–1)
BUN BLDV-MCNC: 5 MG/DL (ref 6–23)
CALCIUM SERPL-MCNC: 8.6 MG/DL (ref 8.3–10.6)
CHLORIDE BLD-SCNC: 101 MMOL/L (ref 99–110)
CO2: 28 MMOL/L (ref 21–32)
CREAT SERPL-MCNC: 0.9 MG/DL (ref 0.9–1.3)
DIFFERENTIAL TYPE: ABNORMAL
EOSINOPHILS ABSOLUTE: 0.2 K/CU MM
EOSINOPHILS RELATIVE PERCENT: 3.5 % (ref 0–3)
GFR AFRICAN AMERICAN: >60 ML/MIN/1.73M2
GFR NON-AFRICAN AMERICAN: >60 ML/MIN/1.73M2
GLUCOSE BLD-MCNC: 90 MG/DL (ref 70–99)
HCT VFR BLD CALC: 41.7 % (ref 42–52)
HEMOGLOBIN: 13.3 GM/DL (ref 13.5–18)
IMMATURE NEUTROPHIL %: 0.3 % (ref 0–0.43)
LYMPHOCYTES ABSOLUTE: 1.9 K/CU MM
LYMPHOCYTES RELATIVE PERCENT: 29.8 % (ref 24–44)
MCH RBC QN AUTO: 30 PG (ref 27–31)
MCHC RBC AUTO-ENTMCNC: 31.9 % (ref 32–36)
MCV RBC AUTO: 94.1 FL (ref 78–100)
MONOCYTES ABSOLUTE: 0.4 K/CU MM
MONOCYTES RELATIVE PERCENT: 6 % (ref 0–4)
NUCLEATED RBC %: 0 %
PDW BLD-RTO: 13 % (ref 11.7–14.9)
PLATELET # BLD: 260 K/CU MM (ref 140–440)
PMV BLD AUTO: 10.3 FL (ref 7.5–11.1)
POTASSIUM SERPL-SCNC: 3.7 MMOL/L (ref 3.5–5.1)
RBC # BLD: 4.43 M/CU MM (ref 4.6–6.2)
SEGMENTED NEUTROPHILS ABSOLUTE COUNT: 3.8 K/CU MM
SEGMENTED NEUTROPHILS RELATIVE PERCENT: 59.9 % (ref 36–66)
SODIUM BLD-SCNC: 140 MMOL/L (ref 135–145)
TOTAL IMMATURE NEUTOROPHIL: 0.02 K/CU MM
TOTAL NUCLEATED RBC: 0 K/CU MM
WBC # BLD: 6.4 K/CU MM (ref 4–10.5)

## 2020-10-10 PROCEDURE — 2580000003 HC RX 258: Performed by: SURGERY

## 2020-10-10 PROCEDURE — 99024 POSTOP FOLLOW-UP VISIT: CPT | Performed by: SURGERY

## 2020-10-10 PROCEDURE — 6360000002 HC RX W HCPCS: Performed by: SURGERY

## 2020-10-10 PROCEDURE — 94761 N-INVAS EAR/PLS OXIMETRY MLT: CPT

## 2020-10-10 PROCEDURE — 80048 BASIC METABOLIC PNL TOTAL CA: CPT

## 2020-10-10 PROCEDURE — 2500000003 HC RX 250 WO HCPCS: Performed by: SURGERY

## 2020-10-10 PROCEDURE — 85025 COMPLETE CBC W/AUTO DIFF WBC: CPT

## 2020-10-10 PROCEDURE — 36415 COLL VENOUS BLD VENIPUNCTURE: CPT

## 2020-10-10 PROCEDURE — 94150 VITAL CAPACITY TEST: CPT

## 2020-10-10 RX ORDER — METRONIDAZOLE 500 MG/1
500 TABLET ORAL 3 TIMES DAILY
Qty: 30 TABLET | Refills: 0 | Status: SHIPPED | OUTPATIENT
Start: 2020-10-10 | End: 2020-10-20

## 2020-10-10 RX ORDER — CIPROFLOXACIN 500 MG/1
500 TABLET, FILM COATED ORAL 2 TIMES DAILY
Qty: 20 TABLET | Refills: 0 | Status: SHIPPED | OUTPATIENT
Start: 2020-10-10 | End: 2020-10-20

## 2020-10-10 RX ORDER — OXYCODONE HYDROCHLORIDE AND ACETAMINOPHEN 5; 325 MG/1; MG/1
1 TABLET ORAL EVERY 6 HOURS PRN
Qty: 20 TABLET | Refills: 0 | Status: SHIPPED | OUTPATIENT
Start: 2020-10-10 | End: 2020-10-15

## 2020-10-10 RX ORDER — AMOXICILLIN 250 MG
2 CAPSULE ORAL DAILY
Qty: 20 TABLET | Refills: 0 | Status: SHIPPED | OUTPATIENT
Start: 2020-10-10 | End: 2020-10-20

## 2020-10-10 RX ADMIN — HYDROMORPHONE HYDROCHLORIDE 1 MG: 1 INJECTION, SOLUTION INTRAMUSCULAR; INTRAVENOUS; SUBCUTANEOUS at 06:50

## 2020-10-10 RX ADMIN — HYDROMORPHONE HYDROCHLORIDE 1 MG: 1 INJECTION, SOLUTION INTRAMUSCULAR; INTRAVENOUS; SUBCUTANEOUS at 02:35

## 2020-10-10 RX ADMIN — HYDROMORPHONE HYDROCHLORIDE 1 MG: 1 INJECTION, SOLUTION INTRAMUSCULAR; INTRAVENOUS; SUBCUTANEOUS at 00:13

## 2020-10-10 RX ADMIN — PIPERACILLIN AND TAZOBACTAM 3.38 G: 3; .375 INJECTION, POWDER, LYOPHILIZED, FOR SOLUTION INTRAVENOUS at 09:40

## 2020-10-10 RX ADMIN — PIPERACILLIN AND TAZOBACTAM 3.38 G: 3; .375 INJECTION, POWDER, LYOPHILIZED, FOR SOLUTION INTRAVENOUS at 02:36

## 2020-10-10 RX ADMIN — METRONIDAZOLE 500 MG: 500 INJECTION, SOLUTION INTRAVENOUS at 11:00

## 2020-10-10 RX ADMIN — SODIUM CHLORIDE, PRESERVATIVE FREE 10 ML: 5 INJECTION INTRAVENOUS at 09:45

## 2020-10-10 RX ADMIN — HYDROMORPHONE HYDROCHLORIDE 1 MG: 1 INJECTION, SOLUTION INTRAMUSCULAR; INTRAVENOUS; SUBCUTANEOUS at 11:39

## 2020-10-10 RX ADMIN — METRONIDAZOLE 500 MG: 500 INJECTION, SOLUTION INTRAVENOUS at 02:36

## 2020-10-10 ASSESSMENT — PAIN SCALES - GENERAL
PAINLEVEL_OUTOF10: 3
PAINLEVEL_OUTOF10: 8
PAINLEVEL_OUTOF10: 7
PAINLEVEL_OUTOF10: 0
PAINLEVEL_OUTOF10: 9
PAINLEVEL_OUTOF10: 8

## 2020-10-10 ASSESSMENT — PAIN DESCRIPTION - PROGRESSION
CLINICAL_PROGRESSION: GRADUALLY IMPROVING

## 2020-10-10 ASSESSMENT — PAIN DESCRIPTION - ONSET: ONSET: ON-GOING

## 2020-10-10 ASSESSMENT — PAIN DESCRIPTION - LOCATION: LOCATION: ABDOMEN

## 2020-10-10 ASSESSMENT — PAIN DESCRIPTION - DESCRIPTORS: DESCRIPTORS: ACHING

## 2020-10-10 ASSESSMENT — PAIN - FUNCTIONAL ASSESSMENT: PAIN_FUNCTIONAL_ASSESSMENT: ACTIVITIES ARE NOT PREVENTED

## 2020-10-10 ASSESSMENT — PAIN DESCRIPTION - PAIN TYPE: TYPE: ACUTE PAIN

## 2020-10-10 ASSESSMENT — PAIN DESCRIPTION - FREQUENCY: FREQUENCY: CONTINUOUS

## 2020-10-10 ASSESSMENT — PAIN DESCRIPTION - ORIENTATION: ORIENTATION: MID

## 2020-10-10 NOTE — DISCHARGE SUMMARY
Discharge Summary    Name:  Ita Cabello /Age/Sex: 1975  (39 y.o. male)   MRN & CSN:  [id number] & [id number] Admission Date/Time: 10/5/2020  5:56 AM   Attending:  Lord Valerie MD Discharging Physician: Alyssia Goodwin MD     Hospital Course:   Ita Cabello is a 39 y.o.  male  who presents with Perforated sigmoid colon [facility])    Patient was seen and examined  Denied any worsening abdominal pain  No fever, chills, dizziness or palpitations  Passing gas and having BM and tolerating his diet     Assessment and Plan  1) Acute Perforated sigmoid diverticulitis with abscess  -Vitally stable, no sepsis  -General Surgery on board; had Lap rectosigmoidectomy with pericolonic abscess drainage 10/06/2020  -No immediate post op complication  -Continue with PO Cipro and Metronidazole     Other diagnoses  Nonobstructing bilateral renal calculi  Cholelithiasis      The patient expressed appropriate understanding of and agreement with the discharge recommendations, medications, and plan. Consults this admission:  [address] TO [facility]    Discharge Instruction:   Follow up appointments: General Surgery in 2 weeks  Primary care physician:  within 2 weeks    Diet:  regular diet   Activity: activity as tolerated  Disposition: Discharged to:   [x]Home, []C, []SNF, []Acute Rehab, []Hospice   Condition on discharge: Stable    Discharge Medications:      Krystina Francis Medication Instructions [id number]    Printed on:10/10/20 1249   Medication Information                      ciprofloxacin (CIPRO) 500 MG tablet  Take 1 tablet by mouth 2 times daily for 10 days Please take AFTER meals             metroNIDAZOLE (FLAGYL) 500 MG tablet  Take 1 tablet by mouth 3 times daily for 10 days Please take AFTER meals. oxyCODONE-acetaminophen (PERCOCET) 5-325 MG per tablet  Take 1-2 tablets by mouth every 6 hours as needed for Pain for up to 7 days. senna-docusate (SENOKOT S) 8.6-50 MG per tablet  Take 2 tablets by mouth daily for 10 days                 Objective Findings at Discharge:   /81   Pulse 68   Temp 98.5 °F (36.9 °C) (Oral)   Resp 18   Ht 5' 11\" (1.803 m)   Wt 207 lb (93.9 kg)   SpO2 97%   BMI 28.87 kg/m²            PHYSICAL EXAM   GEN Awake male, sitting upright in bed in no apparent distress. Appears given age. EYES Pupils are equally round. No scleral erythema, discharge, or conjunctivitis. HENT Mucous membranes are moist. Oral pharynx without exudates, no evidence of thrush. NECK Supple, no apparent thyromegaly or masses. RESP Clear to auscultation, no wheezes, rales or rhonchi. Symmetric chest movement while on room air. CARDIO/VASC S1/S2 auscultated. Regular rate without appreciable murmurs, rubs, or gallops. No JVD or carotid bruits. Peripheral pulses equal bilaterally and palpable. No peripheral edema. GI Abdomen is soft without significant tenderness, masses, or guarding. Bowel sounds are normoactive. Rectal exam deferred.  No costovertebral angle tenderness. Normal appearing external genitalia. Donnelly catheter is not present. HEME/LYMPH No palpable cervical lymphadenopathy and no hepatosplenomegaly. No petechiae or ecchymoses. MSK No gross joint deformities. SKIN Normal coloration, warm, dry. NEURO Cranial nerves appear grossly intact, normal speech, no lateralizing weakness. PSYCH Awake, alert, oriented x 4. Affect appropriate.     BMP/CBC  Recent Labs     10/08/20  0953 10/09/20  1506 10/10/20  0351    142 140   K 4.5 4.0 3.7    103 101   CO2 20* 30 28   BUN 5* 3* 5*   CREATININE 0.9 0.9 0.9   WBC 8.7 7.3 6.4   HCT 48.4 41.5* 41.7*    230 260       IMAGING:  As above    Discharge Time of 35 minutes    Electronically signed by Yasmine Hendricks MD on 10/10/2020 at 12:49 PM

## 2020-10-10 NOTE — PLAN OF CARE
Problem: Pain:  Goal: Pain level will decrease  Description: Pain level will decrease  Outcome: Met This Shift     Problem: Pain:  Goal: Control of acute pain  Description: Control of acute pain  Outcome: Met This Shift     Problem: Pain:  Goal: Control of chronic pain  Description: Control of chronic pain  Outcome: Met This Shift     Problem: Pain:  Goal: Patient's pain/discomfort is manageable  Description: Patient's pain/discomfort is manageable  Outcome: Met This Shift

## 2020-10-21 ENCOUNTER — OFFICE VISIT (OUTPATIENT)
Dept: BARIATRICS/WEIGHT MGMT | Age: 45
End: 2020-10-21

## 2020-10-21 VITALS
TEMPERATURE: 98 F | WEIGHT: 207 LBS | OXYGEN SATURATION: 99 % | BODY MASS INDEX: 28.98 KG/M2 | SYSTOLIC BLOOD PRESSURE: 120 MMHG | HEART RATE: 82 BPM | HEIGHT: 71 IN | RESPIRATION RATE: 16 BRPM | DIASTOLIC BLOOD PRESSURE: 86 MMHG

## 2020-10-21 PROBLEM — Z90.49 S/P LAPAROSCOPIC-ASSISTED SIGMOIDECTOMY: Status: ACTIVE | Noted: 2020-10-21

## 2020-10-21 PROCEDURE — 99024 POSTOP FOLLOW-UP VISIT: CPT | Performed by: SURGERY

## 2020-10-21 NOTE — PROGRESS NOTES
10/21/2020    TELEHEALTH EVALUATION -- Audio (During HDSHX-78 public health emergency)    HPI:    Ashley Mast (:  1975) has requested an audio/video evaluation for the following concern(s):    presenting in first, follow up 2 weeks post op Sigmoidectomy. Procedure:1. [facility] robotic-assisted rectosigmoidectomy with 2 LAYERS HAND SEWN anastomosis. 2.  Drainage of the pericolonic abscess. 3.  Rigid proctocolonoscopy. 4.  Extensive lysis of adhesions which consumed 2.5 hrs of the  total operative time. Pathology: .Final Pathologic Diagnosis:   Colon, rectosigmoid, partial resection:   -  Diverticulitis is identified.     -  Serositis is present.     -  Negative for malignancy.       Called and left him an AM message. Review of Systems    Prior to Visit Medications    Not on File       Social History     Tobacco Use    Smoking status: Never Smoker    Smokeless tobacco: Never Used   Substance Use Topics    Alcohol use: No    Drug use: Yes     Types: Marijuana     Comment: \"last used last week of 2020\"        Allergies   Allergen Reactions    Vicodin [Hydrocodone-Acetaminophen] Other (See Comments)     Patient states that it is tolerable but state that makes his skin crawl   ,   Past Medical History:   Diagnosis Date    Diverticulitis     Kidney stone     \"had kidney stone now- had them since age 17-had surgery and able to pass some\" follows with dr Ekaterina Marcum   ,   Past Surgical History:   Procedure Laterality Date    KIDNEY STONE SURGERY      \"had surgery multiple times for kidney stones last time ?     SMALL INTESTINE SURGERY N/A 10/6/2020    BOWEL RESECTION SIGMOID LAPAROSCOPIC ROBOTIC performed by Krysten Negron MD at [address] [facility]      left wrist\"no metal\"   ,   Social History     Tobacco Use    Smoking status: Never Smoker    Smokeless tobacco: Never Used   Substance Use Topics    Alcohol use: No    Drug use: Yes     Types: Marijuana Comment: \"last used last week of Sept 2020\"   ,   Family History   Problem Relation Age of Onset    Cancer Mother         ovarian cancer     Early Death Mother     Kidney stones Father     Diabetes Father     Diabetes Brother    ,   Immunization History   Administered Date(s) Administered    Tdap (Boostrix, Adacel) 09/27/2013   ,   Health Maintenance   Topic Date Due    HIV screen  02/20/1992    Lipid screen  02/20/2017    Flu vaccine (1) 09/01/2020    DTaP/Tdap/Td vaccine (2 - Td) 09/27/2023    Hepatitis A vaccine  Aged Out    Hepatitis B vaccine  Aged Out    Hib vaccine  Aged Out    Meningococcal (ACWY) vaccine  Aged Out    Pneumococcal 0-64 years Vaccine  Aged Out         ASSESSMENT/PLAN:    1. S/P laparoscopic-assisted sigmoidectomy      Return in about 1 week (around 10/28/2020) for Follow up Symptoms, Post operative check. Claudia Heredia is a 39 y.o. male being evaluated by a Virtual Visit (video visit) encounter to address concerns as mentioned above. A caregiver was present when appropriate. Due to this being a TeleHealth encounter (During ZECYP-49 public health emergency), evaluation of the following organ systems was limited: Vitals/Constitutional/EENT/Resp/CV/GI//MS/Neuro/Skin/Heme-Lymph-Imm. Pursuant to the emergency declaration under the [address] [place] [address], [address] [place] [address] authority and the [employer] Act, this Virtual Visit was conducted with patient's (and/or legal guardian's) consent, to reduce the patient's risk of exposure to COVID-19 and provide necessary medical care. The patient (and/or legal guardian) has also been advised to contact this office for worsening conditions or problems, and seek emergency medical treatment and/or call 911 if deemed necessary.      Patient identification was verified at the start of the visit: no    Good caliber of stools, daily BMs, wounds very nicely healing. Total time spent on this encounter: done - post op - Mid November f/u to release back to works. Services were provided through a video synchronous discussion virtually to substitute for in-person clinic visit. Patient and provider were located at their individual homes. --Krysten Negron MD on 10/21/2020 at 2:15 PM    An electronic signature was used to authenticate this note.

## 2020-10-30 ENCOUNTER — APPOINTMENT (OUTPATIENT)
Dept: ULTRASOUND IMAGING | Age: 45
End: 2020-10-30
Payer: COMMERCIAL

## 2020-10-30 ENCOUNTER — APPOINTMENT (OUTPATIENT)
Dept: CT IMAGING | Age: 45
End: 2020-10-30
Payer: COMMERCIAL

## 2020-10-30 ENCOUNTER — HOSPITAL ENCOUNTER (EMERGENCY)
Age: 45
Discharge: HOME | End: 2020-10-30
Attending: EMERGENCY MEDICINE
Payer: COMMERCIAL

## 2020-10-30 VITALS
OXYGEN SATURATION: 97 % | DIASTOLIC BLOOD PRESSURE: 71 MMHG | HEIGHT: 71 IN | SYSTOLIC BLOOD PRESSURE: 123 MMHG | RESPIRATION RATE: 20 BRPM | BODY MASS INDEX: 28.7 KG/M2 | TEMPERATURE: 97.9 F | WEIGHT: 205 LBS | HEART RATE: 104 BPM

## 2020-10-30 LAB
ALBUMIN SERPL-MCNC: 4.4 GM/DL (ref 3.4–5)
ALP BLD-CCNC: 77 IU/L (ref 40–129)
ALT SERPL-CCNC: 40 U/L (ref 10–40)
ANION GAP SERPL CALCULATED.3IONS-SCNC: 12 MMOL/L (ref 4–16)
AST SERPL-CCNC: 25 IU/L (ref 15–37)
BASOPHILS ABSOLUTE: 0 K/CU MM
BASOPHILS RELATIVE PERCENT: 0.2 % (ref 0–1)
BILIRUB SERPL-MCNC: 0.7 MG/DL (ref 0–1)
BUN BLDV-MCNC: 11 MG/DL (ref 6–23)
CALCIUM SERPL-MCNC: 9.4 MG/DL (ref 8.3–10.6)
CHLORIDE BLD-SCNC: 104 MMOL/L (ref 99–110)
CO2: 26 MMOL/L (ref 21–32)
CREAT SERPL-MCNC: 0.9 MG/DL (ref 0.9–1.3)
DIFFERENTIAL TYPE: ABNORMAL
EOSINOPHILS ABSOLUTE: 0.1 K/CU MM
EOSINOPHILS RELATIVE PERCENT: 1.2 % (ref 0–3)
GFR AFRICAN AMERICAN: >60 ML/MIN/1.73M2
GFR NON-AFRICAN AMERICAN: >60 ML/MIN/1.73M2
GLUCOSE BLD-MCNC: 123 MG/DL (ref 70–99)
HCT VFR BLD CALC: 47.5 % (ref 42–52)
HEMOGLOBIN: 16 GM/DL (ref 13.5–18)
IMMATURE NEUTROPHIL %: 0.2 % (ref 0–0.43)
LIPASE: 44 IU/L (ref 13–60)
LYMPHOCYTES ABSOLUTE: 2.7 K/CU MM
LYMPHOCYTES RELATIVE PERCENT: 28.8 % (ref 24–44)
MAGNESIUM: 1.9 MG/DL (ref 1.8–2.4)
MCH RBC QN AUTO: 30.7 PG (ref 27–31)
MCHC RBC AUTO-ENTMCNC: 33.7 % (ref 32–36)
MCV RBC AUTO: 91 FL (ref 78–100)
MONOCYTES ABSOLUTE: 0.6 K/CU MM
MONOCYTES RELATIVE PERCENT: 6.7 % (ref 0–4)
PDW BLD-RTO: 12.9 % (ref 11.7–14.9)
PLATELET # BLD: 311 K/CU MM (ref 140–440)
PMV BLD AUTO: 10.4 FL (ref 7.5–11.1)
POTASSIUM SERPL-SCNC: 3.5 MMOL/L (ref 3.5–5.1)
RBC # BLD: 5.22 M/CU MM (ref 4.6–6.2)
SEGMENTED NEUTROPHILS ABSOLUTE COUNT: 5.8 K/CU MM
SEGMENTED NEUTROPHILS RELATIVE PERCENT: 62.9 % (ref 36–66)
SODIUM BLD-SCNC: 142 MMOL/L (ref 135–145)
TOTAL IMMATURE NEUTOROPHIL: 0.02 K/CU MM
TOTAL PROTEIN: 7.7 GM/DL (ref 6.4–8.2)
WBC # BLD: 9.2 K/CU MM (ref 4–10.5)

## 2020-10-30 PROCEDURE — C9113 INJ PANTOPRAZOLE SODIUM, VIA: HCPCS | Performed by: EMERGENCY MEDICINE

## 2020-10-30 PROCEDURE — 96374 THER/PROPH/DIAG INJ IV PUSH: CPT

## 2020-10-30 PROCEDURE — 85025 COMPLETE CBC W/AUTO DIFF WBC: CPT

## 2020-10-30 PROCEDURE — 83735 ASSAY OF MAGNESIUM: CPT

## 2020-10-30 PROCEDURE — 83690 ASSAY OF LIPASE: CPT

## 2020-10-30 PROCEDURE — 93010 ELECTROCARDIOGRAM REPORT: CPT | Performed by: INTERNAL MEDICINE

## 2020-10-30 PROCEDURE — 74176 CT ABD & PELVIS W/O CONTRAST: CPT

## 2020-10-30 PROCEDURE — 76705 ECHO EXAM OF ABDOMEN: CPT

## 2020-10-30 PROCEDURE — 99284 EMERGENCY DEPT VISIT MOD MDM: CPT

## 2020-10-30 PROCEDURE — 80053 COMPREHEN METABOLIC PANEL: CPT

## 2020-10-30 PROCEDURE — 96375 TX/PRO/DX INJ NEW DRUG ADDON: CPT

## 2020-10-30 PROCEDURE — 93005 ELECTROCARDIOGRAM TRACING: CPT | Performed by: EMERGENCY MEDICINE

## 2020-10-30 PROCEDURE — 6360000002 HC RX W HCPCS: Performed by: EMERGENCY MEDICINE

## 2020-10-30 RX ORDER — METOCLOPRAMIDE HYDROCHLORIDE 5 MG/ML
10 INJECTION INTRAMUSCULAR; INTRAVENOUS ONCE
Status: COMPLETED | OUTPATIENT
Start: 2020-10-30 | End: 2020-10-30

## 2020-10-30 RX ORDER — PANTOPRAZOLE SODIUM 40 MG/1
40 TABLET, DELAYED RELEASE ORAL
Qty: 30 TABLET | Refills: 0 | Status: SHIPPED | OUTPATIENT
Start: 2020-10-30 | End: 2021-01-13

## 2020-10-30 RX ORDER — PANTOPRAZOLE SODIUM 40 MG/10ML
40 INJECTION, POWDER, LYOPHILIZED, FOR SOLUTION INTRAVENOUS ONCE
Status: COMPLETED | OUTPATIENT
Start: 2020-10-30 | End: 2020-10-30

## 2020-10-30 RX ADMIN — HYDROMORPHONE HYDROCHLORIDE 1 MG: 1 INJECTION, SOLUTION INTRAMUSCULAR; INTRAVENOUS; SUBCUTANEOUS at 07:00

## 2020-10-30 RX ADMIN — METOCLOPRAMIDE HYDROCHLORIDE 10 MG: 5 INJECTION INTRAMUSCULAR; INTRAVENOUS at 07:03

## 2020-10-30 RX ADMIN — PANTOPRAZOLE SODIUM 40 MG: 40 INJECTION, POWDER, FOR SOLUTION INTRAVENOUS at 07:02

## 2020-10-30 ASSESSMENT — ENCOUNTER SYMPTOMS
BLOOD IN STOOL: 0
ABDOMINAL PAIN: 1
NAUSEA: 1
ANAL BLEEDING: 0
CONSTIPATION: 0

## 2020-10-30 ASSESSMENT — PAIN DESCRIPTION - LOCATION: LOCATION: ABDOMEN;FLANK

## 2020-10-30 ASSESSMENT — PAIN SCALES - GENERAL: PAINLEVEL_OUTOF10: 10

## 2020-10-30 ASSESSMENT — PAIN DESCRIPTION - FREQUENCY: FREQUENCY: CONTINUOUS

## 2020-10-30 ASSESSMENT — PAIN DESCRIPTION - DESCRIPTORS: DESCRIPTORS: SHARP

## 2020-10-30 ASSESSMENT — PAIN DESCRIPTION - PROGRESSION: CLINICAL_PROGRESSION: GRADUALLY WORSENING

## 2020-10-30 ASSESSMENT — PAIN DESCRIPTION - PAIN TYPE: TYPE: ACUTE PAIN

## 2020-10-30 ASSESSMENT — PAIN DESCRIPTION - ONSET: ONSET: SUDDEN

## 2020-10-30 ASSESSMENT — PAIN DESCRIPTION - ORIENTATION: ORIENTATION: RIGHT

## 2020-10-30 NOTE — ED NOTES
Patient given AVS, discharge instructions and prescriptions. Verbalizes understanding no further needs identified at this time.      Marcelo Arguello RN  10/30/20 3096

## 2020-10-30 NOTE — ED PROVIDER NOTES
Triage Chief Complaint:   No chief complaint on file. [place]:  Jennifer Connelly is a 39 y.o. male that presents back to the ED with diffuse abdominal pain. He points to all over the abdomen. He is sitting up right in bed is resistant to lie flat. He is status post recent surgery as noted below he had a laparoscopic rectosigmoidectomy. He had drainage of an abscess. He was sent home on Cipro and Flagyl denies any fevers or chills admits to having over 47 kidney stones he states her staghorn. He is having some pain in his back but the points throughout his entire abdomen is also having some reflux states he has heartburn after he ate some spicy food last evening      NOTES from recent admit>>  Assessment and Plan  1) Acute Perforated sigmoid diverticulitis with abscess  -Vitally stable, no sepsis  -General Surgery on board; had Lap rectosigmoidectomy with pericolonic abscess drainage 10/06/2020  -No immediate post op complication  -Continue with PO Cipro and Metronidazole      Past Medical History:   Diagnosis Date    Diverticulitis     Kidney stone     \"had kidney stone now- had them since age 17-had surgery and able to pass some\" follows with dr Isaac Kay     Past Surgical History:   Procedure Laterality Date    KIDNEY STONE SURGERY      \"had surgery multiple times for kidney stones last time 2018?     SMALL INTESTINE SURGERY N/A 10/6/2020    BOWEL RESECTION SIGMOID LAPAROSCOPIC ROBOTIC performed by Zelda Fuentes MD at [address] [facility]  2012    left wrist\"no metal\"     Family History   Problem Relation Age of Onset   Alexandra Berry Cancer Mother         ovarian cancer     Early Death Mother     Kidney stones Father     Diabetes Father     Diabetes Brother      Social History     Socioeconomic History    Marital status:      Spouse name: Not on file    Number of children: Not on file    Years of education: Not on file    Highest education level: Not on file   Occupational History    Not on file   Social Needs    Financial resource strain: Not on file    Food insecurity     Worry: Not on file     Inability: Not on file    Transportation needs     Medical: Not on file     Non-medical: Not on file   Tobacco Use    Smoking status: Never Smoker    Smokeless tobacco: Never Used   Substance and Sexual Activity    Alcohol use: No    Drug use: Yes     Types: Marijuana     Comment: \"last used last week of Sept 2020\"    Sexual activity: Never   Lifestyle    Physical activity     Days per week: Not on file     Minutes per session: Not on file    Stress: Not on file   Relationships    Social connections     Talks on phone: Not on file     Gets together: Not on file     Attends Jewish service: Not on file     Active member of club or organization: Not on file     Attends meetings of clubs or organizations: Not on file     Relationship status: Not on file    Intimate partner violence     Fear of current or ex partner: Not on file     Emotionally abused: Not on file     Physically abused: Not on file     Forced sexual activity: Not on file   Other Topics Concern    Not on file   Social History Narrative    Not on file     No current facility-administered medications for this encounter. Current Outpatient Medications   Medication Sig Dispense Refill    pantoprazole (PROTONIX) 40 MG tablet Take 1 tablet by mouth every morning (before breakfast) 30 tablet 0     Allergies   Allergen Reactions    Vicodin [Hydrocodone-Acetaminophen] Other (See Comments)     Patient states that it is tolerable but state that makes his skin crawl         ROS:    Review of Systems   Constitutional: Negative. HENT: Negative. Cardiovascular: Negative. Gastrointestinal: Positive for abdominal pain and nausea. Negative for anal bleeding, blood in stool and constipation. Genitourinary: Negative. Psychiatric/Behavioral: Positive for agitation. All other systems reviewed and are negative.       Nursing Notes Reviewed    Physical Exam:  ED Triage Vitals [10/30/20 0637]   Enc Vitals Group      /87      Pulse 104      Resp 20      Temp 97.9 °F (36.6 °C)      Temp Source Oral      SpO2 99 %      Weight 205 lb (93 kg)      Height 5' 11\" (1.803 m)      Head Circumference       Peak Flow       Pain Score       Pain Loc       Pain Edu? Excl. in [address]? Physical Exam  Vitals signs and nursing note reviewed. Constitutional:       Appearance: He is well-developed. HENT:      Head: Normocephalic and atraumatic. Right Ear: External ear normal.      Left Ear: External ear normal.      Mouth/Throat:      Mouth: Mucous membranes are moist.   Eyes:      General: No scleral icterus. Right eye: No discharge. Left eye: No discharge. Conjunctiva/sclera: Conjunctivae normal.      Pupils: Pupils are equal, round, and reactive to light. Neck:      Musculoskeletal: Normal range of motion and neck supple. Thyroid: No thyromegaly. Vascular: No JVD. Trachea: No tracheal deviation. Cardiovascular:      Rate and Rhythm: Normal rate and regular rhythm. Heart sounds: Normal heart sounds. No murmur. No friction rub. No gallop. Pulmonary:      Effort: Pulmonary effort is normal. No respiratory distress. Breath sounds: Normal breath sounds. No stridor. No wheezing or rales. Chest:      Chest wall: No tenderness. Abdominal:      General: Abdomen is protuberant. A surgical scar is present. Bowel sounds are increased. There is no distension. Palpations: Abdomen is soft. There is no mass. Tenderness: There is generalized abdominal tenderness. There is no guarding or rebound. Hernia: No hernia is present. Musculoskeletal: Normal range of motion. General: No tenderness or deformity. Right lower leg: No edema. Left lower leg: No edema. Lymphadenopathy:      Cervical: No cervical adenopathy. Skin:     General: Skin is warm and dry. Capillary Refill: Capillary refill takes less than 2 seconds. Coloration: Skin is not pale. Findings: No erythema or rash. Neurological:      Mental Status: He is alert and oriented to person, place, and time. Cranial Nerves: No cranial nerve deficit. Sensory: No sensory deficit. Deep Tendon Reflexes: Reflexes are normal and symmetric. Reflexes normal.   Psychiatric:         Attention and Perception: Attention normal.         Mood and Affect: Mood is anxious. Speech: Speech normal.         Behavior: Behavior normal.         Thought Content:  Thought content normal.         Judgment: Judgment normal.         I have reviewed and interpreted all of the currently available lab results from this visit (ifapplicable):  Results for orders placed or performed during the hospital encounter of 10/30/20   CBC Auto Differential   Result Value Ref Range    WBC 9.2 4.0 - 10.5 K/CU MM    RBC 5.22 4.6 - 6.2 M/CU MM    Hemoglobin 16.0 13.5 - 18.0 GM/DL    Hematocrit 47.5 42 - 52 %    MCV 91.0 78 - 100 FL    MCH 30.7 27 - 31 PG    MCHC 33.7 32.0 - 36.0 %    RDW 12.9 11.7 - 14.9 %    Platelets 279 295 - 662 K/CU MM    MPV 10.4 7.5 - 11.1 FL    Differential Type AUTOMATED DIFFERENTIAL     Segs Relative 62.9 36 - 66 %    Lymphocytes % 28.8 24 - 44 %    Monocytes % 6.7 (H) 0 - 4 %    Eosinophils % 1.2 0 - 3 %    Basophils % 0.2 0 - 1 %    Segs Absolute 5.8 K/CU MM    Lymphocytes Absolute 2.7 K/CU MM    Monocytes Absolute 0.6 K/CU MM    Eosinophils Absolute 0.1 K/CU MM    Basophils Absolute 0.0 K/CU MM    Immature Neutrophil % 0.2 0 - 0.43 %    Total Immature Neutrophil 0.02 K/CU MM   Comprehensive Metabolic Panel w/ Reflex to MG   Result Value Ref Range    Sodium 142 135 - 145 MMOL/L    Potassium 3.5 3.5 - 5.1 MMOL/L    Chloride 104 99 - 110 mMol/L    CO2 26 21 - 32 MMOL/L    BUN 11 6 - 23 MG/DL    CREATININE 0.9 0.9 - 1.3 MG/DL    Glucose 123 (H) 70 - 99 MG/DL    Calcium 9.4 8.3 - 10.6 MG/DL    Alb 4.4 3.4 - 5.0 GM/DL    Total Protein 7.7 6.4 - 8.2 GM/DL    Total Bilirubin 0.7 0.0 - 1.0 MG/DL    ALT 40 10 - 40 U/L    AST 25 15 - 37 IU/L    Alkaline Phosphatase 77 40 - 129 IU/L    GFR Non-African American >60 >60 mL/min/1.73m2    GFR African American >60 >60 mL/min/1.73m2    Anion Gap 12 4 - 16   Lipase   Result Value Ref Range    Lipase 44 13 - 60 IU/L   Magnesium   Result Value Ref Range    Magnesium 1.9 1.8 - 2.4 mg/dl   EKG 12 Lead   Result Value Ref Range    Ventricular Rate 85 BPM    Atrial Rate 85 BPM    P-R Interval 124 ms    QRS Duration 88 ms    Q-T Interval 372 ms    QTc Calculation (Bazett) 442 ms    P Axis 79 degrees    R Axis 58 degrees    T Axis 49 degrees    Diagnosis       Normal sinus rhythm  Low voltage QRS  Borderline ECG  No previous ECGs available        Radiographs (if obtained):  [] The following radiograph wasinterpreted by myself in the absence of a radiologist:   [] Radiologist's Report Reviewed:  US ABDOMEN LIMITED Specify organ? GALLBLADDER   Preliminary Result   1. Gallbladder sludge with mild gallbladder wall thickening. Sonographic   findings are nonspecific for acute cholecystitis. Cholelithiasis as well as   stone seen within the cystic duct on earlier CT are not well appreciated on   this exam.         CT ABDOMEN PELVIS WO CONTRAST Additional Contrast? None   Final Result   1. Cholelithiasis and suspected biliary sludge. One of the gallstones   appears to be within the cystic duct, and there is pericholecystic stranding   suggestive of acute cholecystitis. Consider further evaluation with   sonography or a nuclear medicine hepatobiliary scan if there are clinical   findings of cholecystitis. 2. New changes of laparoscopic partial sigmoid colectomy. Inflammatory   stranding adjacent to the anastomosis may be related to surgery or remnant   inflammation from the previously seen diverticulitis.   Tiny foci of gas along   staple line appear separate from the colon wall and could be intraluminal or   within remnant diverticula, could be postoperative, or could be infectious. Of note, there is no evident loculated fluid to suggest abscess. 3. Trace pelvic ascites is likely reactive and/or postoperative. 4. Suspected circumferential wall thickening in the distal thoracic esophagus   suggestive of esophagitis. No suspicious features to suggest malignancy,   which could appear similar. Consider endoscopy. EKG (if obtained): (All EKG's are interpreted by myself in the absence of a cardiologist)      The 12 lead EKG was interpreted by me, and the interpretation is as follows:  normal sinus rhythm, rate = 85. Intervals are within the normal range. QTc is not prolonged. ST elevations are not present. T wave inversions are not present. Non-specific T wave changes are not present. Delta waves, Brugada Syndrome, and Short [place] are not present. There is no acute ischemia. Chart review shows recent radiographs:  Ct Abdomen Pelvis W Iv Contrast Additional Contrast? None    Result Date: 10/5/2020  EXAMINATION: CT OF THE ABDOMEN AND PELVIS WITH CONTRAST, 10/5/2020 1:33 am TECHNIQUE: CT of the abdomen and pelvis was performed with the administration of intravenous contrast. Multiplanar reformatted images are provided for review. Dose modulation, iterative reconstruction, and/or weight based adjustment of the mA/kV was utilized to reduce the radiation dose to as low as reasonably achievable. COMPARISON: CT abdomen and pelvis dated August 15, 2020 and August 19, 2020. HISTORY: ORDERING SYSTEM PROVIDED HISTORY: Pain TECHNOLOGIST PROVIDED HISTORY: I just want IV contrast, Reason for exam:-> Pain Additional Contrast?->None Reason for Exam: Abdominal Pain (patient states that he was beginning the bowel prep for upcoming colonoscopy at 1630 and began to have abdominal cramping that is so intense he feels like he can't breathe. Denies any blood in stool.  Emesis X 4 , denies any free air, stranding and fluid is seen within the mesentery. Rim enhancing collection with gas is noted adjacent to the proximal sigmoid colon in the region of the perforated diverticulitis, consistent with an abscess which extends into the mesentery. This measures approximately 3.5 x 1.5 cm. 2. Trace free fluid within the pelvis. 3. Nonobstructing bilateral renal calculi. 4. Cholelithiasis. Findings were discussed with Page Rueda at 2:28 am on 10/5/2020. MDM:      Patient presents to the ED with diffuse abdominal pain feeling as if he is having reflux. Abdomen soft nonsurgical he has has healed surgical incisions there is no tympany rigidity or rebound. He had a recent rectosigmoid colon resection was concerned of possible anastomosis abnormality or recurrent infection. He had kidney stones in the past symptoms did not present like this. He had healing findings from the surgical process. He does have some sludge in his gallbladder concern for cystic duct stone. This prompted an ultrasound which failed to reveal any significant abnormality. He did have some swelling of the distal esophagus concerning for esophagitis he improved was stable he will be sent home with ProtonRestalo. He does of an appointment to see his surgeon on 6 November. Please note that portions of this note may have been completed with a voice recognition/dictation program. Efforts were made to edit the dictations but occasionally words are mis-transcribed.      All pertinent Lab data and radiographic results reviewed with patient at bedside. The patient and/or the family were informed of the results of any tests/labs/imaging, the treatment plan, and time was allotted to answer questions. See chart for details of medications given during the ED stay.     The likelihood of other entities in the differential is insufficient to justify any further testing for them. This was explained to the patient.  The patient was advised that persistent or worsening symptoms would require further evaluation.                Clinical Impression:  1. Pain of upper abdomen      Disposition referral (if applicable): Kenny Dela Cruz MD  [address] [facility]  [phone]    Schedule an appointment as soon as possible for a visit   If symptoms worsen    Disposition medications (if applicable):  New Prescriptions    PANTOPRAZOLE (PROTONIX) 40 MG TABLET    Take 1 tablet by mouth every morning (before breakfast)           Tucker Batista DO, FACEP      Comment: Please note this report has been produced using speech recognition software and maycontain errors related to that system including errors in grammar, punctuation, and spelling, as well as words and phrases that may be inappropriate. If there are any questions or concerns please feel free to contact thedictating provider for clarification.         Tammy Valdez DO  10/30/20 7116

## 2020-11-01 ENCOUNTER — APPOINTMENT (OUTPATIENT)
Dept: CT IMAGING | Age: 45
End: 2020-11-01
Payer: COMMERCIAL

## 2020-11-01 ENCOUNTER — HOSPITAL ENCOUNTER (EMERGENCY)
Age: 45
Discharge: HOME | End: 2020-11-01
Attending: EMERGENCY MEDICINE
Payer: COMMERCIAL

## 2020-11-01 VITALS
DIASTOLIC BLOOD PRESSURE: 99 MMHG | HEIGHT: 71 IN | HEART RATE: 100 BPM | WEIGHT: 198 LBS | BODY MASS INDEX: 27.72 KG/M2 | TEMPERATURE: 98.2 F | SYSTOLIC BLOOD PRESSURE: 142 MMHG | RESPIRATION RATE: 16 BRPM | OXYGEN SATURATION: 95 %

## 2020-11-01 LAB
ALBUMIN SERPL-MCNC: 4.3 GM/DL (ref 3.4–5)
ALP BLD-CCNC: 78 IU/L (ref 40–129)
ALT SERPL-CCNC: 30 U/L (ref 10–40)
ANION GAP SERPL CALCULATED.3IONS-SCNC: 15 MMOL/L (ref 4–16)
AST SERPL-CCNC: 5 IU/L (ref 15–37)
BACTERIA: NEGATIVE /HPF
BASOPHILS ABSOLUTE: 0 K/CU MM
BASOPHILS RELATIVE PERCENT: 0.3 % (ref 0–1)
BILIRUB SERPL-MCNC: 0.7 MG/DL (ref 0–1)
BILIRUBIN URINE: NEGATIVE MG/DL
BLOOD, URINE: NEGATIVE
BUN BLDV-MCNC: 10 MG/DL (ref 6–23)
CALCIUM SERPL-MCNC: 9.2 MG/DL (ref 8.3–10.6)
CHLORIDE BLD-SCNC: 100 MMOL/L (ref 99–110)
CLARITY: CLEAR
CO2: 23 MMOL/L (ref 21–32)
COLOR: YELLOW
CREAT SERPL-MCNC: 0.8 MG/DL (ref 0.9–1.3)
DIFFERENTIAL TYPE: ABNORMAL
EOSINOPHILS ABSOLUTE: 0.1 K/CU MM
EOSINOPHILS RELATIVE PERCENT: 1.5 % (ref 0–3)
GFR AFRICAN AMERICAN: >60 ML/MIN/1.73M2
GFR NON-AFRICAN AMERICAN: >60 ML/MIN/1.73M2
GLUCOSE BLD-MCNC: 104 MG/DL (ref 70–99)
GLUCOSE, URINE: NEGATIVE MG/DL
HCT VFR BLD CALC: 50.6 % (ref 42–52)
HEMOGLOBIN: 17.2 GM/DL (ref 13.5–18)
IMMATURE NEUTROPHIL %: 0.2 % (ref 0–0.43)
KETONES, URINE: ABNORMAL MG/DL
LEUKOCYTE ESTERASE, URINE: NEGATIVE
LIPASE: 30 IU/L (ref 13–60)
LYMPHOCYTES ABSOLUTE: 2.2 K/CU MM
LYMPHOCYTES RELATIVE PERCENT: 25.1 % (ref 24–44)
MCH RBC QN AUTO: 31.3 PG (ref 27–31)
MCHC RBC AUTO-ENTMCNC: 34 % (ref 32–36)
MCV RBC AUTO: 92 FL (ref 78–100)
MONOCYTES ABSOLUTE: 0.6 K/CU MM
MONOCYTES RELATIVE PERCENT: 7.2 % (ref 0–4)
MUCUS: ABNORMAL HPF
NITRITE URINE, QUANTITATIVE: NEGATIVE
NUCLEATED RBC %: 0 %
PDW BLD-RTO: 12.7 % (ref 11.7–14.9)
PH, URINE: 5 (ref 5–8)
PLATELET # BLD: 316 K/CU MM (ref 140–440)
PMV BLD AUTO: 10.6 FL (ref 7.5–11.1)
POTASSIUM SERPL-SCNC: 3.5 MMOL/L (ref 3.5–5.1)
PROTEIN UA: NEGATIVE MG/DL
RBC # BLD: 5.5 M/CU MM (ref 4.6–6.2)
RBC URINE: 2 /HPF (ref 0–3)
SEGMENTED NEUTROPHILS ABSOLUTE COUNT: 5.7 K/CU MM
SEGMENTED NEUTROPHILS RELATIVE PERCENT: 65.7 % (ref 36–66)
SODIUM BLD-SCNC: 138 MMOL/L (ref 135–145)
SPECIFIC GRAVITY UA: >1.06 (ref 1–1.03)
TOTAL IMMATURE NEUTOROPHIL: 0.02 K/CU MM
TOTAL NUCLEATED RBC: 0 K/CU MM
TOTAL PROTEIN: 7.7 GM/DL (ref 6.4–8.2)
TRICHOMONAS: ABNORMAL /HPF
UROBILINOGEN, URINE: NORMAL MG/DL (ref 0.2–1)
WBC # BLD: 8.7 K/CU MM (ref 4–10.5)
WBC UA: <1 /HPF (ref 0–2)

## 2020-11-01 PROCEDURE — 83690 ASSAY OF LIPASE: CPT

## 2020-11-01 PROCEDURE — 2580000003 HC RX 258: Performed by: EMERGENCY MEDICINE

## 2020-11-01 PROCEDURE — 80053 COMPREHEN METABOLIC PANEL: CPT

## 2020-11-01 PROCEDURE — 99284 EMERGENCY DEPT VISIT MOD MDM: CPT

## 2020-11-01 PROCEDURE — 74177 CT ABD & PELVIS W/CONTRAST: CPT

## 2020-11-01 PROCEDURE — 85025 COMPLETE CBC W/AUTO DIFF WBC: CPT

## 2020-11-01 PROCEDURE — 6360000002 HC RX W HCPCS: Performed by: EMERGENCY MEDICINE

## 2020-11-01 PROCEDURE — 6370000000 HC RX 637 (ALT 250 FOR IP): Performed by: EMERGENCY MEDICINE

## 2020-11-01 PROCEDURE — 96375 TX/PRO/DX INJ NEW DRUG ADDON: CPT

## 2020-11-01 PROCEDURE — 96374 THER/PROPH/DIAG INJ IV PUSH: CPT

## 2020-11-01 PROCEDURE — 6360000004 HC RX CONTRAST MEDICATION: Performed by: EMERGENCY MEDICINE

## 2020-11-01 PROCEDURE — 81001 URINALYSIS AUTO W/SCOPE: CPT

## 2020-11-01 RX ORDER — DICYCLOMINE HYDROCHLORIDE 10 MG/1
10 CAPSULE ORAL 3 TIMES DAILY
Qty: 15 CAPSULE | Refills: 0 | Status: SHIPPED | OUTPATIENT
Start: 2020-11-01 | End: 2021-01-13

## 2020-11-01 RX ORDER — OXYCODONE HYDROCHLORIDE AND ACETAMINOPHEN 5; 325 MG/1; MG/1
1 TABLET ORAL EVERY 8 HOURS PRN
Qty: 9 TABLET | Refills: 0 | Status: ON HOLD | OUTPATIENT
Start: 2020-11-01 | End: 2020-11-06 | Stop reason: HOSPADM

## 2020-11-01 RX ORDER — SODIUM CHLORIDE 0.9 % (FLUSH) 0.9 %
10 SYRINGE (ML) INJECTION 2 TIMES DAILY
Status: DISCONTINUED | OUTPATIENT
Start: 2020-11-01 | End: 2020-11-02 | Stop reason: HOSPADM

## 2020-11-01 RX ORDER — MORPHINE SULFATE 4 MG/ML
4 INJECTION, SOLUTION INTRAMUSCULAR; INTRAVENOUS ONCE
Status: COMPLETED | OUTPATIENT
Start: 2020-11-01 | End: 2020-11-01

## 2020-11-01 RX ORDER — FENTANYL CITRATE 50 UG/ML
50 INJECTION, SOLUTION INTRAMUSCULAR; INTRAVENOUS ONCE
Status: COMPLETED | OUTPATIENT
Start: 2020-11-01 | End: 2020-11-01

## 2020-11-01 RX ORDER — OXYCODONE HYDROCHLORIDE AND ACETAMINOPHEN 5; 325 MG/1; MG/1
1 TABLET ORAL EVERY 8 HOURS PRN
Status: DISCONTINUED | OUTPATIENT
Start: 2020-11-01 | End: 2020-11-02 | Stop reason: HOSPADM

## 2020-11-01 RX ADMIN — IOPAMIDOL 80 ML: 755 INJECTION, SOLUTION INTRAVENOUS at 20:02

## 2020-11-01 RX ADMIN — MORPHINE SULFATE 4 MG: 4 INJECTION, SOLUTION INTRAMUSCULAR; INTRAVENOUS at 19:46

## 2020-11-01 RX ADMIN — FENTANYL CITRATE 50 MCG: 50 INJECTION INTRAMUSCULAR; INTRAVENOUS at 21:24

## 2020-11-01 RX ADMIN — OXYCODONE HYDROCHLORIDE AND ACETAMINOPHEN 1 TABLET: 5; 325 TABLET ORAL at 22:28

## 2020-11-01 RX ADMIN — SODIUM CHLORIDE, PRESERVATIVE FREE 10 ML: 5 INJECTION INTRAVENOUS at 21:14

## 2020-11-01 ASSESSMENT — PAIN DESCRIPTION - LOCATION: LOCATION: ABDOMEN

## 2020-11-01 ASSESSMENT — PAIN SCALES - GENERAL
PAINLEVEL_OUTOF10: 10
PAINLEVEL_OUTOF10: 8
PAINLEVEL_OUTOF10: 7
PAINLEVEL_OUTOF10: 8

## 2020-11-01 ASSESSMENT — PAIN DESCRIPTION - PAIN TYPE: TYPE: ACUTE PAIN

## 2020-11-01 NOTE — ED PROVIDER NOTES
As physician-in-teletriage, I performed a medical screening history and physical exam on this patient. HISTORY OF PRESENT ILLNESS  Betsy Goodell is a 39 y.o. male with complaint of all over abdominal pain, had part of colon taken out three weeks ago for diverticulitis with Dr. Renée Roberts.      PHYSICAL EXAM  There were no vitals taken for this visit. On exam by video teletriage, the patient appears in no acute distress. Speech is clear. Breathing is unlabored. Moves all extremities. Comment: Please note this report has been produced using speech recognition software and may contain errors related to that system including errors in grammar, punctuation, and spelling, as well as words and phrases that may be inappropriate. If there are any questions or concerns please feel free to contact the dictating provider for clarification.         Elsi Castro MD  11/01/20 7846

## 2020-11-01 NOTE — ED PROVIDER NOTES
Emergency Department Encounter    Patient: Yves Ulrich  MRN: [id number]  : 1975  Date of Evaluation: 2020  ED Provider:  Raleigh Paul    Triage Chief Complaint:   Abdominal Pain (generalized abd pain, 3 weeks ago pt had part of colon removed d/t diverticulitis per pt, pt also reports gall stones and kidney stones)      [place]:  Yves Ulrich is a 39 y.o. male that presents to the emergency department for evaluation of abdominal pain. Patient is unable to localize the pain, states it is throughout his abdomen. In certain parts including the right upper quadrant and midepigastric region he feels dull, achy pain. In his flanks, he feels sharp pain. Patient notes that he underwent a partial colectomy approximately 3 weeks ago secondary to diverticulitis. Surgery went well without any complications. It was a da Azam robot assisted robotic surgery. Patient was seen and evaluated in our [place] facility in 10/30/2020 for similar symptoms. At that time, gallbladder ultrasound was significant for gallbladder sludge with wall thickening. The worsening pain, patient now presents back to the ER for evaluation. Patient notes that he did eat a peanut butter and jelly sandwich today after which he noticed worsening pain. Denies nausea, vomiting, diarrhea, constipation. He does not have much of a appetite. Denies fevers, chills, diaphoresis. Denies any recent weight loss. Denies syncope, dizziness, lightheadedness. Denies chest pain, shortness of breath, pleuritic pain. No additional precipitating, modifying, alleviating factors. ROS - see HPI, below listed is current ROS at time of my eval:  A complete, 10 point review of systems was performed and is as dictated above, otherwise negative.     Past Medical History:   Diagnosis Date    Diverticulitis     Gall stone     Kidney stone     \"had kidney stone now- had them since age 17-had surgery and able to pass some\" follows with dr Reed Higgins Kidney stone        Past Surgical History:   Procedure Laterality Date    KIDNEY STONE SURGERY      \"had surgery multiple times for kidney stones last time 2018?     SMALL INTESTINE SURGERY N/A 10/6/2020    BOWEL RESECTION SIGMOID LAPAROSCOPIC ROBOTIC performed by Cherelle Joseph MD at [address] [facility]  2012    left wrist\"no metal\"       Family History   Problem Relation Age of Onset   [facility] Cancer Mother         ovarian cancer     Early Death Mother     Kidney stones Father     Diabetes Father     Diabetes Brother        Social History     Socioeconomic History    Marital status:      Spouse name: Not on file    Number of children: Not on file    Years of education: Not on file    Highest education level: Not on file   Occupational History    Not on file   Social Needs    Financial resource strain: Not on file    Food insecurity     Worry: Not on file     Inability: Not on file    Transportation needs     Medical: Not on file     Non-medical: Not on file   Tobacco Use    Smoking status: Never Smoker    Smokeless tobacco: Never Used   Substance and Sexual Activity    Alcohol use: No    Drug use: Yes     Types: Marijuana     Comment: \"last used last week of Sept 2020\"    Sexual activity: Never   Lifestyle    Physical activity     Days per week: Not on file     Minutes per session: Not on file    Stress: Not on file   Relationships    Social connections     Talks on phone: Not on file     Gets together: Not on file     Attends [employer] service: Not on file     Active member of club or organization: Not on file     Attends meetings of clubs or organizations: Not on file     Relationship status: Not on file    Intimate partner violence     Fear of current or ex partner: Not on file     Emotionally abused: Not on file     Physically abused: Not on file     Forced sexual activity: Not on file   Other Topics Concern    Not on file   Social History Narrative    Not on file Current Facility-Administered Medications   Medication Dose Route Frequency Provider Last Rate Last Dose    sodium chloride flush 0.9 % injection 10 mL  10 mL Intravenous BID Tramaine Abrams DO   10 mL at 11/01/20 2114    oxyCODONE-acetaminophen (PERCOCET) 5-325 MG per tablet 1 tablet  1 tablet Oral Q8H PRN Tramaine Horne DO         Current Outpatient Medications   Medication Sig Dispense Refill    dicyclomine (BENTYL) 10 MG capsule Take 1 capsule by mouth 3 times daily As needed for abdominal pain 15 capsule 0    pantoprazole (PROTONIX) 40 MG tablet Take 1 tablet by mouth every morning (before breakfast) 30 tablet 0       Allergies   Allergen Reactions    Vicodin [Hydrocodone-Acetaminophen] Other (See Comments)     Patient states that it is tolerable but state that makes his skin crawl       Nursing Notes Reviewed    Physical Exam:  Triage VS:    ED Triage Vitals   Enc Vitals Group      BP 11/01/20 1737 (!) 142/99      Pulse 11/01/20 1737 100      Resp 11/01/20 1737 16      Temp 11/01/20 1830 98.2 °F (36.8 °C)      Temp Source 11/01/20 1830 Oral      SpO2 11/01/20 1737 95 %      Weight 11/01/20 1737 198 lb (89.8 kg)      Height 11/01/20 1737 5' 11\" (1.803 m)     My pulse ox interpretation is - normal    General appearance:  Patient is awake and alert. Following commands and answering questions. GCS is 15. Skin:  Warm. Dry. Intact. No rash. Eyes: Pupils are equal, round and reactive. Extraocular movements are intact. No nystagmus. No scleral icterus. No eyelid pallor. HENT: Head is normocephalic, atraumatic. No external masses or lesions. No nasal drainage. Pharynx is clear. Airway is patent. Mucous membranes are moist..  Neck:  Supple. No nuchal rigidity. Trachea midline. No thyromegaly or lymphadenopathy. No JVD. No carotid thrills or bruits. No subcutaneous emphysema. Heart: Tachycardic rate and regular rhythm. Audible S1 & S2. No audible murmurs, rubs, or gallops.     Perfusion:  Symmetric Total Nucleated RBC 0.0 K/CU MM    Total Immature Neutrophil 0.02 K/CU MM    Immature Neutrophil % 0.2 0 - 0.43 %   Comprehensive Metabolic Panel   Result Value Ref Range    Sodium 138 135 - 145 MMOL/L    Potassium 3.5 3.5 - 5.1 MMOL/L    Chloride 100 99 - 110 mMol/L    CO2 23 21 - 32 MMOL/L    BUN 10 6 - 23 MG/DL    CREATININE 0.8 (L) 0.9 - 1.3 MG/DL    Glucose 104 (H) 70 - 99 MG/DL    Calcium 9.2 8.3 - 10.6 MG/DL    Alb 4.3 3.4 - 5.0 GM/DL    Total Protein 7.7 6.4 - 8.2 GM/DL    Total Bilirubin 0.7 0.0 - 1.0 MG/DL    ALT 30 10 - 40 U/L    AST 5 (L) 15 - 37 IU/L    Alkaline Phosphatase 78 40 - 129 IU/L    GFR Non-African American >60 >60 mL/min/1.73m2    GFR African American >60 >60 mL/min/1.73m2    Anion Gap 15 4 - 16   Lipase   Result Value Ref Range    Lipase 30 13 - 60 IU/L   Urinalysis   Result Value Ref Range    Color, UA YELLOW YELLOW    Clarity, UA CLEAR CLEAR    Glucose, Urine NEGATIVE NEGATIVE MG/DL    Bilirubin Urine NEGATIVE NEGATIVE MG/DL    Ketones, Urine LARGE (A) NEGATIVE MG/DL    Specific Gravity, UA >1.060 (H) 1.001 - 1.035    Blood, Urine NEGATIVE NEGATIVE    pH, Urine 5.0 5.0 - 8.0    Protein, UA NEGATIVE NEGATIVE MG/DL    Urobilinogen, Urine NORMAL 0.2 - 1.0 MG/DL    Nitrite Urine, Quantitative NEGATIVE NEGATIVE    Leukocyte Esterase, Urine NEGATIVE NEGATIVE    RBC, UA 2 0 - 3 /HPF    WBC, UA <1 0 - 2 /HPF    Bacteria, UA NEGATIVE NEGATIVE /HPF    Mucus, UA MANY (A) NEGATIVE HPF    Trichomonas, UA NONE SEEN NONE SEEN /HPF      Radiographs:  Ct Abdomen Pelvis W Iv Contrast Additional Contrast? None    Result Date: 11/1/2020  EXAMINATION: CT OF THE ABDOMEN AND PELVIS WITH CONTRAST 11/1/2020 7:57 pm TECHNIQUE: CT of the abdomen and pelvis was performed with the administration of intravenous contrast. Multiplanar reformatted images are provided for review.  Dose modulation, iterative reconstruction, and/or weight based adjustment of the mA/kV was utilized to reduce the radiation dose to as low as reasonably achievable. COMPARISON: 10/30/2020 HISTORY: ORDERING SYSTEM PROVIDED HISTORY: surgery three weeks ago, having all over abdominal pain TECHNOLOGIST PROVIDED HISTORY: Reason for exam:->surgery three weeks ago, having all over abdominal pain Additional Contrast?->None Reason for Exam: surgery three weeks ago, having all over abdominal pain Acuity: Acute Type of Exam: Initial Additional signs and symptoms: Alona Lynch is a 39 y.o. male with complaint of all over abdominal pain, had part of colon taken out three weeks ago for diverticulitis with Dr. Allison Rangel History: isovue 370 80 ml FINDINGS: Lower Chest: No acute abnormality within the visualized lung bases. Suspected wall thickening in the distal thoracic esophagus is again noted. Organs: Hypoattenuating hepatic lesions measuring up to 2.7 cm in the left hepatic lobe are unchanged. Cholelithiasis and minimal pericholecystic stranding, similar to prior examination. No acute abnormality within the spleen, pancreas, adrenal glands, or kidneys. Bilateral nonobstructing nephrolithiasis. GI/Bowel: Small hiatal hernia. Stomach is partially distended. The small bowel is nondilated. Partial sigmoid resection without colonic dilation to suggest obstruction. There is wall thickening adjacent to the anastomosis. Stranding adjacent to the anastomosis with foci of gas and soft tissue along the suture line are again noted, appearing separate from the colon. The appendix is within normal limits. Pelvis: Bladder is partially distended without vesicular stone. Prostate is enlarged with coarse internal calcifications. . Peritoneum/Retroperitoneum: No ascites or pneumoperitoneum. Abdominal aorta is normal in caliber. Bones/Soft Tissues: No acute osseous abnormality. Bilateral L5 pars defects with grade 1 anterolisthesis at L5-S1. there is stranding within the right lower quadrant abdominal soft tissues.      1. Prior partial sigmoidectomy with stranding at the anastomosis. Tiny foci of gas and soft tissue adjacent to the staple line, but separate from the colonic wall are unchanged. This could be postoperative, but superimposed infection would be difficult to exclude. No discrete fluid collection/abscess. 2. Cholelithiasis with minimal pericholecystic fluid, similar to prior examination. 3. Suspected wall thickening of the distal thoracic esophagus is again noted. 4. Bilateral L5 pars defects with grade 1 anterolisthesis at L5-S1. MDM:  Patient was seen and evaluated in the emergency department by myself. A thorough history and physical exam were performed, prior medical records reviewed. Upon arrival to the emergency department patient vital signs are noted. Slight tachycardia with a heart rate of 100. No tachypnea, hypoxia. Blood pressure 142/99. Differential diagnoses and treatment plan were discussed. IV access is established. Patient is initiated on 1 L bolus of 0.9% of normal saline. 4 mg intravenous morphine is provided for pain. Pertinent labs are drawn and radiographic studies were performed. Once results are available, they are reviewed by myself. Labs demonstrate no leukocytosis, anemia, thrombocytopenia. Electrolytes are all within normal limits. No signs of kidney injury. Glucose within normal limits. AST and ALT are unremarkable. Lipase is 30. Urine analysis is negative for urinary tract infection. CT abdomen and pelvis with IV contrast radiology report reads prior partial sigmoidectomy with stranding at the anastomosis. Tiny foci of gas and soft tissue adjacent to the staple line, but separate from the colonic wall are unchanged. This could be postoperative. No discrete fluid collection or abscess. Cholelithiasis with minimal pericholecystic fluid similar to prior examination. Suspected wall thickening of the distal thoracic esophagus.   Bilateral L5 pars defects with grade 1 anterolisthesis at L5-S1    On repeat evaluations, patient remains hemodynamically stable. Repeat abdominal exam remains soft and non-peritoneal. No signs of acute surgical abdomen. Patient requests additional pain medications and 50 mcg of fentanyl are provided. Patient notes improvement in symptoms. Case is discussed with Nanci Higgins, on-call physician for patient's general surgeon Ana Riley, who recommends no acute surgical intervention. Is agreeable with outpatient follow-up with Dr. Kaylah Hernandez. Results of laboratory and radiographic data along with differential diagnoses and treatment plan were discussed with the patient. Patient presents with acute exacerbation of abdominal pain. No nausea vomiting in the emergency department. He is able to tolerate p.o. fluids. Stable for discharge. Instructed to follow-up with primary care provider for reevaluation. Instructed to follow-up with his general surgeon as well. Instructed to return to the emergency department immediately with any new, worsening, concerning symptoms. Prescription for Norco 5 is provided for pain. Side effect profile of this medication in addition to addiction potential discussed with the patient. Additional verbal and printed discharge instructions were provided. Patient verbalized understanding and was agreeable with discharge plan. Was discharged in hemodynamically stable condition    Clinical Impression:  1. Generalized abdominal pain    2. Biliary calculus of other site without obstruction    3. Thickening of esophagus    4. Anterolisthesis at L5-S1        Disposition referral:  [facility] Emergency Department  [facility] [address] [phone]  Go to   Immediately with any new, worsening, concerning symptoms.     [facility]  [address] [facility]  [phone]  In 3 days  Please follow-up with primary care physician for reevaluation and to establish care. Steffen Tejeda MD  [address]  [phone]    In 2 days  Please follow-up with your general surgeon for reevaluation of abdominal pain. Please also address cholelithiasis. Disposition medications:  New Prescriptions    DICYCLOMINE (BENTYL) 10 MG CAPSULE    Take 1 capsule by mouth 3 times daily As needed for abdominal pain       ED Provider Disposition:  DISPOSITION  Discharge      Comment: Please note this report has been produced using speech recognition software and may contain errors related to that system including errors in grammar, punctuation, and spelling, as well as words and phrases that may be inappropriate. Efforts were made to edit the dictations.        [facility],   11/01/20 1824

## 2020-11-02 ENCOUNTER — TELEPHONE (OUTPATIENT)
Dept: BARIATRICS/WEIGHT MGMT | Age: 45
End: 2020-11-02

## 2020-11-02 ENCOUNTER — HOSPITAL ENCOUNTER (EMERGENCY)
Age: 45
Discharge: ANOTHER HOSPITAL | End: 2020-11-02
Attending: EMERGENCY MEDICINE
Payer: COMMERCIAL

## 2020-11-02 ENCOUNTER — HOSPITAL ENCOUNTER (INPATIENT)
Age: 45
LOS: 1 days | Discharge: HOME | DRG: 419 | End: 2020-11-06
Attending: HOSPITALIST | Admitting: HOSPITALIST
Payer: COMMERCIAL

## 2020-11-02 ENCOUNTER — APPOINTMENT (OUTPATIENT)
Dept: CT IMAGING | Age: 45
End: 2020-11-02
Payer: COMMERCIAL

## 2020-11-02 ENCOUNTER — APPOINTMENT (OUTPATIENT)
Dept: GENERAL RADIOLOGY | Age: 45
End: 2020-11-02
Payer: COMMERCIAL

## 2020-11-02 ENCOUNTER — HOSPITAL ENCOUNTER (EMERGENCY)
Age: 45
Discharge: HOME | End: 2020-11-02
Attending: EMERGENCY MEDICINE
Payer: COMMERCIAL

## 2020-11-02 VITALS
HEIGHT: 71 IN | OXYGEN SATURATION: 97 % | WEIGHT: 198 LBS | RESPIRATION RATE: 16 BRPM | BODY MASS INDEX: 27.72 KG/M2 | DIASTOLIC BLOOD PRESSURE: 98 MMHG | SYSTOLIC BLOOD PRESSURE: 143 MMHG | HEART RATE: 67 BPM | TEMPERATURE: 98.7 F

## 2020-11-02 VITALS
HEIGHT: 71 IN | BODY MASS INDEX: 27.72 KG/M2 | SYSTOLIC BLOOD PRESSURE: 137 MMHG | HEART RATE: 66 BPM | WEIGHT: 198 LBS | OXYGEN SATURATION: 99 % | DIASTOLIC BLOOD PRESSURE: 77 MMHG | TEMPERATURE: 97.8 F | RESPIRATION RATE: 32 BRPM

## 2020-11-02 PROBLEM — R10.9 ABDOMINAL PAIN: Status: ACTIVE | Noted: 2020-11-02

## 2020-11-02 LAB
ALBUMIN SERPL-MCNC: 4.4 GM/DL (ref 3.4–5)
ALP BLD-CCNC: 72 IU/L (ref 40–129)
ALT SERPL-CCNC: 30 U/L (ref 10–40)
AMPHETAMINES: NEGATIVE
ANION GAP SERPL CALCULATED.3IONS-SCNC: 13 MMOL/L (ref 4–16)
AST SERPL-CCNC: 19 IU/L (ref 15–37)
BACTERIA: ABNORMAL /HPF
BARBITURATE SCREEN URINE: NEGATIVE
BASOPHILS ABSOLUTE: 0 K/CU MM
BASOPHILS RELATIVE PERCENT: 0.3 % (ref 0–1)
BENZODIAZEPINE SCREEN, URINE: NEGATIVE
BILIRUB SERPL-MCNC: 0.9 MG/DL (ref 0–1)
BILIRUBIN DIRECT: 0.3 MG/DL (ref 0–0.3)
BILIRUBIN URINE: ABNORMAL MG/DL
BILIRUBIN, INDIRECT: 0.6 MG/DL (ref 0–0.7)
BLOOD, URINE: ABNORMAL
BUN BLDV-MCNC: 10 MG/DL (ref 6–23)
CALCIUM SERPL-MCNC: 9.5 MG/DL (ref 8.3–10.6)
CANNABINOID SCREEN URINE: ABNORMAL
CAST TYPE: ABNORMAL /HPF
CHLORIDE BLD-SCNC: 102 MMOL/L (ref 99–110)
CLARITY: CLEAR
CO2: 25 MMOL/L (ref 21–32)
COCAINE METABOLITE: NEGATIVE
COLOR: ABNORMAL
CREAT SERPL-MCNC: 0.9 MG/DL (ref 0.9–1.3)
CRYSTAL TYPE: ABNORMAL /HPF
DIFFERENTIAL TYPE: ABNORMAL
EOSINOPHILS ABSOLUTE: 0.1 K/CU MM
EOSINOPHILS RELATIVE PERCENT: 1.1 % (ref 0–3)
EPITHELIAL CELLS, UA: ABNORMAL /HPF
GFR AFRICAN AMERICAN: >60 ML/MIN/1.73M2
GFR NON-AFRICAN AMERICAN: >60 ML/MIN/1.73M2
GLUCOSE BLD-MCNC: 137 MG/DL (ref 70–99)
GLUCOSE, URINE: 100 MG/DL
HCT VFR BLD CALC: 47.7 % (ref 42–52)
HEMOGLOBIN: 16.2 GM/DL (ref 13.5–18)
ICTOTEST: NEGATIVE
IMMATURE NEUTROPHIL %: 0.2 % (ref 0–0.43)
KETONES, URINE: ABNORMAL MG/DL
LACTATE: 1.9 MMOL/L (ref 0.4–2)
LACTIC ACID, SEPSIS: 1.9 MMOL/L (ref 0.5–1.9)
LACTIC ACID, SEPSIS: 2.1 MMOL/L (ref 0.5–1.9)
LEUKOCYTE ESTERASE, URINE: NEGATIVE
LIPASE: 24 IU/L (ref 13–60)
LYMPHOCYTES ABSOLUTE: 2.2 K/CU MM
LYMPHOCYTES RELATIVE PERCENT: 22.8 % (ref 24–44)
MCH RBC QN AUTO: 31 PG (ref 27–31)
MCHC RBC AUTO-ENTMCNC: 34 % (ref 32–36)
MCV RBC AUTO: 91.2 FL (ref 78–100)
MONOCYTES ABSOLUTE: 0.9 K/CU MM
MONOCYTES RELATIVE PERCENT: 8.8 % (ref 0–4)
MUCUS: NEGATIVE HPF
NITRITE URINE, QUANTITATIVE: NEGATIVE
OPIATES, URINE: ABNORMAL
OXYCODONE: ABNORMAL
PDW BLD-RTO: 12.9 % (ref 11.7–14.9)
PH, URINE: 5 (ref 5–8)
PHENCYCLIDINE, URINE: NEGATIVE
PLATELET # BLD: 284 K/CU MM (ref 140–440)
PMV BLD AUTO: 10.7 FL (ref 7.5–11.1)
POTASSIUM SERPL-SCNC: 3.6 MMOL/L (ref 3.5–5.1)
PROTEIN UA: ABNORMAL MG/DL
RBC # BLD: 5.23 M/CU MM (ref 4.6–6.2)
RBC URINE: ABNORMAL /HPF (ref 0–3)
SARS-COV-2, NAAT: NOT DETECTED
SEGMENTED NEUTROPHILS ABSOLUTE COUNT: 6.6 K/CU MM
SEGMENTED NEUTROPHILS RELATIVE PERCENT: 66.8 % (ref 36–66)
SODIUM BLD-SCNC: 140 MMOL/L (ref 135–145)
SOURCE: NORMAL
SPECIFIC GRAVITY UA: 1.02 (ref 1–1.03)
TOTAL IMMATURE NEUTOROPHIL: 0.02 K/CU MM
TOTAL PROTEIN: 7.8 GM/DL (ref 6.4–8.2)
UROBILINOGEN, URINE: 1 MG/DL (ref 0.2–1)
VOLUME, (UVOL): 12 ML (ref 10–12)
WBC # BLD: 9.8 K/CU MM (ref 4–10.5)
WBC UA: ABNORMAL /HPF (ref 0–2)

## 2020-11-02 PROCEDURE — 2580000003 HC RX 258: Performed by: EMERGENCY MEDICINE

## 2020-11-02 PROCEDURE — G0378 HOSPITAL OBSERVATION PER HR: HCPCS

## 2020-11-02 PROCEDURE — 82248 BILIRUBIN DIRECT: CPT

## 2020-11-02 PROCEDURE — 96361 HYDRATE IV INFUSION ADD-ON: CPT

## 2020-11-02 PROCEDURE — 6360000002 HC RX W HCPCS: Performed by: EMERGENCY MEDICINE

## 2020-11-02 PROCEDURE — 83690 ASSAY OF LIPASE: CPT

## 2020-11-02 PROCEDURE — 71045 X-RAY EXAM CHEST 1 VIEW: CPT

## 2020-11-02 PROCEDURE — 85025 COMPLETE CBC W/AUTO DIFF WBC: CPT

## 2020-11-02 PROCEDURE — 74177 CT ABD & PELVIS W/CONTRAST: CPT

## 2020-11-02 PROCEDURE — U0002 COVID-19 LAB TEST NON-CDC: HCPCS

## 2020-11-02 PROCEDURE — 81001 URINALYSIS AUTO W/SCOPE: CPT

## 2020-11-02 PROCEDURE — 93010 ELECTROCARDIOGRAM REPORT: CPT | Performed by: INTERNAL MEDICINE

## 2020-11-02 PROCEDURE — 96374 THER/PROPH/DIAG INJ IV PUSH: CPT

## 2020-11-02 PROCEDURE — 96376 TX/PRO/DX INJ SAME DRUG ADON: CPT

## 2020-11-02 PROCEDURE — 96375 TX/PRO/DX INJ NEW DRUG ADDON: CPT

## 2020-11-02 PROCEDURE — 6360000002 HC RX W HCPCS: Performed by: SURGERY

## 2020-11-02 PROCEDURE — 2580000003 HC RX 258: Performed by: SURGERY

## 2020-11-02 PROCEDURE — 2580000003 HC RX 258: Performed by: NURSE PRACTITIONER

## 2020-11-02 PROCEDURE — G0379 DIRECT REFER HOSPITAL OBSERV: HCPCS

## 2020-11-02 PROCEDURE — 80307 DRUG TEST PRSMV CHEM ANLYZR: CPT

## 2020-11-02 PROCEDURE — 99285 EMERGENCY DEPT VISIT HI MDM: CPT

## 2020-11-02 PROCEDURE — C9113 INJ PANTOPRAZOLE SODIUM, VIA: HCPCS | Performed by: EMERGENCY MEDICINE

## 2020-11-02 PROCEDURE — 93005 ELECTROCARDIOGRAM TRACING: CPT | Performed by: EMERGENCY MEDICINE

## 2020-11-02 PROCEDURE — C9113 INJ PANTOPRAZOLE SODIUM, VIA: HCPCS | Performed by: SURGERY

## 2020-11-02 PROCEDURE — 6360000004 HC RX CONTRAST MEDICATION: Performed by: EMERGENCY MEDICINE

## 2020-11-02 PROCEDURE — 83605 ASSAY OF LACTIC ACID: CPT

## 2020-11-02 PROCEDURE — 99284 EMERGENCY DEPT VISIT MOD MDM: CPT

## 2020-11-02 PROCEDURE — 96372 THER/PROPH/DIAG INJ SC/IM: CPT

## 2020-11-02 PROCEDURE — 80053 COMPREHEN METABOLIC PANEL: CPT

## 2020-11-02 RX ORDER — METOCLOPRAMIDE HYDROCHLORIDE 5 MG/ML
10 INJECTION INTRAMUSCULAR; INTRAVENOUS ONCE
Status: COMPLETED | OUTPATIENT
Start: 2020-11-02 | End: 2020-11-02

## 2020-11-02 RX ORDER — FENTANYL CITRATE 50 UG/ML
200 INJECTION, SOLUTION INTRAMUSCULAR; INTRAVENOUS ONCE
Status: COMPLETED | OUTPATIENT
Start: 2020-11-02 | End: 2020-11-02

## 2020-11-02 RX ORDER — PANTOPRAZOLE SODIUM 40 MG/10ML
80 INJECTION, POWDER, LYOPHILIZED, FOR SOLUTION INTRAVENOUS ONCE
Status: COMPLETED | OUTPATIENT
Start: 2020-11-02 | End: 2020-11-02

## 2020-11-02 RX ORDER — METOCLOPRAMIDE HYDROCHLORIDE 5 MG/ML
10 INJECTION INTRAMUSCULAR; INTRAVENOUS EVERY 6 HOURS
Status: DISCONTINUED | OUTPATIENT
Start: 2020-11-02 | End: 2020-11-06 | Stop reason: HOSPADM

## 2020-11-02 RX ORDER — PROMETHAZINE HYDROCHLORIDE 12.5 MG/1
12.5 TABLET ORAL EVERY 6 HOURS PRN
Status: DISCONTINUED | OUTPATIENT
Start: 2020-11-02 | End: 2020-11-06 | Stop reason: HOSPADM

## 2020-11-02 RX ORDER — DEXTROSE, SODIUM CHLORIDE, SODIUM LACTATE, POTASSIUM CHLORIDE, AND CALCIUM CHLORIDE 5; .6; .31; .03; .02 G/100ML; G/100ML; G/100ML; G/100ML; G/100ML
INJECTION, SOLUTION INTRAVENOUS CONTINUOUS
Status: DISCONTINUED | OUTPATIENT
Start: 2020-11-02 | End: 2020-11-04

## 2020-11-02 RX ORDER — MORPHINE SULFATE 4 MG/ML
4 INJECTION, SOLUTION INTRAMUSCULAR; INTRAVENOUS
Status: DISCONTINUED | OUTPATIENT
Start: 2020-11-02 | End: 2020-11-05

## 2020-11-02 RX ORDER — HALOPERIDOL 5 MG/ML
2.5 INJECTION INTRAMUSCULAR ONCE
Status: COMPLETED | OUTPATIENT
Start: 2020-11-02 | End: 2020-11-02

## 2020-11-02 RX ORDER — METOCLOPRAMIDE 10 MG/1
10 TABLET ORAL 3 TIMES DAILY PRN
Qty: 18 TABLET | Refills: 0 | Status: SHIPPED | OUTPATIENT
Start: 2020-11-02 | End: 2021-01-13

## 2020-11-02 RX ORDER — PANTOPRAZOLE SODIUM 40 MG/10ML
40 INJECTION, POWDER, LYOPHILIZED, FOR SOLUTION INTRAVENOUS 2 TIMES DAILY
Status: DISCONTINUED | OUTPATIENT
Start: 2020-11-02 | End: 2020-11-06 | Stop reason: HOSPADM

## 2020-11-02 RX ORDER — HALOPERIDOL 5 MG/ML
5 INJECTION INTRAMUSCULAR ONCE
Status: COMPLETED | OUTPATIENT
Start: 2020-11-02 | End: 2020-11-02

## 2020-11-02 RX ORDER — 0.9 % SODIUM CHLORIDE 0.9 %
1000 INTRAVENOUS SOLUTION INTRAVENOUS ONCE
Status: COMPLETED | OUTPATIENT
Start: 2020-11-02 | End: 2020-11-02

## 2020-11-02 RX ORDER — FENTANYL CITRATE 50 UG/ML
100 INJECTION, SOLUTION INTRAMUSCULAR; INTRAVENOUS ONCE
Status: COMPLETED | OUTPATIENT
Start: 2020-11-02 | End: 2020-11-02

## 2020-11-02 RX ORDER — SODIUM CHLORIDE 0.9 % (FLUSH) 0.9 %
10 SYRINGE (ML) INJECTION PRN
Status: DISCONTINUED | OUTPATIENT
Start: 2020-11-02 | End: 2020-11-06 | Stop reason: HOSPADM

## 2020-11-02 RX ORDER — DIPHENHYDRAMINE HYDROCHLORIDE 50 MG/ML
50 INJECTION INTRAMUSCULAR; INTRAVENOUS ONCE
Status: COMPLETED | OUTPATIENT
Start: 2020-11-02 | End: 2020-11-02

## 2020-11-02 RX ORDER — ACETAMINOPHEN 325 MG/1
650 TABLET ORAL EVERY 6 HOURS PRN
Status: DISCONTINUED | OUTPATIENT
Start: 2020-11-02 | End: 2020-11-06 | Stop reason: HOSPADM

## 2020-11-02 RX ORDER — FENTANYL CITRATE 50 UG/ML
25 INJECTION, SOLUTION INTRAMUSCULAR; INTRAVENOUS ONCE
Status: COMPLETED | OUTPATIENT
Start: 2020-11-02 | End: 2020-11-02

## 2020-11-02 RX ORDER — PANTOPRAZOLE SODIUM 40 MG/10ML
40 INJECTION, POWDER, LYOPHILIZED, FOR SOLUTION INTRAVENOUS ONCE
Status: COMPLETED | OUTPATIENT
Start: 2020-11-02 | End: 2020-11-02

## 2020-11-02 RX ORDER — ACETAMINOPHEN 650 MG/1
650 SUPPOSITORY RECTAL EVERY 6 HOURS PRN
Status: DISCONTINUED | OUTPATIENT
Start: 2020-11-02 | End: 2020-11-06 | Stop reason: HOSPADM

## 2020-11-02 RX ORDER — POLYETHYLENE GLYCOL 3350 17 G/17G
17 POWDER, FOR SOLUTION ORAL DAILY PRN
Status: DISCONTINUED | OUTPATIENT
Start: 2020-11-02 | End: 2020-11-06 | Stop reason: HOSPADM

## 2020-11-02 RX ORDER — SODIUM CHLORIDE 9 MG/ML
INJECTION, SOLUTION INTRAVENOUS CONTINUOUS
Status: DISCONTINUED | OUTPATIENT
Start: 2020-11-02 | End: 2020-11-02

## 2020-11-02 RX ORDER — MORPHINE SULFATE 2 MG/ML
2 INJECTION, SOLUTION INTRAMUSCULAR; INTRAVENOUS EVERY 4 HOURS PRN
Status: DISCONTINUED | OUTPATIENT
Start: 2020-11-02 | End: 2020-11-02

## 2020-11-02 RX ORDER — ONDANSETRON 2 MG/ML
4 INJECTION INTRAMUSCULAR; INTRAVENOUS EVERY 6 HOURS PRN
Status: DISCONTINUED | OUTPATIENT
Start: 2020-11-02 | End: 2020-11-06 | Stop reason: HOSPADM

## 2020-11-02 RX ORDER — SODIUM CHLORIDE 0.9 % (FLUSH) 0.9 %
10 SYRINGE (ML) INJECTION EVERY 12 HOURS SCHEDULED
Status: DISCONTINUED | OUTPATIENT
Start: 2020-11-02 | End: 2020-11-06 | Stop reason: HOSPADM

## 2020-11-02 RX ORDER — KETOROLAC TROMETHAMINE 30 MG/ML
30 INJECTION, SOLUTION INTRAMUSCULAR; INTRAVENOUS EVERY 6 HOURS PRN
Status: DISCONTINUED | OUTPATIENT
Start: 2020-11-02 | End: 2020-11-02

## 2020-11-02 RX ORDER — PANTOPRAZOLE SODIUM 40 MG/10ML
40 INJECTION, POWDER, LYOPHILIZED, FOR SOLUTION INTRAVENOUS DAILY
Status: DISCONTINUED | OUTPATIENT
Start: 2020-11-03 | End: 2020-11-02

## 2020-11-02 RX ADMIN — DIPHENHYDRAMINE HYDROCHLORIDE 50 MG: 50 INJECTION, SOLUTION INTRAMUSCULAR; INTRAVENOUS at 02:30

## 2020-11-02 RX ADMIN — MORPHINE SULFATE 4 MG: 4 INJECTION, SOLUTION INTRAMUSCULAR; INTRAVENOUS at 17:01

## 2020-11-02 RX ADMIN — SODIUM CHLORIDE 1000 ML: 9 INJECTION, SOLUTION INTRAVENOUS at 09:21

## 2020-11-02 RX ADMIN — HALOPERIDOL LACTATE 2.5 MG: 5 INJECTION, SOLUTION INTRAMUSCULAR at 10:04

## 2020-11-02 RX ADMIN — FENTANYL CITRATE 25 MCG: 50 INJECTION INTRAMUSCULAR; INTRAVENOUS at 04:56

## 2020-11-02 RX ADMIN — FENTANYL CITRATE 200 MCG: 50 INJECTION INTRAMUSCULAR; INTRAVENOUS at 09:25

## 2020-11-02 RX ADMIN — IOPAMIDOL 100 ML: 755 INJECTION, SOLUTION INTRAVENOUS at 04:06

## 2020-11-02 RX ADMIN — MORPHINE SULFATE 4 MG: 4 INJECTION, SOLUTION INTRAMUSCULAR; INTRAVENOUS at 22:48

## 2020-11-02 RX ADMIN — PANTOPRAZOLE SODIUM 80 MG: 40 INJECTION, POWDER, FOR SOLUTION INTRAVENOUS at 09:23

## 2020-11-02 RX ADMIN — FENTANYL CITRATE 100 MCG: 50 INJECTION INTRAMUSCULAR; INTRAVENOUS at 10:03

## 2020-11-02 RX ADMIN — PANTOPRAZOLE SODIUM 40 MG: 40 INJECTION, POWDER, FOR SOLUTION INTRAVENOUS at 21:58

## 2020-11-02 RX ADMIN — METOCLOPRAMIDE 10 MG: 5 INJECTION, SOLUTION INTRAMUSCULAR; INTRAVENOUS at 17:01

## 2020-11-02 RX ADMIN — METOCLOPRAMIDE HYDROCHLORIDE 10 MG: 5 INJECTION INTRAMUSCULAR; INTRAVENOUS at 09:21

## 2020-11-02 RX ADMIN — METOCLOPRAMIDE 10 MG: 5 INJECTION, SOLUTION INTRAMUSCULAR; INTRAVENOUS at 22:48

## 2020-11-02 RX ADMIN — HALOPERIDOL LACTATE 5 MG: 5 INJECTION, SOLUTION INTRAMUSCULAR at 02:30

## 2020-11-02 RX ADMIN — PANTOPRAZOLE SODIUM 40 MG: 40 INJECTION, POWDER, FOR SOLUTION INTRAVENOUS at 02:30

## 2020-11-02 RX ADMIN — IOHEXOL 50 ML: 240 INJECTION, SOLUTION INTRATHECAL; INTRAVASCULAR; INTRAVENOUS; ORAL at 04:07

## 2020-11-02 RX ADMIN — SODIUM CHLORIDE: 9 INJECTION, SOLUTION INTRAVENOUS at 14:40

## 2020-11-02 RX ADMIN — SODIUM CHLORIDE, PRESERVATIVE FREE 10 ML: 5 INJECTION INTRAVENOUS at 21:58

## 2020-11-02 RX ADMIN — SODIUM CHLORIDE 1000 ML: 9 INJECTION, SOLUTION INTRAVENOUS at 02:29

## 2020-11-02 RX ADMIN — SODIUM CHLORIDE, SODIUM LACTATE, POTASSIUM CHLORIDE, CALCIUM CHLORIDE AND DEXTROSE MONOHYDRATE: 5; 600; 310; 30; 20 INJECTION, SOLUTION INTRAVENOUS at 17:01

## 2020-11-02 RX ADMIN — FENTANYL CITRATE 25 MCG: 50 INJECTION INTRAMUSCULAR; INTRAVENOUS at 02:31

## 2020-11-02 ASSESSMENT — ENCOUNTER SYMPTOMS
NAUSEA: 1
RESPIRATORY NEGATIVE: 1
ABDOMINAL PAIN: 1

## 2020-11-02 ASSESSMENT — PAIN DESCRIPTION - ORIENTATION
ORIENTATION: UPPER
ORIENTATION: UPPER

## 2020-11-02 ASSESSMENT — PAIN - FUNCTIONAL ASSESSMENT: PAIN_FUNCTIONAL_ASSESSMENT: ACTIVITIES ARE NOT PREVENTED

## 2020-11-02 ASSESSMENT — PAIN SCALES - GENERAL
PAINLEVEL_OUTOF10: 0
PAINLEVEL_OUTOF10: 0
PAINLEVEL_OUTOF10: 10
PAINLEVEL_OUTOF10: 10
PAINLEVEL_OUTOF10: 0
PAINLEVEL_OUTOF10: 10
PAINLEVEL_OUTOF10: 10
PAINLEVEL_OUTOF10: 5
PAINLEVEL_OUTOF10: 6
PAINLEVEL_OUTOF10: 7
PAINLEVEL_OUTOF10: 10
PAINLEVEL_OUTOF10: 10

## 2020-11-02 ASSESSMENT — PAIN DESCRIPTION - LOCATION
LOCATION: ABDOMEN

## 2020-11-02 ASSESSMENT — PAIN DESCRIPTION - FREQUENCY
FREQUENCY: CONTINUOUS
FREQUENCY: CONTINUOUS

## 2020-11-02 ASSESSMENT — PAIN DESCRIPTION - DESCRIPTORS: DESCRIPTORS: SHARP

## 2020-11-02 ASSESSMENT — PAIN DESCRIPTION - PAIN TYPE
TYPE: ACUTE PAIN
TYPE: ACUTE PAIN

## 2020-11-02 ASSESSMENT — PAIN DESCRIPTION - PROGRESSION: CLINICAL_PROGRESSION: NOT CHANGED

## 2020-11-02 ASSESSMENT — PAIN DESCRIPTION - ONSET: ONSET: ON-GOING

## 2020-11-02 NOTE — TELEPHONE ENCOUNTER
Pt called this morning reporting he had gone to the ED twice over the weekend for upper generalized abdominal pain, s/p rectosigmoidectomy 10/6/20. Rx's given at ED visits are not helping, radiology test(s) were negative per patient, reports pain greater than 10, wants to be admitted. Perfect serve sent to Dr Edwar Hernandez, states he will call patient.

## 2020-11-02 NOTE — ED PROVIDER NOTES
rash, No itching. ?  ? PAST MEDICAL HISTORY  Past Medical History:   Diagnosis Date    Diverticulitis     Gall stone     Kidney stone     \"had kidney stone now- had them since age 17-had surgery and able to pass some\" follows with dr Quique Carreon History   Problem Relation Age of Onset    Cancer Mother         ovarian cancer     Early Death Mother     Kidney stones Father     Diabetes Father     Diabetes Brother      SOCIAL HISTORY  Social History     Socioeconomic History    Marital status:      Spouse name: None    Number of children: None    Years of education: None    Highest education level: None   Occupational History    None   Social Needs    Financial resource strain: None    Food insecurity     Worry: None     Inability: None    Transportation needs     Medical: None     Non-medical: None   Tobacco Use    Smoking status: Never Smoker    Smokeless tobacco: Never Used   Substance and Sexual Activity    Alcohol use: No    Drug use: Yes     Types: Marijuana     Comment: \"last used last week of Sept 2020\"    Sexual activity: Never   Lifestyle    Physical activity     Days per week: None     Minutes per session: None    Stress: None   Relationships    Social connections     Talks on phone: None     Gets together: None     Attends Religion service: None     Active member of club or organization: None     Attends meetings of clubs or organizations: None     Relationship status: None    Intimate partner violence     Fear of current or ex partner: None     Emotionally abused: None     Physically abused: None     Forced sexual activity: None   Other Topics Concern    None   Social History Narrative    None       SURGICAL HISTORY  Past Surgical History:   Procedure Laterality Date    KIDNEY STONE SURGERY      \"had surgery multiple times for kidney stones last time 2018?     SMALL INTESTINE SURGERY N/A 10/6/2020    BOWEL RESECTION SIGMOID tenderness, No CVA tenderness. Extremities: No edema, No tenderness, No cyanosis, Normal perfusion, No clubbing. Musculoskeletal: Good range of motion in all major joints as observed. No major deformities noted. Neurologic: Alert & oriented x 3, Normal motor function, Normal sensory function, CN II-XII grossly intact as tested, No focal deficits noted. Psychiatric: Anxious, cooperative  EKG  NA  RADIOLOGY  Labs Reviewed   CBC WITH AUTO DIFFERENTIAL - Abnormal; Notable for the following components:       Result Value    Segs Relative 66.8 (*)     Lymphocytes % 22.8 (*)     Monocytes % 8.8 (*)     All other components within normal limits   BASIC METABOLIC PANEL - Abnormal; Notable for the following components:    Glucose 137 (*)     All other components within normal limits   HEPATIC FUNCTION PANEL   LIPASE   LACTIC ACID, PLASMA     I personally reviewed the images. The radiologist's interpretation reveals:  Last Imaging results   CT ABDOMEN PELVIS W IV CONTRAST Additional Contrast? Oral   Final Result   1. The appearance at the sigmoid anastomosis is most likely redundant colon,   though once again a contained perforation cannot be excluded. The presence   or absence of oral contrast in this collection cannot differentiate these 2   findings. 2. Cholelithiasis again seen with pericholecystic fat stranding.            Procedures  NA  MEDS GIVEN IN ED:  Medications   fentaNYL (SUBLIMAZE) injection 25 mcg (has no administration in time range)   haloperidol lactate (HALDOL) injection 5 mg (5 mg Intravenous Given 11/2/20 0230)   diphenhydrAMINE (BENADRYL) injection 50 mg (50 mg Intravenous Given 11/2/20 0230)   fentaNYL (SUBLIMAZE) injection 25 mcg (25 mcg Intravenous Given 11/2/20 0231)   0.9 % sodium chloride bolus (1,000 mLs Intravenous New Bag 11/2/20 0229)   pantoprazole (PROTONIX) injection 40 mg (40 mg Intravenous Given 11/2/20 0230)   iopamidol (ISOVUE-370) 76 % injection 100 mL (100 mLs Intravenous Given 11/2/20 0406)   iohexol (OMNIPAQUE 240) injection 50 mL (50 mLs Oral Given 11/2/20 0407)     COURSE & MEDICAL DECISION MAKING  79-year-old male presents the emergency department with complaints of worsening diffuse abdominal pain. He was evaluated yesterday evening for same complaints as he did just undergo laparoscopic da Azam robotic assisted rectosigmoidectomy and drainage of pericolonic abscess on October 6. His work-up was reassuring there. Initial vital signs here are reassuring without evidence of fever or tachycardia. The patient is moaning and crawling on the ground and pain on his arrival.  His abdominal exam shows appropriately healing incisional sites from previous surgery and his abdomen is soft with diffuse tenderness to palpation. On review, it seems that follow-up scans after surgery have been with IV contrast but no oral contrast has been added and there were some concerns for stranding around the anastomosis. Therefore we will repeat a scan with IV and oral contrast for full evaluation of areas of anastomosis. In the interim we will obtain CBC, BMP, hepatic panel, lipase, and lactic acid and provide the patient with IV fluid bolus, Haldol, Benadryl, Protonix, and fentanyl. Laboratory studies are reassuring. Patient's pain improved significantly. He was able to ambulate here without difficulty and tolerating oral intake. He is resting comfortably in bed. His CT of the abdomen/pelvis performed here shows very similar appearance of the inflammatory changes around the sigmoid anastomosis. At this time given similar appearance CT scan with improvement in symptoms I feel patient is appropriate for discharge with follow-up by his surgery team this week. Return precautions provided. Appropriate PPE utilized as indicated for entire patient encounter? Time of Disposition: See timeline  ?   New Prescriptions    METOCLOPRAMIDE (REGLAN) 10 MG TABLET    Take 1 tablet by mouth 3 times daily as needed (abdominal pain, nausea)     FINAL IMPRESSION  1. Generalized abdominal pain    2. Nausea without vomiting    3. Post-op pain        Electronically signed by:  Bethany Carias DO, 11/2/2020         Bethany Carias DO  11/02/20 9623

## 2020-11-02 NOTE — ED NOTES
Patient resting more comfortably now. Patient drank oral contrast. Radiology notified.       Jose Huang RN  11/02/20 7359

## 2020-11-02 NOTE — ED NOTES
[facility]    [address] [place] [address]    Phone:  [phone]       Thank You for choosing us for your health care visit. We are glad to serve you and happy to provide you with this summary of your visit. Please help us to ensure we have accurate records. If you find anything that needs to be changed, please let our staff know as soon as possible.          Your Demographic Information     Patient Name Sex     Gio Newberry Male 1941       Ethnic Group Patient Race    Not of  or  Origin White      Your Visit Details     Date & Time Provider Department    2017 8:30 AM Luis Hall MD [facility]      Your Upcoming Appointment*(Max 10)       1:00 PM CST   Office Visit with Suhail Gomez MD   [facility] ([facility])    [address] [place] [address]   [phone]            2017  1:30 PM CST   Office Visit with Jamilah Gee DPM   [facility] ([facility])    [address] [place] [address]   [phone]            2017 12:50 PM CST   Follow-up Visit with Luis Hall MD   [facility] ([facility])    [address] [place] [address]   [phone]            2017  1:30 PM CDT   Office Visit with Thomas Pierson MD   [facility] ([facility])    [address] [place] [address]   [phone]            2017 11:00 AM CDT   Follow-up Visit with Lilibeth Belcher MD   [facility] Card[facility] ([facility])    [address] [place] [address]   [phone]              Conditions Discussed Today or Order-Related  Called access center to transfer pt     Augie Almanzar, RN  11/02/20 Emigdio Diagnoses        Comments    Acute swimmer's ear of right side    -  Primary     Acute conjunctivitis of left eye, unspecified acute conjunctivitis type           Your Vitals Were     BP Pulse Temp Weight BMI Smoking Status    134/62 64 97.3 °F (36.3 °C) (Tympanic) 231 lb 14.4 oz (105.2 kg) 37.43 kg/m2 Former Smoker      Medications Prescribed or Re-Ordered Today     neomycin-polymyxin-hydroCORTisone (CORTISPORIN) 3.5-50315-7 otic solution    Sig - Route: Place 3 drops into right ear 4 times daily for 7 days. - Right Ear    Class: Eprescribe    Pharmacy: [facility] [id number] [place] [address] [address] Ph #: [phone]    gentamicin (GARAMYCIN) 0.3 % ophthalmic solution    Sig - Route: Place 1 drop into left eye 4 times daily for 7 days. - Left Eye    Class: Eprescribe    Pharmacy: [facility] [id number] [place] [address] [address] Ph #: [phone]      Your Current Medications Are        Disp Refills Start End    FLUTICASONE FUROATE NA        Sig - Route: Spray in each nostril nightly. - Nasal    Class: Historical Med    insulin lispro (HUMALOG KWIKPEN) 100 UNIT/ML injection 105 mL 1 11/15/2016     Sig: Inject 25 units with breakfast, 25 units with lunch and 35 units with dinner, plus sliding scale, max 120u per day    Class: Eprescribe    FREESTYLE LITE 300 strip 1 11/4/2016     Sig: TEST BLOOD SUGAR 3 TIMES DAILY  Dx E11.22    Class: Eprescribe    Notes to Pharmacy: DX Code Needed  .    insulin glargine (LANTUS) 100 UNIT/ML injection 120 mL 1 10/21/2016     Sig: Inject 54 units in the morning and 54 units in the evening    Class: Eprescribe    insulin glargine (LANTUS SOLOSTAR) 100 UNIT/ML injection 15 mL 12 10/21/2016     Sig: Samples given at visit lot [id number] exp 10/28    Class: Sample    furosemide (LASIX) 40 MG tablet 135 tablet 1 10/3/2016     Sig: TAKE 1.5 TABLETS BY MOUTH DAILY.    Class: Eprescribe    Lancets (FREESTYLE) Misc 300 each 3 10/3/2016     Sig: FreeStyle Lancets to test  bloodsugar 3 times daily dx E11.22    Class: Eprescribe    clopidogrel (PLAVIX) 75 MG tablet 90 tablet 1 9/12/2016     Sig: TAKE ONE TABLET BY MOUTH ONE TIME DAILY    Class: Eprescribe    levothyroxine (SYNTHROID, LEVOTHROID) 75 MCG tablet 90 tablet 1 9/6/2016     Sig: TAKE ONE TABLET BY MOUTH ONE TIME DAILY    Class: Eprescribe    potassium chloride 10 MEQ CR tablet 30 tablet 3 8/26/2016 8/21/2017    Sig - Route: Take 1 tablet by mouth daily. - Oral    Class: Eprescribe    losartan-hydrochlorothiazide (HYZAAR) 50-12.5 MG per tablet 90 tablet 3 7/14/2016     Sig - Route: Take 1 tablet by mouth daily. - Oral    Class: Eprescribe    metoPROLOL (LOPRESSOR) 25 MG tablet 180 tablet 3 6/10/2016     Sig - Route: Take 1 tablet by mouth 2 times daily. - Oral    Class: Eprescribe    DISPENSE 1 Device 0 5/10/2016     Sig: One Touch Meter for testing BS 4 times a day Dx: E11.9    blood glucose test strips 400 strip 0 5/10/2016     Sig: Test blood sugar 4 times daily as directed. Diagnosis: E11.9. Meter:One touch    Class: Eprescribe    tamsulosin (FLOMAX) 0.4 MG Cap 90 capsule 3 5/10/2016     Sig - Route: Take 1 capsule by mouth daily after a meal. - Oral    Class: Eprescribe    Omega-3 Fatty Acids (FISH OIL) 1000 MG capsule   4/20/2016     Sig: Take 2 capsules in am and 1 capsules in evening    Class: Historical Med    DISPENSE 100 each 3 1/29/2016     Sig: Bd Insulin Syringe ultrafine/ U-100/1 mL/31g x 15/64\" Misc. Bd D    Uses twice daily to inject insulin.    Class: Eprescribe    Insulin Pen Needle (BD PEN NEEDLE JJ U/F) 32G X 4 MM Misc 200 each 11 1/27/2016     Sig: Patient uses to inject insulin 5 times daily    Class: Eprescribe    fluvastatin (LESCOL) 40 MG capsule 90 capsule 3 1/11/2016     Sig - Route: Take 1 capsule by mouth nightly. - Oral    Class: Eprescribe    capsaicin (ZOSTRIX) 0.025 % cream 60 g 2 12/21/2015     Sig: Apply to feet daily    Class: Eprescribe    Cholecalciferol 2000 UNITS Cap 90 capsule 3  9/14/2015     Sig - Route: Take 1 capsule by mouth daily. - Oral    Class: Eprescribe    albuterol 108 (90 BASE) MCG/ACT inhaler        Sig - Route: Inhale 2 puffs into the lungs every 4 hours as needed for Shortness of Breath or Wheezing. - Inhalation    Class: Historical Med    DISPENSE 200 each 3 4/7/2015     Sig: Insulin syringes 6 mm 31 G 1 mL capacity   (used to inject Lantus twice daily)    Class: Eprescribe    Notes to Pharmacy: 90 day supply    aspirin 81 MG tablet        Sig - Route: Take 81 mg by mouth 2 times daily. - Oral    Class: Historical Med    nitroGLYcerin (NITROSTAT) 0.4 MG SL tablet        Sig - Route: Place 0.4 mg under the tongue as needed. Indications: Congestive Heart Failure, Heart Attack - Sublingual    Class: Historical Med    ASA-APAP-Caff Buffered (VANQUISH PO)        Sig - Route: Take 1 tablet by mouth Weekly as needed. Back pain - Oral    Class: Historical Med    fluticasone (FLONASE) 50 MCG/ACT nasal spray        Sig - Route: Spray 2 sprays in each nostril nightly. - Nasal    Class: Historical Med    cimetidine (TAGAMET) 400 MG tablet   9/13/2011     Sig - Route: Take 400 mg by mouth as needed. Indications: Gastroesophageal Reflux Disease - Oral    Class: Historical Med    Notes to Pharmacy:      neomycin-polymyxin-hydroCORTisone (CORTISPORIN) 3.5-75007-6 otic solution 10 mL 0 1/6/2017 1/13/2017    Sig - Route: Place 3 drops into right ear 4 times daily for 7 days. - Right Ear    Class: Eprescribe    gentamicin (GARAMYCIN) 0.3 % ophthalmic solution 5 mL 0 1/6/2017 1/13/2017    Sig - Route: Place 1 drop into left eye 4 times daily for 7 days. - Left Eye    Class: Eprescribe      Allergies     Celebrex [Celecoxib] MYALGIA    Erythromycin     Abdominal pain    Morphine Sulfate     hypotension    Statins MYALGIA    Leg cramps and weakness. Atorvastatin, Simvastatin, Pravastatin and Crestor all caused myalgia.    Sulfa Drugs Cross Reactors     Vioxx [Rofecoxib]       Immunizations  History as of 1/6/2017     Name Date    Pneumococcal Polysaccharide Adult 3/19/2010    Tdap 1/1/2013      Problem List as of 1/6/2017     Anemia, unspecified    Other and unspecified angina pectoris    Arthritis    Asthma    S/P CABG (coronary artery bypass graft)    CAD (coronary artery disease) - s/p CABG 9/2011 (VG to PDA , VG to M1 to M2, LIMA to LAD)     Dyslipidemia    Pneumonia    Rheumatic fever    Other dyspnea and respiratory abnormality    Obesity, unspecified    Benign essential hypertension    Chest pain    BLACKMON (dyspnea on exertion)    Unstable angina    Bilateral carotid bruits    Type 2 diabetes mellitus with stage 2 chronic kidney disease, with long-term current use of insulin    Acquired hypothyroidism            Patient Instructions     None

## 2020-11-02 NOTE — CARE COORDINATION
Pt identified as potential readmission. Last admission 10/5 - 10/10 for Perforated sigmoid colon ([address] Pt here today for Abdominal Pain (generalized abd pain, 3 weeks ago pt had part of colon removed d/t diverticulitis per pt, pt also reports gall stones and kidney stones)    No acute findings. Readmission was avoided and pt was able to be d/c'd home.

## 2020-11-02 NOTE — ED NOTES
Informed pt's father of COVID procedure and that he would be allowed to return to room when negative results returned. Results obtained while wearing appropriate PPE.       Ellyn Brush RN  11/02/20 7157 (3) no apparent problem

## 2020-11-02 NOTE — ED NOTES
On call light requesting more pain medication. Educated pt that it was too soon to be given more pain medication due to the time of last dose. Informed him of ETA of arrival of squad for transfer to [facility].       Kassi Pacheco RN  11/02/20 1024

## 2020-11-02 NOTE — H&P
History and Physical      Name:  Elina Sellers /Age/Sex: 1975  (39 y.o. male)   MRN & CSN:  [id number] & [id number] Admission Date/Time: 2020 12:20 PM   Location:  [address] [place] [address]- PCP: No primary care provider on file. Hospital Day: 1        Admitting Physician: Dr. Christian Andrade and Plan:   Elina Sellers is a 39 y.o. male who presents with  No chief complaint on file.  Intractable abdominal pain. history of perforated diverticulum with abscess s/p sigmoidectomy 10/3/2020. CT Abd today essentially non acute. CT 10/30 cholelithiasis and suspected biliary sludge, postsurgical changes, suspected esophagitis    Admit to [facility] under observation   As needed pain control   General surgery evaluation-Dr. Vipin Danielson    PPI    Serial abdominal exams     Patient case discussed with ED provider    Diet Diet NPO Effective Now Exceptions are: Ice Chips   DVT Prophylaxis [x] Lovenox, []  Heparin, [] SCDs, [] Ambulation  [] Long term AC   GI Prophylaxis [x] PPI,  [] H2 Blocker,  [] Carafate,  [] Diet/Tube Feeds   Code Status Full     Disposition Admit to obs. Patient plans to return home upon discharge   MDM [] Low, [x] Moderate,[]  High     -Patient assessment and plan discussed and reviewed with admitting physician: Raghu Shelby MD.     History of Present Illness:     Chief Complaint: abdominal pain    Elina Sellers is a 39 y.o. male who presents as a direct med from [place] emergency room. Third visit in 3 days related to severe abdominal pain. The patient describes bilateral upper quadrant pain following his diaphragm. He states \"like a rainbow. When asked to describe his pain and words he says \"like death\". Currently reports no pain but states has not moved.   He was admitted to this facility on 10/5 related to a perforated sigmoid diverticulitis with abscess and underwent a sigmoidectomy by Dr Vipin Danielson.        Ten point ROS: reviewed negative, unless as noted in above HPI.    Objective:   No intake or output data in the 24 hours ending 11/02/20 1311     Vitals:   Vitals:    11/02/20 1215   BP: 126/65   Pulse: 68   Resp: 20   Temp: 97.6 °F (36.4 °C)   TempSrc: Axillary   SpO2: 96%   Weight: 202 lb 11.2 oz (91.9 kg)   Height: 5' 11\" (1.803 m)       Physical Exam: 11/02/20     Gen:  awake, alert, cooperative, no apparent distress normal body habitus  Head/Eyes:  Normocephalic atraumatic, EOMI   NECK:   symmetrical, trachea midline  LUNGS: Normal Effort/ symmetry movement   CARDIOVASCULAR:  Normal rate Tele SR  ABDOMEN: Non tender, non distended, no HSM noted. MUSCULOSKELETAL: no gross deformities  NEUROLOGIC: Alert and Oriented,  Cranial nerves II-XII are grossly intact. SKIN:  no bruising or bleeding, normal skin color,  no redness      Past Medical History:      Past Medical History:   Diagnosis Date    Diverticulitis     Gall stone     Kidney stone     \"had kidney stone now- had them since age 17-had surgery and able to pass some\" follows with dr Jan Rose Kidney stone        Past Surgical  History:    has a past surgical history that includes Wrist surgery (2012); Kidney stone surgery; and Small intestine surgery (N/A, 10/6/2020). Social History:    FAM HX: Reviewed   family history includes Cancer in his mother; Diabetes in his brother and father; Early Death in his mother; Kidney stones in his father.     Soc HX:   Social History     Socioeconomic History    Marital status:      Spouse name: Not on file    Number of children: Not on file    Years of education: Not on file    Highest education level: Not on file   Occupational History    Not on file   Social Needs    Financial resource strain: Not on file    Food insecurity     Worry: Not on file     Inability: Not on file    Transportation needs     Medical: Not on file     Non-medical: Not on file   Tobacco Use    Smoking status: Never Smoker    Smokeless tobacco: Never Used   Substance and Sexual Activity    Alcohol use: No    Drug use: Yes     Types: Marijuana     Comment: \"last used last week of Sept 2020\"    Sexual activity: Never   Lifestyle    Physical activity     Days per week: Not on file     Minutes per session: Not on file    Stress: Not on file   Relationships    Social connections     Talks on phone: Not on file     Gets together: Not on file     Attends [employer] service: Not on file     Active member of club or organization: Not on file     Attends meetings of clubs or organizations: Not on file     Relationship status: Not on file    Intimate partner violence     Fear of current or ex partner: Not on file     Emotionally abused: Not on file     Physically abused: Not on file     Forced sexual activity: Not on file   Other Topics Concern    Not on file   Social History Narrative    Not on file     TOBACCO:   reports that he has never smoked. He has never used smokeless tobacco.  ETOH:   reports no history of alcohol use. Drugs:  reports current drug use. Drug: Marijuana. Allergies: Allergies   Allergen Reactions    Vicodin [Hydrocodone-Acetaminophen] Other (See Comments)     Patient states that it is tolerable but state that makes his skin crawl       Home Medications:     Prior to Admission medications    Medication Sig Start Date End Date Taking? Authorizing Provider   metoclopramide (REGLAN) 10 MG tablet Take 1 tablet by mouth 3 times daily as needed (abdominal pain, nausea) 11/2/20   Raj Cid,    dicyclomine (BENTYL) 10 MG capsule Take 1 capsule by mouth 3 times daily As needed for abdominal pain 11/1/20   Tramaine Marino DO   oxyCODONE-acetaminophen (PERCOCET) 5-325 MG per tablet Take 1 tablet by mouth every 8 hours as needed for Pain for up to 9 doses. Intended supply: 3 days.  Take lowest dose possible to manage pain 11/1/20 11/8/20  Tramaine Ugalde DO   pantoprazole (PROTONIX) 40 MG tablet Take 1 tablet by mouth every morning (before breakfast) 10/30/20   Venkatesh Marquez DO [place]         Medications:   Medications:    sodium chloride flush  10 mL Intravenous 2 times per day    [START ON 11/3/2020] enoxaparin  40 mg Subcutaneous Daily      Infusions:    sodium chloride       PRN Meds: sodium chloride flush, 10 mL, PRN  acetaminophen, 650 mg, Q6H PRN    Or  acetaminophen, 650 mg, Q6H PRN  polyethylene glycol, 17 g, Daily PRN  promethazine, 12.5 mg, Q6H PRN    Or  ondansetron, 4 mg, Q6H PRN  morphine, 2 mg, Q4H PRN  ketorolac, 30 mg, Q6H PRN        Data:     Laboratory this visit:  Reviewed  Recent Labs     11/01/20 1845 11/02/20 0229   WBC 8.7 9.8   HGB 17.2 16.2   HCT 50.6 47.7    284      Recent Labs     11/01/20 1845 11/02/20 0229    140   K 3.5 3.6    102   CO2 23 25   BUN 10 10   CREATININE 0.8* 0.9     Recent Labs     11/01/20 1845 11/02/20 0229   AST 5* 19   ALT 30 30   BILIDIR  --  0.3   BILITOT 0.7 0.9   ALKPHOS 78 72     No results for input(s): INR in the last 72 hours. Radiology this visit:  Reviewed. Ct Abdomen Pelvis Wo Contrast Additional Contrast? None    Result Date: 10/30/2020  EXAMINATION: CT OF THE ABDOMEN AND PELVIS WITHOUT CONTRAST 10/30/2020 7:04 am TECHNIQUE: CT of the abdomen and pelvis was performed without the administration of intravenous contrast. Multiplanar reformatted images are provided for review. Dose modulation, iterative reconstruction, and/or weight based adjustment of the mA/kV was utilized to reduce the radiation dose to as low as reasonably achievable. COMPARISON: Abdomen and pelvis CT 10/05/2020 HISTORY: ORDERING SYSTEM PROVIDED HISTORY: recurrrent ABD Pain. ..DC 20days ago s/p rectosigmoid resection TECHNOLOGIST PROVIDED HISTORY: Reason for exam:->recurrrent ABD Pain. ..DC 20days ago s/p rectosigmoid resection Additional Contrast?->None Additional signs and symptoms: back pain , hx of renal stones FINDINGS: Lack of intravenous contrast administration, partially limiting evaluation of the soft tissues and vasculature. LOWER CHEST:  Minimal gravity dependent atelectasis in the bilateral lower lobes. Normal heart size. No pleural nor pericardial effusions. ORGANS:  Hepatic cysts measuring up to 2.7 cm. Additional hypodense liver lesions measuring up to 0.7 cm too small to characterize but likely cysts or hemangiomas. Gallstones in the gallbladder neck and cystic duct. Hyperdense gallbladder contents elsewhere. Diffuse peripancreatic stranding. No intrahepatic nor extrahepatic biliary dilation. Mild to moderate pancreatic atrophy. Nonobstructing calculi in the bilateral renal collecting systems. No hydroureteronephrosis. Normal appearance of the spleen and adrenal glands. GI/BOWEL:  Suspected circumferential wall thickening in the distal thoracic esophagus. New changes of partial sigmoid colectomy with primary anastomosis. Otherwise normal course and caliber of the stomach, small bowel, remnant colon, and rectum without obstruction. Normal appearance of the appendix. Minimal to mild stool. Minimal remnant sigmoid colon diverticulosis. Stranding adjacent to the anastomosis with tiny foci of gas and soft tissue density along the suture, appearing separate from the colon wall. PELVIS:  Normal appearance of the urinary bladder. Mild prostatomegaly. PERITONEUM/RETROPERITONEUM:  Normal variant aberrant right renal artery supplying the lower pole of the right kidney. Minimal systemic atherosclerotic calcification. No abdominal nor pelvic lymphadenopathy. Trace free intraperitoneal fluid in the pelvis. No loculated fluid nor free intraperitoneal gas. SOFT TISSUES/BONES:  Partial inclusion of at least minimal left gynecomastia. Patchy stranding in the abdominal wall especially on the right likely related to recent laparoscopy. Tiny fat containing umbilical and bilateral inguinal hernias. No inguinal lymphadenopathy. No acute fractures nor suspicious osseous lesions.   Grade 1 anterolisthesis of L5 on S1 related to bilateral spondylolysis at L5. Partial lumbarization of S1.     1. Cholelithiasis and suspected biliary sludge. One of the gallstones appears to be within the cystic duct, and there is pericholecystic stranding suggestive of acute cholecystitis. Consider further evaluation with sonography or a nuclear medicine hepatobiliary scan if there are clinical findings of cholecystitis. 2. New changes of laparoscopic partial sigmoid colectomy. Inflammatory stranding adjacent to the anastomosis may be related to surgery or remnant inflammation from the previously seen diverticulitis. Tiny foci of gas along staple line appear separate from the colon wall and could be intraluminal or within remnant diverticula, could be postoperative, or could be infectious. Of note, there is no evident loculated fluid to suggest abscess. 3. Trace pelvic ascites is likely reactive and/or postoperative. 4. Suspected circumferential wall thickening in the distal thoracic esophagus suggestive of esophagitis. No suspicious features to suggest malignancy, which could appear similar. Consider endoscopy. Ct Abdomen Pelvis W Iv Contrast Additional Contrast? Oral    Result Date: 11/2/2020  EXAMINATION: CT OF THE ABDOMEN AND PELVIS WITH CONTRAST 11/2/2020 3:56 am TECHNIQUE: CT of the abdomen and pelvis was performed with the administration of intravenous contrast. Multiplanar reformatted images are provided for review. Dose modulation, iterative reconstruction, and/or weight based adjustment of the mA/kV was utilized to reduce the radiation dose to as low as reasonably achievable.  COMPARISON: 11/01/2020 and 10/05/2020 HISTORY: ORDERING SYSTEM PROVIDED HISTORY: Diffuse abdominal pain, laparoscopic da Azam robotic assisted rectosigmoidectomy on 10/06 with severe worsening of diffuse abdominal pain TECHNOLOGIST PROVIDED HISTORY: Reason for exam:->Diffuse abdominal pain, laparoscopic da Azam robotic assisted rectosigmoidectomy on 10/06 with severe worsening of diffuse abdominal pain Additional Contrast?->Oral Reason for Exam: Diffuse abdominal pain, laparoscopic da Azam robotic assisted rectosigmoidectomy on 10/06 with severe worsening of diffuse abdominal pain, 100 ml isovue 370, 50 ml omnipaque 240 Acuity: Acute Type of Exam: Subsequent/Follow-up Relevant Medical/Surgical History: Diffuse abdominal pain, laparoscopic da Azam robotic assisted rectosigmoidectomy on 10/06 with severe worsening of diffuse abdominal pain, 100 ml isovue 370, 50 ml omnipaque 240 FINDINGS: Lower Chest: There is bibasilar atelectasis. Again noted is distal esophageal thickening. Organs: Scattered low-density liver lesions are again seen. Cholelithiasis is present with minimal pericholecystic fat stranding. The spleen, pancreas, adrenal glands and kidneys are unremarkable. GI/Bowel: Small bowel caliber is normal.  The appendix is normal.  Sigmoid anastomosis is again seen. Previously described possibly extraluminal gas is again seen adjacent to the anastomosis, with increased gas compared to the study performed 1 day prior. Oral contrast did not reach this portion of the GI tract. Minimal adjacent fat stranding is again seen. Pelvis: The bladder is grossly negative and is filled with excreted contrast. Peritoneum/Retroperitoneum: No adenopathy, mesenteric stranding or free fluid. Aortic caliber is normal. Bones/Soft Tissues: No acute osseous abnormality. Fat stranding is again seen in the anterior abdominal wall on the right. 1. The appearance at the sigmoid anastomosis is most likely redundant colon, though once again a contained perforation cannot be excluded. The presence or absence of oral contrast in this collection cannot differentiate these 2 findings. 2. Cholelithiasis again seen with pericholecystic fat stranding.      Ct Abdomen Pelvis W Iv Contrast Additional Contrast? None    Result Date: 11/1/2020  EXAMINATION: CT OF THE ABDOMEN AND PELVIS WITH CONTRAST 11/1/2020 7:57 pm TECHNIQUE: CT of the abdomen and pelvis was performed with the administration of intravenous contrast. Multiplanar reformatted images are provided for review. Dose modulation, iterative reconstruction, and/or weight based adjustment of the mA/kV was utilized to reduce the radiation dose to as low as reasonably achievable. COMPARISON: 10/30/2020 HISTORY: ORDERING SYSTEM PROVIDED HISTORY: surgery three weeks ago, having all over abdominal pain TECHNOLOGIST PROVIDED HISTORY: Reason for exam:->surgery three weeks ago, having all over abdominal pain Additional Contrast?->None Reason for Exam: surgery three weeks ago, having all over abdominal pain Acuity: Acute Type of Exam: Initial Additional signs and symptoms: Riddhi Hopper is a 39 y.o. male with complaint of all over abdominal pain, had part of colon taken out three weeks ago for diverticulitis with Dr. Phylicia Willson History: isovue 370 80 ml FINDINGS: Lower Chest: No acute abnormality within the visualized lung bases. Suspected wall thickening in the distal thoracic esophagus is again noted. Organs: Hypoattenuating hepatic lesions measuring up to 2.7 cm in the left hepatic lobe are unchanged. Cholelithiasis and minimal pericholecystic stranding, similar to prior examination. No acute abnormality within the spleen, pancreas, adrenal glands, or kidneys. Bilateral nonobstructing nephrolithiasis. GI/Bowel: Small hiatal hernia. Stomach is partially distended. The small bowel is nondilated. Partial sigmoid resection without colonic dilation to suggest obstruction. There is wall thickening adjacent to the anastomosis. Stranding adjacent to the anastomosis with foci of gas and soft tissue along the suture line are again noted, appearing separate from the colon. The appendix is within normal limits. Pelvis: Bladder is partially distended without vesicular stone.   Prostate is enlarged with coarse internal calcifications. . Peritoneum/Retroperitoneum: No ascites or pneumoperitoneum. Abdominal aorta is normal in caliber. Bones/Soft Tissues: No acute osseous abnormality. Bilateral L5 pars defects with grade 1 anterolisthesis at L5-S1. there is stranding within the right lower quadrant abdominal soft tissues. 1. Prior partial sigmoidectomy with stranding at the anastomosis. Tiny foci of gas and soft tissue adjacent to the staple line, but separate from the colonic wall are unchanged. This could be postoperative, but superimposed infection would be difficult to exclude. No discrete fluid collection/abscess. 2. Cholelithiasis with minimal pericholecystic fluid, similar to prior examination. 3. Suspected wall thickening of the distal thoracic esophagus is again noted. 4. Bilateral L5 pars defects with grade 1 anterolisthesis at L5-S1. Ct Abdomen Pelvis W Iv Contrast Additional Contrast? None    Result Date: 10/5/2020  EXAMINATION: CT OF THE ABDOMEN AND PELVIS WITH CONTRAST, 10/5/2020 1:33 am TECHNIQUE: CT of the abdomen and pelvis was performed with the administration of intravenous contrast. Multiplanar reformatted images are provided for review. Dose modulation, iterative reconstruction, and/or weight based adjustment of the mA/kV was utilized to reduce the radiation dose to as low as reasonably achievable. COMPARISON: CT abdomen and pelvis dated August 15, 2020 and August 19, 2020. HISTORY: ORDERING SYSTEM PROVIDED HISTORY: Pain TECHNOLOGIST PROVIDED HISTORY: I just want IV contrast, Reason for exam:-> Pain Additional Contrast?->None Reason for Exam: Abdominal Pain (patient states that he was beginning the bowel prep for upcoming colonoscopy at 1630 and began to have abdominal cramping that is so intense he feels like he can't breathe. Denies any blood in stool. Emesis X 4 , denies any blood in vomit).  Acuity: Acute Type of Exam: Initial Additional signs and symptoms: Abdominal Pain (patient states that he was beginning the bowel prep for upcoming colonoscopy at 1630 and began to have abdominal cramping that is so intense he feels like he can't breathe. Denies any blood in stool. Emesis X 4 , denies any blood in vomit). Relevant Medical/Surgical History: Abdominal Pain (patient states that he was beginning the bowel prep for upcoming colonoscopy at 1630 and began to have abdominal cramping that is so intense he feels like he can't breathe. Denies any blood in stool. Emesis X 4 , denies any blood in vomit). 100 mL Isovue 370 inj by mv rt. ext 0145. FINDINGS: Lower Chest: Bibasilar atelectasis is noted. Organs: Cholelithiasis is noted. The liver, spleen, adrenal glands, and pancreas are without acute findings. Nonobstructing bilateral renal calculi are noted. There is no hydronephrosis. GI/Bowel: Colonic diverticulosis is noted. There is a rim enhancing collection with gas adjacent to the proximal sigmoid colon in the region of the prior diverticulitis, consistent with a diverticular abscess which extends into the mesentery. This measures approximately 3.5 x 1.5 cm. There is no evidence of bowel obstruction. The appendix is normal. Pelvis: Trace free fluid is seen within the pelvis. The bladder is unremarkable. Peritoneum/Retroperitoneum: A small amount of free fluid is seen within the left lower quadrant and in the mesentery. Stranding is seen within the mesentery. Trace free air is noted. There has been interval improvement in the free air seen on the prior examination. Bones/Soft Tissues: No destructive osseous lesions are identified. Transitional lumbosacral anatomy is noted with 6 lumbar type vertebral bodies. There is bilateral pars defects of L5 with grade 1 anterolisthesis of L5 with respect to L6.     1. There is redemonstration of perforated sigmoid diverticulitis. Minimal free air, stranding and fluid is seen within the mesentery.   Rim enhancing collection with gas is noted adjacent to the proximal sigmoid colon in the region of the perforated diverticulitis, consistent with an abscess which extends into the mesentery. This measures approximately 3.5 x 1.5 cm. 2. Trace free fluid within the pelvis. 3. Nonobstructing bilateral renal calculi. 4. Cholelithiasis. Findings were discussed with Giselle Christianson at 2:28 am on 10/5/2020. Xr Chest Portable    Result Date: 11/2/2020  EXAMINATION: ONE XRAY VIEW OF THE CHEST 11/2/2020 9:05 am COMPARISON: None. HISTORY: ORDERING SYSTEM PROVIDED HISTORY: sob TECHNOLOGIST PROVIDED HISTORY: Reason for exam:->sob Reason for Exam: abd pain,sob Acuity: Acute Type of Exam: Initial Additional signs and symptoms: abd pain,sob FINDINGS: The lung volumes are low. There is no pneumothorax, edema or focal consolidation. The bony thorax is grossly intact. Bowel distention is partially imaged. Low lung volumes. Otherwise, no evidence of acute cardiopulmonary disease. Distended bowel loops in the abdomen, incompletely imaged. Us Abdomen Limited Specify Organ? Gallbladder    Result Date: 11/1/2020  EXAMINATION: RIGHT UPPER QUADRANT ULTRASOUND 10/30/2020 8:23 am COMPARISON: 10/30/2020 HISTORY: ORDERING SYSTEM PROVIDED HISTORY: RUQ; ? cystic duct stone per Rad TECHNOLOGIST PROVIDED HISTORY: Reason for exam:->RUQ; ? cystic duct stone per Rad Specify organ?->GALLBLADDER Reason for Exam: generalized abd pain Acuity: Unknown Type of Exam: Subsequent/Follow-up FINDINGS: LIVER:  The liver demonstrates normal echogenicity without evidence of intrahepatic biliary ductal dilatation. BILIARY SYSTEM:  There is gallbladder sludge. Cholelithiasis seen on prior CT is not well seen on this exam.  Mild gallbladder wall thickening measuring up to 5 mm. Negative sonographic Mendoza's sign. Common bile duct is within normal limits measuring 2 mm. RIGHT KIDNEY: The right kidney is grossly unremarkable without evidence of hydronephrosis.  PANCREAS:  Visualized portions of the pancreas are unremarkable. OTHER: No evidence of right upper quadrant ascites. 1. Gallbladder sludge with mild gallbladder wall thickening. Sonographic findings are nonspecific for acute cholecystitis.   Cholelithiasis as well as stone seen within the cystic duct on earlier CT are not well appreciated on this exam.         EKG this visit:  Reviewed         Electronically signed by [employer] URSULA Esparza CNP on 11/2/2020 at 1:11 PM

## 2020-11-02 NOTE — ED PROVIDER NOTES
Triage Chief Complaint:   Abdominal Pain (cramping. Surgery 3 weeks ago for diverticulitis. Upper abd. States (was seen in ER earlier this morning)) and Shortness of Breath (20 mins PTA. )    HERMINIA:  Clay Guillen is a 39 y.o. male that presents ED in acute distress. The patient was seen earlier about 2:00 this morning by the night physician here at the [facility].  The patient is a very complex medical history where he presented initially on October 5 had a perforated sigmoid diverticulum unfortunately developed an abscess requiring surgery the following day on October 6. He was discharged on 1010. He returned back to the ED I personally saw him on 1030 he returned yesterday on 11 1 that was seen at 2:00 this morning today now returns back to the emergency department again with a family member. He is moaning in pain holding his abdomen he has exquisite pain in his epigastric area. Patient did have a contrast CT which revealed a resolving surgical findings but no acute pathological findings. Laboratory data was within normal limits and the patient was stable for discharge. Past Medical History:   Diagnosis Date    Diverticulitis     Gall stone     Kidney stone     \"had kidney stone now- had them since age 17-had surgery and able to pass some\" follows with dr Chau Alvarez Kidney stone      Past Surgical History:   Procedure Laterality Date    KIDNEY STONE SURGERY      \"had surgery multiple times for kidney stones last time 2018?     SMALL INTESTINE SURGERY N/A 10/6/2020    BOWEL RESECTION SIGMOID LAPAROSCOPIC ROBOTIC performed by Norbert Lopez MD at [id number] [facility]  2012    left wrist\"no metal\"     Family History   Problem Relation Age of Onset    Cancer Mother         ovarian cancer     Early Death Mother     Kidney stones Father     Diabetes Father     Diabetes Brother      Social History     Socioeconomic History    Marital status:      Spouse name: Not on file    Number of children: Not on file    Years of education: Not on file    Highest education level: Not on file   Occupational History    Not on file   Social Needs    Financial resource strain: Not on file    Food insecurity     Worry: Not on file     Inability: Not on file    Transportation needs     Medical: Not on file     Non-medical: Not on file   Tobacco Use    Smoking status: Never Smoker    Smokeless tobacco: Never Used   Substance and Sexual Activity    Alcohol use: No    Drug use: Yes     Types: Marijuana     Comment: \"last used last week of Sept 2020\"    Sexual activity: Never   Lifestyle    Physical activity     Days per week: Not on file     Minutes per session: Not on file    Stress: Not on file   Relationships    Social connections     Talks on phone: Not on file     Gets together: Not on file     Attends [employer] service: Not on file     Active member of club or organization: Not on file     Attends meetings of clubs or organizations: Not on file     Relationship status: Not on file    Intimate partner violence     Fear of current or ex partner: Not on file     Emotionally abused: Not on file     Physically abused: Not on file     Forced sexual activity: Not on file   Other Topics Concern    Not on file   Social History Narrative    Not on file     Current Facility-Administered Medications   Medication Dose Route Frequency Provider Last Rate Last Dose    0.9 % sodium chloride bolus  1,000 mL Intravenous Once Tammy Energy, DO        fentaNYL (SUBLIMAZE) injection 200 mcg  200 mcg Intravenous Once Tammy Energy, DO        metoclopramide (REGLAN) injection 10 mg  10 mg Intravenous Once Tammy Energy, DO        pantoprazole (PROTONIX) injection 80 mg  80 mg Intravenous Once Tammy Energy, DO         Current Outpatient Medications   Medication Sig Dispense Refill    metoclopramide (REGLAN) 10 MG tablet Take 1 tablet by mouth 3 times daily as needed (abdominal pain, nausea) 18 tablet 0  dicyclomine (BENTYL) 10 MG capsule Take 1 capsule by mouth 3 times daily As needed for abdominal pain 15 capsule 0    oxyCODONE-acetaminophen (PERCOCET) 5-325 MG per tablet Take 1 tablet by mouth every 8 hours as needed for Pain for up to 9 doses. Intended supply: 3 days. Take lowest dose possible to manage pain 9 tablet 0    pantoprazole (PROTONIX) 40 MG tablet Take 1 tablet by mouth every morning (before breakfast) 30 tablet 0     Allergies   Allergen Reactions    Vicodin [Hydrocodone-Acetaminophen] Other (See Comments)     Patient states that it is tolerable but state that makes his skin crawl         ROS:    Review of Systems   Constitutional: Positive for activity change and appetite change. HENT: Negative. Respiratory: Negative. Gastrointestinal: Positive for abdominal pain and nausea. Genitourinary: Negative. All other systems reviewed and are negative. Nursing Notes Reviewed    Physical Exam:  ED Triage Vitals [11/02/20 0853]   Enc Vitals Group      BP (!) 152/100      Pulse 82      Resp 16      Temp 97.8 °F (36.6 °C)      Temp Source Oral      SpO2 97 %      Weight 198 lb (89.8 kg)      Height 5' 11\" (1.803 m)      Head Circumference       Peak Flow       Pain Score       Pain Loc       Pain Edu? Excl. in [address]? Physical Exam  Vitals signs and nursing note reviewed. Exam conducted with a chaperone present. Constitutional:       General: He is in acute distress. HENT:      Head: Normocephalic and atraumatic. Right Ear: External ear normal.      Left Ear: External ear normal.      Mouth/Throat:      Mouth: Mucous membranes are moist.   Eyes:      General: No scleral icterus. Right eye: No discharge. Left eye: No discharge. Conjunctiva/sclera: Conjunctivae normal.      Pupils: Pupils are equal, round, and reactive to light. Neck:      Musculoskeletal: Normal range of motion and neck supple. Thyroid: No thyromegaly. Vascular: No JVD. Trachea: No tracheal deviation. Cardiovascular:      Rate and Rhythm: Regular rhythm. Tachycardia present. Heart sounds: Normal heart sounds. No murmur. No friction rub. No gallop. Pulmonary:      Effort: Pulmonary effort is normal. No respiratory distress. Breath sounds: Normal breath sounds. No stridor. No wheezing or rales. Chest:      Chest wall: No tenderness. Abdominal:      General: Abdomen is flat. A surgical scar is present. Bowel sounds are normal. There is no distension. Palpations: Abdomen is soft. There is no mass. Tenderness: There is abdominal tenderness in the epigastric area. There is no guarding or rebound. Hernia: No hernia is present. Musculoskeletal: Normal range of motion. General: No tenderness or deformity. Lymphadenopathy:      Cervical: No cervical adenopathy. Skin:     General: Skin is warm and dry. Capillary Refill: Capillary refill takes less than 2 seconds. Coloration: Skin is not pale. Findings: No erythema or rash. Neurological:      General: No focal deficit present. Mental Status: He is alert and oriented to person, place, and time. Cranial Nerves: No cranial nerve deficit. Sensory: No sensory deficit. Deep Tendon Reflexes: Reflexes are normal and symmetric. Reflexes normal.   Psychiatric:         Speech: Speech normal.         Behavior: Behavior normal.         Thought Content: Thought content normal.         Judgment: Judgment normal.         I have reviewed and interpreted all of the currently available lab results from this visit (ifapplicable):  No results found for this visit on 11/02/20.    Radiographs (if obtained):  [] The following radiograph wasinterpreted by myself in the absence of a radiologist:   [] Radiologist's Report Reviewed:  XR CHEST PORTABLE    (Results Pending)         EKG (if obtained): (All EKG's are interpreted by myself in the absence of a cardiologist)      The 12 lead EKG was interpreted by me, and the interpretation is as follows:  normal sinus rhythm, rate = 85. Intervals are within the normal range. QTc is not prolonged. ST elevations are not present. T wave inversions are not present. Non-specific T wave changes are not present. Delta waves, Brugada Syndrome, and Short FL are not present. There is no acute ischemia. Chart review shows recent radiographs:  Ct Abdomen Pelvis Wo Contrast Additional Contrast? None    Result Date: 10/30/2020  EXAMINATION: CT OF THE ABDOMEN AND PELVIS WITHOUT CONTRAST 10/30/2020 7:04 am TECHNIQUE: CT of the abdomen and pelvis was performed without the administration of intravenous contrast. Multiplanar reformatted images are provided for review. Dose modulation, iterative reconstruction, and/or weight based adjustment of the mA/kV was utilized to reduce the radiation dose to as low as reasonably achievable. COMPARISON: Abdomen and pelvis CT 10/05/2020 HISTORY: ORDERING SYSTEM PROVIDED HISTORY: recurrrent ABD Pain. ..DC 20days ago s/p rectosigmoid resection TECHNOLOGIST PROVIDED HISTORY: Reason for exam:->recurrrent ABD Pain. ..DC 20days ago s/p rectosigmoid resection Additional Contrast?->None Additional signs and symptoms: back pain , hx of renal stones FINDINGS: Lack of intravenous contrast administration, partially limiting evaluation of the soft tissues and vasculature. LOWER CHEST:  Minimal gravity dependent atelectasis in the bilateral lower lobes. Normal heart size. No pleural nor pericardial effusions. ORGANS:  Hepatic cysts measuring up to 2.7 cm. Additional hypodense liver lesions measuring up to 0.7 cm too small to characterize but likely cysts or hemangiomas. Gallstones in the gallbladder neck and cystic duct. Hyperdense gallbladder contents elsewhere. Diffuse peripancreatic stranding. No intrahepatic nor extrahepatic biliary dilation. Mild to moderate pancreatic atrophy.   Nonobstructing calculi in the bilateral renal collecting systems. No hydroureteronephrosis. Normal appearance of the spleen and adrenal glands. GI/BOWEL:  Suspected circumferential wall thickening in the distal thoracic esophagus. New changes of partial sigmoid colectomy with primary anastomosis. Otherwise normal course and caliber of the stomach, small bowel, remnant colon, and rectum without obstruction. Normal appearance of the appendix. Minimal to mild stool. Minimal remnant sigmoid colon diverticulosis. Stranding adjacent to the anastomosis with tiny foci of gas and soft tissue density along the suture, appearing separate from the colon wall. PELVIS:  Normal appearance of the urinary bladder. Mild prostatomegaly. PERITONEUM/RETROPERITONEUM:  Normal variant aberrant right renal artery supplying the lower pole of the right kidney. Minimal systemic atherosclerotic calcification. No abdominal nor pelvic lymphadenopathy. Trace free intraperitoneal fluid in the pelvis. No loculated fluid nor free intraperitoneal gas. SOFT TISSUES/BONES:  Partial inclusion of at least minimal left gynecomastia. Patchy stranding in the abdominal wall especially on the right likely related to recent laparoscopy. Tiny fat containing umbilical and bilateral inguinal hernias. No inguinal lymphadenopathy. No acute fractures nor suspicious osseous lesions. Grade 1 anterolisthesis of L5 on S1 related to bilateral spondylolysis at L5. Partial lumbarization of S1.     1. Cholelithiasis and suspected biliary sludge. One of the gallstones appears to be within the cystic duct, and there is pericholecystic stranding suggestive of acute cholecystitis. Consider further evaluation with sonography or a nuclear medicine hepatobiliary scan if there are clinical findings of cholecystitis. 2. New changes of laparoscopic partial sigmoid colectomy.   Inflammatory stranding adjacent to the anastomosis may be related to surgery or remnant inflammation from the previously seen diverticulitis. Tiny foci of gas along staple line appear separate from the colon wall and could be intraluminal or within remnant diverticula, could be postoperative, or could be infectious. Of note, there is no evident loculated fluid to suggest abscess. 3. Trace pelvic ascites is likely reactive and/or postoperative. 4. Suspected circumferential wall thickening in the distal thoracic esophagus suggestive of esophagitis. No suspicious features to suggest malignancy, which could appear similar. Consider endoscopy. Ct Abdomen Pelvis W Iv Contrast Additional Contrast? Oral    Result Date: 11/2/2020  EXAMINATION: CT OF THE ABDOMEN AND PELVIS WITH CONTRAST 11/2/2020 3:56 am TECHNIQUE: CT of the abdomen and pelvis was performed with the administration of intravenous contrast. Multiplanar reformatted images are provided for review. Dose modulation, iterative reconstruction, and/or weight based adjustment of the mA/kV was utilized to reduce the radiation dose to as low as reasonably achievable.  COMPARISON: 11/01/2020 and 10/05/2020 HISTORY: ORDERING SYSTEM PROVIDED HISTORY: Diffuse abdominal pain, laparoscopic da Azam robotic assisted rectosigmoidectomy on 10/06 with severe worsening of diffuse abdominal pain TECHNOLOGIST PROVIDED HISTORY: Reason for exam:->Diffuse abdominal pain, laparoscopic da Azam robotic assisted rectosigmoidectomy on 10/06 with severe worsening of diffuse abdominal pain Additional Contrast?->Oral Reason for Exam: Diffuse abdominal pain, laparoscopic da Azam robotic assisted rectosigmoidectomy on 10/06 with severe worsening of diffuse abdominal pain, 100 ml isovue 370, 50 ml omnipaque 240 Acuity: Acute Type of Exam: Subsequent/Follow-up Relevant Medical/Surgical History: Diffuse abdominal pain, laparoscopic da Azam robotic assisted rectosigmoidectomy on 10/06 with severe worsening of diffuse abdominal pain, 100 ml isovue 370, 50 ml omnipaque 240 FINDINGS: Lower Chest: There is bibasilar atelectasis. Again noted is distal esophageal thickening. Organs: Scattered low-density liver lesions are again seen. Cholelithiasis is present with minimal pericholecystic fat stranding. The spleen, pancreas, adrenal glands and kidneys are unremarkable. GI/Bowel: Small bowel caliber is normal.  The appendix is normal.  Sigmoid anastomosis is again seen. Previously described possibly extraluminal gas is again seen adjacent to the anastomosis, with increased gas compared to the study performed 1 day prior. Oral contrast did not reach this portion of the GI tract. Minimal adjacent fat stranding is again seen. Pelvis: The bladder is grossly negative and is filled with excreted contrast. Peritoneum/Retroperitoneum: No adenopathy, mesenteric stranding or free fluid. Aortic caliber is normal. Bones/Soft Tissues: No acute osseous abnormality. Fat stranding is again seen in the anterior abdominal wall on the right. 1. The appearance at the sigmoid anastomosis is most likely redundant colon, though once again a contained perforation cannot be excluded. The presence or absence of oral contrast in this collection cannot differentiate these 2 findings. 2. Cholelithiasis again seen with pericholecystic fat stranding. Ct Abdomen Pelvis W Iv Contrast Additional Contrast? None    Result Date: 11/1/2020  EXAMINATION: CT OF THE ABDOMEN AND PELVIS WITH CONTRAST 11/1/2020 7:57 pm TECHNIQUE: CT of the abdomen and pelvis was performed with the administration of intravenous contrast. Multiplanar reformatted images are provided for review. Dose modulation, iterative reconstruction, and/or weight based adjustment of the mA/kV was utilized to reduce the radiation dose to as low as reasonably achievable.  COMPARISON: 10/30/2020 HISTORY: ORDERING SYSTEM PROVIDED HISTORY: surgery three weeks ago, having all over abdominal pain TECHNOLOGIST PROVIDED HISTORY: Reason for exam:->surgery three weeks ago, having all over abdominal pain Additional Contrast?->None Reason for Exam: surgery three weeks ago, having all over abdominal pain Acuity: Acute Type of Exam: Initial Additional signs and symptoms: Barbara Ortiz is a 39 y.o. male with complaint of all over abdominal pain, had part of colon taken out three weeks ago for diverticulitis with Dr. Sahil Stevens History: isovue 370 80 ml FINDINGS: Lower Chest: No acute abnormality within the visualized lung bases. Suspected wall thickening in the distal thoracic esophagus is again noted. Organs: Hypoattenuating hepatic lesions measuring up to 2.7 cm in the left hepatic lobe are unchanged. Cholelithiasis and minimal pericholecystic stranding, similar to prior examination. No acute abnormality within the spleen, pancreas, adrenal glands, or kidneys. Bilateral nonobstructing nephrolithiasis. GI/Bowel: Small hiatal hernia. Stomach is partially distended. The small bowel is nondilated. Partial sigmoid resection without colonic dilation to suggest obstruction. There is wall thickening adjacent to the anastomosis. Stranding adjacent to the anastomosis with foci of gas and soft tissue along the suture line are again noted, appearing separate from the colon. The appendix is within normal limits. Pelvis: Bladder is partially distended without vesicular stone. Prostate is enlarged with coarse internal calcifications. . Peritoneum/Retroperitoneum: No ascites or pneumoperitoneum. Abdominal aorta is normal in caliber. Bones/Soft Tissues: No acute osseous abnormality. Bilateral L5 pars defects with grade 1 anterolisthesis at L5-S1. there is stranding within the right lower quadrant abdominal soft tissues. 1. Prior partial sigmoidectomy with stranding at the anastomosis. Tiny foci of gas and soft tissue adjacent to the staple line, but separate from the colonic wall are unchanged. This could be postoperative, but superimposed infection would be difficult to exclude.   No discrete fluid collection/abscess. 2. Cholelithiasis with minimal pericholecystic fluid, similar to prior examination. 3. Suspected wall thickening of the distal thoracic esophagus is again noted. 4. Bilateral L5 pars defects with grade 1 anterolisthesis at L5-S1. Ct Abdomen Pelvis W Iv Contrast Additional Contrast? None    Result Date: 10/5/2020  EXAMINATION: CT OF THE ABDOMEN AND PELVIS WITH CONTRAST, 10/5/2020 1:33 am TECHNIQUE: CT of the abdomen and pelvis was performed with the administration of intravenous contrast. Multiplanar reformatted images are provided for review. Dose modulation, iterative reconstruction, and/or weight based adjustment of the mA/kV was utilized to reduce the radiation dose to as low as reasonably achievable. COMPARISON: CT abdomen and pelvis dated August 15, 2020 and August 19, 2020. HISTORY: ORDERING SYSTEM PROVIDED HISTORY: Pain TECHNOLOGIST PROVIDED HISTORY: I just want IV contrast, Reason for exam:-> Pain Additional Contrast?->None Reason for Exam: Abdominal Pain (patient states that he was beginning the bowel prep for upcoming colonoscopy at 1630 and began to have abdominal cramping that is so intense he feels like he can't breathe. Denies any blood in stool. Emesis X 4 , denies any blood in vomit). Acuity: Acute Type of Exam: Initial Additional signs and symptoms: Abdominal Pain (patient states that he was beginning the bowel prep for upcoming colonoscopy at 1630 and began to have abdominal cramping that is so intense he feels like he can't breathe. Denies any blood in stool. Emesis X 4 , denies any blood in vomit). Relevant Medical/Surgical History: Abdominal Pain (patient states that he was beginning the bowel prep for upcoming colonoscopy at 1630 and began to have abdominal cramping that is so intense he feels like he can't breathe. Denies any blood in stool. Emesis X 4 , denies any blood in vomit). 100 mL Isovue 370 inj by mv rt. ext 0145.  FINDINGS: Lower Chest: Bibasilar atelectasis is noted. Organs: Cholelithiasis is noted. The liver, spleen, adrenal glands, and pancreas are without acute findings. Nonobstructing bilateral renal calculi are noted. There is no hydronephrosis. GI/Bowel: Colonic diverticulosis is noted. There is a rim enhancing collection with gas adjacent to the proximal sigmoid colon in the region of the prior diverticulitis, consistent with a diverticular abscess which extends into the mesentery. This measures approximately 3.5 x 1.5 cm. There is no evidence of bowel obstruction. The appendix is normal. Pelvis: Trace free fluid is seen within the pelvis. The bladder is unremarkable. Peritoneum/Retroperitoneum: A small amount of free fluid is seen within the left lower quadrant and in the mesentery. Stranding is seen within the mesentery. Trace free air is noted. There has been interval improvement in the free air seen on the prior examination. Bones/Soft Tissues: No destructive osseous lesions are identified. Transitional lumbosacral anatomy is noted with 6 lumbar type vertebral bodies. There is bilateral pars defects of L5 with grade 1 anterolisthesis of L5 with respect to L6.     1. There is redemonstration of perforated sigmoid diverticulitis. Minimal free air, stranding and fluid is seen within the mesentery. Rim enhancing collection with gas is noted adjacent to the proximal sigmoid colon in the region of the perforated diverticulitis, consistent with an abscess which extends into the mesentery. This measures approximately 3.5 x 1.5 cm. 2. Trace free fluid within the pelvis. 3. Nonobstructing bilateral renal calculi. 4. Cholelithiasis. Findings were discussed with Darreld Bence at 2:28 am on 10/5/2020. Us Abdomen Limited Specify Organ?  Gallbladder    Result Date: 11/1/2020  EXAMINATION: RIGHT UPPER QUADRANT ULTRASOUND 10/30/2020 8:23 am COMPARISON: 10/30/2020 HISTORY: ORDERING SYSTEM PROVIDED HISTORY: RUQ; ? cystic duct stone per Rad applicable):  New Prescriptions    No medications on file           Tucker Batista DO, FACEP      Comment: Please note this report has been produced using speech recognition software and maycontain errors related to that system including errors in grammar, punctuation, and spelling, as well as words and phrases that may be inappropriate. If there are any questions or concerns please feel free to contact thedictating provider for clarification.         Mary Latif DO  11/02/20 [id number]

## 2020-11-03 ENCOUNTER — ANESTHESIA EVENT (OUTPATIENT)
Dept: ENDOSCOPY | Age: 45
DRG: 419 | End: 2020-11-03
Payer: COMMERCIAL

## 2020-11-03 ENCOUNTER — APPOINTMENT (OUTPATIENT)
Dept: ULTRASOUND IMAGING | Age: 45
DRG: 419 | End: 2020-11-03
Attending: HOSPITALIST
Payer: COMMERCIAL

## 2020-11-03 ENCOUNTER — ANESTHESIA (OUTPATIENT)
Dept: ENDOSCOPY | Age: 45
DRG: 419 | End: 2020-11-03
Payer: COMMERCIAL

## 2020-11-03 VITALS
SYSTOLIC BLOOD PRESSURE: 89 MMHG | DIASTOLIC BLOOD PRESSURE: 44 MMHG | RESPIRATION RATE: 20 BRPM | OXYGEN SATURATION: 96 %

## 2020-11-03 LAB
ALBUMIN SERPL-MCNC: 4 GM/DL (ref 3.4–5)
ALP BLD-CCNC: 142 IU/L (ref 40–128)
ALT SERPL-CCNC: 242 U/L (ref 10–40)
ANION GAP SERPL CALCULATED.3IONS-SCNC: 13 MMOL/L (ref 4–16)
AST SERPL-CCNC: 155 IU/L (ref 15–37)
BASOPHILS ABSOLUTE: 0 K/CU MM
BASOPHILS RELATIVE PERCENT: 0.1 % (ref 0–1)
BILIRUB SERPL-MCNC: 5.2 MG/DL (ref 0–1)
BUN BLDV-MCNC: 5 MG/DL (ref 6–23)
CALCIUM SERPL-MCNC: 9.3 MG/DL (ref 8.3–10.6)
CHLORIDE BLD-SCNC: 102 MMOL/L (ref 99–110)
CO2: 27 MMOL/L (ref 21–32)
CREAT SERPL-MCNC: 0.8 MG/DL (ref 0.9–1.3)
DIFFERENTIAL TYPE: ABNORMAL
EOSINOPHILS ABSOLUTE: 0 K/CU MM
EOSINOPHILS RELATIVE PERCENT: 0.1 % (ref 0–3)
GFR AFRICAN AMERICAN: >60 ML/MIN/1.73M2
GFR NON-AFRICAN AMERICAN: >60 ML/MIN/1.73M2
GLUCOSE BLD-MCNC: 112 MG/DL (ref 70–99)
HCT VFR BLD CALC: 45.4 % (ref 42–52)
HEMOGLOBIN: 14.5 GM/DL (ref 13.5–18)
IMMATURE NEUTROPHIL %: 0.3 % (ref 0–0.43)
LYMPHOCYTES ABSOLUTE: 1.2 K/CU MM
LYMPHOCYTES RELATIVE PERCENT: 14.1 % (ref 24–44)
MCH RBC QN AUTO: 30.3 PG (ref 27–31)
MCHC RBC AUTO-ENTMCNC: 31.9 % (ref 32–36)
MCV RBC AUTO: 95 FL (ref 78–100)
MONOCYTES ABSOLUTE: 0.8 K/CU MM
MONOCYTES RELATIVE PERCENT: 9.5 % (ref 0–4)
NUCLEATED RBC %: 0 %
PDW BLD-RTO: 13.3 % (ref 11.7–14.9)
PLATELET # BLD: 233 K/CU MM (ref 140–440)
PMV BLD AUTO: 11 FL (ref 7.5–11.1)
POTASSIUM SERPL-SCNC: 3.9 MMOL/L (ref 3.5–5.1)
RBC # BLD: 4.78 M/CU MM (ref 4.6–6.2)
SEGMENTED NEUTROPHILS ABSOLUTE COUNT: 6.6 K/CU MM
SEGMENTED NEUTROPHILS RELATIVE PERCENT: 75.9 % (ref 36–66)
SODIUM BLD-SCNC: 142 MMOL/L (ref 135–145)
TOTAL IMMATURE NEUTOROPHIL: 0.03 K/CU MM
TOTAL NUCLEATED RBC: 0 K/CU MM
TOTAL PROTEIN: 6.4 GM/DL (ref 6.4–8.2)
WBC # BLD: 8.7 K/CU MM (ref 4–10.5)

## 2020-11-03 PROCEDURE — 88305 TISSUE EXAM BY PATHOLOGIST: CPT | Performed by: PATHOLOGY

## 2020-11-03 PROCEDURE — 2709999900 HC NON-CHARGEABLE SUPPLY: Performed by: SURGERY

## 2020-11-03 PROCEDURE — 6360000002 HC RX W HCPCS: Performed by: SURGERY

## 2020-11-03 PROCEDURE — 85025 COMPLETE CBC W/AUTO DIFF WBC: CPT

## 2020-11-03 PROCEDURE — 99253 IP/OBS CNSLTJ NEW/EST LOW 45: CPT | Performed by: SURGERY

## 2020-11-03 PROCEDURE — 6360000002 HC RX W HCPCS: Performed by: PHYSICIAN ASSISTANT

## 2020-11-03 PROCEDURE — 94761 N-INVAS EAR/PLS OXIMETRY MLT: CPT

## 2020-11-03 PROCEDURE — 36415 COLL VENOUS BLD VENIPUNCTURE: CPT

## 2020-11-03 PROCEDURE — 76705 ECHO EXAM OF ABDOMEN: CPT

## 2020-11-03 PROCEDURE — 96375 TX/PRO/DX INJ NEW DRUG ADDON: CPT

## 2020-11-03 PROCEDURE — 6360000002 HC RX W HCPCS

## 2020-11-03 PROCEDURE — 80053 COMPREHEN METABOLIC PANEL: CPT

## 2020-11-03 PROCEDURE — 3700000000 HC ANESTHESIA ATTENDED CARE: Performed by: SURGERY

## 2020-11-03 PROCEDURE — 6360000002 HC RX W HCPCS: Performed by: NURSE ANESTHETIST, CERTIFIED REGISTERED

## 2020-11-03 PROCEDURE — 2580000003 HC RX 258: Performed by: NURSE ANESTHETIST, CERTIFIED REGISTERED

## 2020-11-03 PROCEDURE — 3609012400 HC EGD TRANSORAL BIOPSY SINGLE/MULTIPLE: Performed by: SURGERY

## 2020-11-03 PROCEDURE — C9113 INJ PANTOPRAZOLE SODIUM, VIA: HCPCS | Performed by: SURGERY

## 2020-11-03 PROCEDURE — G0378 HOSPITAL OBSERVATION PER HR: HCPCS

## 2020-11-03 PROCEDURE — 43239 EGD BIOPSY SINGLE/MULTIPLE: CPT | Performed by: SURGERY

## 2020-11-03 PROCEDURE — 87077 CULTURE AEROBIC IDENTIFY: CPT

## 2020-11-03 PROCEDURE — 96376 TX/PRO/DX INJ SAME DRUG ADON: CPT

## 2020-11-03 PROCEDURE — 6360000002 HC RX W HCPCS: Performed by: NURSE PRACTITIONER

## 2020-11-03 PROCEDURE — 2580000003 HC RX 258: Performed by: NURSE PRACTITIONER

## 2020-11-03 PROCEDURE — 2580000003 HC RX 258: Performed by: SURGERY

## 2020-11-03 PROCEDURE — 3700000001 HC ADD 15 MINUTES (ANESTHESIA): Performed by: SURGERY

## 2020-11-03 PROCEDURE — 2500000003 HC RX 250 WO HCPCS: Performed by: NURSE ANESTHETIST, CERTIFIED REGISTERED

## 2020-11-03 RX ORDER — MORPHINE SULFATE 2 MG/ML
2 INJECTION, SOLUTION INTRAMUSCULAR; INTRAVENOUS ONCE
Status: COMPLETED | OUTPATIENT
Start: 2020-11-03 | End: 2020-11-03

## 2020-11-03 RX ORDER — PROPOFOL 10 MG/ML
INJECTION, EMULSION INTRAVENOUS PRN
Status: DISCONTINUED | OUTPATIENT
Start: 2020-11-03 | End: 2020-11-03 | Stop reason: SDUPTHER

## 2020-11-03 RX ORDER — LIDOCAINE HYDROCHLORIDE 20 MG/ML
INJECTION, SOLUTION INFILTRATION; PERINEURAL PRN
Status: DISCONTINUED | OUTPATIENT
Start: 2020-11-03 | End: 2020-11-03 | Stop reason: SDUPTHER

## 2020-11-03 RX ORDER — SODIUM CHLORIDE, SODIUM LACTATE, POTASSIUM CHLORIDE, CALCIUM CHLORIDE 600; 310; 30; 20 MG/100ML; MG/100ML; MG/100ML; MG/100ML
INJECTION, SOLUTION INTRAVENOUS CONTINUOUS PRN
Status: DISCONTINUED | OUTPATIENT
Start: 2020-11-03 | End: 2020-11-03 | Stop reason: SDUPTHER

## 2020-11-03 RX ORDER — KETOROLAC TROMETHAMINE 30 MG/ML
30 INJECTION, SOLUTION INTRAMUSCULAR; INTRAVENOUS ONCE
Status: COMPLETED | OUTPATIENT
Start: 2020-11-03 | End: 2020-11-03

## 2020-11-03 RX ADMIN — SODIUM CHLORIDE, PRESERVATIVE FREE 10 ML: 5 INJECTION INTRAVENOUS at 03:54

## 2020-11-03 RX ADMIN — PROPOFOL 50 MG: 10 INJECTION, EMULSION INTRAVENOUS at 14:00

## 2020-11-03 RX ADMIN — METOCLOPRAMIDE 10 MG: 5 INJECTION, SOLUTION INTRAMUSCULAR; INTRAVENOUS at 11:15

## 2020-11-03 RX ADMIN — PROPOFOL 50 MG: 10 INJECTION, EMULSION INTRAVENOUS at 14:02

## 2020-11-03 RX ADMIN — MORPHINE SULFATE 4 MG: 4 INJECTION, SOLUTION INTRAMUSCULAR; INTRAVENOUS at 03:53

## 2020-11-03 RX ADMIN — PROPOFOL 50 MG: 10 INJECTION, EMULSION INTRAVENOUS at 14:04

## 2020-11-03 RX ADMIN — MORPHINE SULFATE 4 MG: 4 INJECTION, SOLUTION INTRAMUSCULAR; INTRAVENOUS at 20:20

## 2020-11-03 RX ADMIN — SODIUM CHLORIDE, PRESERVATIVE FREE 10 ML: 5 INJECTION INTRAVENOUS at 11:16

## 2020-11-03 RX ADMIN — MORPHINE SULFATE 4 MG: 4 INJECTION, SOLUTION INTRAMUSCULAR; INTRAVENOUS at 22:59

## 2020-11-03 RX ADMIN — METOCLOPRAMIDE 10 MG: 5 INJECTION, SOLUTION INTRAMUSCULAR; INTRAVENOUS at 04:05

## 2020-11-03 RX ADMIN — PROPOFOL 50 MG: 10 INJECTION, EMULSION INTRAVENOUS at 13:56

## 2020-11-03 RX ADMIN — LIDOCAINE HYDROCHLORIDE 100 MG: 20 INJECTION, SOLUTION INFILTRATION; PERINEURAL at 13:56

## 2020-11-03 RX ADMIN — PANTOPRAZOLE SODIUM 40 MG: 40 INJECTION, POWDER, FOR SOLUTION INTRAVENOUS at 11:15

## 2020-11-03 RX ADMIN — METOCLOPRAMIDE 10 MG: 5 INJECTION, SOLUTION INTRAMUSCULAR; INTRAVENOUS at 23:04

## 2020-11-03 RX ADMIN — METOCLOPRAMIDE 10 MG: 5 INJECTION, SOLUTION INTRAMUSCULAR; INTRAVENOUS at 17:07

## 2020-11-03 RX ADMIN — PROPOFOL 50 MG: 10 INJECTION, EMULSION INTRAVENOUS at 13:57

## 2020-11-03 RX ADMIN — PROPOFOL 50 MG: 10 INJECTION, EMULSION INTRAVENOUS at 14:06

## 2020-11-03 RX ADMIN — SODIUM CHLORIDE, POTASSIUM CHLORIDE, SODIUM LACTATE AND CALCIUM CHLORIDE: 600; 310; 30; 20 INJECTION, SOLUTION INTRAVENOUS at 13:50

## 2020-11-03 RX ADMIN — KETOROLAC TROMETHAMINE 30 MG: 30 INJECTION, SOLUTION INTRAMUSCULAR; INTRAVENOUS at 05:43

## 2020-11-03 RX ADMIN — SODIUM CHLORIDE, PRESERVATIVE FREE 10 ML: 5 INJECTION INTRAVENOUS at 04:05

## 2020-11-03 RX ADMIN — SODIUM CHLORIDE, PRESERVATIVE FREE 10 ML: 5 INJECTION INTRAVENOUS at 20:10

## 2020-11-03 RX ADMIN — ONDANSETRON 4 MG: 2 INJECTION INTRAMUSCULAR; INTRAVENOUS at 03:53

## 2020-11-03 RX ADMIN — SODIUM CHLORIDE, SODIUM LACTATE, POTASSIUM CHLORIDE, CALCIUM CHLORIDE AND DEXTROSE MONOHYDRATE: 5; 600; 310; 30; 20 INJECTION, SOLUTION INTRAVENOUS at 23:00

## 2020-11-03 RX ADMIN — MORPHINE SULFATE 2 MG: 2 INJECTION, SOLUTION INTRAMUSCULAR; INTRAVENOUS at 11:15

## 2020-11-03 RX ADMIN — PANTOPRAZOLE SODIUM 40 MG: 40 INJECTION, POWDER, FOR SOLUTION INTRAVENOUS at 20:09

## 2020-11-03 ASSESSMENT — PAIN DESCRIPTION - LOCATION
LOCATION: ABDOMEN

## 2020-11-03 ASSESSMENT — PAIN SCALES - GENERAL
PAINLEVEL_OUTOF10: 3
PAINLEVEL_OUTOF10: 6
PAINLEVEL_OUTOF10: 8
PAINLEVEL_OUTOF10: 6
PAINLEVEL_OUTOF10: 6
PAINLEVEL_OUTOF10: 10
PAINLEVEL_OUTOF10: 7
PAINLEVEL_OUTOF10: 10
PAINLEVEL_OUTOF10: 0

## 2020-11-03 ASSESSMENT — PAIN DESCRIPTION - PROGRESSION
CLINICAL_PROGRESSION: GRADUALLY IMPROVING
CLINICAL_PROGRESSION: GRADUALLY IMPROVING

## 2020-11-03 ASSESSMENT — PAIN DESCRIPTION - ONSET: ONSET: ON-GOING

## 2020-11-03 ASSESSMENT — PAIN DESCRIPTION - DESCRIPTORS
DESCRIPTORS: ACHING;SHARP
DESCRIPTORS: ACHING;SHARP
DESCRIPTORS: SHARP;ACHING

## 2020-11-03 ASSESSMENT — PAIN DESCRIPTION - PAIN TYPE
TYPE: ACUTE PAIN

## 2020-11-03 ASSESSMENT — PAIN DESCRIPTION - ORIENTATION
ORIENTATION: RIGHT;UPPER

## 2020-11-03 ASSESSMENT — PAIN DESCRIPTION - FREQUENCY
FREQUENCY: CONTINUOUS

## 2020-11-03 ASSESSMENT — LIFESTYLE VARIABLES: SMOKING_STATUS: 1

## 2020-11-03 ASSESSMENT — PAIN - FUNCTIONAL ASSESSMENT: PAIN_FUNCTIONAL_ASSESSMENT: PREVENTS OR INTERFERES SOME ACTIVE ACTIVITIES AND ADLS

## 2020-11-03 NOTE — ANESTHESIA PRE PROCEDURE
Department of Anesthesiology  Preprocedure Note       Name:  Davidson Boggs   Age:  39 y.o.  :  1975                                          MRN:  [id number]         Date:  11/3/2020      Surgeon: Ramona De Leon):  Ana Mercedes MD    Procedure: Procedure(s):  EGD ESOPHAGOGASTRODUODENOSCOPY    Medications prior to admission:   Prior to Admission medications    Medication Sig Start Date End Date Taking? Authorizing Provider   metoclopramide (REGLAN) 10 MG tablet Take 1 tablet by mouth 3 times daily as needed (abdominal pain, nausea) 20   Raj Willis DO   dicyclomine (BENTYL) 10 MG capsule Take 1 capsule by mouth 3 times daily As needed for abdominal pain 20   Tramaine Michael DO   oxyCODONE-acetaminophen (PERCOCET) 5-325 MG per tablet Take 1 tablet by mouth every 8 hours as needed for Pain for up to 9 doses. Intended supply: 3 days. Take lowest dose possible to manage pain 20  Tramaine Gayle DO   pantoprazole (PROTONIX) 40 MG tablet Take 1 tablet by mouth every morning (before breakfast) 10/30/20   Hosey Homans, DO       Current medications:    No current facility-administered medications for this visit. No current outpatient medications on file.      Facility-Administered Medications Ordered in Other Visits   Medication Dose Route Frequency Provider Last Rate Last Dose    sodium chloride flush 0.9 % injection 10 mL  10 mL Intravenous 2 times per day Katherene Ace, APRN - CNP   10 mL at 20 1116    sodium chloride flush 0.9 % injection 10 mL  10 mL Intravenous PRN Katherene Ace, APRN - CNP   10 mL at 20 0405    acetaminophen (TYLENOL) tablet 650 mg  650 mg Oral Q6H PRN Katherene Ace, APRN - CNP        Or    acetaminophen (TYLENOL) suppository 650 mg  650 mg Rectal Q6H PRN Katherene Ace, APRN - CNP        polyethylene glycol (GLYCOLAX) packet 17 g  17 g Oral Daily PRN Katherene Ace, APRN - CNP        promethazine (PHENERGAN) tablet 12.5 mg  12.5 mg Oral Q6H PRN AICHA URSULA Campbell CNP        Or    ondansetron (ZOFRAN) injection 4 mg  4 mg Intravenous Q6H PRN URSULA Weber CNP   4 mg at 11/03/20 0353    enoxaparin (LOVENOX) injection 40 mg  40 mg Subcutaneous Daily URSULA Weber CNP        morphine sulfate (PF) injection 4 mg  4 mg Intravenous Q2H PRN Aye Beltre MD   4 mg at 11/03/20 0353    dextrose 5 % in lactated ringers infusion   Intravenous Continuous Aye Beltre  mL/hr at 11/02/20 1701      pantoprazole (PROTONIX) injection 40 mg  40 mg Intravenous BID Aye Beltre MD   40 mg at 11/03/20 1115    metoclopramide (REGLAN) injection 10 mg  10 mg Intravenous Q6H Aye Beltre MD   10 mg at 11/03/20 1115       Allergies: Allergies   Allergen Reactions    Vicodin [Hydrocodone-Acetaminophen] Other (See Comments)     Patient states that it is tolerable but state that makes his skin crawl       Problem List:    Patient Active Problem List   Diagnosis Code    Perforated sigmoid colon ([facility] [address] K63.1    Perforated diverticulum K57.80    S/P laparoscopic-assisted sigmoidectomy Z90.49    Abdominal pain R10.9       Past Medical History:        Diagnosis Date    Diverticulitis     Gall stone     Kidney stone     \"had kidney stone now- had them since age 17-had surgery and able to pass some\" follows with dr Brennan Gip Kidney stone        Past Surgical History:        Procedure Laterality Date    KIDNEY STONE SURGERY      \"had surgery multiple times for kidney stones last time 2018?  SMALL INTESTINE SURGERY N/A 10/6/2020    BOWEL RESECTION SIGMOID LAPAROSCOPIC ROBOTIC performed by Aye Beltre MD at [facility]  2012    left wrist\"no metal\"       Social History:    Social History     Tobacco Use    Smoking status: Never Smoker    Smokeless tobacco: Never Used   Substance Use Topics    Alcohol use: No                                Counseling given: Not Answered      Vital Signs (Current):  There were no vitals filed for this visit. BP Readings from Last 3 Encounters:   11/03/20 116/74   11/02/20 137/77   11/02/20 (!) 143/98       NPO Status:                                                                                 BMI:   Wt Readings from Last 3 Encounters:   11/02/20 202 lb 11.2 oz (91.9 kg)   11/02/20 198 lb (89.8 kg)   11/02/20 198 lb (89.8 kg)     There is no height or weight on file to calculate BMI.    CBC:   Lab Results   Component Value Date    WBC 8.7 11/03/2020    RBC 4.78 11/03/2020    HGB 14.5 11/03/2020    HCT 45.4 11/03/2020    MCV 95.0 11/03/2020    RDW 13.3 11/03/2020     11/03/2020       CMP:   Lab Results   Component Value Date     11/03/2020    K 3.9 11/03/2020     11/03/2020    CO2 27 11/03/2020    BUN 5 11/03/2020    CREATININE 0.8 11/03/2020    GFRAA >60 11/03/2020    LABGLOM >60 11/03/2020    GLUCOSE 112 11/03/2020    PROT 6.4 11/03/2020    CALCIUM 9.3 11/03/2020    BILITOT 5.2 11/03/2020    ALKPHOS 142 11/03/2020     11/03/2020     11/03/2020       POC Tests: No results for input(s): POCGLU, POCNA, POCK, POCCL, POCBUN, POCHEMO, POCHCT in the last 72 hours.     Coags: No results found for: PROTIME, INR, APTT    HCG (If Applicable): No results found for: PREGTESTUR, PREGSERUM, HCG, HCGQUANT     ABGs: No results found for: PHART, PO2ART, SSE7OFU, BRV2RIK, BEART, X2FJOYQQ     Type & Screen (If Applicable):  No results found for: LABABO, LABRH    Drug/Infectious Status (If Applicable):  No results found for: HIV, HEPCAB    COVID-19 Screening (If Applicable):   Lab Results   Component Value Date    COVID19 NOT DETECTED 11/02/2020    COVID19 NOT DETECTED 09/28/2020         Anesthesia Evaluation  Patient summary reviewed and Nursing notes reviewed  Airway: Mallampati: II  TM distance: >3 FB   Neck ROM: full  Mouth opening: > = 3 FB Dental: normal exam         Pulmonary:normal exam    (+) current smoker                          ROS comment: thc    Cardiovascular:  Exercise tolerance: good (>4 METS),           Rhythm: regular  Rate: normal                    Neuro/Psych:   Negative Neuro/Psych ROS              GI/Hepatic/Renal:   (+) GERD:, renal disease: kidney stones,           Endo/Other: Negative Endo/Other ROS             Pt had no PAT visit       Abdominal:           Vascular: negative vascular ROS. Anesthesia Plan      MAC     ASA 2 - emergent     (  covid - 9/28    In [facility] ed this am ? )  Induction: intravenous. Anesthetic plan and risks discussed with patient. Plan discussed with CRNA.     Attending anesthesiologist reviewed and agrees with Pre Eval content              URSULA Mueller - CRNA   11/3/2020

## 2020-11-03 NOTE — CONSULTS
days. Take lowest dose possible to manage pain 11/1/20 11/8/20  Tramaine Carter DO   pantoprazole (PROTONIX) 40 MG tablet Take 1 tablet by mouth every morning (before breakfast) 10/30/20   Mabel Mayo DO        [facility] Meds:    Current Facility-Administered Medications   Medication Dose Route Frequency Provider Last Rate Last Dose    morphine (PF) injection 2 mg  2 mg Intravenous Once Farzana Rivas PA-C        sodium chloride flush 0.9 % injection 10 mL  10 mL Intravenous 2 times per day URSULA Barcenas - CNP   10 mL at 11/02/20 2158    sodium chloride flush 0.9 % injection 10 mL  10 mL Intravenous PRN URSULA Barcenas - CNP   10 mL at 11/03/20 0405    acetaminophen (TYLENOL) tablet 650 mg  650 mg Oral Q6H PRN Amrik Smith, APRN - CNP        Or    acetaminophen (TYLENOL) suppository 650 mg  650 mg Rectal Q6H PRN Amrik Smith, APRN - CNP        polyethylene glycol (GLYCOLAX) packet 17 g  17 g Oral Daily PRN Amrik Smith APRN - ANNE-MARIE        promethazine (PHENERGAN) tablet 12.5 mg  12.5 mg Oral Q6H PRN Amrik Smith APRN - CNP        Or    ondansetron (ZOFRAN) injection 4 mg  4 mg Intravenous Q6H PRN Amrik Smith APRN - CNP   4 mg at 11/03/20 0353    enoxaparin (LOVENOX) injection 40 mg  40 mg Subcutaneous Daily URSULA Barcenas - CNP        morphine sulfate (PF) injection 4 mg  4 mg Intravenous Q2H PRN Jared Garcia MD   4 mg at 11/03/20 0353    dextrose 5 % in lactated ringers infusion   Intravenous Continuous Jared Garcia  mL/hr at 11/02/20 1701      pantoprazole (PROTONIX) injection 40 mg  40 mg Intravenous BID Jared Garcia MD   40 mg at 11/02/20 2158    metoclopramide (REGLAN) injection 10 mg  10 mg Intravenous Q6H Jared Garcia MD   10 mg at 11/03/20 0405      dextrose 5% in lactated ringers 125 mL/hr at 11/02/20 1701       Social History / Family History:     Social History     Socioeconomic History    Marital status:      Spouse name: Not on file    Number of children: Not on file    Years of education: Not on file    Highest education level: Not on file   Occupational History    Not on file   Social Needs    Financial resource strain: Not on file    Food insecurity     Worry: Not on file     Inability: Not on file    Transportation needs     Medical: Not on file     Non-medical: Not on file   Tobacco Use    Smoking status: Never Smoker    Smokeless tobacco: Never Used   Substance and Sexual Activity    Alcohol use: No    Drug use: Yes     Types: Marijuana     Comment: \"last used last week of Sept 2020\"    Sexual activity: Never   Lifestyle    Physical activity     Days per week: Not on file     Minutes per session: Not on file    Stress: Not on file   Relationships    Social connections     Talks on phone: Not on file     Gets together: Not on file     Attends Judaism service: Not on file     Active member of club or organization: Not on file     Attends meetings of clubs or organizations: Not on file     Relationship status: Not on file    Intimate partner violence     Fear of current or ex partner: Not on file     Emotionally abused: Not on file     Physically abused: Not on file     Forced sexual activity: Not on file   Other Topics Concern    Not on file   Social History Narrative    Not on file      Family History   Problem Relation Age of Onset    Cancer Mother         ovarian cancer     Early Death Mother     Kidney stones Father     Diabetes Father     Diabetes Brother     otherwise irrelevant to this surgical issue. Review of Systems:  Constitutional: Negative for fever, chills, diaphoresis, appetite change and fatigue. HENT: Negative for sore throat, trouble swallowing and voice change. Respiratory: Negative for cough, positive for shortness of breath no wheezing. Cardiovascular: Negative for chest pain positive for SOB with one flight of stairs exertion, no pitting LE edema.    Gastrointestinal: Positive for abdominal upper abdominal pain, Negative for nausea, vomiting, abdominal distention, constipation, no diarrhea, blood in stool, anal bleeding or rectal pain. Musculoskeletal: Negative for joint swelling and arthralgias. Skin: Warm and dry, well perfused. Neurological: Negative for seizures, syncope, speech difficulty and weakness. Hematological/Lymphatic: Negative for adenopathy. No history of DVT/PE. Does not bruise/bleed easily. Psychiatric/Behavioral: Negative for agitation. All others reviewed and negative. Physical Exam:  Vital Signs:   Vitals:    11/03/20 0353   BP: 136/88   Pulse: 105   Resp: 20   Temp: 97.1 °F (36.2 °C)   SpO2: 94%     Body mass index is 28.27 kg/m². General appearance: Pt Alert and oriented, in no apparent acute distress. HEENT:  RYAN, Conjunctiva clear. EOMI, No JVDs, Bruits, Megalies: neither thyroid nor lymph nodes. Lungs: Clear to auscultation bilaterally. Heart: Regular rate and rhythm, S1, S2 normal, no murmur, rub or gallop. Abdomen: Epigastrium tender., LOWER ABDOMEN AND PELVIS non tender whatsoever!! Non distended. Positive bowel sounds. No hernias noted, no masses palpable. Extremities: Warm, well perfused, no cyanosis or edema. Skin: Skin color, texture, turgor normal.  Neurologic: Grossly normal, Cranial nerves from II to XII intact, Deep tendon reflexes normal.  Lymph nodes: No palpable LNs, Cervical, groins, abdominal.  Musculoskeletal: Bilateral Upper and lower extremities ROM within normal limits. Radiologic / Imaging / TESTING  Impression    1. The appearance at the sigmoid anastomosis is most likely redundant colon,    though once again a contained perforation cannot be excluded.  The presence    or absence of oral contrast in this collection cannot differentiate these 2    findings. 2. Cholelithiasis again seen with pericholecystic fat stranding. I reviewed.      Labs:    Recent Results (from the past 24 hour(s))   Lactate, Sepsis    Collection Time: 11/02/20  9:15 AM   Result Value Ref Range    Lactic Acid, Sepsis 2.1 (HH) 0.5 - 1.9 mMOL/L   EKG 12 Lead    Collection Time: 11/02/20  9:50 AM   Result Value Ref Range    Ventricular Rate 85 BPM    Atrial Rate 85 BPM    P-R Interval 112 ms    QRS Duration 100 ms    Q-T Interval 406 ms    QTc Calculation (Bazett) 483 ms    P Axis 66 degrees    R Axis 57 degrees    T Axis 4 degrees    Diagnosis        Poor data quality, interpretation may be adversely affected  Normal sinus rhythm with sinus arrhythmia  Normal ECG  When compared with ECG of 30-OCT-2020 07:10,  No significant change was found    Confirmed by [facility] CRISTIN BROWN ([id number]) on 11/2/2020 12:05:51 PM     COVID-19    Collection Time: 11/02/20 10:00 AM    Specimen: Nasopharyngeal Swab   Result Value Ref Range    Source NASOPHARYNGEAL SWAB     SARS-CoV-2, NAAT NOT DETECTED    Lactate, Sepsis    Collection Time: 11/02/20 11:00 AM   Result Value Ref Range    Lactic Acid, Sepsis 1.9 0.5 - 1.9 mMOL/L   Urine Drug Screen    Collection Time: 11/02/20 11:00 AM   Result Value Ref Range    Cannabinoid Scrn, Ur UNCONFIRMED POSITIVE (A) NEGATIVE    Amphetamines NEGATIVE NEGATIVE    Cocaine Metabolite NEGATIVE NEGATIVE    Benzodiazepine Screen, Urine NEGATIVE NEGATIVE    Barbiturate Screen, Ur NEGATIVE NEGATIVE    Opiates, Urine UNCONFIRMED POSITIVE (A) NEGATIVE    Phencyclidine, Urine NEGATIVE NEGATIVE    Oxycodone UNCONFIRMED POSITIVE (A) NEGATIVE   Urinalysis (Lab)    Collection Time: 11/02/20 11:00 AM   Result Value Ref Range    Color, UA ORANGE (A) YELLOW    Clarity, UA CLEAR CLEAR    Glucose, Urine 100 (A) NEGATIVE MG/DL    Bilirubin Urine SMALL (A) NEGATIVE MG/DL    Ketones, Urine LARGE (A) NEGATIVE MG/DL    Specific Gravity, UA 1.025 1.001 - 1.035    Blood, Urine TRACE (A) NEGATIVE    pH, Urine 5.0 5.0 - 8.0    Protein, UA TRACE (A) NEGATIVE MG/DL    Urobilinogen, Urine 1.0 0.2 - 1.0 MG/DL    Nitrite Urine, Quantitative NEGATIVE NEGATIVE    Leukocyte Esterase, Urine NEGATIVE NEGATIVE    Volume, (UVOL) 12 10 - 12 ML    Ictotest NEGATIVE     RBC, UA 2 TO 3 0 - 3 /HPF    WBC, UA 0 TO 1 0 - 2 /HPF    Epithelial Cells, UA NO CELLS SEEN /HPF    Cast Type NO CAST FORMS SEEN NO CAST FORMS SEEN /HPF    Bacteria, UA OCCASIONAL (A) NEGATIVE /HPF    Crystal Type NONE SEEN NEGATIVE /HPF    Mucus, UA NEGATIVE NEGATIVE HPF       Diagnosis:  Patient Active Problem List   Diagnosis    Perforated sigmoid colon (HCC)    Perforated diverticulum    S/P laparoscopic-assisted sigmoidectomy    Abdominal pain       Assessment & plan:    Barby Gomez is a very pleasant 39 y.o. male who presents with pain in the Epigastrium and is acute, worsening, dull and stabbing compared to patient's normal condition. It is severe in intensity for a duration of 3 days and is intermittent with modifying factors increased by or worse with eating hot foods. He just had a sigmoidectomy less than a month ago, for perforated sigmoid diverticulitis, and recovered without problems, will do an EGD, and treat, IF COMPLETELY normal, will give him fleet enemas and do a colonoscopy tomorrow / Gastrografin enema. Cholelithiasis, ? Acute calculous cholecystitis? Will recheck the [address] [place] [address]. Continue NPO/Bowel rest, IV Fluids, Pain control, Nausea control. Addendum @ 2:13 PM  Impression    Again identified is gallbladder wall thickening, with sludge is well as a    small stone noted. Julia Everardo is a positive sonographic Mendoza's sign.  On the CT    examination the previous day, pericholecystic inflammatory change is also    noted, findings of which all felt consistent with acute cholecystitis.       NORMAL EGD, NO ulcers, will proceed with joselito bustamante in am.    Thank you doctor Lorenzo Goodrich MD very much for your consultation and for the opportunity to take care of Mr Barby Gomez with you, I'll follow along with you and I'll update you on any new events in his care.    ____________________________________________    Zoila Ventura Anibal Perez MD, [address], [address]    11/3/2020  7:48 AM      Patient was seen with total face to face time of 45 minutes. More than 50% of this visit was counseling and education as above in my assessment and plan section of my note.

## 2020-11-03 NOTE — PROGRESS NOTES
[address]    Hospitalist Progress Note      Name:  Fadumo Gregory /Age/Sex: 1975  (39 y.o. male)   MRN & CSN:  [id number] & [id number] Admission Date/Time: 2020 12:20 PM   Location:  [address] [place] [address] PCP: No primary care provider on file. Hospital Day: 2    Assessment and Plan:   Fadumo Gregory is a 39 y.o.  male  who presents with abdominal pain     Intractable abdominal pain. Possible Acute cholecystitis     Scheduled for EGD today   US of the gallbladder -showing cholecystitis   May start on Zosyn, Protonix, pain control   Surgery consulted     Hx of perforated diverticulum with abscess s/p sigmoidectomy 10/3/2020. CT Abd today essentially non acute. CT 10/30 cholelithiasis and suspected biliary sludge, postsurgical     Diet Diet NPO Effective Now Exceptions are: Ice Chips   DVT Prophylaxis [] Lovenox, []  Heparin, [] SCDs, []No VTE prophylaxis, patient ambulating   GI Prophylaxis [] PPI, [] H2 Blocker, [] No GI prophylaxis, patient is receiving diet/Tube Feeds   Code Status Full Code   Disposition Patient requires continued admission due to intractable abdominal pain   MDM [] Low, [x] Moderate,[]  High     History of Present Illness: Subjective     Patient Seen & Examined at the bedside     Resting in bed with no distress while on room air  Abdominal pain has improved  No nausea or vomiting at this time    Ten point ROS reviewed negative, unless as noted above    Objective: Intake/Output Summary (Last 24 hours) at 11/3/2020 1437  Last data filed at 11/3/2020 1415  Gross per 24 hour   Intake 373.75 ml   Output --   Net 373.75 ml      Vitals:   Vitals:    20 0828   BP: 116/74   Pulse: 70   Resp: 20   Temp: 98.1 °F (36.7 °C)   SpO2: 97%     Physical Exam:    GEN Awake male, resting in bed in no apparent distress. Appears given age. HENT Mucous membranes are moist.   RESP Clear to auscultation, no wheezes, rales or rhonchi. CARDIO/VASC -S1/S2 auscultated.  Regular rate without appreciable murmurs, rubs, or gallops. Peripheral pulses equal bilaterally and palpable. No peripheral edema. GI Abdomen is soft without significant tenderness, masses, or guarding. Bowel sounds are normoactive. Rectal exam deferred.  Donnelly catheter is not present.     Medications:   Medications:    sodium chloride flush  10 mL Intravenous 2 times per day    enoxaparin  40 mg Subcutaneous Daily    pantoprazole  40 mg Intravenous BID    metoclopramide  10 mg Intravenous Q6H      Infusions:    dextrose 5% in lactated ringers 125 mL/hr at 11/02/20 1701     PRN Meds: sodium chloride flush, 10 mL, PRN  acetaminophen, 650 mg, Q6H PRN    Or  acetaminophen, 650 mg, Q6H PRN  polyethylene glycol, 17 g, Daily PRN  promethazine, 12.5 mg, Q6H PRN    Or  ondansetron, 4 mg, Q6H PRN  morphine, 4 mg, Q2H PRN          Electronically signed by Johnson Youngblood MD on 11/3/2020 at 2:37 PM

## 2020-11-03 NOTE — ANESTHESIA POSTPROCEDURE EVALUATION
Department of Anesthesiology  Postprocedure Note    Patient: William Jin  MRN: [id number]  YOB: 1975  Date of evaluation: 11/3/2020  Time:  2:15 PM     Procedure Summary     Date:  11/03/20 Room / Location:  [address]    Anesthesia Start:  1350 Anesthesia Stop:  5096    Procedure:  EGD BIOPSY (N/A ) Diagnosis:  (abdominal pain)    Surgeon:  Jody Bailey MD Responsible Provider:  URSULA Cobb CRNA    Anesthesia Type:  MAC ASA Status:  2 - Emergent          Anesthesia Type: MAC    Kwesi Phase I:      Kwesi Phase II:      Last vitals: Reviewed and per EMR flowsheets.        Anesthesia Post Evaluation    Patient location during evaluation: bedside  Patient participation: complete - patient participated  Level of consciousness: awake and alert  Pain score: 0  Airway patency: patent  Nausea & Vomiting: no vomiting and no nausea  Complications: no  Cardiovascular status: blood pressure returned to baseline and hemodynamically stable  Respiratory status: acceptable, room air, spontaneous ventilation and nonlabored ventilation  Hydration status: stable

## 2020-11-04 ENCOUNTER — ANESTHESIA EVENT (OUTPATIENT)
Dept: OPERATING ROOM | Age: 45
DRG: 419 | End: 2020-11-04
Payer: COMMERCIAL

## 2020-11-04 ENCOUNTER — ANESTHESIA (OUTPATIENT)
Dept: OPERATING ROOM | Age: 45
DRG: 419 | End: 2020-11-04
Payer: COMMERCIAL

## 2020-11-04 ENCOUNTER — APPOINTMENT (OUTPATIENT)
Dept: GENERAL RADIOLOGY | Age: 45
DRG: 419 | End: 2020-11-04
Attending: HOSPITALIST
Payer: COMMERCIAL

## 2020-11-04 VITALS
SYSTOLIC BLOOD PRESSURE: 110 MMHG | OXYGEN SATURATION: 100 % | DIASTOLIC BLOOD PRESSURE: 71 MMHG | TEMPERATURE: 99.6 F | RESPIRATION RATE: 4 BRPM

## 2020-11-04 LAB
ALBUMIN SERPL-MCNC: 3.9 GM/DL (ref 3.4–5)
ALP BLD-CCNC: 204 IU/L (ref 40–129)
ALT SERPL-CCNC: 182 U/L (ref 10–40)
AST SERPL-CCNC: 77 IU/L (ref 15–37)
BASOPHILS ABSOLUTE: 0 K/CU MM
BASOPHILS RELATIVE PERCENT: 0.1 % (ref 0–1)
BILIRUB SERPL-MCNC: 3.2 MG/DL (ref 0–1)
BILIRUBIN DIRECT: 2.1 MG/DL (ref 0–0.3)
BILIRUBIN, INDIRECT: 1.1 MG/DL (ref 0–0.7)
CLOTEST: NEGATIVE
DIFFERENTIAL TYPE: ABNORMAL
EOSINOPHILS ABSOLUTE: 0 K/CU MM
EOSINOPHILS RELATIVE PERCENT: 0.2 % (ref 0–3)
HCT VFR BLD CALC: 46.1 % (ref 42–52)
HEMOGLOBIN: 14.9 GM/DL (ref 13.5–18)
IMMATURE NEUTROPHIL %: 0.3 % (ref 0–0.43)
LYMPHOCYTES ABSOLUTE: 1 K/CU MM
LYMPHOCYTES RELATIVE PERCENT: 11.4 % (ref 24–44)
MCH RBC QN AUTO: 30.8 PG (ref 27–31)
MCHC RBC AUTO-ENTMCNC: 32.3 % (ref 32–36)
MCV RBC AUTO: 95.2 FL (ref 78–100)
MONOCYTES ABSOLUTE: 0.8 K/CU MM
MONOCYTES RELATIVE PERCENT: 8.7 % (ref 0–4)
NUCLEATED RBC %: 0 %
PDW BLD-RTO: 13.2 % (ref 11.7–14.9)
PLATELET # BLD: 214 K/CU MM (ref 140–440)
PMV BLD AUTO: 10.8 FL (ref 7.5–11.1)
RBC # BLD: 4.84 M/CU MM (ref 4.6–6.2)
SEGMENTED NEUTROPHILS ABSOLUTE COUNT: 6.9 K/CU MM
SEGMENTED NEUTROPHILS RELATIVE PERCENT: 79.3 % (ref 36–66)
TOTAL IMMATURE NEUTOROPHIL: 0.03 K/CU MM
TOTAL NUCLEATED RBC: 0 K/CU MM
TOTAL PROTEIN: 6.4 GM/DL (ref 6.4–8.2)
WBC # BLD: 8.7 K/CU MM (ref 4–10.5)

## 2020-11-04 PROCEDURE — C1726 CATH, BAL DIL, NON-VASCULAR: HCPCS | Performed by: SURGERY

## 2020-11-04 PROCEDURE — 0FT44ZZ RESECTION OF GALLBLADDER, PERCUTANEOUS ENDOSCOPIC APPROACH: ICD-10-PCS | Performed by: SURGERY

## 2020-11-04 PROCEDURE — C1769 GUIDE WIRE: HCPCS | Performed by: SURGERY

## 2020-11-04 PROCEDURE — 99232 SBSQ HOSP IP/OBS MODERATE 35: CPT | Performed by: SURGERY

## 2020-11-04 PROCEDURE — 2709999900 HC NON-CHARGEABLE SUPPLY: Performed by: SURGERY

## 2020-11-04 PROCEDURE — 2580000003 HC RX 258: Performed by: NURSE ANESTHETIST, CERTIFIED REGISTERED

## 2020-11-04 PROCEDURE — 85025 COMPLETE CBC W/AUTO DIFF WBC: CPT

## 2020-11-04 PROCEDURE — 36415 COLL VENOUS BLD VENIPUNCTURE: CPT

## 2020-11-04 PROCEDURE — 2500000003 HC RX 250 WO HCPCS: Performed by: SURGERY

## 2020-11-04 PROCEDURE — 80076 HEPATIC FUNCTION PANEL: CPT

## 2020-11-04 PROCEDURE — 3700000000 HC ANESTHESIA ATTENDED CARE: Performed by: SURGERY

## 2020-11-04 PROCEDURE — 2720000010 HC SURG SUPPLY STERILE: Performed by: SURGERY

## 2020-11-04 PROCEDURE — 6360000002 HC RX W HCPCS: Performed by: SURGERY

## 2020-11-04 PROCEDURE — 2580000003 HC RX 258: Performed by: SURGERY

## 2020-11-04 PROCEDURE — 6360000002 HC RX W HCPCS: Performed by: NURSE PRACTITIONER

## 2020-11-04 PROCEDURE — 47562 LAPAROSCOPIC CHOLECYSTECTOMY: CPT | Performed by: SURGERY

## 2020-11-04 PROCEDURE — G0378 HOSPITAL OBSERVATION PER HR: HCPCS

## 2020-11-04 PROCEDURE — 96376 TX/PRO/DX INJ SAME DRUG ADON: CPT

## 2020-11-04 PROCEDURE — 2580000003 HC RX 258: Performed by: NURSE PRACTITIONER

## 2020-11-04 PROCEDURE — 7100000001 HC PACU RECOVERY - ADDTL 15 MIN: Performed by: SURGERY

## 2020-11-04 PROCEDURE — 3600000014 HC SURGERY LEVEL 4 ADDTL 15MIN: Performed by: SURGERY

## 2020-11-04 PROCEDURE — 3600000004 HC SURGERY LEVEL 4 BASE: Performed by: SURGERY

## 2020-11-04 PROCEDURE — 88304 TISSUE EXAM BY PATHOLOGIST: CPT

## 2020-11-04 PROCEDURE — C9113 INJ PANTOPRAZOLE SODIUM, VIA: HCPCS | Performed by: SURGERY

## 2020-11-04 PROCEDURE — 3700000001 HC ADD 15 MINUTES (ANESTHESIA): Performed by: SURGERY

## 2020-11-04 PROCEDURE — 0DNU4ZZ RELEASE OMENTUM, PERCUTANEOUS ENDOSCOPIC APPROACH: ICD-10-PCS | Performed by: SURGERY

## 2020-11-04 PROCEDURE — 7100000000 HC PACU RECOVERY - FIRST 15 MIN: Performed by: SURGERY

## 2020-11-04 PROCEDURE — 6360000002 HC RX W HCPCS: Performed by: NURSE ANESTHETIST, CERTIFIED REGISTERED

## 2020-11-04 PROCEDURE — 2500000003 HC RX 250 WO HCPCS: Performed by: NURSE ANESTHETIST, CERTIFIED REGISTERED

## 2020-11-04 RX ORDER — MIDAZOLAM HYDROCHLORIDE 1 MG/ML
INJECTION INTRAMUSCULAR; INTRAVENOUS PRN
Status: DISCONTINUED | OUTPATIENT
Start: 2020-11-04 | End: 2020-11-04 | Stop reason: SDUPTHER

## 2020-11-04 RX ORDER — LIDOCAINE HYDROCHLORIDE 20 MG/ML
INJECTION, SOLUTION INTRAVENOUS PRN
Status: DISCONTINUED | OUTPATIENT
Start: 2020-11-04 | End: 2020-11-04 | Stop reason: SDUPTHER

## 2020-11-04 RX ORDER — ROCURONIUM BROMIDE 10 MG/ML
INJECTION, SOLUTION INTRAVENOUS PRN
Status: DISCONTINUED | OUTPATIENT
Start: 2020-11-04 | End: 2020-11-04 | Stop reason: SDUPTHER

## 2020-11-04 RX ORDER — POTASSIUM CHLORIDE 7.45 MG/ML
10 INJECTION INTRAVENOUS PRN
Status: DISCONTINUED | OUTPATIENT
Start: 2020-11-04 | End: 2020-11-06 | Stop reason: HOSPADM

## 2020-11-04 RX ORDER — SODIUM CHLORIDE 0.9 % (FLUSH) 0.9 %
10 SYRINGE (ML) INJECTION EVERY 12 HOURS SCHEDULED
Status: DISCONTINUED | OUTPATIENT
Start: 2020-11-04 | End: 2020-11-06 | Stop reason: HOSPADM

## 2020-11-04 RX ORDER — GLYCOPYRROLATE 1 MG/5 ML
SYRINGE (ML) INTRAVENOUS PRN
Status: DISCONTINUED | OUTPATIENT
Start: 2020-11-04 | End: 2020-11-04 | Stop reason: SDUPTHER

## 2020-11-04 RX ORDER — MAGNESIUM SULFATE 1 G/100ML
1 INJECTION INTRAVENOUS PRN
Status: DISCONTINUED | OUTPATIENT
Start: 2020-11-04 | End: 2020-11-06 | Stop reason: HOSPADM

## 2020-11-04 RX ORDER — SODIUM CHLORIDE 0.9 % (FLUSH) 0.9 %
10 SYRINGE (ML) INJECTION PRN
Status: DISCONTINUED | OUTPATIENT
Start: 2020-11-04 | End: 2020-11-06 | Stop reason: HOSPADM

## 2020-11-04 RX ORDER — METOPROLOL TARTRATE 5 MG/5ML
INJECTION INTRAVENOUS PRN
Status: DISCONTINUED | OUTPATIENT
Start: 2020-11-04 | End: 2020-11-04 | Stop reason: SDUPTHER

## 2020-11-04 RX ORDER — DEXTROSE, SODIUM CHLORIDE, AND POTASSIUM CHLORIDE 5; .45; .15 G/100ML; G/100ML; G/100ML
INJECTION INTRAVENOUS CONTINUOUS
Status: DISCONTINUED | OUTPATIENT
Start: 2020-11-04 | End: 2020-11-06 | Stop reason: HOSPADM

## 2020-11-04 RX ORDER — PROPOFOL 10 MG/ML
INJECTION, EMULSION INTRAVENOUS PRN
Status: DISCONTINUED | OUTPATIENT
Start: 2020-11-04 | End: 2020-11-04 | Stop reason: SDUPTHER

## 2020-11-04 RX ORDER — CEFAZOLIN SODIUM 1 G/50ML
1 INJECTION, SOLUTION INTRAVENOUS ONCE
Status: DISCONTINUED | OUTPATIENT
Start: 2020-11-04 | End: 2020-11-04 | Stop reason: CLARIF

## 2020-11-04 RX ORDER — FENTANYL CITRATE 50 UG/ML
INJECTION, SOLUTION INTRAMUSCULAR; INTRAVENOUS PRN
Status: DISCONTINUED | OUTPATIENT
Start: 2020-11-04 | End: 2020-11-04 | Stop reason: SDUPTHER

## 2020-11-04 RX ORDER — FENTANYL CITRATE 50 UG/ML
25 INJECTION, SOLUTION INTRAMUSCULAR; INTRAVENOUS EVERY 5 MIN PRN
Status: DISCONTINUED | OUTPATIENT
Start: 2020-11-04 | End: 2020-11-04 | Stop reason: HOSPADM

## 2020-11-04 RX ORDER — HYDROMORPHONE HCL 110MG/55ML
0.5 PATIENT CONTROLLED ANALGESIA SYRINGE INTRAVENOUS EVERY 5 MIN PRN
Status: DISCONTINUED | OUTPATIENT
Start: 2020-11-04 | End: 2020-11-04 | Stop reason: HOSPADM

## 2020-11-04 RX ORDER — NEOSTIGMINE METHYLSULFATE 5 MG/5 ML
SYRINGE (ML) INTRAVENOUS PRN
Status: DISCONTINUED | OUTPATIENT
Start: 2020-11-04 | End: 2020-11-04 | Stop reason: SDUPTHER

## 2020-11-04 RX ORDER — ONDANSETRON 2 MG/ML
INJECTION INTRAMUSCULAR; INTRAVENOUS PRN
Status: DISCONTINUED | OUTPATIENT
Start: 2020-11-04 | End: 2020-11-04 | Stop reason: SDUPTHER

## 2020-11-04 RX ORDER — ONDANSETRON 2 MG/ML
4 INJECTION INTRAMUSCULAR; INTRAVENOUS
Status: DISCONTINUED | OUTPATIENT
Start: 2020-11-04 | End: 2020-11-04 | Stop reason: HOSPADM

## 2020-11-04 RX ORDER — SODIUM CHLORIDE, SODIUM LACTATE, POTASSIUM CHLORIDE, CALCIUM CHLORIDE 600; 310; 30; 20 MG/100ML; MG/100ML; MG/100ML; MG/100ML
INJECTION, SOLUTION INTRAVENOUS CONTINUOUS PRN
Status: DISCONTINUED | OUTPATIENT
Start: 2020-11-04 | End: 2020-11-04 | Stop reason: SDUPTHER

## 2020-11-04 RX ORDER — DIPHENHYDRAMINE HYDROCHLORIDE 50 MG/ML
12.5 INJECTION INTRAMUSCULAR; INTRAVENOUS
Status: DISCONTINUED | OUTPATIENT
Start: 2020-11-04 | End: 2020-11-04 | Stop reason: HOSPADM

## 2020-11-04 RX ADMIN — PROPOFOL 200 MG: 10 INJECTION, EMULSION INTRAVENOUS at 07:39

## 2020-11-04 RX ADMIN — HYDROMORPHONE HYDROCHLORIDE 1 MG: 1 INJECTION, SOLUTION INTRAMUSCULAR; INTRAVENOUS; SUBCUTANEOUS at 15:00

## 2020-11-04 RX ADMIN — ROCURONIUM BROMIDE 50 MG: 10 INJECTION INTRAVENOUS at 07:39

## 2020-11-04 RX ADMIN — PANTOPRAZOLE SODIUM 40 MG: 40 INJECTION, POWDER, FOR SOLUTION INTRAVENOUS at 21:26

## 2020-11-04 RX ADMIN — HYDROMORPHONE HYDROCHLORIDE 1 MG: 1 INJECTION, SOLUTION INTRAMUSCULAR; INTRAVENOUS; SUBCUTANEOUS at 22:36

## 2020-11-04 RX ADMIN — HYDROMORPHONE HYDROCHLORIDE 1 MG: 1 INJECTION, SOLUTION INTRAMUSCULAR; INTRAVENOUS; SUBCUTANEOUS at 12:44

## 2020-11-04 RX ADMIN — METOCLOPRAMIDE 10 MG: 5 INJECTION, SOLUTION INTRAMUSCULAR; INTRAVENOUS at 05:00

## 2020-11-04 RX ADMIN — METOCLOPRAMIDE 10 MG: 5 INJECTION, SOLUTION INTRAMUSCULAR; INTRAVENOUS at 10:38

## 2020-11-04 RX ADMIN — HYDROMORPHONE HYDROCHLORIDE 1 MG: 1 INJECTION, SOLUTION INTRAMUSCULAR; INTRAVENOUS; SUBCUTANEOUS at 20:12

## 2020-11-04 RX ADMIN — MORPHINE SULFATE 4 MG: 4 INJECTION, SOLUTION INTRAMUSCULAR; INTRAVENOUS at 03:01

## 2020-11-04 RX ADMIN — MIDAZOLAM 2 MG: 1 INJECTION INTRAMUSCULAR; INTRAVENOUS at 07:28

## 2020-11-04 RX ADMIN — METOCLOPRAMIDE 10 MG: 5 INJECTION, SOLUTION INTRAMUSCULAR; INTRAVENOUS at 04:39

## 2020-11-04 RX ADMIN — ONDANSETRON 4 MG: 2 INJECTION INTRAMUSCULAR; INTRAVENOUS at 07:50

## 2020-11-04 RX ADMIN — MORPHINE SULFATE 4 MG: 4 INJECTION, SOLUTION INTRAMUSCULAR; INTRAVENOUS at 05:05

## 2020-11-04 RX ADMIN — Medication 3 MG: at 09:10

## 2020-11-04 RX ADMIN — PANTOPRAZOLE SODIUM 40 MG: 40 INJECTION, POWDER, FOR SOLUTION INTRAVENOUS at 10:38

## 2020-11-04 RX ADMIN — HYDROMORPHONE HYDROCHLORIDE 1 MG: 1 INJECTION, SOLUTION INTRAMUSCULAR; INTRAVENOUS; SUBCUTANEOUS at 17:34

## 2020-11-04 RX ADMIN — Medication 0.4 MG: at 09:10

## 2020-11-04 RX ADMIN — POTASSIUM CHLORIDE, DEXTROSE MONOHYDRATE AND SODIUM CHLORIDE: 150; 5; 450 INJECTION, SOLUTION INTRAVENOUS at 09:44

## 2020-11-04 RX ADMIN — CEFAZOLIN SODIUM 2 G: 10 INJECTION, POWDER, FOR SOLUTION INTRAVENOUS at 16:15

## 2020-11-04 RX ADMIN — CEFAZOLIN SODIUM 1 G: 1 INJECTION, SOLUTION INTRAVENOUS at 07:45

## 2020-11-04 RX ADMIN — Medication 1 G: at 07:45

## 2020-11-04 RX ADMIN — SODIUM CHLORIDE, PRESERVATIVE FREE 10 ML: 5 INJECTION INTRAVENOUS at 00:55

## 2020-11-04 RX ADMIN — HYDROMORPHONE HYDROCHLORIDE 1 MG: 1 INJECTION, SOLUTION INTRAMUSCULAR; INTRAVENOUS; SUBCUTANEOUS at 10:36

## 2020-11-04 RX ADMIN — ONDANSETRON 4 MG: 2 INJECTION INTRAMUSCULAR; INTRAVENOUS at 02:26

## 2020-11-04 RX ADMIN — LIDOCAINE HYDROCHLORIDE 100 MG: 20 INJECTION, SOLUTION INTRAVENOUS at 07:39

## 2020-11-04 RX ADMIN — FENTANYL CITRATE 100 MCG: 50 INJECTION INTRAMUSCULAR; INTRAVENOUS at 07:31

## 2020-11-04 RX ADMIN — CEFAZOLIN SODIUM 2 G: 10 INJECTION, POWDER, FOR SOLUTION INTRAVENOUS at 23:46

## 2020-11-04 RX ADMIN — SODIUM CHLORIDE, POTASSIUM CHLORIDE, SODIUM LACTATE AND CALCIUM CHLORIDE: 600; 310; 30; 20 INJECTION, SOLUTION INTRAVENOUS at 07:31

## 2020-11-04 RX ADMIN — MORPHINE SULFATE 4 MG: 4 INJECTION, SOLUTION INTRAMUSCULAR; INTRAVENOUS at 00:55

## 2020-11-04 RX ADMIN — METOCLOPRAMIDE 10 MG: 5 INJECTION, SOLUTION INTRAMUSCULAR; INTRAVENOUS at 16:15

## 2020-11-04 RX ADMIN — METOPROLOL TARTRATE 1 MG: 5 INJECTION, SOLUTION INTRAVENOUS at 07:44

## 2020-11-04 RX ADMIN — FENTANYL CITRATE 100 MCG: 50 INJECTION INTRAMUSCULAR; INTRAVENOUS at 08:12

## 2020-11-04 RX ADMIN — POTASSIUM CHLORIDE, DEXTROSE MONOHYDRATE AND SODIUM CHLORIDE: 150; 5; 450 INJECTION, SOLUTION INTRAVENOUS at 17:34

## 2020-11-04 ASSESSMENT — PULMONARY FUNCTION TESTS
PIF_VALUE: 19
PIF_VALUE: 24
PIF_VALUE: 0
PIF_VALUE: 25
PIF_VALUE: 26
PIF_VALUE: 0
PIF_VALUE: 20
PIF_VALUE: 25
PIF_VALUE: 16
PIF_VALUE: 19
PIF_VALUE: 26
PIF_VALUE: 25
PIF_VALUE: 0
PIF_VALUE: 25
PIF_VALUE: 17
PIF_VALUE: 26
PIF_VALUE: 25
PIF_VALUE: 26
PIF_VALUE: 3
PIF_VALUE: 16
PIF_VALUE: 23
PIF_VALUE: 22
PIF_VALUE: 23
PIF_VALUE: 18
PIF_VALUE: 27
PIF_VALUE: 24
PIF_VALUE: 25
PIF_VALUE: 25
PIF_VALUE: 19
PIF_VALUE: 18
PIF_VALUE: 25
PIF_VALUE: 19
PIF_VALUE: 28
PIF_VALUE: 0
PIF_VALUE: 26
PIF_VALUE: 25
PIF_VALUE: 19
PIF_VALUE: 17
PIF_VALUE: 25
PIF_VALUE: 22
PIF_VALUE: 25
PIF_VALUE: 25
PIF_VALUE: 17
PIF_VALUE: 26
PIF_VALUE: 17
PIF_VALUE: 25
PIF_VALUE: 23
PIF_VALUE: 25
PIF_VALUE: 1
PIF_VALUE: 19
PIF_VALUE: 25
PIF_VALUE: 26
PIF_VALUE: 22
PIF_VALUE: 25
PIF_VALUE: 19
PIF_VALUE: 26
PIF_VALUE: 26
PIF_VALUE: 2
PIF_VALUE: 22
PIF_VALUE: 1
PIF_VALUE: 26
PIF_VALUE: 27
PIF_VALUE: 25
PIF_VALUE: 5
PIF_VALUE: 6
PIF_VALUE: 2
PIF_VALUE: 24
PIF_VALUE: 19
PIF_VALUE: 25
PIF_VALUE: 18
PIF_VALUE: 20
PIF_VALUE: 18
PIF_VALUE: 20
PIF_VALUE: 19
PIF_VALUE: 25
PIF_VALUE: 25
PIF_VALUE: 19
PIF_VALUE: 22
PIF_VALUE: 22
PIF_VALUE: 26
PIF_VALUE: 19
PIF_VALUE: 26
PIF_VALUE: 19
PIF_VALUE: 25
PIF_VALUE: 23
PIF_VALUE: 25
PIF_VALUE: 0
PIF_VALUE: 23
PIF_VALUE: 21
PIF_VALUE: 25
PIF_VALUE: 20
PIF_VALUE: 25
PIF_VALUE: 22
PIF_VALUE: 25
PIF_VALUE: 23
PIF_VALUE: 1
PIF_VALUE: 25
PIF_VALUE: 19
PIF_VALUE: 0
PIF_VALUE: 13
PIF_VALUE: 25
PIF_VALUE: 27
PIF_VALUE: 25
PIF_VALUE: 25
PIF_VALUE: 21
PIF_VALUE: 25

## 2020-11-04 ASSESSMENT — PAIN SCALES - GENERAL
PAINLEVEL_OUTOF10: 3
PAINLEVEL_OUTOF10: 0
PAINLEVEL_OUTOF10: 9
PAINLEVEL_OUTOF10: 5
PAINLEVEL_OUTOF10: 3
PAINLEVEL_OUTOF10: 4
PAINLEVEL_OUTOF10: 7
PAINLEVEL_OUTOF10: 8
PAINLEVEL_OUTOF10: 9
PAINLEVEL_OUTOF10: 0
PAINLEVEL_OUTOF10: 7
PAINLEVEL_OUTOF10: 8
PAINLEVEL_OUTOF10: 10
PAINLEVEL_OUTOF10: 7
PAINLEVEL_OUTOF10: 5
PAINLEVEL_OUTOF10: 7

## 2020-11-04 ASSESSMENT — PAIN DESCRIPTION - PAIN TYPE
TYPE: ACUTE PAIN

## 2020-11-04 ASSESSMENT — PAIN DESCRIPTION - DESCRIPTORS
DESCRIPTORS: ACHING
DESCRIPTORS: SHARP
DESCRIPTORS: ACHING
DESCRIPTORS: ACHING;SHARP
DESCRIPTORS: SHARP
DESCRIPTORS: SHARP;ACHING
DESCRIPTORS: SHARP
DESCRIPTORS: SHARP

## 2020-11-04 ASSESSMENT — PAIN DESCRIPTION - FREQUENCY
FREQUENCY: CONTINUOUS

## 2020-11-04 ASSESSMENT — PAIN DESCRIPTION - LOCATION
LOCATION: ABDOMEN

## 2020-11-04 ASSESSMENT — PAIN DESCRIPTION - ONSET
ONSET: ON-GOING

## 2020-11-04 ASSESSMENT — PAIN DESCRIPTION - ORIENTATION
ORIENTATION: RIGHT
ORIENTATION: RIGHT;MID
ORIENTATION: RIGHT
ORIENTATION: RIGHT;LEFT

## 2020-11-04 ASSESSMENT — PAIN DESCRIPTION - PROGRESSION
CLINICAL_PROGRESSION: GRADUALLY IMPROVING
CLINICAL_PROGRESSION: GRADUALLY IMPROVING
CLINICAL_PROGRESSION: NOT CHANGED
CLINICAL_PROGRESSION: GRADUALLY IMPROVING
CLINICAL_PROGRESSION: NOT CHANGED

## 2020-11-04 NOTE — OP NOTE
OPERATIVE REPORT      Nathan Raman 1975, 39 y.o.,  male, CSN: [id number]  November 4, 2020    Preoperative diagnosis: Symptomatic Acute on top of chronic calculous cholecystitis. Postoperative diagnosis: Same. Procedure: Laparoscopic cholecystectomy AND MAJOR LYSIS OF ADHESIONS that took more than an hour. Surgeon: Mirian Chavez MD, FACS, FICS  Assistant Surgeon:  Theo Handler  Anesthesia: General Endotracheal Anesthesia + Local 1% Xylocaine With Epinephrine, 0.5% Marcaine, mixed, 50% : 50%. Specimen(s): Gall Bladder was sent to permanent pathology. Estimated Blood Loss: Less than 5 ml. Drain: None. Complications: None. Condition/Disposition:  Stable, Extubated, to PACU    Indications: This 39y.o.-year-old male developed right upper quadrant pain/ nausea/ vomiting/ fever/ leukocytosis and on workup was found to have cholelithiasis with a normal common duct/ cholecystitis. Laparoscopic cholecystectomy was elected. After informed consent was signed, knowing the risks and benefits, possible alternatives and potential complications of the procedure including but not limited to: bleeding, infection, incisional hernia(s), injury to the common bile duct or right hepatic duct, bile leak, subhepatic abscess, retained common bile duct stones, bowel injury, need for interventional radiology procedures like drain placement or gastro-enterology procedure like ERCP/stent placements, and possibly the need for further surgeries. Patient is well aware of the rare yet possible need for conversion to open cholecystectomy  if conditions are not favorable. Description of procedure: The patient was appropriately identified in the holding area. Appropriate IV antibiotic was given on call to OR (operating room). Hibiclens abdominal skin prep was performed in the holding area and shakira hose and sequential compression devices were placed on the lower extremities bilaterally and activated.   Pt voided her urinary bladder on call to OR. The patient was then brought to the operating room, and placed on the operating table in the supine position. A time-out was completed verifying correct patient and procedure. The patient was then brought to the operating room, and placed on the operating table in the supine position. A time-out was completed verifying correct patient and procedure. An orogastric tube was placed by anesthesia, and connected to suction to decompress the stomach. Anesthesia endotracheally intubated the patient uneventfully and general endotracheal anesthesia was started. The abdomen was prepped and draped in the usual sterile fashion. The local anesthetic mixture mentioned above was used to infiltrate the planned incisions sites starting  intradermally raising a wheal at the skin level, and through the layers all the way to the preperitoneal level where another wheal was raised, preemptively before making any incisions and post operatively at the end of the procedure. A 1cm incision was made in a natural skin crease periumbilically, dissection was carried down to the fascia, the fascia and peritoneum were opened under direct visualization and the peritoneal cavity was entered safely; careful inspection revealed no bowels or solid organs noted in the vicinity of the incision. A figure-of-eight suture with 0-Vicryl was placed for future closure of this fascial defect at the end of the procedure. A 12-mm port was introduced through the fascia and pneumoperitoneum was instituted using CO2 (carbon dioxide) insufflation, up to 12-14 mmHg pressure. The patient tolerated insufflation well. A 5-mm/30 degree scope was introduced through the port, the abdomen was inspected and no injuries from initial trocar placement were noted.   Then under direct visualization three 5-mm ports were placed in the right upper quadrant / subcostal region: 2 finger-breadths below and parallel to the right costal margin, 8-10 cm apart one from the other: in the epigastrium, midclavicular line and anterior axillary line. The table was then placed in the reverse Trendelenburg position with the right side up. Filmy adhesions between the gallbladder and omentum/ duodenum/ transverse colon were lysed sharply. The fundus of the gallbladder was grasped with an atraumatic grasper passed through the lateral-most port and retracted superiorly and laterally. The  Crowder's pouch (infundibulum of the gall bladder) was also grasped with an atraumatic grasper through the midclavicular port and retracted laterally. These maneuvers exposed Calots triangle nicely. The anterior, and then the posterior peritoneum overlying the gallbladder's infundibulum were then incised meticulously and the cystic duct and cystic artery were both clearly identified and circumferentially skeletonized with gentle dissection. The cystic duct was triply clipped and sharply divided close to the gallbladder, leaving 2 clips on the common bile duct side and one on the gall bladder side of the cystic duct this was done after major lysis of adhesions was performed to access the right anatomy. Likewise, the cystic artery was triply clipped and divided using electrocautery, leaving 2 clips on the hepatic artery side and one on the gall bladder side of the cystic artery. The gallbladder was then dissected from its peritoneal attachments and from its gall bladder liver bed using the hook electrocautery. Hemostasis was secured all along the dissection and the gallbladder was retrieved in its entirety using the endo-catch bag (the endoscopic retrieval bag) introduced through the umbilical port after switching the camera to the epigastric / subxiphoid port . The gallbladder was passed off the table as a specimen after it was palpated for any neoplastic abnormalities, and none were noted.       The gallbladder fossa was re-inspected, and irrigated as needed with saline and perfectly adequate hemostasis was noted. There was no evidence of bleeding from the gallbladder fossa or cystic artery or leakage of the bile from the cystic duct stump or the liver bed. The orogastric tube was removed uneventfully   All three subcostal trocars were removed under direct visualization without any evidence of port site bleeding. The laparoscope was withdrawn and the umbilical trocar removed. The pneumoperitoneum was evacuated. The pre-placed 0-Vicryl suture was tied to approximate the fascial defect of the 12-mm trocar site. Further more local anesthetic was injected to all trocar sites. The skin was then  approximated using 4-0 Vicryl in a subcuticular fashion, followed by the application of \"Dermabond\" /  topical skin adhesive. The patient tolerated the procedure very well without any complications, was extubated in the OR and was taken to the PACU (postanesthesia care unit) in stable condition.     ___________________________________________    Froy Daniel MD, FACS, FICS  11/4/2020  9:16 AM.E

## 2020-11-04 NOTE — ANESTHESIA POSTPROCEDURE EVALUATION
Department of Anesthesiology  Postprocedure Note    Patient: Brooke Ortega  MRN: [id number]  YOB: 1975  Date of evaluation: 11/4/2020  Time:  12:43 PM     Procedure Summary     Date:  11/04/20 Room / Location:  [address]    Anesthesia Start:  0730 Anesthesia Stop:  6642    Procedure:  CHOLECYSTECTOMY LAPAROSCOPIC WITH IOC (N/A Abdomen) Diagnosis:  (-)    Surgeon:  Kenny Dela Cruz MD Responsible Provider:  URSULA Doran CRNA    Anesthesia Type:  general ASA Status:  2          Anesthesia Type: general    Kwesi Phase I: Kwesi Score: 10    Kwesi Phase II:      Last vitals: Reviewed and per EMR flowsheets.        Anesthesia Post Evaluation    Patient location during evaluation: PACU  Patient participation: complete - patient participated  Level of consciousness: awake and alert  Airway patency: patent  Nausea & Vomiting: no nausea  Complications: no  Cardiovascular status: hemodynamically stable  Hydration status: euvolemic

## 2020-11-04 NOTE — PROGRESS NOTES
GENERAL SURGERY PROGRESS NOTE    CC/HPI:           Patient feels the same. [place] PAIN in his RUQ. Vitals:    11/03/20 0828 11/03/20 1441 11/03/20 2007 11/04/20 0445   BP: 116/74 112/74 135/75 139/83   Pulse: 70 79 80 104   Resp: 20 18 18 18   Temp: 98.1 °F (36.7 °C) 98.2 °F (36.8 °C) 98.4 °F (36.9 °C) 97.9 °F (36.6 °C)   TempSrc: Oral Oral Oral Oral   SpO2: 97% 97% 97% 94%   Weight:       Height:         I/O last 3 completed shifts: In: 798 [I.V.:798]  Out: -   No intake/output data recorded.     Diet NPO Effective Now Exceptions are: Ice Chips    Recent Results (from the past 48 hour(s))   Lactate, Sepsis    Collection Time: 11/02/20  9:15 AM   Result Value Ref Range    Lactic Acid, Sepsis 2.1 (HH) 0.5 - 1.9 mMOL/L   EKG 12 Lead    Collection Time: 11/02/20  9:50 AM   Result Value Ref Range    Ventricular Rate 85 BPM    Atrial Rate 85 BPM    P-R Interval 112 ms    QRS Duration 100 ms    Q-T Interval 406 ms    QTc Calculation (Bazett) 483 ms    P Axis 66 degrees    R Axis 57 degrees    T Axis 4 degrees    Diagnosis       Poor data quality, interpretation may be adversely affected  Normal sinus rhythm with sinus arrhythmia  Normal ECG  When compared with ECG of 30-OCT-2020 07:10,  No significant change was found    Confirmed by [facility] CRISTIN BROWN ([id number]) on 11/2/2020 12:05:51 PM     COVID-19    Collection Time: 11/02/20 10:00 AM    Specimen: Nasopharyngeal Swab   Result Value Ref Range    Source NASOPHARYNGEAL SWAB     SARS-CoV-2, NAAT NOT DETECTED    Lactate, Sepsis    Collection Time: 11/02/20 11:00 AM   Result Value Ref Range    Lactic Acid, Sepsis 1.9 0.5 - 1.9 mMOL/L   Urine Drug Screen    Collection Time: 11/02/20 11:00 AM   Result Value Ref Range    Cannabinoid Scrn, Ur UNCONFIRMED POSITIVE (A) NEGATIVE    Amphetamines NEGATIVE NEGATIVE    Cocaine Metabolite NEGATIVE NEGATIVE    Benzodiazepine Screen, Urine NEGATIVE NEGATIVE    Barbiturate Screen, Ur NEGATIVE NEGATIVE    Opiates, Urine UNCONFIRMED MCHC 31.9 (L) 32.0 - 36.0 %    RDW 13.3 11.7 - 14.9 %    Platelets 796 145 - 188 K/CU MM    MPV 11.0 7.5 - 11.1 FL    Differential Type AUTOMATED DIFFERENTIAL     Segs Relative 75.9 (H) 36 - 66 %    Lymphocytes % 14.1 (L) 24 - 44 %    Monocytes % 9.5 (H) 0 - 4 %    Eosinophils % 0.1 0 - 3 %    Basophils % 0.1 0 - 1 %    Segs Absolute 6.6 K/CU MM    Lymphocytes Absolute 1.2 K/CU MM    Monocytes Absolute 0.8 K/CU MM    Eosinophils Absolute 0.0 K/CU MM    Basophils Absolute 0.0 K/CU MM    Nucleated RBC % 0.0 %    Total Nucleated RBC 0.0 K/CU MM    Total Immature Neutrophil 0.03 K/CU MM    Immature Neutrophil % 0.3 0 - 0.43 %   CBC auto differential    Collection Time: 11/04/20  4:21 AM   Result Value Ref Range    WBC 8.7 4.0 - 10.5 K/CU MM    RBC 4.84 4.6 - 6.2 M/CU MM    Hemoglobin 14.9 13.5 - 18.0 GM/DL    Hematocrit 46.1 42 - 52 %    MCV 95.2 78 - 100 FL    MCH 30.8 27 - 31 PG    MCHC 32.3 32.0 - 36.0 %    RDW 13.2 11.7 - 14.9 %    Platelets 578 455 - 166 K/CU MM    MPV 10.8 7.5 - 11.1 FL    Differential Type AUTOMATED DIFFERENTIAL     Segs Relative 79.3 (H) 36 - 66 %    Lymphocytes % 11.4 (L) 24 - 44 %    Monocytes % 8.7 (H) 0 - 4 %    Eosinophils % 0.2 0 - 3 %    Basophils % 0.1 0 - 1 %    Segs Absolute 6.9 K/CU MM    Lymphocytes Absolute 1.0 K/CU MM    Monocytes Absolute 0.8 K/CU MM    Eosinophils Absolute 0.0 K/CU MM    Basophils Absolute 0.0 K/CU MM    Nucleated RBC % 0.0 %    Total Nucleated RBC 0.0 K/CU MM    Total Immature Neutrophil 0.03 K/CU MM    Immature Neutrophil % 0.3 0 - 0.43 %       Scheduled Meds:   ceFAZolin  1 g Intravenous Once    sodium chloride flush  10 mL Intravenous 2 times per day    enoxaparin  40 mg Subcutaneous Daily    pantoprazole  40 mg Intravenous BID    metoclopramide  10 mg Intravenous Q6H       Continuous Infusions:   dextrose 5% in lactated ringers 125 mL/hr at 11/03/20 2300       Physical Exam:  HEENT: Anicteric sclerae, Oropharyngeal mucosae moist, pink and intact. Heart:  Normal S1 and S2, RRR  Lungs: Clear to auscultation bilaterally, No audible Wheezes or Rales. Extremities: No edema. Neuro: Alert and Oriented x 3, Non focal.  Abdomen: Soft, Benign, very tender RUQ, positive Mendoza sign, Non distended, Positive bowel sounds, no lower abdominal pain or tenderness on deep palpation. Incision: Nicely healing: No erythema, No discharge. Active Problems:    Abdominal pain  Resolved Problems:    * No resolved hospital problems. *      Assessment and Plan:  Olive Singleton is a 39 y.o. male with acute calculous cholecystitis, RUQ pain severe, no lower abdominal pain, moving his bowels fine, CT and US revealed acute calculous cholecystitis, will proceed today with lap exploration of his colorectal anastomosis, and then proceed with lap dianna with IOC. Increase Ambulation to at least 4x/day walk in the hallways with assistance. Respiratory ryann-operative care: Incentive Spirometry / deep breathing and coughing 10x/hr while awake. Continue DVT prophylaxis with Teds and SCDs and SC Lovenox. Continue GI prophylaxis with Protonix IV till able to tolerate PO.   Continue ryann-op Abx for 24 hrs after surgery then dc.    ___________________________________________    Luis Antonio Renae MD, FACS, FICS  11/4/2020  7:35 AM

## 2020-11-04 NOTE — PLAN OF CARE
Problem: Infection:  Goal: Will remain free from infection  Description: Will remain free from infection  Outcome: Ongoing     Problem: Safety:  Goal: Free from accidental physical injury  Description: Free from accidental physical injury  Outcome: Ongoing  Goal: Free from intentional harm  Description: Free from intentional harm  Outcome: Ongoing     Problem: Daily Care:  Goal: Daily care needs are met  Description: Daily care needs are met  Outcome: Ongoing     Problem: Pain:  Description: Pain management should include both nonpharmacologic and pharmacologic interventions.   Goal: Patient's pain/discomfort is manageable  Description: Patient's pain/discomfort is manageable  Outcome: Ongoing  Goal: Pain level will decrease  Description: Pain level will decrease  Outcome: Ongoing  Goal: Control of acute pain  Description: Control of acute pain  Outcome: Ongoing  Goal: Control of chronic pain  Description: Control of chronic pain  Outcome: Ongoing     Problem: Discharge Planning:  Goal: Patients continuum of care needs are met  Description: Patients continuum of care needs are met  Outcome: Ongoing

## 2020-11-04 NOTE — PROGRESS NOTES
RESP Clear to auscultation, no wheezes, rales or rhonchi. CARDIO/VASC -S1/S2 auscultated. Regular rate without appreciable murmurs, rubs, or gallops. Peripheral pulses equal bilaterally and palpable. No peripheral edema. GI right upper quadrant drain in place, abdomen is soft with mild to moderate tenderness, no masses, or guarding. Bowel sounds hypoactive. Rectal exam deferred.  Donnelly catheter is not present.     Medications:   Medications:    sodium chloride flush  10 mL Intravenous 2 times per day    ceFAZolin (ANCEF) IVPB  2 g Intravenous Q8H    sodium chloride flush  10 mL Intravenous 2 times per day    enoxaparin  40 mg Subcutaneous Daily    pantoprazole  40 mg Intravenous BID    metoclopramide  10 mg Intravenous Q6H      Infusions:    dextrose 5% and 0.45% NaCl with KCl 20 mEq 125 mL/hr at 11/04/20 0944     PRN Meds: sodium chloride flush, 10 mL, PRN  potassium chloride, 10 mEq, PRN  magnesium sulfate, 1 g, PRN  HYDROmorphone, 1 mg, Q2H PRN  sodium chloride flush, 10 mL, PRN  acetaminophen, 650 mg, Q6H PRN    Or  acetaminophen, 650 mg, Q6H PRN  polyethylene glycol, 17 g, Daily PRN  promethazine, 12.5 mg, Q6H PRN    Or  ondansetron, 4 mg, Q6H PRN  morphine, 4 mg, Q2H PRN          Electronically signed by Edie Fregoso MD on 11/4/2020 at 11:38 AM

## 2020-11-04 NOTE — ANESTHESIA PRE PROCEDURE
11/03/20 0353    enoxaparin (LOVENOX) injection 40 mg  40 mg Subcutaneous Daily URSULA Billings - CNP        morphine sulfate (PF) injection 4 mg  4 mg Intravenous Q2H PRN Olayinka Moreno MD   4 mg at 11/03/20 2259    dextrose 5 % in lactated ringers infusion   Intravenous Continuous Olayinka Moreno  mL/hr at 11/03/20 2300      pantoprazole (PROTONIX) injection 40 mg  40 mg Intravenous BID Olayinka Moreno MD   40 mg at 11/03/20 2009    metoclopramide (REGLAN) injection 10 mg  10 mg Intravenous Q6H Olayinka Moreno MD   10 mg at 11/03/20 2304       Allergies: Allergies   Allergen Reactions    Vicodin [Hydrocodone-Acetaminophen] Other (See Comments)     Patient states that it is tolerable but state that makes his skin crawl       Problem List:    Patient Active Problem List   Diagnosis Code    Perforated sigmoid colon ([facility] [address] K63.1    Perforated diverticulum K57.80    S/P laparoscopic-assisted sigmoidectomy Z90.49    Abdominal pain R10.9       Past Medical History:        Diagnosis Date    Diverticulitis     Gall stone     Kidney stone     \"had kidney stone now- had them since age 17-had surgery and able to pass some\" follows with dr Nate Mercedes Kidney stone        Past Surgical History:        Procedure Laterality Date    KIDNEY STONE SURGERY      \"had surgery multiple times for kidney stones last time 2018?  SMALL INTESTINE SURGERY N/A 10/6/2020    BOWEL RESECTION SIGMOID LAPAROSCOPIC ROBOTIC performed by Olayinka Moreno MD at [facility] N/A 11/3/2020    EGD BIOPSY performed by Olayinka Moreno MD at [address] [place] [address]  2012    left wrist\"no metal\"       Social History:    Social History     Tobacco Use    Smoking status: Never Smoker    Smokeless tobacco: Never Used   Substance Use Topics    Alcohol use:  No                                Counseling given: Not Answered      Vital Signs (Current):   Vitals:    11/03/20 0353 11/03/20 0828 11/03/20 1441 11/03/20 2007   BP: 136/88 116/74 112/74 135/75   Pulse: 105 70 79 80   Resp: 20 20 18 18   Temp: 36.2 °C (97.1 °F) 36.7 °C (98.1 °F) 36.8 °C (98.2 °F) 36.9 °C (98.4 °F)   TempSrc: Oral Oral Oral Oral   SpO2: 94% 97% 97% 97%   Weight:       Height:                                                  BP Readings from Last 3 Encounters:   11/03/20 135/75   11/03/20 (!) 89/44   11/02/20 137/77       NPO Status: Time of last liquid consumption: 0300                        Time of last solid consumption: 1200                        Date of last liquid consumption: 11/03/20                        Date of last solid food consumption: 11/01/20    BMI:   Wt Readings from Last 3 Encounters:   11/02/20 202 lb 11.2 oz (91.9 kg)   11/02/20 198 lb (89.8 kg)   11/02/20 198 lb (89.8 kg)     Body mass index is 28.27 kg/m². CBC:   Lab Results   Component Value Date    WBC 8.7 11/03/2020    RBC 4.78 11/03/2020    HGB 14.5 11/03/2020    HCT 45.4 11/03/2020    MCV 95.0 11/03/2020    RDW 13.3 11/03/2020     11/03/2020       CMP:   Lab Results   Component Value Date     11/03/2020    K 3.9 11/03/2020     11/03/2020    CO2 27 11/03/2020    BUN 5 11/03/2020    CREATININE 0.8 11/03/2020    GFRAA >60 11/03/2020    LABGLOM >60 11/03/2020    GLUCOSE 112 11/03/2020    PROT 6.4 11/03/2020    CALCIUM 9.3 11/03/2020    BILITOT 5.2 11/03/2020    ALKPHOS 142 11/03/2020     11/03/2020     11/03/2020       POC Tests: No results for input(s): POCGLU, POCNA, POCK, POCCL, POCBUN, POCHEMO, POCHCT in the last 72 hours.     Coags: No results found for: PROTIME, INR, APTT    HCG (If Applicable): No results found for: PREGTESTUR, PREGSERUM, HCG, HCGQUANT     ABGs: No results found for: PHART, PO2ART, KKI0WZX, XDC1YPE, BEART, T4JWUVYS     Type & Screen (If Applicable):  No results found for: LABABO, LABRH    Drug/Infectious Status (If Applicable):  No results found for: HIV, HEPCAB    COVID-19 Screening (If Applicable):   Lab Results   Component Value Date    COVID19 NOT DETECTED 11/02/2020    COVID19 NOT DETECTED 09/28/2020         Anesthesia Evaluation  Patient summary reviewed  Airway:         Dental:          Pulmonary:                              Cardiovascular:             Beta Blocker:  Not on Beta Blocker         Neuro/Psych:               GI/Hepatic/Renal:   (+) renal disease:,          ROS comment: Kidney stone: \"had kidney stone now- had them since age 17-had surgery and able to pass some\" follows with dr Vinicius Stock     Diverticulitis     Perforated sigmoid colon   Perforated diverticulum   S/P laparoscopic-assisted sigmoidectomy   Abdominal pain     . Endo/Other:                     Abdominal:           Vascular:                                        Anesthesia Plan      general     ASA 3       Induction: intravenous. MIPS: Postoperative opioids intended and Prophylactic antiemetics administered. Anesthetic plan and risks discussed with patient. Pre Anesthesia Assessment complete.  Chart reviewed on 11/4/2020    URSULA Burton - GARFIELD   11/4/2020

## 2020-11-04 NOTE — ANESTHESIA PRE PROCEDURE
Department of Anesthesiology  Preprocedure Note       Name:  Susan Stock   Age:  39 y.o.  :  1975                                          MRN:  [id number]         Date:  2020      Surgeon: Anna Winkler):  Bernarda Martínez MD    Procedure: Procedure(s):  CHOLECYSTECTOMY LAPAROSCOPIC WITH IOC    Medications prior to admission:   Prior to Admission medications    Medication Sig Start Date End Date Taking? Authorizing Provider   metoclopramide (REGLAN) 10 MG tablet Take 1 tablet by mouth 3 times daily as needed (abdominal pain, nausea) 20   Raj Beasley Res, DO   dicyclomine (BENTYL) 10 MG capsule Take 1 capsule by mouth 3 times daily As needed for abdominal pain 20   Tramaine Doyle,    oxyCODONE-acetaminophen (PERCOCET) 5-325 MG per tablet Take 1 tablet by mouth every 8 hours as needed for Pain for up to 9 doses. Intended supply: 3 days.  Take lowest dose possible to manage pain 20  Tramaine Mora DO   pantoprazole (PROTONIX) 40 MG tablet Take 1 tablet by mouth every morning (before breakfast) 10/30/20   Linsey Linder DO       Current medications:    Current Facility-Administered Medications   Medication Dose Route Frequency Provider Last Rate Last Dose    fentaNYL (SUBLIMAZE) injection 25 mcg  25 mcg Intravenous Q5 Min PRN Sushil Ema, DO        HYDROmorphone (DILAUDID) injection 0.5 mg  0.5 mg Intravenous Q5 Min PRN Sushil Ema, DO        diphenhydrAMINE (BENADRYL) injection 12.5 mg  12.5 mg Intravenous Once PRN Sushil Ema, DO        [facility] 4 mg  4 mg Intravenous Once PRN Sushil Mea, DO        sodium chloride flush 0.9 % injection 10 mL  10 mL Intravenous 2 times per day Sarah Rincon APRN - CNP   10 mL at 20    sodium chloride flush 0.9 % injection 10 mL  10 mL Intravenous PRN Sarah Rincon, APRN - CNP   10 mL at 20 0055    acetaminophen (TYLENOL) tablet 650 mg  650 mg Oral Q6H PRN aSrah Rincon, APRN - CNP 72 hours. Coags: No results found for: PROTIME, INR, APTT    HCG (If Applicable): No results found for: PREGTESTUR, PREGSERUM, HCG, HCGQUANT     ABGs: No results found for: PHART, PO2ART, DSF5BGH, JRV3NFM, BEART, B0IQKFXU     Type & Screen (If Applicable):  No results found for: LABABO, LABRH    Drug/Infectious Status (If Applicable):  No results found for: HIV, HEPCAB    COVID-19 Screening (If Applicable):   Lab Results   Component Value Date    COVID19 NOT DETECTED 11/02/2020    COVID19 NOT DETECTED 09/28/2020         Anesthesia Evaluation  Patient summary reviewed no history of anesthetic complications:   Airway: Mallampati: II  TM distance: >3 FB   Neck ROM: full  Mouth opening: > = 3 FB Dental: normal exam         Pulmonary:Negative Pulmonary ROS                              Cardiovascular:  Exercise tolerance: good (>4 METS),            Beta Blocker:  Not on Beta Blocker         Neuro/Psych:   Negative Neuro/Psych ROS              GI/Hepatic/Renal:   (+) renal disease: kidney stones,          ROS comment: Kidney stone: \"had kidney stone now- had them since age 15-had surgery and able to pass some\" follows with dr Yamila Alves     Diverticulitis     Perforated sigmoid colon   Perforated diverticulum   S/P laparoscopic-assisted sigmoidectomy   Abdominal pain     . Endo/Other: Negative Endo/Other ROS                    Abdominal:           Vascular: negative vascular ROS. Anesthesia Plan      general     ASA 2       Induction: intravenous. MIPS: Postoperative opioids intended and Prophylactic antiemetics administered. Anesthetic plan and risks discussed with patient. Plan discussed with CRNA. Pre Anesthesia Assessment complete.  Chart reviewed on 11/4/2020    Carlo Pond DO   11/4/2020

## 2020-11-05 LAB
ALBUMIN SERPL-MCNC: 3.6 GM/DL (ref 3.4–5)
ALP BLD-CCNC: 156 IU/L (ref 40–129)
ALT SERPL-CCNC: 113 U/L (ref 10–40)
ANION GAP SERPL CALCULATED.3IONS-SCNC: 10 MMOL/L (ref 4–16)
AST SERPL-CCNC: 47 IU/L (ref 15–37)
BASOPHILS ABSOLUTE: 0 K/CU MM
BASOPHILS RELATIVE PERCENT: 0.3 % (ref 0–1)
BILIRUB SERPL-MCNC: 1.6 MG/DL (ref 0–1)
BILIRUBIN DIRECT: 0.8 MG/DL (ref 0–0.3)
BILIRUBIN, INDIRECT: 0.8 MG/DL (ref 0–0.7)
BUN BLDV-MCNC: 4 MG/DL (ref 6–23)
CALCIUM SERPL-MCNC: 8.5 MG/DL (ref 8.3–10.6)
CHLORIDE BLD-SCNC: 98 MMOL/L (ref 99–110)
CO2: 28 MMOL/L (ref 21–32)
CREAT SERPL-MCNC: 0.9 MG/DL (ref 0.9–1.3)
DIFFERENTIAL TYPE: ABNORMAL
EKG ATRIAL RATE: 85 BPM
EKG DIAGNOSIS: NORMAL
EKG P AXIS: 79 DEGREES
EKG P-R INTERVAL: 124 MS
EKG Q-T INTERVAL: 372 MS
EKG QRS DURATION: 88 MS
EKG QTC CALCULATION (BAZETT): 442 MS
EKG R AXIS: 58 DEGREES
EKG T AXIS: 49 DEGREES
EKG VENTRICULAR RATE: 85 BPM
EOSINOPHILS ABSOLUTE: 0 K/CU MM
EOSINOPHILS RELATIVE PERCENT: 0.1 % (ref 0–3)
GFR AFRICAN AMERICAN: >60 ML/MIN/1.73M2
GFR NON-AFRICAN AMERICAN: >60 ML/MIN/1.73M2
GLUCOSE BLD-MCNC: 116 MG/DL (ref 70–99)
HCT VFR BLD CALC: 42.8 % (ref 42–52)
HEMOGLOBIN: 14 GM/DL (ref 13.5–18)
IMMATURE NEUTROPHIL %: 0.3 % (ref 0–0.43)
LYMPHOCYTES ABSOLUTE: 1.5 K/CU MM
LYMPHOCYTES RELATIVE PERCENT: 20.5 % (ref 24–44)
MCH RBC QN AUTO: 31.4 PG (ref 27–31)
MCHC RBC AUTO-ENTMCNC: 32.7 % (ref 32–36)
MCV RBC AUTO: 96 FL (ref 78–100)
MONOCYTES ABSOLUTE: 0.7 K/CU MM
MONOCYTES RELATIVE PERCENT: 9.2 % (ref 0–4)
NUCLEATED RBC %: 0 %
PDW BLD-RTO: 13.2 % (ref 11.7–14.9)
PLATELET # BLD: 212 K/CU MM (ref 140–440)
PMV BLD AUTO: 10.4 FL (ref 7.5–11.1)
POTASSIUM SERPL-SCNC: 3.7 MMOL/L (ref 3.5–5.1)
RBC # BLD: 4.46 M/CU MM (ref 4.6–6.2)
SEGMENTED NEUTROPHILS ABSOLUTE COUNT: 4.9 K/CU MM
SEGMENTED NEUTROPHILS RELATIVE PERCENT: 69.6 % (ref 36–66)
SODIUM BLD-SCNC: 136 MMOL/L (ref 135–145)
TOTAL IMMATURE NEUTOROPHIL: 0.02 K/CU MM
TOTAL NUCLEATED RBC: 0 K/CU MM
TOTAL PROTEIN: 5.8 GM/DL (ref 6.4–8.2)
WBC # BLD: 7.1 K/CU MM (ref 4–10.5)

## 2020-11-05 PROCEDURE — 2580000003 HC RX 258: Performed by: SURGERY

## 2020-11-05 PROCEDURE — 85025 COMPLETE CBC W/AUTO DIFF WBC: CPT

## 2020-11-05 PROCEDURE — 96376 TX/PRO/DX INJ SAME DRUG ADON: CPT

## 2020-11-05 PROCEDURE — 2500000003 HC RX 250 WO HCPCS: Performed by: SURGERY

## 2020-11-05 PROCEDURE — 94150 VITAL CAPACITY TEST: CPT

## 2020-11-05 PROCEDURE — 96375 TX/PRO/DX INJ NEW DRUG ADDON: CPT

## 2020-11-05 PROCEDURE — 94761 N-INVAS EAR/PLS OXIMETRY MLT: CPT

## 2020-11-05 PROCEDURE — 80076 HEPATIC FUNCTION PANEL: CPT

## 2020-11-05 PROCEDURE — 36415 COLL VENOUS BLD VENIPUNCTURE: CPT

## 2020-11-05 PROCEDURE — 6360000002 HC RX W HCPCS: Performed by: SURGERY

## 2020-11-05 PROCEDURE — 80048 BASIC METABOLIC PNL TOTAL CA: CPT

## 2020-11-05 PROCEDURE — 6370000000 HC RX 637 (ALT 250 FOR IP): Performed by: SURGERY

## 2020-11-05 PROCEDURE — C9113 INJ PANTOPRAZOLE SODIUM, VIA: HCPCS | Performed by: SURGERY

## 2020-11-05 PROCEDURE — G0378 HOSPITAL OBSERVATION PER HR: HCPCS

## 2020-11-05 RX ORDER — OXYCODONE HYDROCHLORIDE AND ACETAMINOPHEN 5; 325 MG/1; MG/1
2 TABLET ORAL EVERY 6 HOURS PRN
Status: DISCONTINUED | OUTPATIENT
Start: 2020-11-05 | End: 2020-11-06 | Stop reason: HOSPADM

## 2020-11-05 RX ORDER — SENNA AND DOCUSATE SODIUM 50; 8.6 MG/1; MG/1
2 TABLET, FILM COATED ORAL 2 TIMES DAILY
Status: DISCONTINUED | OUTPATIENT
Start: 2020-11-05 | End: 2020-11-06 | Stop reason: HOSPADM

## 2020-11-05 RX ORDER — OXYCODONE HYDROCHLORIDE AND ACETAMINOPHEN 5; 325 MG/1; MG/1
1 TABLET ORAL EVERY 6 HOURS PRN
Status: DISCONTINUED | OUTPATIENT
Start: 2020-11-05 | End: 2020-11-06 | Stop reason: HOSPADM

## 2020-11-05 RX ADMIN — HYDROMORPHONE HYDROCHLORIDE 1 MG: 1 INJECTION, SOLUTION INTRAMUSCULAR; INTRAVENOUS; SUBCUTANEOUS at 00:40

## 2020-11-05 RX ADMIN — OXYCODONE HYDROCHLORIDE AND ACETAMINOPHEN 2 TABLET: 5; 325 TABLET ORAL at 10:48

## 2020-11-05 RX ADMIN — PANTOPRAZOLE SODIUM 40 MG: 40 INJECTION, POWDER, FOR SOLUTION INTRAVENOUS at 09:41

## 2020-11-05 RX ADMIN — HYDROMORPHONE HYDROCHLORIDE 1 MG: 1 INJECTION, SOLUTION INTRAMUSCULAR; INTRAVENOUS; SUBCUTANEOUS at 04:53

## 2020-11-05 RX ADMIN — OXYCODONE HYDROCHLORIDE AND ACETAMINOPHEN 2 TABLET: 5; 325 TABLET ORAL at 17:12

## 2020-11-05 RX ADMIN — METOCLOPRAMIDE 10 MG: 5 INJECTION, SOLUTION INTRAMUSCULAR; INTRAVENOUS at 17:20

## 2020-11-05 RX ADMIN — MAGNESIUM HYDROXIDE 60 ML: 400 SUSPENSION ORAL at 09:50

## 2020-11-05 RX ADMIN — POTASSIUM CHLORIDE, DEXTROSE MONOHYDRATE AND SODIUM CHLORIDE: 150; 5; 450 INJECTION, SOLUTION INTRAVENOUS at 19:33

## 2020-11-05 RX ADMIN — SODIUM CHLORIDE, PRESERVATIVE FREE 10 ML: 5 INJECTION INTRAVENOUS at 22:06

## 2020-11-05 RX ADMIN — POTASSIUM CHLORIDE, DEXTROSE MONOHYDRATE AND SODIUM CHLORIDE: 150; 5; 450 INJECTION, SOLUTION INTRAVENOUS at 01:42

## 2020-11-05 RX ADMIN — OXYCODONE HYDROCHLORIDE AND ACETAMINOPHEN 2 TABLET: 5; 325 TABLET ORAL at 23:40

## 2020-11-05 RX ADMIN — METOCLOPRAMIDE 10 MG: 5 INJECTION, SOLUTION INTRAMUSCULAR; INTRAVENOUS at 04:59

## 2020-11-05 RX ADMIN — DOCUSATE SODIUM 50MG AND SENNOSIDES 8.6MG 2 TABLET: 8.6; 5 TABLET, FILM COATED ORAL at 09:50

## 2020-11-05 RX ADMIN — SODIUM CHLORIDE, PRESERVATIVE FREE 10 ML: 5 INJECTION INTRAVENOUS at 09:47

## 2020-11-05 RX ADMIN — METOCLOPRAMIDE 10 MG: 5 INJECTION, SOLUTION INTRAMUSCULAR; INTRAVENOUS at 09:41

## 2020-11-05 RX ADMIN — POTASSIUM CHLORIDE, DEXTROSE MONOHYDRATE AND SODIUM CHLORIDE: 150; 5; 450 INJECTION, SOLUTION INTRAVENOUS at 10:59

## 2020-11-05 RX ADMIN — PANTOPRAZOLE SODIUM 40 MG: 40 INJECTION, POWDER, FOR SOLUTION INTRAVENOUS at 22:06

## 2020-11-05 RX ADMIN — HYDROMORPHONE HYDROCHLORIDE 1 MG: 1 INJECTION, SOLUTION INTRAMUSCULAR; INTRAVENOUS; SUBCUTANEOUS at 09:41

## 2020-11-05 RX ADMIN — SODIUM CHLORIDE, PRESERVATIVE FREE 10 ML: 5 INJECTION INTRAVENOUS at 09:46

## 2020-11-05 RX ADMIN — HYDROMORPHONE HYDROCHLORIDE 1 MG: 1 INJECTION, SOLUTION INTRAMUSCULAR; INTRAVENOUS; SUBCUTANEOUS at 19:36

## 2020-11-05 RX ADMIN — HYDROMORPHONE HYDROCHLORIDE 1 MG: 1 INJECTION, SOLUTION INTRAMUSCULAR; INTRAVENOUS; SUBCUTANEOUS at 02:45

## 2020-11-05 RX ADMIN — HYDROMORPHONE HYDROCHLORIDE 1 MG: 1 INJECTION, SOLUTION INTRAMUSCULAR; INTRAVENOUS; SUBCUTANEOUS at 06:59

## 2020-11-05 RX ADMIN — HYDROMORPHONE HYDROCHLORIDE 1 MG: 1 INJECTION, SOLUTION INTRAMUSCULAR; INTRAVENOUS; SUBCUTANEOUS at 13:27

## 2020-11-05 RX ADMIN — METOCLOPRAMIDE 10 MG: 5 INJECTION, SOLUTION INTRAMUSCULAR; INTRAVENOUS at 22:06

## 2020-11-05 ASSESSMENT — PAIN SCALES - GENERAL
PAINLEVEL_OUTOF10: 0
PAINLEVEL_OUTOF10: 10
PAINLEVEL_OUTOF10: 8
PAINLEVEL_OUTOF10: 7
PAINLEVEL_OUTOF10: 8
PAINLEVEL_OUTOF10: 7
PAINLEVEL_OUTOF10: 8
PAINLEVEL_OUTOF10: 5
PAINLEVEL_OUTOF10: 8

## 2020-11-05 ASSESSMENT — PAIN DESCRIPTION - ORIENTATION: ORIENTATION: LEFT

## 2020-11-05 ASSESSMENT — PAIN DESCRIPTION - ONSET
ONSET: ON-GOING

## 2020-11-05 ASSESSMENT — PAIN DESCRIPTION - FREQUENCY
FREQUENCY: CONTINUOUS

## 2020-11-05 ASSESSMENT — PAIN - FUNCTIONAL ASSESSMENT: PAIN_FUNCTIONAL_ASSESSMENT: ACTIVITIES ARE NOT PREVENTED

## 2020-11-05 ASSESSMENT — PAIN DESCRIPTION - DESCRIPTORS
DESCRIPTORS: ACHING

## 2020-11-05 ASSESSMENT — PAIN DESCRIPTION - PAIN TYPE
TYPE: ACUTE PAIN
TYPE: SURGICAL PAIN
TYPE: ACUTE PAIN
TYPE: ACUTE PAIN

## 2020-11-05 ASSESSMENT — PAIN DESCRIPTION - LOCATION
LOCATION: ABDOMEN

## 2020-11-05 ASSESSMENT — PAIN DESCRIPTION - PROGRESSION
CLINICAL_PROGRESSION: GRADUALLY IMPROVING
CLINICAL_PROGRESSION: RAPIDLY WORSENING
CLINICAL_PROGRESSION: GRADUALLY IMPROVING

## 2020-11-05 NOTE — PROGRESS NOTES
Absolute 6.6 K/CU MM    Lymphocytes Absolute 1.2 K/CU MM    Monocytes Absolute 0.8 K/CU MM    Eosinophils Absolute 0.0 K/CU MM    Basophils Absolute 0.0 K/CU MM    Nucleated RBC % 0.0 %    Total Nucleated RBC 0.0 K/CU MM    Total Immature Neutrophil 0.03 K/CU MM    Immature Neutrophil % 0.3 0 - 0.43 %   RAULITO TEST    Collection Time: 11/03/20  2:30 PM   Result Value Ref Range    Clotest NEGATIVE NEGATIVE   CBC auto differential    Collection Time: 11/04/20  4:21 AM   Result Value Ref Range    WBC 8.7 4.0 - 10.5 K/CU MM    RBC 4.84 4.6 - 6.2 M/CU MM    Hemoglobin 14.9 13.5 - 18.0 GM/DL    Hematocrit 46.1 42 - 52 %    MCV 95.2 78 - 100 FL    MCH 30.8 27 - 31 PG    MCHC 32.3 32.0 - 36.0 %    RDW 13.2 11.7 - 14.9 %    Platelets 763 983 - 236 K/CU MM    MPV 10.8 7.5 - 11.1 FL    Differential Type AUTOMATED DIFFERENTIAL     Segs Relative 79.3 (H) 36 - 66 %    Lymphocytes % 11.4 (L) 24 - 44 %    Monocytes % 8.7 (H) 0 - 4 %    Eosinophils % 0.2 0 - 3 %    Basophils % 0.1 0 - 1 %    Segs Absolute 6.9 K/CU MM    Lymphocytes Absolute 1.0 K/CU MM    Monocytes Absolute 0.8 K/CU MM    Eosinophils Absolute 0.0 K/CU MM    Basophils Absolute 0.0 K/CU MM    Nucleated RBC % 0.0 %    Total Nucleated RBC 0.0 K/CU MM    Total Immature Neutrophil 0.03 K/CU MM    Immature Neutrophil % 0.3 0 - 0.43 %   Hepatic function panel    Collection Time: 11/04/20  4:37 AM   Result Value Ref Range    Alb 3.9 3.4 - 5.0 GM/DL    Total Bilirubin 3.2 (H) 0.0 - 1.0 MG/DL    Bilirubin, Direct 2.1 (H) 0.0 - 0.3 MG/DL    Bilirubin, Indirect 1.1 (H) 0 - 0.7 MG/DL    Alkaline Phosphatase 204 (H) 40 - 129 IU/L    AST 77 (H) 15 - 37 IU/L     (H) 10 - 40 U/L    Total Protein 6.4 6.4 - 8.2 GM/DL       Scheduled Meds:   sodium chloride flush  10 mL Intravenous 2 times per day    sodium chloride flush  10 mL Intravenous 2 times per day    enoxaparin  40 mg Subcutaneous Daily    pantoprazole  40 mg Intravenous BID    metoclopramide  10 mg Intravenous Q6H Continuous Infusions:   dextrose 5% and 0.45% NaCl with KCl 20 mEq 125 mL/hr at 11/05/20 0142       Physical Exam:  HEENT: Anicteric sclerae, Oropharyngeal mucosae moist, pink and intact. Heart:  Normal S1 and S2, RRR  Lungs: Clear to auscultation bilaterally, No audible Wheezes or Rales. Extremities: No edema. Neuro: Alert and Oriented x 3, Non focal.  Abdomen: Soft, Benign, Non tender, Non distended, Positive bowel sounds. Incision: Nicely healing: No erythema, No discharge. Active Problems:    Abdominal pain  Resolved Problems:    * No resolved hospital problems. *      Assessment and Plan:  Tammy Rivero is a 39 y.o. male who is POD # 1 status post:  Laparoscopic cholecystectomy AND MAJOR LYSIS OF ADHESIONS     Bilirubin decreasing, will continue to follow till back to normal, and keep the MARQUIS for one more day. Increase Ambulation to at least 4x/day walk in the hallways with assistance. Respiratory ryann-operative care: Incentive Spirometry / deep breathing and coughing 10x/hr while awake. Continue DVT prophylaxis with Teds and SCDs and SC Lovenox. Continue GI prophylaxis with Protonix IV till able to tolerate PO.   Continue ryann-op Abx for 24 hrs after surgery then dc.    ___________________________________________    Christian Mo MD, FACS, FICS  11/5/2020  6:19 AM

## 2020-11-05 NOTE — PROGRESS NOTES
Lap sites are clean and dry. MARQUIS drain is intact and has small amount of bloody drainage. Will keep monitoring.

## 2020-11-05 NOTE — PROGRESS NOTES
[address]    Hospitalist Progress Note      Name:  Manuela Barbosa /Age/Sex: 1975  (39 y.o. male)   MRN & CSN:  [id number] & [id number] Admission Date/Time: 2020 12:20 PM   Location:  [address] [place] [address] PCP: No primary care provider on file. Hospital Day: 4    Assessment and Plan:   Manuela Barbosa is a 39 y.o.  male  who presents with abdominal pain     Acute calculous cholecystitis S/p Lap Juanita and lysis of adhesions     Reportedly he had EGD on 11/3 but no official report    US of the gallbladder -showing cholecystitis   Lap juanita with placement of drain  Surgery following    Elevated liver enzymes - improving     >182, >77, Bili 5>3.2 - unclear etiology   Likely from cholecystitis ?, CT abdomen with report of small scattered liver lesions   Continue to Monitor LFTs     Hx of perforated diverticulum with abscess s/p lap sigmoidectomy 10/3/2020. CT Abd today essentially non acute. CT 10/30 cholelithiasis and suspected biliary sludge, postsurgical     Diet DIET LOW FAT;   DVT Prophylaxis [] Lovenox, []  Heparin, [] SCDs, []No VTE prophylaxis, patient ambulating   GI Prophylaxis [] PPI, [] H2 Blocker, [] No GI prophylaxis, patient is receiving diet/Tube Feeds   Code Status Full Code   Disposition Patient requires continued admission due to intractable abdominal pain   MDM [] Low, [x] Moderate,[]  High     History of Present Illness: Subjective     Patient Seen & Examined at the bedside     Resting in bed with no distress while on room air  Complains of pain from surgical site  Ambulated in the hallways with no issues   No BM yet    Ten point ROS reviewed negative, unless as noted above    Objective:        Intake/Output Summary (Last 24 hours) at 2020 1212  Last data filed at 2020 0946  Gross per 24 hour   Intake 1572 ml   Output 35 ml   Net 1537 ml      Vitals:   Vitals:    20 1102   BP:    Pulse:    Resp:    Temp:    SpO2: 93%     Physical Exam:    GEN Awake male, resting in bed in no apparent distress. Appears given age. HENT Mucous membranes are moist.   RESP Clear to auscultation, no wheezes, rales or rhonchi. CARDIO/VASC -S1/S2 auscultated. Regular rate without appreciable murmurs, rubs, or gallops. Peripheral pulses equal bilaterally and palpable. No peripheral edema. GI right upper quadrant drain in place, abdomen is soft with mild to moderate tenderness, no masses, or guarding. Bowel sounds hypoactive. Rectal exam deferred.  Donnelly catheter is not present.     Medications:   Medications:    sennosides-docusate sodium  2 tablet Oral BID    magnesium hydroxide  60 mL Oral BID    sodium chloride flush  10 mL Intravenous 2 times per day    sodium chloride flush  10 mL Intravenous 2 times per day    enoxaparin  40 mg Subcutaneous Daily    pantoprazole  40 mg Intravenous BID    metoclopramide  10 mg Intravenous Q6H      Infusions:    dextrose 5% and 0.45% NaCl with KCl 20 mEq 125 mL/hr at 11/05/20 1059     PRN Meds: oxyCODONE-acetaminophen, 1 tablet, Q6H PRN  oxyCODONE-acetaminophen, 2 tablet, Q6H PRN  HYDROmorphone, 1 mg, Q6H PRN  sodium chloride flush, 10 mL, PRN  potassium chloride, 10 mEq, PRN  magnesium sulfate, 1 g, PRN  sodium chloride flush, 10 mL, PRN  acetaminophen, 650 mg, Q6H PRN    Or  acetaminophen, 650 mg, Q6H PRN  polyethylene glycol, 17 g, Daily PRN  promethazine, 12.5 mg, Q6H PRN    Or  ondansetron, 4 mg, Q6H PRN          Electronically signed by Harjit Long MD on 11/5/2020 at 12:12 PM

## 2020-11-06 VITALS
BODY MASS INDEX: 29.9 KG/M2 | HEART RATE: 90 BPM | OXYGEN SATURATION: 98 % | WEIGHT: 213.6 LBS | TEMPERATURE: 98.4 F | SYSTOLIC BLOOD PRESSURE: 126 MMHG | DIASTOLIC BLOOD PRESSURE: 80 MMHG | HEIGHT: 71 IN | RESPIRATION RATE: 16 BRPM

## 2020-11-06 PROBLEM — K80.00 ACUTE CALCULOUS CHOLECYSTITIS: Status: ACTIVE | Noted: 2020-11-06

## 2020-11-06 LAB
ALBUMIN SERPL-MCNC: 3.3 GM/DL (ref 3.4–5)
ALP BLD-CCNC: 143 IU/L (ref 40–129)
ALT SERPL-CCNC: 91 U/L (ref 10–40)
AST SERPL-CCNC: 35 IU/L (ref 15–37)
BASOPHILS ABSOLUTE: 0 K/CU MM
BASOPHILS RELATIVE PERCENT: 0.3 % (ref 0–1)
BILIRUB SERPL-MCNC: 1.2 MG/DL (ref 0–1)
BILIRUBIN DIRECT: 0.6 MG/DL (ref 0–0.3)
BILIRUBIN, INDIRECT: 0.6 MG/DL (ref 0–0.7)
DIFFERENTIAL TYPE: ABNORMAL
EKG ATRIAL RATE: 85 BPM
EKG DIAGNOSIS: NORMAL
EKG P AXIS: 66 DEGREES
EKG P-R INTERVAL: 112 MS
EKG Q-T INTERVAL: 406 MS
EKG QRS DURATION: 100 MS
EKG QTC CALCULATION (BAZETT): 483 MS
EKG R AXIS: 57 DEGREES
EKG T AXIS: 4 DEGREES
EKG VENTRICULAR RATE: 85 BPM
EOSINOPHILS ABSOLUTE: 0.1 K/CU MM
EOSINOPHILS RELATIVE PERCENT: 0.9 % (ref 0–3)
HCT VFR BLD CALC: 39.5 % (ref 42–52)
HEMOGLOBIN: 13 GM/DL (ref 13.5–18)
IMMATURE NEUTROPHIL %: 0.3 % (ref 0–0.43)
LYMPHOCYTES ABSOLUTE: 1.6 K/CU MM
LYMPHOCYTES RELATIVE PERCENT: 27.4 % (ref 24–44)
MCH RBC QN AUTO: 31.3 PG (ref 27–31)
MCHC RBC AUTO-ENTMCNC: 32.9 % (ref 32–36)
MCV RBC AUTO: 95.2 FL (ref 78–100)
MONOCYTES ABSOLUTE: 0.5 K/CU MM
MONOCYTES RELATIVE PERCENT: 8.7 % (ref 0–4)
NUCLEATED RBC %: 0 %
PDW BLD-RTO: 13.1 % (ref 11.7–14.9)
PLATELET # BLD: 195 K/CU MM (ref 140–440)
PMV BLD AUTO: 10.9 FL (ref 7.5–11.1)
RBC # BLD: 4.15 M/CU MM (ref 4.6–6.2)
SEGMENTED NEUTROPHILS ABSOLUTE COUNT: 3.7 K/CU MM
SEGMENTED NEUTROPHILS RELATIVE PERCENT: 62.4 % (ref 36–66)
TOTAL IMMATURE NEUTOROPHIL: 0.02 K/CU MM
TOTAL NUCLEATED RBC: 0 K/CU MM
TOTAL PROTEIN: 5.4 GM/DL (ref 6.4–8.2)
WBC # BLD: 5.9 K/CU MM (ref 4–10.5)

## 2020-11-06 PROCEDURE — 1200000000 HC SEMI PRIVATE

## 2020-11-06 PROCEDURE — 36415 COLL VENOUS BLD VENIPUNCTURE: CPT

## 2020-11-06 PROCEDURE — 2500000003 HC RX 250 WO HCPCS: Performed by: SURGERY

## 2020-11-06 PROCEDURE — 96376 TX/PRO/DX INJ SAME DRUG ADON: CPT

## 2020-11-06 PROCEDURE — 6360000002 HC RX W HCPCS: Performed by: SURGERY

## 2020-11-06 PROCEDURE — 2580000003 HC RX 258: Performed by: SURGERY

## 2020-11-06 PROCEDURE — 85025 COMPLETE CBC W/AUTO DIFF WBC: CPT

## 2020-11-06 PROCEDURE — 80076 HEPATIC FUNCTION PANEL: CPT

## 2020-11-06 PROCEDURE — C9113 INJ PANTOPRAZOLE SODIUM, VIA: HCPCS | Performed by: SURGERY

## 2020-11-06 RX ORDER — OXYCODONE HYDROCHLORIDE AND ACETAMINOPHEN 5; 325 MG/1; MG/1
1-2 TABLET ORAL EVERY 6 HOURS PRN
Qty: 35 TABLET | Refills: 0 | Status: SHIPPED | OUTPATIENT
Start: 2020-11-06 | End: 2020-11-13

## 2020-11-06 RX ORDER — AMOXICILLIN 250 MG
2 CAPSULE ORAL DAILY
Qty: 60 TABLET | Refills: 3 | Status: SHIPPED | OUTPATIENT
Start: 2020-11-06 | End: 2020-11-16

## 2020-11-06 RX ADMIN — HYDROMORPHONE HYDROCHLORIDE 1 MG: 1 INJECTION, SOLUTION INTRAMUSCULAR; INTRAVENOUS; SUBCUTANEOUS at 08:11

## 2020-11-06 RX ADMIN — PANTOPRAZOLE SODIUM 40 MG: 40 INJECTION, POWDER, FOR SOLUTION INTRAVENOUS at 08:46

## 2020-11-06 RX ADMIN — SODIUM CHLORIDE, PRESERVATIVE FREE 10 ML: 5 INJECTION INTRAVENOUS at 08:46

## 2020-11-06 RX ADMIN — HYDROMORPHONE HYDROCHLORIDE 1 MG: 1 INJECTION, SOLUTION INTRAMUSCULAR; INTRAVENOUS; SUBCUTANEOUS at 02:44

## 2020-11-06 RX ADMIN — POTASSIUM CHLORIDE, DEXTROSE MONOHYDRATE AND SODIUM CHLORIDE: 150; 5; 450 INJECTION, SOLUTION INTRAVENOUS at 04:44

## 2020-11-06 RX ADMIN — SODIUM CHLORIDE, PRESERVATIVE FREE 10 ML: 5 INJECTION INTRAVENOUS at 08:47

## 2020-11-06 ASSESSMENT — PAIN DESCRIPTION - DESCRIPTORS
DESCRIPTORS: ACHING;SHARP
DESCRIPTORS: ACHING;SHARP

## 2020-11-06 ASSESSMENT — PAIN DESCRIPTION - ONSET
ONSET: ON-GOING
ONSET: ON-GOING

## 2020-11-06 ASSESSMENT — PAIN DESCRIPTION - FREQUENCY
FREQUENCY: CONTINUOUS
FREQUENCY: CONTINUOUS

## 2020-11-06 ASSESSMENT — PAIN DESCRIPTION - LOCATION
LOCATION: ABDOMEN
LOCATION: ABDOMEN

## 2020-11-06 ASSESSMENT — PAIN - FUNCTIONAL ASSESSMENT
PAIN_FUNCTIONAL_ASSESSMENT: PREVENTS OR INTERFERES SOME ACTIVE ACTIVITIES AND ADLS
PAIN_FUNCTIONAL_ASSESSMENT: PREVENTS OR INTERFERES SOME ACTIVE ACTIVITIES AND ADLS

## 2020-11-06 ASSESSMENT — PAIN DESCRIPTION - ORIENTATION
ORIENTATION: RIGHT
ORIENTATION: RIGHT

## 2020-11-06 ASSESSMENT — PAIN DESCRIPTION - PROGRESSION
CLINICAL_PROGRESSION: GRADUALLY WORSENING
CLINICAL_PROGRESSION: GRADUALLY IMPROVING

## 2020-11-06 ASSESSMENT — PAIN SCALES - GENERAL
PAINLEVEL_OUTOF10: 7
PAINLEVEL_OUTOF10: 7
PAINLEVEL_OUTOF10: 8

## 2020-11-06 ASSESSMENT — PAIN DESCRIPTION - PAIN TYPE
TYPE: SURGICAL PAIN
TYPE: SURGICAL PAIN

## 2020-11-06 NOTE — PROGRESS NOTES
CLINICAL PHARMACY NOTE: MEDS TO [address] Select Patient?: Yes  Total # of Prescriptions Filled: 1   The following medications were delivered to the patient:  · Percocet 5/325 #35  Total # of Interventions Completed: 1  Time Spent (min): 15    Additional Documentation:

## 2020-11-06 NOTE — PROGRESS NOTES
GENERAL SURGERY PROGRESS NOTE    CC/HPI:           Patient feels better from the day before yesterday's surgery NO MORE PAIN IN HIS RUQ!!.       Vitals:    11/05/20 1102 11/05/20 1530 11/05/20 2020 11/06/20 0338   BP:  (!) 128/90 126/70 130/76   Pulse:  118 109 99   Resp:  16 16 16   Temp:  98.6 °F (37 °C) 97.6 °F (36.4 °C) 97.5 °F (36.4 °C)   TempSrc:  Oral Oral Oral   SpO2: 93% 94%  99%   Weight:       Height:         I/O last 3 completed shifts: In: 10 [I.V.:10]  Out: -   No intake/output data recorded. DIET LOW FAT;     Recent Results (from the past 48 hour(s))   CBC auto differential    Collection Time: 11/05/20  3:59 PM   Result Value Ref Range    WBC 7.1 4.0 - 10.5 K/CU MM    RBC 4.46 (L) 4.6 - 6.2 M/CU MM    Hemoglobin 14.0 13.5 - 18.0 GM/DL    Hematocrit 42.8 42 - 52 %    MCV 96.0 78 - 100 FL    MCH 31.4 (H) 27 - 31 PG    MCHC 32.7 32.0 - 36.0 %    RDW 13.2 11.7 - 14.9 %    Platelets 180 525 - 199 K/CU MM    MPV 10.4 7.5 - 11.1 FL    Differential Type AUTOMATED DIFFERENTIAL     Segs Relative 69.6 (H) 36 - 66 %    Lymphocytes % 20.5 (L) 24 - 44 %    Monocytes % 9.2 (H) 0 - 4 %    Eosinophils % 0.1 0 - 3 %    Basophils % 0.3 0 - 1 %    Segs Absolute 4.9 K/CU MM    Lymphocytes Absolute 1.5 K/CU MM    Monocytes Absolute 0.7 K/CU MM    Eosinophils Absolute 0.0 K/CU MM    Basophils Absolute 0.0 K/CU MM    Nucleated RBC % 0.0 %    Total Nucleated RBC 0.0 K/CU MM    Total Immature Neutrophil 0.02 K/CU MM    Immature Neutrophil % 0.3 0 - 0.43 %   Basic Metabolic Panel w/ Reflex to MG    Collection Time: 11/05/20  3:59 PM   Result Value Ref Range    Sodium 136 135 - 145 MMOL/L    Potassium 3.7 3.5 - 5.1 MMOL/L    Chloride 98 (L) 99 - 110 mMol/L    CO2 28 21 - 32 MMOL/L    Anion Gap 10 4 - 16    BUN 4 (L) 6 - 23 MG/DL    CREATININE 0.9 0.9 - 1.3 MG/DL    Glucose 116 (H) 70 - 99 MG/DL    Calcium 8.5 8.3 - 10.6 MG/DL    GFR Non-African American >60 >60 mL/min/1.73m2    GFR African American >60 >60 mL/min/1.73m2   Hepatic function panel    Collection Time: 11/05/20  3:59 PM   Result Value Ref Range    Alb 3.6 3.4 - 5.0 GM/DL    Total Bilirubin 1.6 (H) 0.0 - 1.0 MG/DL    Bilirubin, Direct 0.8 (H) 0.0 - 0.3 MG/DL    Bilirubin, Indirect 0.8 (H) 0 - 0.7 MG/DL    Alkaline Phosphatase 156 (H) 40 - 129 IU/L    AST 47 (H) 15 - 37 IU/L     (H) 10 - 40 U/L    Total Protein 5.8 (L) 6.4 - 8.2 GM/DL       Scheduled Meds:   sennosides-docusate sodium  2 tablet Oral BID    magnesium hydroxide  60 mL Oral BID    sodium chloride flush  10 mL Intravenous 2 times per day    sodium chloride flush  10 mL Intravenous 2 times per day    enoxaparin  40 mg Subcutaneous Daily    pantoprazole  40 mg Intravenous BID    metoclopramide  10 mg Intravenous Q6H       Continuous Infusions:   dextrose 5% and 0.45% NaCl with KCl 20 mEq 125 mL/hr at 11/06/20 0444       Physical Exam:  HEENT: Anicteric sclerae, Oropharyngeal mucosae moist, pink and intact. Heart:  Normal S1 and S2, RRR  Lungs: Clear to auscultation bilaterally, No audible Wheezes or Rales. Extremities: No edema. Neuro: Alert and Oriented x 3, Non focal.  Abdomen: Soft, Benign, Non tender, Non distended, Positive bowel sounds. Incision: Nicely healing: No erythema, No discharge. Active Problems:    Abdominal pain  Resolved Problems:    * No resolved hospital problems.  *      Assessment and Plan:  Elina Sellers is a 39 y.o. male who is POD # 2 status post:  Laparoscopic cholecystectomy AND MAJOR LYSIS OF ADHESIONS     Bilirubin decreasing, almost back to normal, MARQUIS drainage minimal and serous, will remove today and discharge.    ___________________________________________    Sindhu Torres MD, FACS, FICS  11/6/2020  6:52 AM

## 2020-11-06 NOTE — DISCHARGE SUMMARY
and gums normal   Neck:   Supple, symmetrical, trachea midline, no adenopathy;        thyroid:  No enlargement/tenderness/nodules; no carotid    bruit or JVD   Back:     Symmetric, no curvature, ROM normal, no CVA tenderness   Lungs:     Clear to auscultation bilaterally, respirations unlabored   Chest wall:    No tenderness or deformity   Heart:    Regular rate and rhythm, S1 and S2 normal, no murmur, rub   or gallop   Abdomen:     Soft, non-tender, bowel sounds active all four quadrants,     no masses, no organomegaly   Genitalia:    Normal male without lesion, discharge or tenderness   Rectal:    Normal tone, normal prostate, no masses or tenderness;    guaiac negative stool   Extremities:   Extremities normal, atraumatic, no cyanosis or edema   Pulses:   2+ and symmetric all extremities   Skin:   Skin color, texture, turgor normal, no rashes or lesions   Lymph nodes:   Cervical, supraclavicular, and axillary nodes normal   Neurologic:   CNII-XII intact. Normal strength, sensation and reflexes       throughout       Discharge vitals:    Wt Readings from Last 3 Encounters:   11/04/20 213 lb 9.6 oz (96.9 kg)   11/02/20 198 lb (89.8 kg)   11/02/20 198 lb (89.8 kg)   ,  Temp Readings from Last 3 Encounters:   11/06/20 97.5 °F (36.4 °C) (Oral)   11/04/20 99.6 °F (37.6 °C)   11/02/20 97.8 °F (36.6 °C) (Oral)   ,  BP Readings from Last 3 Encounters:   11/06/20 130/76   11/04/20 110/71   11/03/20 (!) 89/44   ,  Pulse Readings from Last 3 Encounters:   11/06/20 99   11/02/20 66   11/02/20 67        Disposition: home    Patient Instructions:   Current Discharge Medication List      START taking these medications    Details   senna-docusate (SENOKOT S) 8.6-50 MG per tablet Take 2 tablets by mouth daily for 10 days  Qty: 60 tablet, Refills: 3         CONTINUE these medications which have CHANGED    Details   oxyCODONE-acetaminophen (PERCOCET) 5-325 MG per tablet Take 1-2 tablets by mouth every 6 hours as needed for Pain for up to 7 days. Qty: 35 tablet, Refills: 0    Comments: Reduce doses taken as pain becomes manageable  Associated Diagnoses: Acute calculous cholecystitis         CONTINUE these medications which have NOT CHANGED    Details   metoclopramide (REGLAN) 10 MG tablet Take 1 tablet by mouth 3 times daily as needed (abdominal pain, nausea)  Qty: 18 tablet, Refills: 0      dicyclomine (BENTYL) 10 MG capsule Take 1 capsule by mouth 3 times daily As needed for abdominal pain  Qty: 15 capsule, Refills: 0      pantoprazole (PROTONIX) 40 MG tablet Take 1 tablet by mouth every morning (before breakfast)  Qty: 30 tablet, Refills: 0           Activity: no lifting, or Strenuous exercise for 3 wks.   Diet: low fat, low cholesterol diet  Wound Care: keep wound clean and dry    Call  [phone]  to make a follow-up appointment  with Dr Marla Merlos MD, Micki Urena in 1 week.    ____________________________________________    Signed:      Marla Merlos MD, FACS, FICS    11/6/2020  8:25 AM

## 2020-11-12 ENCOUNTER — TELEPHONE (OUTPATIENT)
Dept: BARIATRICS/WEIGHT MGMT | Age: 45
End: 2020-11-12

## 2020-11-17 ENCOUNTER — OFFICE VISIT (OUTPATIENT)
Dept: SURGERY | Age: 45
End: 2020-11-17

## 2020-11-17 VITALS
HEIGHT: 71 IN | HEART RATE: 80 BPM | DIASTOLIC BLOOD PRESSURE: 82 MMHG | BODY MASS INDEX: 29.12 KG/M2 | TEMPERATURE: 96.8 F | SYSTOLIC BLOOD PRESSURE: 126 MMHG | WEIGHT: 208 LBS | OXYGEN SATURATION: 97 %

## 2020-11-17 PROCEDURE — 99024 POSTOP FOLLOW-UP VISIT: CPT | Performed by: SURGERY

## 2020-11-17 RX ORDER — CYCLOBENZAPRINE HCL 5 MG
5 TABLET ORAL 3 TIMES DAILY PRN
Qty: 30 TABLET | Refills: 0 | Status: SHIPPED | OUTPATIENT
Start: 2020-11-17 | End: 2020-11-27

## 2020-11-17 NOTE — PROGRESS NOTES
Current Medications:   Current Outpatient Medications   Medication Sig Dispense Refill    cyclobenzaprine (FLEXERIL) 5 MG tablet Take 1 tablet by mouth 3 times daily as needed for Muscle spasms 30 tablet 0    metoclopramide (REGLAN) 10 MG tablet Take 1 tablet by mouth 3 times daily as needed (abdominal pain, nausea) 18 tablet 0    dicyclomine (BENTYL) 10 MG capsule Take 1 capsule by mouth 3 times daily As needed for abdominal pain 15 capsule 0    pantoprazole (PROTONIX) 40 MG tablet Take 1 tablet by mouth every morning (before breakfast) 30 tablet 0     No current facility-administered medications for this visit.         Allergies:  Vicodin [hydrocodone-acetaminophen]    Social History:   Social History     Socioeconomic History    Marital status:      Spouse name: None    Number of children: None    Years of education: None    Highest education level: None   Occupational History    None   Social Needs    Financial resource strain: None    Food insecurity     Worry: None     Inability: None    Transportation needs     Medical: None     Non-medical: None   Tobacco Use    Smoking status: Never Smoker    Smokeless tobacco: Never Used   Substance and Sexual Activity    Alcohol use: No    Drug use: Yes     Types: Marijuana     Comment: \"last used last week of Sept 2020\"    Sexual activity: Never   Lifestyle    Physical activity     Days per week: None     Minutes per session: None    Stress: None   Relationships    Social connections     Talks on phone: None     Gets together: None     Attends [employer] service: None     Active member of club or organization: None     Attends meetings of clubs or organizations: None     Relationship status: None    Intimate partner violence     Fear of current or ex partner: None     Emotionally abused: None     Physically abused: None     Forced sexual activity: None   Other Topics Concern    None   Social History Narrative    None       Family History:   Family History   Problem Relation Age of Onset   [employer] Cancer Mother         ovarian cancer     Early Death Mother     Kidney stones Father     Diabetes Father     Diabetes Brother        REVIEW OF SYSTEMS:    Review of Systems   Constitutional: Negative for chills and fever. Respiratory: Negative for shortness of breath. Cardiovascular: Negative for chest pain. Gastrointestinal: Positive for abdominal pain. Negative for constipation, diarrhea, nausea and vomiting. I have reviewed the patient's information pertinent to this visit, including medical history, family history, social history and review of systems. PHYSICAL EXAM:    Vitals:    11/17/20 1010   BP: 126/82   Pulse: 80   Temp: 96.8 °F (36 °C)   SpO2: 97%   Weight: 208 lb (94.3 kg)   Height: 5' 11\" (1.803 m)       Physical Exam  Constitutional:       General: He is not in acute distress. Appearance: He is well-developed. He is not diaphoretic. HENT:      Head: Normocephalic and atraumatic. Eyes:      Pupils: Pupils are equal, round, and reactive to light. Cardiovascular:      Rate and Rhythm: Normal rate and regular rhythm. Pulmonary:      Effort: Pulmonary effort is normal. No respiratory distress. Abdominal:      Palpations: Abdomen is soft. Tenderness: There is abdominal tenderness (appropriately tender at incisions). There is no guarding or rebound. Comments: Incision(s) well healed, no erythema or drainage    Neurological:      Mental Status: He is alert and oriented to person, place, and time. Psychiatric:         Behavior: Behavior normal.         DATA:    Pathology Results:   Specimen #[id number]       Final Pathologic Diagnosis:   Gallbladder, cholecystectomy:   -  Acute cholecystitis with mucosal necrosis.     Specimen #[id number]       Final Pathologic Diagnosis:   Colon, rectosigmoid, partial resection:   -  Diverticulitis is identified.     -  Serositis is present.     -  Negative for

## 2020-11-18 ASSESSMENT — ENCOUNTER SYMPTOMS
VOMITING: 0
ABDOMINAL PAIN: 1
DIARRHEA: 0
SHORTNESS OF BREATH: 0
CONSTIPATION: 0
NAUSEA: 0

## 2020-12-02 ENCOUNTER — HOSPITAL ENCOUNTER (OUTPATIENT)
Dept: CT IMAGING | Age: 45
Discharge: HOME | End: 2020-12-02
Payer: COMMERCIAL

## 2020-12-02 ENCOUNTER — HOSPITAL ENCOUNTER (OUTPATIENT)
Age: 45
Discharge: HOME | End: 2020-12-02
Payer: COMMERCIAL

## 2020-12-02 ENCOUNTER — OFFICE VISIT (OUTPATIENT)
Dept: BARIATRICS/WEIGHT MGMT | Age: 45
End: 2020-12-02

## 2020-12-02 VITALS
WEIGHT: 208.2 LBS | RESPIRATION RATE: 16 BRPM | OXYGEN SATURATION: 98 % | TEMPERATURE: 97 F | BODY MASS INDEX: 29.15 KG/M2 | DIASTOLIC BLOOD PRESSURE: 86 MMHG | HEIGHT: 71 IN | SYSTOLIC BLOOD PRESSURE: 110 MMHG | HEART RATE: 83 BPM

## 2020-12-02 PROBLEM — Z90.49 S/P LAPAROSCOPIC CHOLECYSTECTOMY: Status: ACTIVE | Noted: 2020-12-02

## 2020-12-02 PROBLEM — R10.32 LLQ PAIN: Status: ACTIVE | Noted: 2020-12-02

## 2020-12-02 LAB
BASOPHILS ABSOLUTE: 0 K/CU MM
BASOPHILS RELATIVE PERCENT: 0.5 % (ref 0–1)
DIFFERENTIAL TYPE: ABNORMAL
EOSINOPHILS ABSOLUTE: 0.1 K/CU MM
EOSINOPHILS RELATIVE PERCENT: 2.1 % (ref 0–3)
HCT VFR BLD CALC: 50.9 % (ref 42–52)
HEMOGLOBIN: 16.7 GM/DL (ref 13.5–18)
IMMATURE NEUTROPHIL %: 0.3 % (ref 0–0.43)
LYMPHOCYTES ABSOLUTE: 2.1 K/CU MM
LYMPHOCYTES RELATIVE PERCENT: 32 % (ref 24–44)
MCH RBC QN AUTO: 30.6 PG (ref 27–31)
MCHC RBC AUTO-ENTMCNC: 32.8 % (ref 32–36)
MCV RBC AUTO: 93.4 FL (ref 78–100)
MONOCYTES ABSOLUTE: 0.5 K/CU MM
MONOCYTES RELATIVE PERCENT: 6.9 % (ref 0–4)
PDW BLD-RTO: 12.9 % (ref 11.7–14.9)
PLATELET # BLD: 251 K/CU MM (ref 140–440)
PMV BLD AUTO: 10.2 FL (ref 7.5–11.1)
RBC # BLD: 5.45 M/CU MM (ref 4.6–6.2)
SEGMENTED NEUTROPHILS ABSOLUTE COUNT: 3.8 K/CU MM
SEGMENTED NEUTROPHILS RELATIVE PERCENT: 58.2 % (ref 36–66)
TOTAL IMMATURE NEUTOROPHIL: 0.02 K/CU MM
WBC # BLD: 6.5 K/CU MM (ref 4–10.5)

## 2020-12-02 PROCEDURE — 36415 COLL VENOUS BLD VENIPUNCTURE: CPT

## 2020-12-02 PROCEDURE — 85025 COMPLETE CBC W/AUTO DIFF WBC: CPT

## 2020-12-02 PROCEDURE — 99024 POSTOP FOLLOW-UP VISIT: CPT | Performed by: SURGERY

## 2020-12-02 PROCEDURE — 6360000004 HC RX CONTRAST MEDICATION: Performed by: SURGERY

## 2020-12-02 PROCEDURE — 74177 CT ABD & PELVIS W/CONTRAST: CPT

## 2020-12-02 RX ADMIN — IOHEXOL 50 ML: 240 INJECTION, SOLUTION INTRATHECAL; INTRAVASCULAR; INTRAVENOUS; ORAL at 12:39

## 2020-12-02 RX ADMIN — IOPAMIDOL 100 ML: 755 INJECTION, SOLUTION INTRAVENOUS at 14:13

## 2020-12-02 ASSESSMENT — ENCOUNTER SYMPTOMS
ANAL BLEEDING: 0
SORE THROAT: 0
WHEEZING: 0
VOMITING: 0
NAUSEA: 0
VOICE CHANGE: 0
TROUBLE SWALLOWING: 0
SHORTNESS OF BREATH: 0
BLOOD IN STOOL: 0
DIARRHEA: 0
COUGH: 0
CONSTIPATION: 0
COLOR CHANGE: 0
PHOTOPHOBIA: 0
ABDOMINAL PAIN: 1

## 2020-12-02 NOTE — PROGRESS NOTES
GENERAL SURGERY OFFICE NOTE    SUBJECTIVE:    Patient presenting today referred from No primary care provider on file. and No ref. provider found, for   Chief Complaint   Patient presents with    Post-Op Check     2nd PO Lap dianna, 4th PO sigmoidectomy        HPI: Rahul Douglas is a 39 y.o. male presenting with pain in the Left Upper Quadrant and LLQ and is acute, worsening, dull and stabbing compared to patient's normal condition. It is moderate in intensity for a duration of 1 week and is intermittent with modifying factors increased by or worse with moving or even laying down. .. Wounds very nicely healing, no erythema, no induration, no purulent discharge. No constipation, BMs back to normal.    Thoroughly reviewed the patient's medical history, family history, social history and review of systems with the patient today in the office. Please see medical record for pertinent positives. Past Medical History:   Diagnosis Date    Diverticulitis     Gall stone     Kidney stone     \"had kidney stone now- had them since age 17-had surgery and able to pass some\" follows with dr Fabian Olivia Kidney stone         Past Surgical History:   Procedure Laterality Date    CHOLECYSTECTOMY, LAPAROSCOPIC N/A 11/4/2020    CHOLECYSTECTOMY LAPAROSCOPIC WITH IOC performed by Zoe Malhotra MD at [address] [place] [address]      \"had surgery multiple times for kidney stones last time 2018?     SMALL INTESTINE SURGERY N/A 10/6/2020    BOWEL RESECTION SIGMOID LAPAROSCOPIC ROBOTIC performed by Zoe Malhotra MD at [facility] N/A 11/3/2020    EGD BIOPSY performed by Zoe Malhotra MD at [place] [address]  2012    left wrist\"no metal\"        Social History     Socioeconomic History    Marital status:      Spouse name: Not on file    Number of children: Not on file    Years of education: Not on file    Highest education level: Not on file Occupational History    Not on file   Social Needs    Financial resource strain: Not on file    Food insecurity     Worry: Not on file     Inability: Not on file    Transportation needs     Medical: Not on file     Non-medical: Not on file   Tobacco Use    Smoking status: Never Smoker    Smokeless tobacco: Never Used   Substance and Sexual Activity    Alcohol use: No    Drug use: Yes     Types: Marijuana     Comment: \"last used last week of Sept 2020\"    Sexual activity: Never   Lifestyle    Physical activity     Days per week: Not on file     Minutes per session: Not on file    Stress: Not on file   Relationships    Social connections     Talks on phone: Not on file     Gets together: Not on file     Attends Uatsdin service: Not on file     Active member of club or organization: Not on file     Attends meetings of clubs or organizations: Not on file     Relationship status: Not on file    Intimate partner violence     Fear of current or ex partner: Not on file     Emotionally abused: Not on file     Physically abused: Not on file     Forced sexual activity: Not on file   Other Topics Concern    Not on file   Social History Narrative    Not on file        Allergies   Allergen Reactions    Vicodin [Hydrocodone-Acetaminophen] Other (See Comments)     Patient states that it is tolerable but state that makes his skin crawl        Family History   Problem Relation Age of Onset    Cancer Mother         ovarian cancer     Early Death Mother     Kidney stones Father     Diabetes Father     Diabetes Brother        Current Outpatient Medications   Medication Sig Dispense Refill    metoclopramide (REGLAN) 10 MG tablet Take 1 tablet by mouth 3 times daily as needed (abdominal pain, nausea) (Patient not taking: Reported on 12/2/2020) 18 tablet 0    dicyclomine (BENTYL) 10 MG capsule Take 1 capsule by mouth 3 times daily As needed for abdominal pain (Patient not taking: Reported on 12/2/2020) 15 capsule 0    pantoprazole (PROTONIX) 40 MG tablet Take 1 tablet by mouth every morning (before breakfast) (Patient not taking: Reported on 12/2/2020) 30 tablet 0     No current facility-administered medications for this visit. Review of Systems   Constitutional: Negative for activity change, chills, diaphoresis and fever. HENT: Negative for sore throat, trouble swallowing and voice change. Eyes: Negative for photophobia and visual disturbance. Respiratory: Negative for cough, shortness of breath and wheezing. Cardiovascular: Negative for chest pain, palpitations and leg swelling. Gastrointestinal: Positive for abdominal pain (LLQ, and LUQ). Negative for anal bleeding, blood in stool, constipation, diarrhea, nausea and vomiting. Endocrine: Negative for cold intolerance, heat intolerance, polydipsia and polyuria. Genitourinary: Negative for dysuria, frequency and hematuria. Musculoskeletal: Negative for joint swelling, myalgias and neck stiffness. Skin: Negative for color change and rash. Neurological: Negative for seizures, speech difficulty, light-headedness and numbness. Hematological: Negative for adenopathy. Does not bruise/bleed easily. OBJECTIVE:    /86   Pulse 83   Temp 97 °F (36.1 °C) (Infrared)   Resp 16   Ht 5' 11\" (1.803 m)   Wt 208 lb 3.2 oz (94.4 kg)   SpO2 98%   BMI 29.04 kg/m²    Body mass index is 29.04 kg/m². Physical Exam  Vitals signs reviewed. Constitutional:       General: He is not in acute distress. Appearance: He is well-developed. He is not diaphoretic. HENT:      Head: Normocephalic and atraumatic. Eyes:      General: No scleral icterus. Conjunctiva/sclera: Conjunctivae normal.      Pupils: Pupils are equal, round, and reactive to light. Neck:      Musculoskeletal: Normal range of motion. Thyroid: No thyromegaly. Vascular: No JVD. Trachea: No tracheal deviation.    Cardiovascular:      Rate and Rhythm: Normal

## 2020-12-04 ENCOUNTER — OFFICE VISIT (OUTPATIENT)
Dept: BARIATRICS/WEIGHT MGMT | Age: 45
End: 2020-12-04

## 2020-12-04 VITALS
DIASTOLIC BLOOD PRESSURE: 80 MMHG | HEART RATE: 78 BPM | SYSTOLIC BLOOD PRESSURE: 110 MMHG | WEIGHT: 209.1 LBS | HEIGHT: 71 IN | OXYGEN SATURATION: 97 % | TEMPERATURE: 97.2 F | BODY MASS INDEX: 29.27 KG/M2

## 2020-12-04 PROBLEM — N20.0 KIDNEY STONE: Status: ACTIVE | Noted: 2020-12-04

## 2020-12-04 PROCEDURE — 99024 POSTOP FOLLOW-UP VISIT: CPT | Performed by: SURGERY

## 2020-12-04 ASSESSMENT — ENCOUNTER SYMPTOMS
BLOOD IN STOOL: 0
SHORTNESS OF BREATH: 0
ABDOMINAL PAIN: 1
VOMITING: 0
TROUBLE SWALLOWING: 0
VOICE CHANGE: 0
CONSTIPATION: 0
COLOR CHANGE: 0
SORE THROAT: 0
DIARRHEA: 0
NAUSEA: 0
COUGH: 0
PHOTOPHOBIA: 0
WHEEZING: 0
ANAL BLEEDING: 0

## 2020-12-04 NOTE — PROGRESS NOTES
Not on file   Social Needs    Financial resource strain: Not on file    Food insecurity     Worry: Not on file     Inability: Not on file    Transportation needs     Medical: Not on file     Non-medical: Not on file   Tobacco Use    Smoking status: Never Smoker    Smokeless tobacco: Never Used   Substance and Sexual Activity    Alcohol use: No    Drug use: Yes     Types: Marijuana     Comment: \"last used last week of Sept 2020\"    Sexual activity: Never   Lifestyle    Physical activity     Days per week: Not on file     Minutes per session: Not on file    Stress: Not on file   Relationships    Social connections     Talks on phone: Not on file     Gets together: Not on file     Attends Oriental orthodox service: Not on file     Active member of club or organization: Not on file     Attends meetings of clubs or organizations: Not on file     Relationship status: Not on file    Intimate partner violence     Fear of current or ex partner: Not on file     Emotionally abused: Not on file     Physically abused: Not on file     Forced sexual activity: Not on file   Other Topics Concern    Not on file   Social History Narrative    Not on file        Allergies   Allergen Reactions    Vicodin [Hydrocodone-Acetaminophen] Other (See Comments)     Patient states that it is tolerable but state that makes his skin crawl        Family History   Problem Relation Age of Onset    Cancer Mother         ovarian cancer     Early Death Mother     Kidney stones Father     Diabetes Father     Diabetes Brother        Current Outpatient Medications   Medication Sig Dispense Refill    metoclopramide (REGLAN) 10 MG tablet Take 1 tablet by mouth 3 times daily as needed (abdominal pain, nausea) 18 tablet 0    dicyclomine (BENTYL) 10 MG capsule Take 1 capsule by mouth 3 times daily As needed for abdominal pain 15 capsule 0    pantoprazole (PROTONIX) 40 MG tablet Take 1 tablet by mouth every morning (before breakfast) 30 tablet 0 No current facility-administered medications for this visit. Review of Systems   Constitutional: Negative for activity change, chills, diaphoresis and fever. HENT: Negative for sore throat, trouble swallowing and voice change. Eyes: Negative for photophobia and visual disturbance. Respiratory: Negative for cough, shortness of breath and wheezing. Cardiovascular: Negative for chest pain, palpitations and leg swelling. Gastrointestinal: Positive for abdominal pain (LUQ flank). Negative for anal bleeding, blood in stool, constipation, diarrhea, nausea and vomiting. Endocrine: Negative for cold intolerance, heat intolerance, polydipsia and polyuria. Genitourinary: Negative for dysuria, frequency and hematuria. Musculoskeletal: Negative for joint swelling, myalgias and neck stiffness. Skin: Negative for color change and rash. Neurological: Negative for seizures, speech difficulty, light-headedness and numbness. Hematological: Negative for adenopathy. Does not bruise/bleed easily. OBJECTIVE:    /80   Pulse 78   Temp 97.2 °F (36.2 °C)   Ht 5' 11\" (1.803 m)   Wt 209 lb 1.6 oz (94.8 kg)   SpO2 97%   BMI 29.16 kg/m²    Body mass index is 29.16 kg/m². Physical Exam  Vitals signs reviewed. Constitutional:       General: He is not in acute distress. Appearance: He is well-developed. He is not diaphoretic. HENT:      Head: Normocephalic and atraumatic. Eyes:      General: No scleral icterus. Conjunctiva/sclera: Conjunctivae normal.      Pupils: Pupils are equal, round, and reactive to light. Neck:      Musculoskeletal: Normal range of motion. Thyroid: No thyromegaly. Vascular: No JVD. Trachea: No tracheal deviation. Cardiovascular:      Rate and Rhythm: Normal rate and regular rhythm. Heart sounds: Normal heart sounds. No murmur. No friction rub. No gallop.     Pulmonary:      Effort: Pulmonary effort is normal. No respiratory distress. Breath sounds: No stridor. No wheezing or rales. Chest:      Chest wall: No tenderness. Abdominal:      General: Bowel sounds are normal. There is no distension. Palpations: Abdomen is soft. There is no mass. Tenderness: There is no abdominal tenderness. There is no guarding or rebound. Musculoskeletal: Normal range of motion. General: No tenderness. Lymphadenopathy:      Cervical: No cervical adenopathy. Skin:     General: Skin is warm and dry. Coloration: Skin is not pale. Findings: No erythema or rash. Neurological:      Mental Status: He is alert and oriented to person, place, and time. Cranial Nerves: No cranial nerve deficit. Coordination: Coordination normal.   Psychiatric:         Behavior: Behavior normal.         Thought Content: Thought content normal.         Judgment: Judgment normal.         Presenting in first, follow up 2 days after CT     Procedure:CT     Pathology:  Results in Epic. CT:     Impression    1. A thin collection of air and fluid is present along the gallbladder fossa,    measuring approximately 0.9 x 3.9 cm and may represent a small abscess.  This    collection is too small for percutaneous drain placement. 2. Slight fat stranding adjacent to the anastomosis from recent sigmoid    colectomy which may represent normal postoperative change versus mild colitis. 3. Bilateral nonobstructive renal calculi measure up to 7 mm. ASSESSMENT & PLAN:    1. Kidney stone         Needs 2 more wks off work he had 2 abdominal surgeries back to back and his job is strenuous. Patient counseled on the risks, benefits, and alternatives of treatment plan at length while in the office today. Patient states an understanding and willingness to proceed with the plan. Follow Up:  Return for PRN - As needed for any new symptom. OFF work 2 more wks, RTW 1/4th/2021. Manjeet Alejo MD, FACS, FICS.     12/4/20

## 2020-12-23 ENCOUNTER — VIRTUAL VISIT (OUTPATIENT)
Dept: BARIATRICS/WEIGHT MGMT | Age: 45
End: 2020-12-23

## 2020-12-23 PROBLEM — Z90.49 S/P COLECTOMY: Status: ACTIVE | Noted: 2020-12-23

## 2020-12-23 PROCEDURE — 99024 POSTOP FOLLOW-UP VISIT: CPT | Performed by: SURGERY

## 2020-12-23 NOTE — PROGRESS NOTES
2020    TELEHEALTH EVALUATION -- Audio/Visual (During OGCJL-90 public health emergency)    HPI:    Darinel Peck (:  1975) has requested an audio/video evaluation for the following concern(s):    Feeling MUCH better!, last wk/;     Review of Systems    Prior to Visit Medications    Medication Sig Taking? Authorizing Provider   metoclopramide (REGLAN) 10 MG tablet Take 1 tablet by mouth 3 times daily as needed (abdominal pain, nausea)  Raj Comer, DO   dicyclomine (BENTYL) 10 MG capsule Take 1 capsule by mouth 3 times daily As needed for abdominal pain  Tramaine Abrams, DO   pantoprazole (PROTONIX) 40 MG tablet Take 1 tablet by mouth every morning (before breakfast)  Enio Bennett, DO       Social History     Tobacco Use    Smoking status: Never Smoker    Smokeless tobacco: Never Used   Substance Use Topics    Alcohol use: No    Drug use: Yes     Types: Marijuana     Comment: \"last used last week of 2020\"        Allergies   Allergen Reactions    Vicodin [Hydrocodone-Acetaminophen] Other (See Comments)     Patient states that it is tolerable but state that makes his skin crawl   ,   Past Medical History:   Diagnosis Date    Diverticulitis     Gall stone     Kidney stone     \"had kidney stone now- had them since age 15-had surgery and able to pass some\" follows with dr Bettina Mcfadden    Kidney stone    ,   Past Surgical History:   Procedure Laterality Date    CHOLECYSTECTOMY, LAPAROSCOPIC N/A 2020    CHOLECYSTECTOMY LAPAROSCOPIC WITH IOC performed by Mayra Baez MD at [address] [place] [address]      \"had surgery multiple times for kidney stones last time ?     SMALL INTESTINE SURGERY N/A 10/6/2020    BOWEL RESECTION SIGMOID LAPAROSCOPIC ROBOTIC performed by Mayra Baez MD at [address] N/A 11/3/2020    EGD BIOPSY performed by [facility]ra Baez MD at [facility]      left wrist\"no metal\"   , Social History     Tobacco Use    Smoking status: Never Smoker    Smokeless tobacco: Never Used   Substance Use Topics    Alcohol use: No    Drug use: Yes     Types: Marijuana     Comment: \"last used last week of Sept 2020\"   ,   Family History   Problem Relation Age of Onset   Jimbo Loser Cancer Mother         ovarian cancer     Early Death Mother     Kidney stones Father     Diabetes Father     Diabetes Brother    ,   Immunization History   Administered Date(s) Administered    Tdap (Boostrix, Adacel) 09/27/2013   ,   Health Maintenance   Topic Date Due    Hepatitis C screen  1975    HIV screen  02/20/1992    Lipid screen  02/20/2017    Diabetes screen  02/20/2017    Flu vaccine (1) 09/01/2020    DTaP/Tdap/Td vaccine (2 - Td) 09/27/2023    Hepatitis A vaccine  Aged Out    Hepatitis B vaccine  Aged Out    Hib vaccine  Aged Out    Meningococcal (ACWY) vaccine  Aged Out    Pneumococcal 0-64 years Vaccine  Aged Out       PHYSICAL EXAMINATION:  [ INSTRUCTIONS:  \"[x]\" Indicates a positive item  \"[]\" Indicates a negative item  -- DELETE ALL ITEMS NOT EXAMINED]  Vital Signs: (As obtained by patient/caregiver or practitioner observation)    Blood pressure-  Heart rate-    Respiratory rate-    Temperature-  Pulse oximetry-     Constitutional: [x] Appears well-developed and well-nourished [x] No apparent distress      [] Abnormal-   Mental status  [x] Alert and awake  [x] Oriented to person/place/time [x]Able to follow commands      Eyes:  EOM    [x]  Normal  [] Abnormal-  Sclera  [x]  Normal  [] Abnormal -         Discharge [x]  None visible  [] Abnormal -    HENT:   [x] Normocephalic, atraumatic.   [] Abnormal   [x] Mouth/Throat: Mucous membranes are moist.     External Ears [x] Normal  [] Abnormal-     Neck: [x] No visualized mass     Pulmonary/Chest: [x] Respiratory effort normal.  [x] No visualized signs of difficulty breathing or respiratory distress        [] Abnormal- Musculoskeletal:   [x] Normal gait with no signs of ataxia         [x] Normal range of motion of neck        [] Abnormal-       Neurological:        [x] No Facial Asymmetry (Cranial nerve 7 motor function) (limited exam to video visit)          [x] No gaze palsy        [] Abnormal-         Skin:        [x] No significant exanthematous lesions or discoloration noted on facial skin         [] Abnormal-            Psychiatric:       [x] Normal Affect [x] No Hallucinations        [] Abnormal-     Other pertinent observable physical exam findings-     ASSESSMENT/PLAN:      Feeling MUCH better!, last wk/1/2;     1. S/P colectomy      2. S/P laparoscopic cholecystectomy        Return for PRN - As needed for any new symptom. Claudia Heredia is a 39 y.o. male being evaluated by a Virtual Visit (video visit) encounter to address concerns as mentioned above. A caregiver was present when appropriate. Due to this being a TeleHealth encounter (During YARNG-59 public [facility] emergency), evaluation of the following organ systems was limited: Vitals/Constitutional/EENT/Resp/CV/GI//MS/Neuro/Skin/Heme-Lymph-Imm. Pursuant to the emergency declaration under the [address] [place] [address] authority and the [employer] Act, this Virtual Visit was conducted with patient's (and/or legal guardian's) consent, to reduce the patient's risk of exposure to COVID-19 and provide necessary medical care. The patient (and/or legal guardian) has also been advised to contact this office for worsening conditions or problems, and seek emergency medical treatment and/or call 911 if deemed necessary.      Patient identification was verified at the start of the visit: yes    Total time spent on this encounter: Not billed by time Services were provided through a video synchronous discussion virtually to substitute for in-person clinic visit. Patient and provider were located at their individual homes. --Bernarda Martínez MD on 12/23/2020 at 2:31 PM    An electronic signature was used to authenticate this note.

## 2021-01-13 ENCOUNTER — OFFICE VISIT (OUTPATIENT)
Dept: BARIATRICS/WEIGHT MGMT | Age: 46
End: 2021-01-13
Payer: COMMERCIAL

## 2021-01-13 VITALS
TEMPERATURE: 97.3 F | DIASTOLIC BLOOD PRESSURE: 88 MMHG | HEART RATE: 80 BPM | BODY MASS INDEX: 30.53 KG/M2 | WEIGHT: 218.1 LBS | HEIGHT: 71 IN | SYSTOLIC BLOOD PRESSURE: 124 MMHG

## 2021-01-13 DIAGNOSIS — R10.12 LEFT UPPER QUADRANT ABDOMINAL PAIN: ICD-10-CM

## 2021-01-13 DIAGNOSIS — K29.00 ACUTE SUPERFICIAL GASTRITIS WITHOUT HEMORRHAGE: Primary | ICD-10-CM

## 2021-01-13 PROCEDURE — 99213 OFFICE O/P EST LOW 20 MIN: CPT | Performed by: SURGERY

## 2021-01-13 RX ORDER — PANTOPRAZOLE SODIUM 40 MG/1
40 TABLET, DELAYED RELEASE ORAL 2 TIMES DAILY
Qty: 60 TABLET | Refills: 3 | Status: SHIPPED | OUTPATIENT
Start: 2021-01-13 | End: 2021-04-18

## 2021-01-13 RX ORDER — SUCRALFATE 1 G/1
1 TABLET ORAL 4 TIMES DAILY
Qty: 120 TABLET | Refills: 3 | Status: SHIPPED | OUTPATIENT
Start: 2021-01-13 | End: 2021-04-18

## 2021-01-13 ASSESSMENT — ENCOUNTER SYMPTOMS
ANAL BLEEDING: 0
PHOTOPHOBIA: 0
WHEEZING: 0
DIARRHEA: 0
VOMITING: 0
ABDOMINAL PAIN: 1
CONSTIPATION: 0
COUGH: 0
COLOR CHANGE: 0
TROUBLE SWALLOWING: 0
SORE THROAT: 0
BLOOD IN STOOL: 0
NAUSEA: 0
VOICE CHANGE: 0
SHORTNESS OF BREATH: 0

## 2021-01-13 NOTE — PROGRESS NOTES
 Years of education: Not on file    Highest education level: Not on file   Occupational History    Not on file   Social Needs    Financial resource strain: Not on file    Food insecurity     Worry: Not on file     Inability: Not on file    Transportation needs     Medical: Not on file     Non-medical: Not on file   Tobacco Use    Smoking status: Never Smoker    Smokeless tobacco: Never Used   Substance and Sexual Activity    Alcohol use: No    Drug use: Yes     Types: Marijuana     Comment: \"last used last week of Sept 2020\"    Sexual activity: Never   Lifestyle    Physical activity     Days per week: Not on file     Minutes per session: Not on file    Stress: Not on file   Relationships    Social connections     Talks on phone: Not on file     Gets together: Not on file     Attends Hinduism service: Not on file     Active member of club or organization: Not on file     Attends meetings of clubs or organizations: Not on file     Relationship status: Not on file    Intimate partner violence     Fear of current or ex partner: Not on file     Emotionally abused: Not on file     Physically abused: Not on file     Forced sexual activity: Not on file   Other Topics Concern    Not on file   Social History Narrative    Not on file        Allergies   Allergen Reactions    Vicodin [Hydrocodone-Acetaminophen] Other (See Comments)     Patient states that it is tolerable but state that makes his skin crawl        Family History   Problem Relation Age of Onset    Cancer Mother         ovarian cancer     Early Death Mother     Kidney stones Father     Diabetes Father     Diabetes Brother        Current Outpatient Medications   Medication Sig Dispense Refill    pantoprazole (PROTONIX) 40 MG tablet Take 1 tablet by mouth 2 times daily 60 tablet 3    sucralfate (CARAFATE) 1 GM tablet Take 1 tablet by mouth 4 times daily 120 tablet 3     No current facility-administered medications for this visit. Review of Systems   Constitutional: Negative for activity change, chills, diaphoresis and fever. HENT: Negative for sore throat, trouble swallowing and voice change. Eyes: Negative for photophobia and visual disturbance. Respiratory: Negative for cough, shortness of breath and wheezing. Cardiovascular: Negative for chest pain, palpitations and leg swelling. Gastrointestinal: Positive for abdominal pain. Negative for anal bleeding, blood in stool, constipation, diarrhea, nausea and vomiting. Endocrine: Negative for cold intolerance, heat intolerance, polydipsia and polyuria. Genitourinary: Negative for dysuria, frequency and hematuria. Musculoskeletal: Negative for joint swelling, myalgias and neck stiffness. Skin: Negative for color change and rash. Neurological: Negative for seizures, speech difficulty, light-headedness and numbness. Hematological: Negative for adenopathy. Does not bruise/bleed easily. OBJECTIVE:    /88 (Site: Right Upper Arm, Position: Sitting, Cuff Size: Medium Adult)   Pulse 80   Temp 97.3 °F (36.3 °C) (Infrared)   Ht 5' 11\" (1.803 m)   Wt 218 lb 1.6 oz (98.9 kg)   BMI 30.42 kg/m²    Body mass index is 30.42 kg/m². Physical Exam  Vitals signs reviewed. Constitutional:       General: He is not in acute distress. Appearance: He is well-developed. He is not diaphoretic. HENT:      Head: Normocephalic and atraumatic. Eyes:      General: No scleral icterus. Conjunctiva/sclera: Conjunctivae normal.      Pupils: Pupils are equal, round, and reactive to light. Neck:      Musculoskeletal: Normal range of motion. Thyroid: No thyromegaly. Vascular: No JVD. Trachea: No tracheal deviation. Cardiovascular:      Rate and Rhythm: Normal rate and regular rhythm. Heart sounds: Normal heart sounds. No murmur. No friction rub. No gallop. Pulmonary:      Effort: Pulmonary effort is normal. No respiratory distress. Breath sounds: No stridor. No wheezing or rales. Chest:      Chest wall: No tenderness. Abdominal:      General: Bowel sounds are normal. There is no distension. Palpations: Abdomen is soft. There is no mass. Tenderness: There is abdominal tenderness (LUQ. .). There is no guarding or rebound. Musculoskeletal: Normal range of motion. General: No tenderness. Lymphadenopathy:      Cervical: No cervical adenopathy. Skin:     General: Skin is warm and dry. Coloration: Skin is not pale. Findings: No erythema or rash. Neurological:      Mental Status: He is alert and oriented to person, place, and time. Cranial Nerves: No cranial nerve deficit. Coordination: Coordination normal.   Psychiatric:         Behavior: Behavior normal.         Thought Content: Thought content normal.         Judgment: Judgment normal.           Orders Placed This Encounter   Medications    pantoprazole (PROTONIX) 40 MG tablet     Sig: Take 1 tablet by mouth 2 times daily     Dispense:  60 tablet     Refill:  3    sucralfate (CARAFATE) 1 GM tablet     Sig: Take 1 tablet by mouth 4 times daily     Dispense:  120 tablet     Refill:  3         ASSESSMENT & PLAN:    1. Acute superficial gastritis without hemorrhage    2. Left upper quadrant abdominal pain         Gastritis, stress, Rx-ed PPI and Carafate, and will f/u in 2 wks. Patient counseled on the risks, benefits, and alternatives of treatment plan at length while in the office today. Patient states an understanding and willingness to proceed with the plan. Follow Up:  Return in about 2 weeks (around 1/27/2021) for Follow up Symptoms. Yoana Thayer MD, FACS, FICS.     1/13/21

## 2021-01-27 ENCOUNTER — OFFICE VISIT (OUTPATIENT)
Dept: BARIATRICS/WEIGHT MGMT | Age: 46
End: 2021-01-27

## 2021-01-27 VITALS
BODY MASS INDEX: 31.27 KG/M2 | SYSTOLIC BLOOD PRESSURE: 120 MMHG | TEMPERATURE: 97.2 F | DIASTOLIC BLOOD PRESSURE: 66 MMHG | HEART RATE: 87 BPM | RESPIRATION RATE: 16 BRPM | WEIGHT: 223.4 LBS | HEIGHT: 71 IN | OXYGEN SATURATION: 97 %

## 2021-01-27 DIAGNOSIS — Z90.49 S/P LAPAROSCOPIC-ASSISTED SIGMOIDECTOMY: Primary | ICD-10-CM

## 2021-01-27 DIAGNOSIS — Z90.49 S/P LAPAROSCOPIC CHOLECYSTECTOMY: ICD-10-CM

## 2021-01-27 PROCEDURE — 99024 POSTOP FOLLOW-UP VISIT: CPT | Performed by: SURGERY

## 2021-01-27 NOTE — PROGRESS NOTES
GENERAL SURGERY OFFICE NOTE    SUBJECTIVE:    Patient presenting today referred from Blanca Rankin MD and No ref. provider found, for   Chief Complaint   Patient presents with    Follow-up     6th PO Lap Juanita 11/04/20 / S/P Daniel sigmoidectomy 10/06/20        HPI: Tree Coughlin is a 39 y.o. male presenting post op lap juanita, and lap sigmoidectomy, doing very well, no N/V BMs, loose stools with fatty foods, but NOT bothering him, RTW. Wounds very nicely healing, no erythema, no induration, no purulent discharge. No constipation, BMs back to normal.    Thoroughly reviewed the patient's medical history, family history, social history and review of systems with the patient today in the office. Please see medical record for pertinent positives. Past Medical History:   Diagnosis Date    Diverticulitis     Gall stone     Kidney stone     \"had kidney stone now- had them since age 17-had surgery and able to pass some\" follows with dr Lea Chaves Kidney stone         Past Surgical History:   Procedure Laterality Date    CHOLECYSTECTOMY, LAPAROSCOPIC N/A 11/4/2020    CHOLECYSTECTOMY LAPAROSCOPIC WITH IOC performed by Jose R Shay MD at [facility]      \"had surgery multiple times for kidney stones last time 2018?     SMALL INTESTINE SURGERY N/A 10/6/2020    BOWEL RESECTION SIGMOID LAPAROSCOPIC ROBOTIC performed by Jose R Shay MD at [facility] N/A 11/3/2020    EGD BIOPSY performed by Jose R Shay MD at [address]  2012    left wrist\"no metal\"        Social History     Socioeconomic History    Marital status:      Spouse name: Not on file    Number of children: Not on file    Years of education: Not on file    Highest education level: Not on file   Occupational History    Not on file   Social Needs    Financial resource strain: Not on file    Food insecurity     Worry: Not on file Inability: Not on file    Transportation needs     Medical: Not on file     Non-medical: Not on file   Tobacco Use    Smoking status: Never Smoker    Smokeless tobacco: Never Used   Substance and Sexual Activity    Alcohol use: No    Drug use: Yes     Types: Marijuana     Comment: \"last used last week of Sept 2020\"    Sexual activity: Never   Lifestyle    Physical activity     Days per week: Not on file     Minutes per session: Not on file    Stress: Not on file   Relationships    Social connections     Talks on phone: Not on file     Gets together: Not on file     Attends Sabianist service: Not on file     Active member of club or organization: Not on file     Attends meetings of clubs or organizations: Not on file     Relationship status: Not on file    Intimate partner violence     Fear of current or ex partner: Not on file     Emotionally abused: Not on file     Physically abused: Not on file     Forced sexual activity: Not on file   Other Topics Concern    Not on file   Social History Narrative    Not on file        Allergies   Allergen Reactions    Vicodin [Hydrocodone-Acetaminophen] Other (See Comments)     Patient states that it is tolerable but state that makes his skin crawl        Family History   Problem Relation Age of Onset    Cancer Mother         ovarian cancer     Early Death Mother     Kidney stones Father     Diabetes Father     Diabetes Brother        Current Outpatient Medications   Medication Sig Dispense Refill    pantoprazole (PROTONIX) 40 MG tablet Take 1 tablet by mouth 2 times daily (Patient not taking: Reported on 1/27/2021) 60 tablet 3    sucralfate (CARAFATE) 1 GM tablet Take 1 tablet by mouth 4 times daily (Patient not taking: Reported on 1/27/2021) 120 tablet 3     No current facility-administered medications for this visit.              Review of Systems      OBJECTIVE: /66   Pulse 87   Temp 97.2 °F (36.2 °C) (Infrared)   Resp 16   Ht 5' 11\" (1.803 m)   Wt 223 lb 6.4 oz (101.3 kg)   SpO2 97%   BMI 31.16 kg/m²    Body mass index is 31.16 kg/m². Physical Exam      ASSESSMENT & PLAN:    1. S/P laparoscopic-assisted sigmoidectomy    2. S/P laparoscopic cholecystectomy         RTW, will give him an excuse for 11, 12, 13th. Patient counseled on the risks, benefits, and alternatives of treatment plan at length while in the office today. Patient states an understanding and willingness to proceed with the plan. Follow Up:  Return for PRN - As needed for any new symptom, Follow up Symptoms. Kailyn Ayala MD, FACS, FICS.     1/27/21

## 2021-02-07 ENCOUNTER — HOSPITAL ENCOUNTER (EMERGENCY)
Age: 46
Discharge: HOME | End: 2021-02-07
Attending: EMERGENCY MEDICINE
Payer: COMMERCIAL

## 2021-02-07 VITALS
OXYGEN SATURATION: 98 % | DIASTOLIC BLOOD PRESSURE: 87 MMHG | HEART RATE: 79 BPM | SYSTOLIC BLOOD PRESSURE: 127 MMHG | WEIGHT: 220 LBS | TEMPERATURE: 98.8 F | BODY MASS INDEX: 30.8 KG/M2 | RESPIRATION RATE: 16 BRPM | HEIGHT: 71 IN

## 2021-02-07 DIAGNOSIS — R11.2 NAUSEA VOMITING AND DIARRHEA: Primary | ICD-10-CM

## 2021-02-07 DIAGNOSIS — R52 BODY ACHES: ICD-10-CM

## 2021-02-07 DIAGNOSIS — R19.7 NAUSEA VOMITING AND DIARRHEA: Primary | ICD-10-CM

## 2021-02-07 PROCEDURE — 99284 EMERGENCY DEPT VISIT MOD MDM: CPT

## 2021-02-07 PROCEDURE — U0002 COVID-19 LAB TEST NON-CDC: HCPCS

## 2021-02-07 RX ORDER — LOPERAMIDE HYDROCHLORIDE 2 MG/1
2 CAPSULE ORAL 4 TIMES DAILY PRN
Qty: 30 CAPSULE | Refills: 0 | Status: SHIPPED | OUTPATIENT
Start: 2021-02-07 | End: 2021-02-14

## 2021-02-07 RX ORDER — KETOROLAC TROMETHAMINE 10 MG/1
10 TABLET, FILM COATED ORAL EVERY 6 HOURS PRN
Qty: 20 TABLET | Refills: 0 | Status: SHIPPED | OUTPATIENT
Start: 2021-02-07 | End: 2021-04-18

## 2021-02-07 RX ORDER — ONDANSETRON 4 MG/1
4 TABLET, ORALLY DISINTEGRATING ORAL EVERY 6 HOURS PRN
Qty: 10 TABLET | Refills: 0 | Status: SHIPPED | OUTPATIENT
Start: 2021-02-07 | End: 2021-04-18

## 2021-02-08 ENCOUNTER — CARE COORDINATION (OUTPATIENT)
Dept: CARE COORDINATION | Age: 46
End: 2021-02-08

## 2021-02-09 ENCOUNTER — CARE COORDINATION (OUTPATIENT)
Dept: CARE COORDINATION | Age: 46
End: 2021-02-09

## 2021-02-09 LAB
SARS-COV-2: NOT DETECTED
SOURCE: NORMAL

## 2021-02-09 NOTE — CARE COORDINATION
Call to check on pt status after recent ER visit for nausea, vomiting, sob, fever. LM with ACM contact information & requested a call back. 2nd attempt, no further outreach is planned. LOIS Mcdonald RN  Ambulatory Care Manager  [phone] office/cell  [phone] fax  [email or web address]       Patient contacted regarding Andrez Lucia. Discussed COVID-19 related testing which was pending at this time. Test results were pending. Patient informed of results, if available? Pt advised that test is pending & how to review on MyChart. Care Transition Nurse/ Ambulatory Care Manager contacted the patient by telephone to perform post discharge assessment. Call within 2 business days of discharge: Yes. Verified name and  with patient as identifiers. Provided introduction to self, and explanation of the CTN/ACM role, and reason for call due to risk factors for infection and/or exposure to COVID-19. Symptoms reviewed with patient who verbalized the following symptoms: fatigue. Due to no new or worsening symptoms encounter was not routed to provider for escalation. Discussed follow-up appointments. If no appointment was previously scheduled, appointment scheduling offered: Yes  [id number] follow up appointment(s): No future appointments. Non-[facility] follow up appointment(s): n/a    Non-face-to-face services provided:  Obtained and reviewed discharge summary and/or continuity of care documents  Reviewed and followed up on pending diagnostic tests and treatments-COVID19     Advance Care Planning:   Does patient have an Advance Directive:  not on file. Patient has following risk factors of: no known risk factors. CTN/ACM reviewed discharge instructions, medical action plan and red flags such as increased shortness of breath, increasing fever and signs of decompensation with patient who verbalized understanding.    Discussed exposure protocols and quarantine with CDC Guidelines What to do if you are sick with coronavirus disease 2019.  Patient was given an opportunity for questions and concerns. The patient agrees to contact the [facility] exposure line [phone], local [facility] department [place] [facility]: ([phone]) and PCP office for questions related to their healthcare. CTN/ACM provided contact information for future needs. Reviewed and educated patient on any new and changed medications related to discharge diagnosis     Patient/family/caregiver given information for [facility] Loop and agrees to enroll no  Patient's preferred e-mail: n/a   Patient's preferred phone number: n/a  Based on Loop alert triggers, patient will be contacted by nurse care manager for worsening symptoms. Plan for follow-up call in 5-7 days based on severity of symptoms and risk factors. Spoke with pt, he reports that all symptoms have resolved except the fatigue. Encouraged rest, fluids. Advised pt that he should remain quarantined pending his COVID test result & if he is without symptoms & negative test, he can return to work. Pt denies additional questions or concerns at present. LOIS Lopez RN  Ambulatory Care Manager  [phone] office/cell  [phone] fax  [email or web address]

## 2021-02-16 ENCOUNTER — CARE COORDINATION (OUTPATIENT)
Dept: CARE COORDINATION | Age: 46
End: 2021-02-16

## 2021-02-23 ENCOUNTER — CARE COORDINATION (OUTPATIENT)
Dept: CARE COORDINATION | Age: 46
End: 2021-02-23

## 2021-02-23 NOTE — CARE COORDINATION
Outreach for final ER follow up. LM with ACM contact information & requested a call back. No further outreach is planned. HERON SpauldingN RN  Ambulatory Care Manager  [phone] office/cell  [phone] fax  [email or web address]

## 2021-04-18 ENCOUNTER — HOSPITAL ENCOUNTER (EMERGENCY)
Age: 46
Discharge: HOME | End: 2021-04-18
Attending: EMERGENCY MEDICINE
Payer: COMMERCIAL

## 2021-04-18 VITALS
HEART RATE: 89 BPM | WEIGHT: 220 LBS | HEIGHT: 71 IN | OXYGEN SATURATION: 97 % | DIASTOLIC BLOOD PRESSURE: 88 MMHG | RESPIRATION RATE: 16 BRPM | SYSTOLIC BLOOD PRESSURE: 102 MMHG | BODY MASS INDEX: 30.8 KG/M2

## 2021-04-18 DIAGNOSIS — R10.9 FLANK PAIN: Primary | ICD-10-CM

## 2021-04-18 PROCEDURE — 96372 THER/PROPH/DIAG INJ SC/IM: CPT

## 2021-04-18 PROCEDURE — 6360000002 HC RX W HCPCS: Performed by: EMERGENCY MEDICINE

## 2021-04-18 PROCEDURE — 99283 EMERGENCY DEPT VISIT LOW MDM: CPT

## 2021-04-18 RX ORDER — OXYCODONE HYDROCHLORIDE AND ACETAMINOPHEN 5; 325 MG/1; MG/1
1-2 TABLET ORAL EVERY 8 HOURS PRN
Qty: 9 TABLET | Refills: 0 | Status: SHIPPED | OUTPATIENT
Start: 2021-04-18 | End: 2021-04-21

## 2021-04-18 RX ORDER — KETOROLAC TROMETHAMINE 10 MG/1
10 TABLET, FILM COATED ORAL EVERY 8 HOURS
Qty: 9 TABLET | Refills: 3 | Status: SHIPPED | OUTPATIENT
Start: 2021-04-18 | End: 2021-05-13

## 2021-04-18 RX ORDER — KETOROLAC TROMETHAMINE 30 MG/ML
30 INJECTION, SOLUTION INTRAMUSCULAR; INTRAVENOUS ONCE
Status: COMPLETED | OUTPATIENT
Start: 2021-04-18 | End: 2021-04-18

## 2021-04-18 RX ORDER — PROMETHAZINE HYDROCHLORIDE 25 MG/ML
25 INJECTION, SOLUTION INTRAMUSCULAR; INTRAVENOUS ONCE
Status: COMPLETED | OUTPATIENT
Start: 2021-04-18 | End: 2021-04-18

## 2021-04-18 RX ORDER — MORPHINE SULFATE 4 MG/ML
4 INJECTION, SOLUTION INTRAMUSCULAR; INTRAVENOUS ONCE
Status: COMPLETED | OUTPATIENT
Start: 2021-04-18 | End: 2021-04-18

## 2021-04-18 RX ADMIN — KETOROLAC TROMETHAMINE 30 MG: 30 INJECTION, SOLUTION INTRAMUSCULAR; INTRAVENOUS at 04:10

## 2021-04-18 RX ADMIN — PROMETHAZINE HYDROCHLORIDE 25 MG: 25 INJECTION INTRAMUSCULAR; INTRAVENOUS at 04:11

## 2021-04-18 RX ADMIN — MORPHINE SULFATE 4 MG: 4 INJECTION, SOLUTION INTRAMUSCULAR; INTRAVENOUS at 04:10

## 2021-04-18 ASSESSMENT — PAIN DESCRIPTION - ORIENTATION: ORIENTATION: RIGHT

## 2021-04-18 ASSESSMENT — ENCOUNTER SYMPTOMS
NAUSEA: 1
EYES NEGATIVE: 1
ABDOMINAL PAIN: 1
RESPIRATORY NEGATIVE: 1

## 2021-04-18 NOTE — ED PROVIDER NOTES
The history is provided by the patient. Abdominal Pain  Pain location:  R flank  Pain quality: stabbing and throbbing    Pain radiates to:  RLQ  Pain severity:  Severe  Onset quality:  Sudden  Timing:  Constant  Progression:  Unchanged  Chronicity:  Recurrent  Context comment:  Right flank pain, known stone. Does not have any pain meds. Will see his urologist in a couple weeks to procede with stone removal  Relieved by:  Nothing  Worsened by:  Nothing  Ineffective treatments:  None tried  Associated symptoms: nausea        Review of Systems   Constitutional: Negative. HENT: Negative. Eyes: Negative. Respiratory: Negative. Cardiovascular: Negative. Gastrointestinal: Positive for abdominal pain and nausea. Genitourinary: Negative. Musculoskeletal: Negative. Skin: Negative. Neurological: Negative. All other systems reviewed and are negative.       Family History   Problem Relation Age of Onset   [place] Cancer Mother         ovarian cancer     Early Death Mother     Kidney stones Father     Diabetes Father     Diabetes Brother      Social History     Socioeconomic History    Marital status:      Spouse name: Not on file    Number of children: Not on file    Years of education: Not on file    Highest education level: Not on file   Occupational History    Not on file   Social Needs    Financial resource strain: Not on file    Food insecurity     Worry: Not on file     Inability: Not on file    Transportation needs     Medical: Not on file     Non-medical: Not on file   Tobacco Use    Smoking status: Never Smoker    Smokeless tobacco: Never Used   Substance and Sexual Activity    Alcohol use: No    Drug use: Yes     Types: Marijuana     Comment: \"last used last week of Sept 2020\"    Sexual activity: Never   Lifestyle    Physical activity     Days per week: Not on file     Minutes per session: Not on file    Stress: Not on file   Relationships    Social connections 5' 11\" (1.803 m)   Wt 220 lb (99.8 kg)   SpO2 97%   BMI 30.68 kg/m²     Physical Exam  Vitals signs and nursing note reviewed. Constitutional:       General: He is in acute distress. Appearance: He is well-developed. He is diaphoretic. HENT:      Head: Normocephalic and atraumatic. Right Ear: External ear normal.      Left Ear: External ear normal.      Nose: Nose normal.   Eyes:      Conjunctiva/sclera: Conjunctivae normal.      Pupils: Pupils are equal, round, and reactive to light. Neck:      Musculoskeletal: Normal range of motion and neck supple. Cardiovascular:      Rate and Rhythm: Normal rate and regular rhythm. Heart sounds: Normal heart sounds. Pulmonary:      Effort: Pulmonary effort is normal.      Breath sounds: Normal breath sounds. Abdominal:      General: Bowel sounds are normal.      Palpations: Abdomen is soft. Musculoskeletal: Normal range of motion. Skin:     General: Skin is warm. Neurological:      Mental Status: He is alert and oriented to person, place, and time. GCS: GCS eye subscore is 4. GCS verbal subscore is 5. GCS motor subscore is 6. Psychiatric:         Behavior: Behavior normal.         Thought Content: Thought content normal.         Judgment: Judgment normal.         MDM:    Labs Reviewed - No data to display    No orders to display        Upon arrival to the emergency department patient was given IM toradol, phenergan and morphine. He has known kidney stone and recent imaging. There is no need to repeat imaging or blood work. He has follow up next week for follow up. My typical dicussion, presentation,and considerations for this patients' chief complaint, diagnosis, and differential diagnosis have been considered and discussed. I have stressed need for follow up and reexamination for this encounter.  The patient  was informed to follow up with [facility] The patient  was also told to return to the emergency department if any changes or any concern. Patient  questions and concerns from this visit have been addressed prior to discharge. Patient was  prescribed percocet and toradol. The medication(s) use,  medication(s) safety and medication(s) interactions with already prescribed medication(s) have been explained and outlined for this encounter. The patient  was educated that it is their responsibility to verify this information is correct at the time of discharge and to contact this department of any complications with the pharmacy providing this medication(s) or if their any difficulty in obtaining this medication(s). Final Impression    1.  Flank pain              [address] [place], DO  04/18/21 1963

## 2021-05-13 ENCOUNTER — APPOINTMENT (OUTPATIENT)
Dept: CT IMAGING | Age: 46
End: 2021-05-13
Payer: COMMERCIAL

## 2021-05-13 ENCOUNTER — HOSPITAL ENCOUNTER (EMERGENCY)
Age: 46
Discharge: ANOTHER HOSPITAL | End: 2021-05-13
Attending: EMERGENCY MEDICINE
Payer: COMMERCIAL

## 2021-05-13 VITALS
WEIGHT: 205 LBS | HEIGHT: 71 IN | BODY MASS INDEX: 28.7 KG/M2 | RESPIRATION RATE: 16 BRPM | HEART RATE: 60 BPM | OXYGEN SATURATION: 92 % | SYSTOLIC BLOOD PRESSURE: 129 MMHG | DIASTOLIC BLOOD PRESSURE: 86 MMHG | TEMPERATURE: 98.2 F

## 2021-05-13 DIAGNOSIS — Z98.890 POST-OPERATIVE STATE: ICD-10-CM

## 2021-05-13 DIAGNOSIS — R10.9 RIGHT FLANK PAIN: Primary | ICD-10-CM

## 2021-05-13 LAB
ALBUMIN SERPL-MCNC: 3.9 GM/DL (ref 3.4–5)
ALP BLD-CCNC: 61 IU/L (ref 40–129)
ALT SERPL-CCNC: 8 U/L (ref 10–40)
ANION GAP SERPL CALCULATED.3IONS-SCNC: 14 MMOL/L (ref 4–16)
AST SERPL-CCNC: 17 IU/L (ref 15–37)
BACTERIA: ABNORMAL /HPF
BASOPHILS ABSOLUTE: 0 K/CU MM
BASOPHILS RELATIVE PERCENT: 0.2 % (ref 0–1)
BILIRUB SERPL-MCNC: 0.7 MG/DL (ref 0–1)
BILIRUBIN URINE: NEGATIVE MG/DL
BLOOD, URINE: ABNORMAL
BUN BLDV-MCNC: 9 MG/DL (ref 6–23)
CALCIUM SERPL-MCNC: 8.8 MG/DL (ref 8.3–10.6)
CAST TYPE: ABNORMAL /HPF
CHLORIDE BLD-SCNC: 102 MMOL/L (ref 99–110)
CLARITY: CLEAR
CO2: 23 MMOL/L (ref 21–32)
COLOR: YELLOW
CREAT SERPL-MCNC: 1.2 MG/DL (ref 0.9–1.3)
CRYSTAL TYPE: NEGATIVE /HPF
DIFFERENTIAL TYPE: ABNORMAL
EOSINOPHILS ABSOLUTE: 0 K/CU MM
EOSINOPHILS RELATIVE PERCENT: 0.2 % (ref 0–3)
EPITHELIAL CELLS, UA: NEGATIVE /HPF
GFR AFRICAN AMERICAN: >60 ML/MIN/1.73M2
GFR NON-AFRICAN AMERICAN: >60 ML/MIN/1.73M2
GLUCOSE BLD-MCNC: 133 MG/DL (ref 70–99)
GLUCOSE, URINE: NEGATIVE MG/DL
HCT VFR BLD CALC: 47.3 % (ref 42–52)
HEMOGLOBIN: 16.1 GM/DL (ref 13.5–18)
IMMATURE NEUTROPHIL %: 0.3 % (ref 0–0.43)
KETONES, URINE: ABNORMAL MG/DL
LACTATE: 2.8 MMOL/L (ref 0.4–2)
LEUKOCYTE ESTERASE, URINE: NEGATIVE
LIPASE: 27 IU/L (ref 13–60)
LYMPHOCYTES ABSOLUTE: 2 K/CU MM
LYMPHOCYTES RELATIVE PERCENT: 17.1 % (ref 24–44)
MAGNESIUM: 1.9 MG/DL (ref 1.8–2.4)
MCH RBC QN AUTO: 31.1 PG (ref 27–31)
MCHC RBC AUTO-ENTMCNC: 34 % (ref 32–36)
MCV RBC AUTO: 91.5 FL (ref 78–100)
MONOCYTES ABSOLUTE: 0.9 K/CU MM
MONOCYTES RELATIVE PERCENT: 7.5 % (ref 0–4)
NITRITE URINE, QUANTITATIVE: NEGATIVE
PDW BLD-RTO: 12.5 % (ref 11.7–14.9)
PH, URINE: 8 (ref 5–8)
PLATELET # BLD: 206 K/CU MM (ref 140–440)
PMV BLD AUTO: 11.2 FL (ref 7.5–11.1)
POTASSIUM SERPL-SCNC: 3.3 MMOL/L (ref 3.5–5.1)
PROTEIN UA: NEGATIVE MG/DL
RBC # BLD: 5.17 M/CU MM (ref 4.6–6.2)
RBC URINE: ABNORMAL /HPF (ref 0–3)
SEGMENTED NEUTROPHILS ABSOLUTE COUNT: 8.6 K/CU MM
SEGMENTED NEUTROPHILS RELATIVE PERCENT: 74.7 % (ref 36–66)
SODIUM BLD-SCNC: 139 MMOL/L (ref 135–145)
SPECIFIC GRAVITY UA: 1.01 (ref 1–1.03)
TOTAL IMMATURE NEUTOROPHIL: 0.03 K/CU MM
TOTAL PROTEIN: 6.7 GM/DL (ref 6.4–8.2)
UROBILINOGEN, URINE: 0.2 MG/DL (ref 0.2–1)
WBC # BLD: 11.6 K/CU MM (ref 4–10.5)
WBC UA: ABNORMAL /HPF (ref 0–2)

## 2021-05-13 PROCEDURE — 96365 THER/PROPH/DIAG IV INF INIT: CPT

## 2021-05-13 PROCEDURE — 6360000002 HC RX W HCPCS: Performed by: EMERGENCY MEDICINE

## 2021-05-13 PROCEDURE — 83735 ASSAY OF MAGNESIUM: CPT

## 2021-05-13 PROCEDURE — 99285 EMERGENCY DEPT VISIT HI MDM: CPT

## 2021-05-13 PROCEDURE — 83690 ASSAY OF LIPASE: CPT

## 2021-05-13 PROCEDURE — 74176 CT ABD & PELVIS W/O CONTRAST: CPT

## 2021-05-13 PROCEDURE — 2580000003 HC RX 258: Performed by: EMERGENCY MEDICINE

## 2021-05-13 PROCEDURE — 80053 COMPREHEN METABOLIC PANEL: CPT

## 2021-05-13 PROCEDURE — 96376 TX/PRO/DX INJ SAME DRUG ADON: CPT

## 2021-05-13 PROCEDURE — 81001 URINALYSIS AUTO W/SCOPE: CPT

## 2021-05-13 PROCEDURE — 85025 COMPLETE CBC W/AUTO DIFF WBC: CPT

## 2021-05-13 PROCEDURE — 96375 TX/PRO/DX INJ NEW DRUG ADDON: CPT

## 2021-05-13 PROCEDURE — 83605 ASSAY OF LACTIC ACID: CPT

## 2021-05-13 RX ORDER — KETOROLAC TROMETHAMINE 30 MG/ML
30 INJECTION, SOLUTION INTRAMUSCULAR; INTRAVENOUS ONCE
Status: COMPLETED | OUTPATIENT
Start: 2021-05-13 | End: 2021-05-13

## 2021-05-13 RX ORDER — MORPHINE SULFATE 2 MG/ML
2 INJECTION, SOLUTION INTRAMUSCULAR; INTRAVENOUS ONCE
Status: COMPLETED | OUTPATIENT
Start: 2021-05-13 | End: 2021-05-13

## 2021-05-13 RX ORDER — ONDANSETRON 2 MG/ML
4 INJECTION INTRAMUSCULAR; INTRAVENOUS EVERY 6 HOURS PRN
Status: DISCONTINUED | OUTPATIENT
Start: 2021-05-13 | End: 2021-05-13 | Stop reason: HOSPADM

## 2021-05-13 RX ORDER — ONDANSETRON 2 MG/ML
4 INJECTION INTRAMUSCULAR; INTRAVENOUS ONCE
Status: COMPLETED | OUTPATIENT
Start: 2021-05-13 | End: 2021-05-13

## 2021-05-13 RX ORDER — MORPHINE SULFATE 4 MG/ML
4 INJECTION, SOLUTION INTRAMUSCULAR; INTRAVENOUS ONCE
Status: COMPLETED | OUTPATIENT
Start: 2021-05-13 | End: 2021-05-13

## 2021-05-13 RX ORDER — 0.9 % SODIUM CHLORIDE 0.9 %
1000 INTRAVENOUS SOLUTION INTRAVENOUS ONCE
Status: COMPLETED | OUTPATIENT
Start: 2021-05-13 | End: 2021-05-13

## 2021-05-13 RX ADMIN — MORPHINE SULFATE 4 MG: 4 INJECTION, SOLUTION INTRAMUSCULAR; INTRAVENOUS at 16:51

## 2021-05-13 RX ADMIN — SODIUM CHLORIDE 1000 ML: 9 INJECTION, SOLUTION INTRAVENOUS at 16:26

## 2021-05-13 RX ADMIN — LIDOCAINE HYDROCHLORIDE 93 MG: 20 INJECTION, SOLUTION INTRAVENOUS at 16:52

## 2021-05-13 RX ADMIN — MORPHINE SULFATE 2 MG: 2 INJECTION, SOLUTION INTRAMUSCULAR; INTRAVENOUS at 18:35

## 2021-05-13 RX ADMIN — MORPHINE SULFATE 4 MG: 4 INJECTION, SOLUTION INTRAMUSCULAR; INTRAVENOUS at 16:06

## 2021-05-13 RX ADMIN — ONDANSETRON 4 MG: 2 INJECTION INTRAMUSCULAR; INTRAVENOUS at 16:50

## 2021-05-13 RX ADMIN — KETOROLAC TROMETHAMINE 30 MG: 30 INJECTION, SOLUTION INTRAMUSCULAR at 16:06

## 2021-05-13 ASSESSMENT — PAIN SCALES - GENERAL
PAINLEVEL_OUTOF10: 10
PAINLEVEL_OUTOF10: 10
PAINLEVEL_OUTOF10: 9
PAINLEVEL_OUTOF10: 10

## 2021-05-13 NOTE — ED PROVIDER NOTES
Triage Chief Complaint:   Flank Pain (Right sided flank pain, had kidney stone removal surgery yesterday. Patient is unable to tell this nurse much due to pain. Pain began around 130p-2p today. )    [place]:  Jerome Melo is a 42 y.o. male that presents with sudden onset severe right flank pain. Patient reports \"it feels like I have a kidney stone. Patient reports pain is 10 out of 10 radiating into the groin. Of note, patient is status post percutaneous nephrostolithotomy just 2 days ago at [place]. Patient reports that he was doing well on discharge yesterday however today pain returned. Patient reports that vomiting at times makes the pain feel better and he is actively gagging himself in the room. Patient is in significant distress and is sobbing and yelling uncontrollably on arrival which does limit history to the brief history above. ROS:  Limited as above. Past Medical History:   Diagnosis Date    Diverticulitis     Gall stone     Kidney stone     \"had kidney stone now- had them since age 17-had surgery and able to pass some\" follows with dr Artem Cao Kidney stone      Past Surgical History:   Procedure Laterality Date    CHOLECYSTECTOMY, LAPAROSCOPIC N/A 11/4/2020    CHOLECYSTECTOMY LAPAROSCOPIC WITH IOC performed by Jacquelynn Homans, MD at [facility]      \"had surgery multiple times for kidney stones last time 2018?     SMALL INTESTINE SURGERY N/A 10/6/2020    BOWEL RESECTION SIGMOID LAPAROSCOPIC ROBOTIC performed by Jacquelynn Homans, MD at [facility] N/A 11/3/2020    EGD BIOPSY performed by Jacquelynn Homans, MD at [address]  2012    left wrist\"no metal\"     Family History   Problem Relation Age of Onset   Null Cancer Mother         ovarian cancer     Early Death Mother     Kidney stones Father     Diabetes Father     Diabetes Brother      Social History     Socioeconomic History    Marital status:      Spouse name: Not on file    Number of children: Not on file    Years of education: Not on file    Highest education level: Not on file   Occupational History    Not on file   Social Needs    Financial resource strain: Not on file    Food insecurity     Worry: Not on file     Inability: Not on file    Transportation needs     Medical: Not on file     Non-medical: Not on file   Tobacco Use    Smoking status: Never Smoker    Smokeless tobacco: Never Used   Substance and Sexual Activity    Alcohol use: No    Drug use: Yes     Types: Marijuana     Comment: \"last used last week of Sept 2020\"    Sexual activity: Never   Lifestyle    Physical activity     Days per week: Not on file     Minutes per session: Not on file    Stress: Not on file   Relationships    Social connections     Talks on phone: Not on file     Gets together: Not on file     Attends Rastafarian service: Not on file     Active member of club or organization: Not on file     Attends meetings of clubs or organizations: Not on file     Relationship status: Not on file    Intimate partner violence     Fear of current or ex partner: Not on file     Emotionally abused: Not on file     Physically abused: Not on file     Forced sexual activity: Not on file   Other Topics Concern    Not on file   Social History Narrative    Not on file     Current Facility-Administered Medications   Medication Dose Route Frequency Provider Last Rate Last Admin    ondansetron (ZOFRAN) injection 4 mg  4 mg Intravenous Q6H PRN Morelia Ventura MD         Current Outpatient Medications   Medication Sig Dispense Refill    ketorolac (TORADOL) 10 MG tablet Take 1 tablet by mouth every 8 hours for 12 days 9 tablet 3     Allergies   Allergen Reactions    Vicodin [Hydrocodone-Acetaminophen] Other (See Comments)     Patient states that it is tolerable but state that makes his skin crawl       Nursing Notes Reviewed    Physical Exam:  ED Triage Vitals   Enc Vitals FL    Differential Type AUTOMATED DIFFERENTIAL     Segs Relative 74.7 (H) 36 - 66 %    Lymphocytes % 17.1 (L) 24 - 44 %    Monocytes % 7.5 (H) 0 - 4 %    Eosinophils % 0.2 0 - 3 %    Basophils % 0.2 0 - 1 %    Segs Absolute 8.6 K/CU MM    Lymphocytes Absolute 2.0 K/CU MM    Monocytes Absolute 0.9 K/CU MM    Eosinophils Absolute 0.0 K/CU MM    Basophils Absolute 0.0 K/CU MM    Immature Neutrophil % 0.3 0 - 0.43 %    Total Immature Neutrophil 0.03 K/CU MM   Comprehensive Metabolic Panel w/ Reflex to MG   Result Value Ref Range    Sodium 139 135 - 145 MMOL/L    Potassium 3.3 (L) 3.5 - 5.1 MMOL/L    Chloride 102 99 - 110 mMol/L    CO2 23 21 - 32 MMOL/L    BUN 9 6 - 23 MG/DL    CREATININE 1.2 0.9 - 1.3 MG/DL    Glucose 133 (H) 70 - 99 MG/DL    Calcium 8.8 8.3 - 10.6 MG/DL    Albumin 3.9 3.4 - 5.0 GM/DL    Total Protein 6.7 6.4 - 8.2 GM/DL    Total Bilirubin 0.7 0.0 - 1.0 MG/DL    ALT 8 (L) 10 - 40 U/L    AST 17 15 - 37 IU/L    Alkaline Phosphatase 61 40 - 129 IU/L    GFR Non-African American >60 >60 mL/min/1.73m2    GFR African American >60 >60 mL/min/1.73m2    Anion Gap 14 4 - 16   Lipase   Result Value Ref Range    Lipase 27 13 - 60 IU/L   Lactate, Plasma   Result Value Ref Range    Lactate 2.8 (HH) 0.4 - 2.0 mMOL/L   Urinalysis with microscopic   Result Value Ref Range    Color, UA YELLOW YELLOW    Clarity, UA CLEAR CLEAR    Glucose, Urine NEGATIVE NEGATIVE MG/DL    Bilirubin Urine NEGATIVE NEGATIVE MG/DL    Ketones, Urine MODERATE (A) NEGATIVE MG/DL    Specific Gravity, UA 1.010 1.001 - 1.035    Blood, Urine LARGE (A) NEGATIVE    pH, Urine 8.0 5.0 - 8.0    Protein, UA NEGATIVE NEGATIVE MG/DL    Urobilinogen, Urine 0.2 0.2 - 1.0 MG/DL    Nitrite Urine, Quantitative NEGATIVE NEGATIVE    Leukocyte Esterase, Urine NEGATIVE NEGATIVE    RBC, UA TOO NUMEROUS TO COUNT 0 - 3 /HPF    WBC, UA RARE 0 - 2 /HPF    Epithelial Cells, UA NEGATIVE /HPF    Cast Type NO CAST FORMS SEEN NO CAST FORMS SEEN /HPF    Bacteria, UA RARE (A) NEGATIVE /HPF    Crystal Type NEGATIVE NEGATIVE /HPF   Magnesium   Result Value Ref Range    Magnesium 1.9 1.8 - 2.4 mg/dl      Radiographs (if obtained):  [] The following radiograph was interpreted by myself in the absence of a radiologist:   [x] Radiologist's Report Reviewed:  CT ABDOMEN PELVIS WO CONTRAST Additional Contrast? None   Final Result   1. Stranding surrounding the right kidney with a small amount of simple   appearing fluid tracking along the posterior-most perirenal fat. There is   moderate hydronephrosis and hydroureter with gas present within the   collecting system at the level of the renal pelvis and also within the   bladder. No obstructing stone is identified. Patient with given history of   recent percutaneous nephrolithotomy. Correlate clinically to exclude   superimposed infection. 2. Nonobstructing stones noted within the left kidney similar to prior exam   dated December 2, 2020. EKG (if obtained): (All EKG's are interpreted by myself in the absence of a cardiologist)    Chart review shows recent radiographs:  Ct Abdomen Pelvis Wo Contrast Additional Contrast? None    Result Date: 5/13/2021  EXAMINATION: CT OF THE ABDOMEN AND PELVIS WITHOUT CONTRAST 5/13/2021 4:01 pm TECHNIQUE: CT of the abdomen and pelvis was performed without the administration of intravenous contrast. Multiplanar reformatted images are provided for review. Dose modulation, iterative reconstruction, and/or weight based adjustment of the mA/kV was utilized to reduce the radiation dose to as low as reasonably achievable.  COMPARISON: CT abdomen pelvis December 2, 2020 HISTORY: ORDERING SYSTEM PROVIDED HISTORY: s/p percutaneous nephrolithotomy on 5/11, pain TECHNOLOGIST PROVIDED HISTORY: Reason for exam:->s/p percutaneous nephrolithotomy on 5/11, pain Additional Contrast?->None Decision Support Exception - unselect if not a suspected or confirmed emergency medical condition->Emergency Medical Condition (MA) Acuity: Acute Type of Exam: Ongoing Additional signs and symptoms: s/p percutaneous nephrolithotomy on 5/11, pain FINDINGS: Lower Chest:  Visualized portion of the lower chest demonstrates no acute abnormality. Organs: Stable low attenuating and benign-appearing cystic lesion of the periphery of the left hepatic lobe. A few additional subcentimeter hypodensities of the right hepatic lobe redemonstrated which also appears stable and are most statistically compatible with small cysts versus hemangiomas. The gallbladder is surgically absent. The nonenhanced spleen, pancreas, and bilateral adrenal glands demonstrate no acute abnormality. The left kidney appears unremarkable with no hydronephrosis or hydroureter identified on the left. No obstructive uropathy identified on the left. A few nonobstructing left renal stones redemonstrated as noted on previous exam. Stranding surrounds the right kidney. There is moderate hydronephrosis and hydroureter on the right. Gas is present within the collecting system at the right renal pelvis (image 71 series 2 and image 57 series 2). No obstructing stone identified. Previously seen stone within the right renal pelvis on exam from December 2, 2020 is no longer visualized. GI/Bowel: No bowel obstruction identified. Postoperative changes of the distal colon. No bowel wall thickening identified. No evidence for acute appendicitis. The small bowel is normal in caliber. Pelvis: The bladder demonstrates gas within its lumen. Solid pelvic organs demonstrate no acute abnormality. Peritoneum/Retroperitoneum: The abdominal aorta is normal in caliber. No retroperitoneal adenopathy. No free fluid or free air identified within the abdomen and pelvis. Small amount of fluid tracks along the right retroperitoneum along the posterior aspect of the right perirenal fat (images 66 through 105 series 2).   This fluid demonstrates CT characteristics most suggestive of simple fluid (image 79 series 2). Bones/Soft Tissues: No acute osseous or subcu cutaneous abnormality identified. 1. Stranding surrounding the right kidney with a small amount of simple appearing fluid tracking along the posterior-most perirenal fat. There is moderate hydronephrosis and hydroureter with gas present within the collecting system at the level of the renal pelvis and also within the bladder. No obstructing stone is identified. Patient with given history of recent percutaneous nephrolithotomy. Correlate clinically to exclude superimposed infection. 2. Nonobstructing stones noted within the left kidney similar to prior exam dated December 2, 2020. MDM:  Pt presents as above. Emergent conditions considered. Presentation prompted initial immediate evaluation. Labs and imaging are quickly obtained. IV is established and patient is placed on monitor. IV morphine, IV Toradol, IV Zofran IV fluid boluses given. Patient is quickly to CT. CT demonstrates hydroureter and hydronephrosis without ureteral stone. There is some perinephric stranding with radiology suggestion to correlate to urinalysis. Urinalysis with hematuria but is otherwise not suggestive of a urinary tract infection. CBC is with small leukocytosis. CMP is without clinically significant derangement. Lipase is not suggestive of a pancreatitis. Lactic acid is marginally elevated suggestive of some degree of tissue hypoperfusion however I do believe this is likely elevated from patient's acute distress and agitation on arrival.  Magnesium is within normal limits. After initial round of medications patient reports pain is down to a 9 out of 10 although objectively looks much improved. Additional IV morphine is given. IV lidocaine infusion is also initiated. Patient is further assessed.     On third assessment of patient patient is reporting pain is back to a 10 out of 10 and is requesting further IV medications and additional round of medications (if applicable):  New Prescriptions    No medications on file       Comment: Please note this report has been produced using speech recognition software and may contain errors related to that system including errors in grammar, punctuation, and spelling, as well as words and phrases that may be inappropriate. If there are any questions or concerns please feel free to contact the dictating provider for clarification.        Ben Honeycutt MD  05/13/21 6046

## 2021-05-13 NOTE — ED NOTES
IV obtained and labs drawn and sent to the lab. Pain meds given per order. Pt to CT via cart by Edi Jenkins.       Humza Chester RN  05/13/21 [id number]

## 2021-05-13 NOTE — ED NOTES
Dr Charlie Garcia speaking with the access center regarding this pt     Hussain Fields, RN  05/13/21 6693

## 2021-05-13 NOTE — ED NOTES
Pt on call light frequently requesting pain medication. Pt will scream loudly when wanting pain meds. Will not quit until medicated.       Reagan Diaz RN  05/13/21 1914

## 2021-05-13 NOTE — ED NOTES
Call placed to [facility] to arrange transfer to [facility].  ETA 7:15pm.      Jacque Radford RN  05/13/21 [id number]

## 2021-05-13 NOTE — ED NOTES
On call light states the pain is really starting to come on strong. Dr. Mckeon Payette notified.       Lexus Torres RN  05/13/21 2874

## 2021-05-13 NOTE — ED NOTES
Requesting more pain medication. States pain is still a 9/10.       Niranjan Robertson RN  05/13/21 8744

## 2022-09-21 ENCOUNTER — HOSPITAL ENCOUNTER (EMERGENCY)
Age: 47
Discharge: HOME | End: 2022-09-21
Attending: EMERGENCY MEDICINE
Payer: COMMERCIAL

## 2022-09-21 ENCOUNTER — APPOINTMENT (OUTPATIENT)
Dept: CT IMAGING | Age: 47
End: 2022-09-21
Payer: COMMERCIAL

## 2022-09-21 VITALS
WEIGHT: 225 LBS | HEIGHT: 71 IN | DIASTOLIC BLOOD PRESSURE: 62 MMHG | TEMPERATURE: 98.2 F | SYSTOLIC BLOOD PRESSURE: 111 MMHG | HEART RATE: 68 BPM | OXYGEN SATURATION: 98 % | RESPIRATION RATE: 12 BRPM | BODY MASS INDEX: 31.5 KG/M2

## 2022-09-21 DIAGNOSIS — R10.9 RIGHT FLANK PAIN: Primary | ICD-10-CM

## 2022-09-21 LAB
ALBUMIN SERPL-MCNC: 4.5 GM/DL (ref 3.4–5)
ALP BLD-CCNC: 81 IU/L (ref 40–129)
ALT SERPL-CCNC: 14 U/L (ref 10–40)
ANION GAP SERPL CALCULATED.3IONS-SCNC: 9 MMOL/L (ref 4–16)
AST SERPL-CCNC: 17 IU/L (ref 15–37)
BASOPHILS ABSOLUTE: 0 K/CU MM
BASOPHILS RELATIVE PERCENT: 0.4 % (ref 0–1)
BILIRUB SERPL-MCNC: 1 MG/DL (ref 0–1)
BILIRUBIN URINE: NEGATIVE
BLOOD, URINE: NEGATIVE
BUN BLDV-MCNC: 10 MG/DL (ref 6–23)
CALCIUM SERPL-MCNC: 9.2 MG/DL (ref 8.3–10.6)
CHLORIDE BLD-SCNC: 103 MMOL/L (ref 99–110)
CLARITY: CLEAR
CO2: 28 MMOL/L (ref 21–32)
COLOR: YELLOW
CREAT SERPL-MCNC: 1 MG/DL (ref 0.9–1.3)
DIFFERENTIAL TYPE: ABNORMAL
EOSINOPHILS ABSOLUTE: 0.1 K/CU MM
EOSINOPHILS RELATIVE PERCENT: 1.3 % (ref 0–3)
GFR AFRICAN AMERICAN: >60 ML/MIN/1.73M2
GFR NON-AFRICAN AMERICAN: >60 ML/MIN/1.73M2
GLUCOSE BLD-MCNC: 95 MG/DL (ref 70–99)
GLUCOSE, URINE: NEGATIVE MG/DL
HCT VFR BLD CALC: 52.6 % (ref 42–52)
HEMOGLOBIN: 17.9 GM/DL (ref 13.5–18)
IMMATURE NEUTROPHIL %: 0.1 % (ref 0–0.43)
KETONES, URINE: NEGATIVE MG/DL
LEUKOCYTE ESTERASE, URINE: NEGATIVE
LIPASE: 36 IU/L (ref 13–60)
LYMPHOCYTES ABSOLUTE: 2 K/CU MM
LYMPHOCYTES RELATIVE PERCENT: 25.5 % (ref 24–44)
MCH RBC QN AUTO: 30.7 PG (ref 27–31)
MCHC RBC AUTO-ENTMCNC: 34 % (ref 32–36)
MCV RBC AUTO: 90.1 FL (ref 78–100)
MONOCYTES ABSOLUTE: 0.4 K/CU MM
MONOCYTES RELATIVE PERCENT: 5 % (ref 0–4)
NITRITE URINE, QUANTITATIVE: NEGATIVE
PDW BLD-RTO: 12.5 % (ref 11.7–14.9)
PH, URINE: 6
PLATELET # BLD: 260 K/CU MM (ref 140–440)
PMV BLD AUTO: 10.3 FL (ref 7.5–11.1)
POTASSIUM SERPL-SCNC: 4 MMOL/L (ref 3.5–5.1)
PROTEIN UA: NEGATIVE MG/DL
RBC # BLD: 5.84 M/CU MM (ref 4.6–6.2)
SEGMENTED NEUTROPHILS ABSOLUTE COUNT: 5.2 K/CU MM
SEGMENTED NEUTROPHILS RELATIVE PERCENT: 67.7 % (ref 36–66)
SODIUM BLD-SCNC: 140 MMOL/L (ref 135–145)
SPECIFIC GRAVITY UA: 1.02
TOTAL IMMATURE NEUTOROPHIL: 0.01 K/CU MM
TOTAL PROTEIN: 7.6 GM/DL (ref 6.4–8.2)
UROBILINOGEN, URINE: 0.2 MG/DL
WBC # BLD: 7.7 K/CU MM (ref 4–10.5)

## 2022-09-21 PROCEDURE — 74176 CT ABD & PELVIS W/O CONTRAST: CPT

## 2022-09-21 PROCEDURE — 83690 ASSAY OF LIPASE: CPT

## 2022-09-21 PROCEDURE — 96374 THER/PROPH/DIAG INJ IV PUSH: CPT

## 2022-09-21 PROCEDURE — 80053 COMPREHEN METABOLIC PANEL: CPT

## 2022-09-21 PROCEDURE — 85025 COMPLETE CBC W/AUTO DIFF WBC: CPT

## 2022-09-21 PROCEDURE — 96375 TX/PRO/DX INJ NEW DRUG ADDON: CPT

## 2022-09-21 PROCEDURE — 99284 EMERGENCY DEPT VISIT MOD MDM: CPT

## 2022-09-21 PROCEDURE — 2580000003 HC RX 258: Performed by: EMERGENCY MEDICINE

## 2022-09-21 PROCEDURE — 6360000002 HC RX W HCPCS: Performed by: EMERGENCY MEDICINE

## 2022-09-21 RX ORDER — TAMSULOSIN HYDROCHLORIDE 0.4 MG/1
0.4 CAPSULE ORAL DAILY
COMMUNITY
Start: 2021-08-26

## 2022-09-21 RX ORDER — OXYCODONE HYDROCHLORIDE 5 MG/1
5 TABLET ORAL EVERY 8 HOURS PRN
COMMUNITY
Start: 2021-08-26

## 2022-09-21 RX ORDER — 0.9 % SODIUM CHLORIDE 0.9 %
1000 INTRAVENOUS SOLUTION INTRAVENOUS ONCE
Status: COMPLETED | OUTPATIENT
Start: 2022-09-21 | End: 2022-09-21

## 2022-09-21 RX ORDER — KETOROLAC TROMETHAMINE 30 MG/ML
30 INJECTION, SOLUTION INTRAMUSCULAR; INTRAVENOUS ONCE
Status: COMPLETED | OUTPATIENT
Start: 2022-09-21 | End: 2022-09-21

## 2022-09-21 RX ORDER — MORPHINE SULFATE 4 MG/ML
4 INJECTION, SOLUTION INTRAMUSCULAR; INTRAVENOUS EVERY 30 MIN PRN
Status: DISCONTINUED | OUTPATIENT
Start: 2022-09-21 | End: 2022-09-21 | Stop reason: HOSPADM

## 2022-09-21 RX ADMIN — MORPHINE SULFATE 4 MG: 4 INJECTION, SOLUTION INTRAMUSCULAR; INTRAVENOUS at 11:35

## 2022-09-21 RX ADMIN — KETOROLAC TROMETHAMINE 30 MG: 30 INJECTION, SOLUTION INTRAMUSCULAR; INTRAVENOUS at 11:35

## 2022-09-21 RX ADMIN — SODIUM CHLORIDE 1000 ML: 9 INJECTION, SOLUTION INTRAVENOUS at 11:34

## 2022-09-21 ASSESSMENT — PAIN DESCRIPTION - DESCRIPTORS: DESCRIPTORS: SHARP

## 2022-09-21 ASSESSMENT — PAIN - FUNCTIONAL ASSESSMENT
PAIN_FUNCTIONAL_ASSESSMENT: PREVENTS OR INTERFERES WITH MANY ACTIVE NOT PASSIVE ACTIVITIES
PAIN_FUNCTIONAL_ASSESSMENT: 0-10

## 2022-09-21 ASSESSMENT — LIFESTYLE VARIABLES
HOW MANY STANDARD DRINKS CONTAINING ALCOHOL DO YOU HAVE ON A TYPICAL DAY: 1 OR 2
HOW OFTEN DO YOU HAVE A DRINK CONTAINING ALCOHOL: NEVER

## 2022-09-21 ASSESSMENT — PAIN DESCRIPTION - PAIN TYPE: TYPE: ACUTE PAIN

## 2022-09-21 ASSESSMENT — PAIN DESCRIPTION - LOCATION: LOCATION: FLANK

## 2022-09-21 ASSESSMENT — PAIN SCALES - GENERAL: PAINLEVEL_OUTOF10: 6

## 2022-09-21 ASSESSMENT — PAIN DESCRIPTION - ORIENTATION: ORIENTATION: RIGHT

## 2022-09-21 ASSESSMENT — PAIN DESCRIPTION - FREQUENCY: FREQUENCY: CONTINUOUS

## 2022-09-21 NOTE — ED PROVIDER NOTES
Emergency Department Encounter    Patient: Kinza Colbert  MRN: [id number]  : 1975  Date of Evaluation: 2022  ED Provider:  Deanna Castellano MD    Triage Chief Complaint:   Flank Pain (States began having pain in right flank on . States thought he passed a stone on Monday that was very small. Not sure if there is another one in there or not. States took his oxycodone around 0300 this morning with little relief. States is feeling a little bit nauseated. States felt like he had a fever last night-sweating. )    [place]:  Kinza Colbert is a 41 y.o. male that presents with complaint of flank pain. Started on  and Monday, passed a small kidney stone. Still having some pain and was worse this morning, took his morning oxycodone without relief. Having some nausea. Was having some sweating last night. Does have a long history of kidney stones. No diarrhea. No fevers. No chest pain or shortness of breath. No other recent illnesses. He did have some blood in his urine with the stone on Monday, none since. No urinary frequency or urgency    ROS - see HPI, below listed is current ROS at time of my eval:  10 systems reviewed and negative except as above. Past Medical History:   Diagnosis Date    Diverticulitis     Gall stone     Kidney stone     \"had kidney stone now- had them since age 17-had surgery and able to pass some\" follows with dr Cathrine Holter    Kidney stone      Past Surgical History:   Procedure Laterality Date    CHOLECYSTECTOMY, LAPAROSCOPIC N/A 2020    CHOLECYSTECTOMY LAPAROSCOPIC WITH IOC performed by Paulette Kelley MD at [facility]  [address]      \"had surgery multiple times for kidney stones last time ?     SMALL INTESTINE SURGERY N/A 10/6/2020    BOWEL RESECTION SIGMOID LAPAROSCOPIC ROBOTIC performed by Paulette Kelley MD at [facility] N/A 11/3/2020    EGD BIOPSY performed by Paulette Kelley MD at [facility] ENDOSCOPY WRIST SURGERY  2012    left wrist\"no metal\"     Family History   Problem Relation Age of Onset    Cancer Mother         ovarian cancer     Early Death Mother     Kidney stones Father     Diabetes Father     Diabetes Brother      Social History     Socioeconomic History    Marital status:      Spouse name: Not on file    Number of children: Not on file    Years of education: Not on file    Highest education level: Not on file   Occupational History    Not on file   Tobacco Use    Smoking status: Never    Smokeless tobacco: Never   Vaping Use    Vaping Use: Never used   Substance and Sexual Activity    Alcohol use: No    Drug use: Yes     Frequency: 1.0 times per week     Types: Marijuana Berneta Pérez)    Sexual activity: Not Currently   Other Topics Concern    Not on file   Social History Narrative    Not on file     Social Determinants of Health     Financial Resource Strain: Not on file   Food Insecurity: Not on file   Transportation Needs: Not on file   Physical Activity: Not on file   Stress: Not on file   Social Connections: Not on file   Intimate Partner Violence: Not on file   Housing Stability: Not on file     Current Facility-Administered Medications   Medication Dose Route Frequency Provider Last Rate Last Admin    morphine sulfate (PF) injection 4 mg  4 mg IntraVENous Q30 Min PRN Ayden Oconnor MD   4 mg at 09/21/22 1135     Current Outpatient Medications   Medication Sig Dispense Refill    oxyCODONE (ROXICODONE) 5 MG immediate release tablet Take 5 mg by mouth every 8 hours as needed.       tamsulosin (FLOMAX) 0.4 MG capsule Take 0.4 mg by mouth daily      ketorolac (TORADOL) 10 MG tablet Take 1 tablet by mouth every 8 hours for 12 days 9 tablet 3     Allergies   Allergen Reactions    Vicodin [Hydrocodone-Acetaminophen] Other (See Comments)     Patient states that it is tolerable but state that makes his skin crawl       Nursing Notes Reviewed    Physical Exam:  ED Triage Vitals [09/21/22 1109]   Enc Vitals Group      BP (!) 126/92      Heart Rate 72      Resp 14      Temp 98.2 °F (36.8 °C)      Temp Source Oral      SpO2 98 %      Weight 225 lb (102.1 kg)      Height 5' 11\" (1.803 m)      Head Circumference       Peak Flow       Pain Score       Pain Loc       Pain Edu? Excl. in [address]? My pulse ox interpretation is - normal    General appearance: Appears uncomfortable, moving all over the bed  Skin:  Warm. Dry. Eye:  Extraocular movements intact. Ears, nose, mouth and throat:  Oral mucosa moist   Neck:  Trachea midline. Extremity:  No swelling. Normal ROM     Heart:  Regular rate and rhythm, normal S1 & S2, no extra heart sounds. Perfusion:  intact  Respiratory:  Lungs clear to auscultation bilaterally. Respirations nonlabored. Abdominal:  Normal bowel sounds. Soft. Moderate tenderness to palpation over RLQ without rebound. Non distended. Neurological:  Alert and oriented.              Psychiatric:  Appropriate    I have reviewed and interpreted all of the currently available lab results from this visit (if applicable):  Results for orders placed or performed during the hospital encounter of 09/21/22   Urinalysis   Result Value Ref Range    Color, UA YELLOW     Clarity, UA CLEAR     Glucose, Urine NEGATIVE MG/DL    Bilirubin Urine NEGATIVE     Ketones, Urine NEGATIVE MG/DL    Specific Gravity, UA 1.025     Blood, Urine NEGATIVE     pH, Urine 6.0     Protein, UA NEGATIVE MG/DL    Urobilinogen, Urine 0.2 MG/DL    Nitrite Urine, Quantitative NEGATIVE     Leukocyte Esterase, Urine NEGATIVE    CBC with Auto Differential   Result Value Ref Range    WBC 7.7 4.0 - 10.5 K/CU MM    RBC 5.84 4.6 - 6.2 M/CU MM    Hemoglobin 17.9 13.5 - 18.0 GM/DL    Hematocrit 52.6 (H) 42 - 52 %    MCV 90.1 78 - 100 FL    MCH 30.7 27 - 31 PG    MCHC 34.0 32.0 - 36.0 %    RDW 12.5 11.7 - 14.9 %    Platelets 663 351 - 860 K/CU MM    MPV 10.3 7.5 - 11.1 FL    Differential Type AUTOMATED DIFFERENTIAL     Segs Relative 67.7 (H) 36 - 66 %    Lymphocytes % 25.5 24 - 44 %    Monocytes % 5.0 (H) 0 - 4 %    Eosinophils % 1.3 0 - 3 %    Basophils % 0.4 0 - 1 %    Segs Absolute 5.2 K/CU MM    Lymphocytes Absolute 2.0 K/CU MM    Monocytes Absolute 0.4 K/CU MM    Eosinophils Absolute 0.1 K/CU MM    Basophils Absolute 0.0 K/CU MM    Immature Neutrophil % 0.1 0 - 0.43 %    Total Immature Neutrophil 0.01 K/CU MM   Comprehensive Metabolic Panel   Result Value Ref Range    Sodium 140 135 - 145 MMOL/L    Potassium 4.0 3.5 - 5.1 MMOL/L    Chloride 103 99 - 110 mMol/L    CO2 28 21 - 32 MMOL/L    BUN 10 6 - 23 MG/DL    Creatinine 1.0 0.9 - 1.3 MG/DL    Glucose 95 70 - 99 MG/DL    Calcium 9.2 8.3 - 10.6 MG/DL    Albumin 4.5 3.4 - 5.0 GM/DL    Total Protein 7.6 6.4 - 8.2 GM/DL    Total Bilirubin 1.0 0.0 - 1.0 MG/DL    ALT 14 10 - 40 U/L    AST 17 15 - 37 IU/L    Alkaline Phosphatase 81 40 - 129 IU/L    GFR Non-African American >60 >60 mL/min/1.73m2    GFR African American >60 >60 mL/min/1.73m2    Anion Gap 9 4 - 16   Lipase   Result Value Ref Range    Lipase 36 13 - 60 IU/L      Radiographs (if obtained):  Radiologist's Report Reviewed:  No results found. EKG (if obtained): (All EKG's are interpreted by myself in the absence of a cardiologist)      MDM:  44-year-old male with history as above presents with concern for right flank pain. He is moving all over the bed but does not appear toxic, his vitals are normal, given Toradol and morphine with significant improvement of pain. Urinalysis negative for infection, no evidence of obstructing stone and he is feeling much improved, possible that he could have passed a stone prior to the CT here but he does not have any hydronephrosis which is reassuring. Labs are unremarkable, plan will be for discharge home, he is comfortable with this, given discharge instructions, given work excuse, all questions answered. Given return precautions.   Discharged in stable condition    Clinical Impression:  1. Right flank pain      Disposition referral (if applicable):  Denis Messer, [address] [facility] [address] Re Cardozo, [address] [place] [phone]        Disposition medications (if applicable):  Discharge Medication List as of 9/21/2022 12:41 PM        ED Provider Disposition Time  DISPOSITION Decision To Discharge 09/21/2022 12:31:58 PM      Comment: Please note this report has been produced using speech recognition software and may contain errors related to that system including errors in grammar, punctuation, and spelling, as well as words and phrases that may be inappropriate. Efforts were made to edit the dictations.        Loly Hayward MD  09/21/22 1760

## 2022-09-21 NOTE — Clinical Note
Clarke Castillo was seen and treated in our emergency department on 9/21/2022. He may return to work on 09/23/2022. If you have any questions or concerns, please don't hesitate to call.       Nam Wells MD

## 2022-09-21 NOTE — ED TRIAGE NOTES
Arrived ambulatory to room 7-2 for triage. Tolerated without difficulty. Bed in lowest position. Call light given. Gowned for exam. UA obtained.

## 2022-10-05 ENCOUNTER — HOSPITAL ENCOUNTER (OUTPATIENT)
Age: 47
Discharge: HOME | End: 2022-10-05
Payer: COMMERCIAL

## 2022-10-05 ENCOUNTER — HOSPITAL ENCOUNTER (OUTPATIENT)
Dept: GENERAL RADIOLOGY | Age: 47
Discharge: HOME | End: 2022-10-05
Payer: COMMERCIAL

## 2022-10-05 DIAGNOSIS — N20.0 KIDNEY STONE: ICD-10-CM

## 2022-10-05 PROCEDURE — 74018 RADEX ABDOMEN 1 VIEW: CPT

## 2022-11-02 ENCOUNTER — HOSPITAL ENCOUNTER (EMERGENCY)
Age: 47
Discharge: HOME | End: 2022-11-02
Attending: EMERGENCY MEDICINE
Payer: COMMERCIAL

## 2022-11-02 VITALS
RESPIRATION RATE: 16 BRPM | TEMPERATURE: 97.6 F | OXYGEN SATURATION: 96 % | HEART RATE: 106 BPM | BODY MASS INDEX: 31.38 KG/M2 | DIASTOLIC BLOOD PRESSURE: 98 MMHG | SYSTOLIC BLOOD PRESSURE: 143 MMHG | WEIGHT: 225 LBS

## 2022-11-02 DIAGNOSIS — R10.9 FLANK PAIN: ICD-10-CM

## 2022-11-02 DIAGNOSIS — R11.0 NAUSEA WITHOUT VOMITING: ICD-10-CM

## 2022-11-02 DIAGNOSIS — N23 RENAL COLIC: Primary | ICD-10-CM

## 2022-11-02 LAB
ANION GAP SERPL CALCULATED.3IONS-SCNC: 8 MMOL/L (ref 4–16)
BACTERIA: ABNORMAL /HPF
BASOPHILS ABSOLUTE: 0 K/CU MM
BASOPHILS RELATIVE PERCENT: 0.4 % (ref 0–1)
BILIRUBIN URINE: NEGATIVE MG/DL
BLOOD, URINE: ABNORMAL
BUN BLDV-MCNC: 12 MG/DL (ref 6–23)
CALCIUM SERPL-MCNC: 9 MG/DL (ref 8.3–10.6)
CAST TYPE: ABNORMAL /HPF
CHLORIDE BLD-SCNC: 105 MMOL/L (ref 99–110)
CLARITY: CLEAR
CO2: 31 MMOL/L (ref 21–32)
COLOR: YELLOW
CREAT SERPL-MCNC: 0.9 MG/DL (ref 0.9–1.3)
CRYSTAL TYPE: NEGATIVE /HPF
DIFFERENTIAL TYPE: ABNORMAL
EOSINOPHILS ABSOLUTE: 0.2 K/CU MM
EOSINOPHILS RELATIVE PERCENT: 2 % (ref 0–3)
EPITHELIAL CELLS, UA: 2 /HPF
GFR SERPL CREATININE-BSD FRML MDRD: >60 ML/MIN/1.73M2
GLUCOSE BLD-MCNC: 99 MG/DL (ref 70–99)
GLUCOSE, URINE: NEGATIVE MG/DL
HCT VFR BLD CALC: 48.9 % (ref 42–52)
HEMOGLOBIN: 16.5 GM/DL (ref 13.5–18)
IMMATURE NEUTROPHIL %: 0.1 % (ref 0–0.43)
KETONES, URINE: NEGATIVE MG/DL
LEUKOCYTE ESTERASE, URINE: NEGATIVE
LYMPHOCYTES ABSOLUTE: 2.7 K/CU MM
LYMPHOCYTES RELATIVE PERCENT: 35.4 % (ref 24–44)
MCH RBC QN AUTO: 30.5 PG (ref 27–31)
MCHC RBC AUTO-ENTMCNC: 33.7 % (ref 32–36)
MCV RBC AUTO: 90.4 FL (ref 78–100)
MONOCYTES ABSOLUTE: 0.5 K/CU MM
MONOCYTES RELATIVE PERCENT: 6.9 % (ref 0–4)
NITRITE URINE, QUANTITATIVE: NEGATIVE
PDW BLD-RTO: 12.4 % (ref 11.7–14.9)
PH, URINE: 6 (ref 5–8)
PLATELET # BLD: 249 K/CU MM (ref 140–440)
PMV BLD AUTO: 9.7 FL (ref 7.5–11.1)
POTASSIUM SERPL-SCNC: 4 MMOL/L (ref 3.5–5.1)
PROTEIN UA: NEGATIVE MG/DL
RBC # BLD: 5.41 M/CU MM (ref 4.6–6.2)
RBC URINE: 12 /HPF (ref 0–3)
SEGMENTED NEUTROPHILS ABSOLUTE COUNT: 4.2 K/CU MM
SEGMENTED NEUTROPHILS RELATIVE PERCENT: 55.2 % (ref 36–66)
SODIUM BLD-SCNC: 144 MMOL/L (ref 135–145)
SPECIFIC GRAVITY UA: 1.02 (ref 1–1.03)
TOTAL IMMATURE NEUTOROPHIL: 0.01 K/CU MM
UROBILINOGEN, URINE: 0.2 MG/DL (ref 0.2–1)
WBC # BLD: 7.6 K/CU MM (ref 4–10.5)
WBC UA: 5 /HPF (ref 0–2)

## 2022-11-02 PROCEDURE — 96376 TX/PRO/DX INJ SAME DRUG ADON: CPT

## 2022-11-02 PROCEDURE — 2580000003 HC RX 258: Performed by: EMERGENCY MEDICINE

## 2022-11-02 PROCEDURE — 99284 EMERGENCY DEPT VISIT MOD MDM: CPT

## 2022-11-02 PROCEDURE — 85025 COMPLETE CBC W/AUTO DIFF WBC: CPT

## 2022-11-02 PROCEDURE — 81001 URINALYSIS AUTO W/SCOPE: CPT

## 2022-11-02 PROCEDURE — 96374 THER/PROPH/DIAG INJ IV PUSH: CPT

## 2022-11-02 PROCEDURE — 96375 TX/PRO/DX INJ NEW DRUG ADDON: CPT

## 2022-11-02 PROCEDURE — 6360000002 HC RX W HCPCS: Performed by: EMERGENCY MEDICINE

## 2022-11-02 PROCEDURE — 80048 BASIC METABOLIC PNL TOTAL CA: CPT

## 2022-11-02 RX ORDER — MORPHINE SULFATE 4 MG/ML
4 INJECTION, SOLUTION INTRAMUSCULAR; INTRAVENOUS ONCE
Status: COMPLETED | OUTPATIENT
Start: 2022-11-02 | End: 2022-11-02

## 2022-11-02 RX ORDER — MORPHINE SULFATE 2 MG/ML
2 INJECTION, SOLUTION INTRAMUSCULAR; INTRAVENOUS ONCE
Status: COMPLETED | OUTPATIENT
Start: 2022-11-02 | End: 2022-11-02

## 2022-11-02 RX ORDER — KETOROLAC TROMETHAMINE 15 MG/ML
15 INJECTION, SOLUTION INTRAMUSCULAR; INTRAVENOUS ONCE
Status: COMPLETED | OUTPATIENT
Start: 2022-11-02 | End: 2022-11-02

## 2022-11-02 RX ORDER — ONDANSETRON 2 MG/ML
8 INJECTION INTRAMUSCULAR; INTRAVENOUS ONCE
Status: COMPLETED | OUTPATIENT
Start: 2022-11-02 | End: 2022-11-02

## 2022-11-02 RX ORDER — CYCLOBENZAPRINE HCL 10 MG
TABLET ORAL 3 TIMES DAILY PRN
COMMUNITY
Start: 2022-10-05 | End: 2022-11-07

## 2022-11-02 RX ORDER — 0.9 % SODIUM CHLORIDE 0.9 %
1000 INTRAVENOUS SOLUTION INTRAVENOUS ONCE
Status: COMPLETED | OUTPATIENT
Start: 2022-11-02 | End: 2022-11-02

## 2022-11-02 RX ADMIN — SODIUM CHLORIDE 1000 ML: 9 INJECTION, SOLUTION INTRAVENOUS at 03:32

## 2022-11-02 RX ADMIN — ONDANSETRON 8 MG: 2 INJECTION INTRAMUSCULAR; INTRAVENOUS at 03:27

## 2022-11-02 RX ADMIN — MORPHINE SULFATE 4 MG: 4 INJECTION, SOLUTION INTRAMUSCULAR; INTRAVENOUS at 03:27

## 2022-11-02 RX ADMIN — KETOROLAC TROMETHAMINE 15 MG: 15 INJECTION, SOLUTION INTRAMUSCULAR; INTRAVENOUS at 03:29

## 2022-11-02 RX ADMIN — KETOROLAC TROMETHAMINE 15 MG: 15 INJECTION, SOLUTION INTRAMUSCULAR; INTRAVENOUS at 04:24

## 2022-11-02 RX ADMIN — MORPHINE SULFATE 2 MG: 2 INJECTION, SOLUTION INTRAMUSCULAR; INTRAVENOUS at 04:23

## 2022-11-02 ASSESSMENT — PAIN SCALES - GENERAL
PAINLEVEL_OUTOF10: 7
PAINLEVEL_OUTOF10: 10

## 2022-11-02 NOTE — ED NOTES
Pt discharged with instructions and prescriptions. Discussed when and how to take medications and pt stated understanding. Pt walked out of the ER to father in waiting car.       Sivan Colorado RN  11/02/22 0088

## 2022-11-02 NOTE — ED PROVIDER NOTES
CHIEF COMPLAINT    Chief Complaint   Patient presents with    Flank Pain     Left side      HPI  Zina Lindquist is a 41 y.o. male with history of renal stones who presents to the ED with complaints of left-sided flank pain. Patient has history of recurrent renal stones. He is followed by Dr. Yonas Huerta of urology. He had CT imaging in September which demonstrated bilateral nonobstructing nephrolithiasis and had x-ray KUB performed 10/05 which demonstrated 3 stones in the inferior pole of the left kidney. He states that he began to have severe pain in the CVA in the left flank with radiation to left lower quadrant consistent with previous episodes of passing renal stones. He has associated nausea but no vomiting. He attempted to take oxycodone at home without relief. He is taking Flomax as well. The pain is rated 9/10 and constant. Pain exacerbated with certain movements and positions. Patient states he has been able to continue urinating this evening he denies fevers, chills, dizziness, lightheadedness, dysuria. REVIEW OF SYSTEMS  Constitutional: No fever, chills or recent illness. Eye: No visual changes  HENT: No earache or sore throat. Resp: No SOB or productive cough. Cardio: No chest pain or palpitations. GI: No constipation or diarrhea. No melena. Complains of left lower quadrant abdominal pain and nausea  : No dysuria, urgency or frequency. Complains of left-sided flank pain  Endocrine: No heat intolerance, no cold intolerance, no polydipsia   Lymphatics: No adenopathy  Musculoskeletal: No new muscle aches or joint pain. Neuro: No headaches. Psych: No homicidal or suicidal thoughts  Skin: No rash, No itching. ?  ?   PAST MEDICAL HISTORY  Past Medical History:   Diagnosis Date    Diverticulitis     Gall stone     Kidney stone     \"had kidney stone now- had them since age 17-had surgery and able to pass some\" follows with dr Smith Manifold History Problem Relation Age of Onset    Cancer Mother         ovarian cancer     Early Death Mother     Kidney stones Father     Diabetes Father     Diabetes Brother      SOCIAL HISTORY  Social History     Socioeconomic History    Marital status:    Tobacco Use    Smoking status: Never    Smokeless tobacco: Never   Vaping Use    Vaping Use: Never used   Substance and Sexual Activity    Alcohol use: No    Drug use: Yes     Frequency: 1.0 times per week     Types: Marijuana Garon Ozuna)    Sexual activity: Not Currently       SURGICAL HISTORY  Past Surgical History:   Procedure Laterality Date    CHOLECYSTECTOMY, LAPAROSCOPIC N/A 11/4/2020    CHOLECYSTECTOMY LAPAROSCOPIC WITH IOC performed by Sergio Maravilla MD at [address] [facility] [place]  [address]      \"had surgery multiple times for kidney stones last time 2018? SMALL INTESTINE SURGERY N/A 10/6/2020    BOWEL RESECTION SIGMOID LAPAROSCOPIC ROBOTIC performed by Sergio Maravilla MD at [id number] [place] [address] Street 11/3/2020    EGD BIOPSY performed by Sergio Maravilla MD at [address] [place] [address] Road  2012    left wrist\"no metal\"     CURRENT MEDICATIONS  Previous Medications    CYCLOBENZAPRINE (FLEXERIL) 10 MG TABLET    Take by mouth 3 times daily as needed    KETOROLAC (TORADOL) 10 MG TABLET    Take 1 tablet by mouth every 8 hours for 12 days    OXYCODONE (ROXICODONE) 5 MG IMMEDIATE RELEASE TABLET    Take 5 mg by mouth every 8 hours as needed. TAMSULOSIN (FLOMAX) 0.4 MG CAPSULE    Take 0.4 mg by mouth daily     ALLERGIES  Allergies   Allergen Reactions    Vicodin [Hydrocodone-Acetaminophen] Other (See Comments)     Patient states that it is tolerable but state that makes his skin crawl       Nursing notes reviewed by myself for past medical history, family history, social history, surgical history, current medications, and allergies.     PHYSICAL EXAM  VITAL SIGNS: Triage VS:    ED Triage Vitals [11/02/22 0320]   Enc Vitals Group      BP Heart Rate (!) 106      Resp 16      Temp 97.6 °F (36.4 °C)      Temp src       SpO2 96 %      Weight 225 lb (102.1 kg)      Height       Head Circumference       Peak Flow       Pain Score       Pain Loc       Pain Edu? Excl. in [address]? Constitutional: Well developed, Well nourished, nontoxic appearing, appears uncomfortable secondary to pain  HENT: Normocephalic, Atraumatic, Bilateral external ears normal, Oropharynx moist, No oral exudates, Nose normal.   Eyes: PERRL, EOMI, Conjunctiva normal, No discharge. No scleral icterus. Neck: Normal range of motion, No tenderness, Supple. Lymphatic: No lymphadenopathy noted. Cardiovascular: Normal heart rate, Normal rhythm, No murmurs, gallops or rubs. Thorax & Lungs: Normal breath sounds, No respiratory distress, No wheezing. Abdomen: Soft, tenderness to palpation of the left lower quadrant without rebound or guarding, no masses, No pulsatile masses, No distention, Normal bowel sounds  Skin: Warm, Dry, Pink, No mottling, No erythema, No rash. Back: No midline tenderness, left-sided CVA tenderness  Extremities: No cyanosis, Normal perfusion, No clubbing. Musculoskeletal:  No major deformities noted. Neurologic: Alert & oriented x 3,  No focal deficits noted. Psychiatric: Affect normal, Judgment normal, Mood normal.     RADIOLOGY  Labs Reviewed   CBC WITH AUTO DIFFERENTIAL - Abnormal; Notable for the following components:       Result Value    Monocytes % 6.9 (*)     All other components within normal limits   URINALYSIS - Abnormal; Notable for the following components:    Blood, Urine LARGE NUMBER OR AMOUNT OBSERVED (*)     All other components within normal limits   MICROSCOPIC URINALYSIS - Abnormal; Notable for the following components:    RBC, UA 12 (*)     WBC, UA 5 (*)     Bacteria, UA RARE (*)     All other components within normal limits   BASIC METABOLIC PANEL     I personally reviewed the images.  The radiologist's interpretation reveals:  Last Imaging results   No orders to display       MEDS GIVEN IN ED:  Medications   ketorolac (TORADOL) injection 15 mg (15 mg IntraVENous Given 11/2/22 0329)   0.9 % sodium chloride bolus (0 mLs IntraVENous Stopped 11/2/22 0453)   morphine sulfate (PF) injection 4 mg (4 mg IntraVENous Given 11/2/22 0327)   ondansetron (ZOFRAN) injection 8 mg (8 mg IntraVENous Given 11/2/22 0327)   morphine (PF) injection 2 mg (2 mg IntraVENous Given 11/2/22 0423)   ketorolac (TORADOL) injection 15 mg (15 mg IntraVENous Given 11/2/22 0424)     [facility]  This is a 59-year-old male with longstanding history of recurrent renal stones presents the emergency department with complaints of left sided flank pain with radiation to left lower quadrant associated nausea consistent with previous episodes of nephrolithiasis/ureterolithiasis. He had imaging studies in October which demonstrated 3 separate renal stones within the inferior pole of the left kidney. He appears uncomfortable on exam with tenderness to palpation to left CVA region as well as left lower quadrant. At this time we will treat him symptomatically with IV fluid bolus, morphine, Zofran, and Toradol. We will also obtain CBC, BMP, and urinalysis. Currently, I do not feel that repeat imaging is not indicated given patient's known left-sided renal stones with significant history of the same. CBC and BMP are reassuring. Urinalysis shows hematuria without evidence of infection. Patient did require second round of opioid pain medication due to persistent pain. We discussed that he required a third round hospitalization but on reassessment he states the pain is manageable and would like to return home. Patient instructed to continue using his oxycodone and NSAIDs at home as well as Flomax. Also instructed to call urology office today for follow-up appointment. Discharge with strict return precautions.       Amount and/or Complexity of Data Reviewed  Clinical lab tests: reviewed  Decide to obtain previous medical records or to obtain history from someone other than the patient: yes       -  Patient seen and evaluated in the emergency department. -  Triage and nursing notes reviewed and incorporated. -  Old chart records reviewed and incorporated. -  Work-up included:  See above  -  Results discussed with patient. Appropriate PPE utilized as indicated for entire patient encounter? Time of Disposition: See timeline  ? New Prescriptions    No medications on file     FINAL IMPRESSION  1. Renal colic    2. Flank pain    3. Nausea without vomiting        Electronically signed by:  [address], DO, 11/2/2022         [address], DO  11/02/22 6308

## 2022-11-02 NOTE — Clinical Note
Elaine Gimenez was seen and treated in our emergency department on 11/2/2022. He may return to work on 11/03/2022. If you have any questions or concerns, please don't hesitate to call.       Prudence Morrison, DO

## 2022-11-03 ENCOUNTER — APPOINTMENT (OUTPATIENT)
Dept: CT IMAGING | Age: 47
End: 2022-11-03
Payer: COMMERCIAL

## 2022-11-03 ENCOUNTER — HOSPITAL ENCOUNTER (EMERGENCY)
Age: 47
Discharge: HOME | End: 2022-11-03
Attending: EMERGENCY MEDICINE
Payer: COMMERCIAL

## 2022-11-03 VITALS
OXYGEN SATURATION: 97 % | HEART RATE: 90 BPM | TEMPERATURE: 97.7 F | HEIGHT: 71 IN | BODY MASS INDEX: 31.5 KG/M2 | DIASTOLIC BLOOD PRESSURE: 88 MMHG | RESPIRATION RATE: 16 BRPM | WEIGHT: 225 LBS | SYSTOLIC BLOOD PRESSURE: 129 MMHG

## 2022-11-03 DIAGNOSIS — R10.9 LEFT FLANK PAIN: Primary | ICD-10-CM

## 2022-11-03 DIAGNOSIS — N20.0 NEPHROLITHIASIS: ICD-10-CM

## 2022-11-03 PROCEDURE — 96375 TX/PRO/DX INJ NEW DRUG ADDON: CPT

## 2022-11-03 PROCEDURE — 6360000002 HC RX W HCPCS: Performed by: EMERGENCY MEDICINE

## 2022-11-03 PROCEDURE — 99284 EMERGENCY DEPT VISIT MOD MDM: CPT

## 2022-11-03 PROCEDURE — 96374 THER/PROPH/DIAG INJ IV PUSH: CPT

## 2022-11-03 PROCEDURE — 74176 CT ABD & PELVIS W/O CONTRAST: CPT

## 2022-11-03 RX ORDER — DIPHENHYDRAMINE HYDROCHLORIDE 50 MG/ML
50 INJECTION INTRAMUSCULAR; INTRAVENOUS ONCE
Status: COMPLETED | OUTPATIENT
Start: 2022-11-03 | End: 2022-11-03

## 2022-11-03 RX ORDER — METOCLOPRAMIDE HYDROCHLORIDE 5 MG/ML
10 INJECTION INTRAMUSCULAR; INTRAVENOUS ONCE
Status: COMPLETED | OUTPATIENT
Start: 2022-11-03 | End: 2022-11-03

## 2022-11-03 RX ORDER — OXYCODONE HYDROCHLORIDE AND ACETAMINOPHEN 5; 325 MG/1; MG/1
1-2 TABLET ORAL EVERY 8 HOURS PRN
Qty: 9 TABLET | Refills: 0 | Status: SHIPPED | OUTPATIENT
Start: 2022-11-03 | End: 2022-11-06

## 2022-11-03 RX ORDER — TAMSULOSIN HYDROCHLORIDE 0.4 MG/1
0.4 CAPSULE ORAL DAILY
Qty: 10 CAPSULE | Refills: 0 | Status: SHIPPED | OUTPATIENT
Start: 2022-11-03 | End: 2022-11-07 | Stop reason: SDUPTHER

## 2022-11-03 RX ADMIN — DIPHENHYDRAMINE HYDROCHLORIDE 50 MG: 50 INJECTION INTRAMUSCULAR; INTRAVENOUS at 19:31

## 2022-11-03 RX ADMIN — METOCLOPRAMIDE 10 MG: 5 INJECTION, SOLUTION INTRAMUSCULAR; INTRAVENOUS at 19:46

## 2022-11-03 RX ADMIN — HYDROMORPHONE HYDROCHLORIDE 1 MG: 1 INJECTION, SOLUTION INTRAMUSCULAR; INTRAVENOUS; SUBCUTANEOUS at 19:33

## 2022-11-03 ASSESSMENT — ENCOUNTER SYMPTOMS
DIARRHEA: 0
HEMATEMESIS: 0
SHORTNESS OF BREATH: 0
NAUSEA: 1
SORE THROAT: 0
FLATUS: 0
RESPIRATORY NEGATIVE: 1
COUGH: 0
ABDOMINAL PAIN: 1
CONSTIPATION: 0
VOMITING: 0
BELCHING: 0
EYES NEGATIVE: 1

## 2022-11-03 ASSESSMENT — PAIN - FUNCTIONAL ASSESSMENT
PAIN_FUNCTIONAL_ASSESSMENT: ACTIVITIES ARE NOT PREVENTED
PAIN_FUNCTIONAL_ASSESSMENT: 0-10

## 2022-11-03 ASSESSMENT — PAIN DESCRIPTION - DESCRIPTORS
DESCRIPTORS: SHARP
DESCRIPTORS: DISCOMFORT
DESCRIPTORS: SHARP;STABBING

## 2022-11-03 ASSESSMENT — PAIN SCALES - GENERAL
PAINLEVEL_OUTOF10: 7
PAINLEVEL_OUTOF10: 3
PAINLEVEL_OUTOF10: 8

## 2022-11-03 ASSESSMENT — PAIN DESCRIPTION - ORIENTATION
ORIENTATION: LEFT

## 2022-11-03 ASSESSMENT — PAIN DESCRIPTION - LOCATION
LOCATION: FLANK

## 2022-11-04 NOTE — ED PROVIDER NOTES
The history is provided by the patient. Abdominal Pain  Pain location:  L flank  Pain quality: sharp, shooting, stabbing and throbbing    Pain radiates to:  L flank and LLQ  Pain severity:  Severe  Onset quality:  Sudden  Timing:  Constant  Progression:  Worsening  Chronicity:  Chronic  Context comment:  Patient has known history of nephrolithiasis  Relieved by:  Nothing  Worsened by:  Nothing  Ineffective treatments:  None tried  Associated symptoms: hematuria and nausea    Associated symptoms: no anorexia, no belching, no chest pain, no chills, no constipation, no cough, no diarrhea, no dysuria, no fever, no flatus, no hematemesis, no shortness of breath, no sore throat and no vomiting      Review of Systems   Constitutional: Negative. Negative for chills and fever. HENT: Negative. Negative for sore throat. Eyes: Negative. Respiratory: Negative. Negative for cough and shortness of breath. Cardiovascular: Negative. Negative for chest pain. Gastrointestinal:  Positive for abdominal pain and nausea. Negative for anorexia, constipation, diarrhea, flatus, hematemesis and vomiting. Genitourinary:  Positive for hematuria. Negative for dysuria. Musculoskeletal: Negative. Skin: Negative. Neurological: Negative. All other systems reviewed and are negative.     Family History   Problem Relation Age of Onset   Luis Bautistaler Cancer Mother         ovarian cancer     Early Death Mother     Kidney stones Father     Diabetes Father     Diabetes Brother      Social History     Socioeconomic History    Marital status:      Spouse name: Not on file    Number of children: Not on file    Years of education: Not on file    Highest education level: Not on file   Occupational History    Not on file   Tobacco Use    Smoking status: Never    Smokeless tobacco: Never   Vaping Use    Vaping Use: Never used   Substance and Sexual Activity    Alcohol use: No    Drug use: Yes     Frequency: 1.0 times per week     Types: Marijuana Daril Michael)    Sexual activity: Not Currently   Other Topics Concern    Not on file   Social History Narrative    Not on file     Social Determinants of Health     Financial Resource Strain: Not on file   Food Insecurity: Not on file   Transportation Needs: Not on file   Physical Activity: Not on file   Stress: Not on file   Social Connections: Not on file   Intimate Partner Violence: Not on file   Housing Stability: Not on file     Past Surgical History:   Procedure Laterality Date    CHOLECYSTECTOMY, LAPAROSCOPIC N/A 11/4/2020    CHOLECYSTECTOMY LAPAROSCOPIC WITH IOC performed by Geovanna Nunez MD at [address] [place] [address]      \"had surgery multiple times for kidney stones last time 2018?  SMALL INTESTINE SURGERY N/A 10/6/2020    BOWEL RESECTION SIGMOID LAPAROSCOPIC ROBOTIC performed by Geovanna Nunez MD at [address] [place] [address] N/A 11/3/2020    EGD BIOPSY performed by Geovanna Nunez MD at [address] [place] [address]  2012    left wrist\"no metal\"     Past Medical History:   Diagnosis Date    Diverticulitis     Gall stone     Kidney stone     \"had kidney stone now- had them since age 17-had surgery and able to pass some\" follows with dr Jose Felipe Kidney stone      Allergies   Allergen Reactions    Vicodin [Hydrocodone-Acetaminophen] Other (See Comments)     Patient states that it is tolerable but state that makes his skin crawl     Prior to Admission medications    Medication Sig Start Date End Date Taking? Authorizing Provider   tamsulosin (FLOMAX) 0.4 MG capsule Take 1 capsule by mouth daily for 10 doses 11/3/22 11/13/22 Yes Alisha Brooks,    oxyCODONE-acetaminophen (PERCOCET) 5-325 MG per tablet Take 1-2 tablets by mouth every 8 hours as needed for Pain for up to 3 days.  11/3/22 11/6/22 Yes Alisha Brooks, DO   cyclobenzaprine (FLEXERIL) 10 MG tablet Take by mouth 3 times daily as needed 10/5/22   Historical Provider, MD oxyCODONE (ROXICODONE) 5 MG immediate release tablet Take 5 mg by mouth every 8 hours as needed. 8/26/21   Historical Provider, MD   tamsulosin (FLOMAX) 0.4 MG capsule Take 0.4 mg by mouth daily 8/26/21   Historical Provider, MD   ketorolac (TORADOL) 10 MG tablet Take 1 tablet by mouth every 8 hours for 12 days 4/18/21 5/13/21  Sammuel Dial Ray, DO       BP (!) 137/94   Pulse (!) 101   Temp 97.7 °F (36.5 °C) (Oral)   Resp 18   Ht 5' 11\" (1.803 m)   Wt 225 lb (102.1 kg)   SpO2 97%   BMI 31.38 kg/m²     Physical Exam  Vitals and nursing note reviewed. Constitutional:       Appearance: He is well-developed. HENT:      Head: Normocephalic and atraumatic. Right Ear: External ear normal.      Left Ear: External ear normal.      Nose: Nose normal.   Eyes:      Conjunctiva/sclera: Conjunctivae normal.      Pupils: Pupils are equal, round, and reactive to light. Cardiovascular:      Rate and Rhythm: Normal rate and regular rhythm. Heart sounds: Normal heart sounds. Pulmonary:      Effort: Pulmonary effort is normal.      Breath sounds: Normal breath sounds. Abdominal:      General: Bowel sounds are normal.      Palpations: Abdomen is soft. Tenderness: There is abdominal tenderness. Musculoskeletal:         General: Normal range of motion. Cervical back: Normal range of motion and neck supple. Skin:     General: Skin is warm and dry. Neurological:      Mental Status: He is alert and oriented to person, place, and time. GCS: GCS eye subscore is 4. GCS verbal subscore is 5. GCS motor subscore is 6. Psychiatric:         Behavior: Behavior normal.         Thought Content: Thought content normal.         Judgment: Judgment normal.       MDM:    Labs Reviewed   URINALYSIS       CT ABDOMEN PELVIS WO CONTRAST   Final Result   1. Left obstructive uropathy related to a left upper ureteric stone measuring   1 cm in diameter. Bilateral nephrolithiasis.    2. No acute gastrointestinal abnormality. Normal appendix. Postsurgical   changes in the sigmoid appears stable. 3. Redemonstration of anterolisthesis of L5 over S1 with bilateral L5   spondylolysis. Patient has known history of chronic kidney stones. Patient has had numerous kidney stones in the past.  Patient currently has in the emergency department a 1 mm kidney stone. Patient has established follow-up with Dr. Miguelina Mensah of urology  Blood work shows not obtained as patient had blood work that was obtained less than 24 hours ago and did not show any acute abnormalities  Imaging as interpreted by radiology reveals 1 mm obstructive stone on the left  Patient was given (name dose and route ) Dilaudid 1 mg as well as Reglan and Benadryl all these medications were given intravenously and patient tolerated. Review of patient's previous medical records which includes laboratory testing, imaging and previous provider's notes was necessary for this particular presentation. This review of the patient's chart confirmed that he has had history of numerous kidney stones urological notes as well as previous procedures from urology was reviewed. patient social factors/detriments to their health care do not play a role in this presentation. In order to provide the best outcome for the patient, consultation was not obtained because the patient already has follow-up with urology     Overall, I have discussed with the patient  my clinical impression and the result of the patient's current clinical evaluation for their presentation. In addition we discussed the risk and benefits of further testing and hospitalization. I discussed candidly with the patient  and the patient  was allowed to provide input as to their thoughts concerning the current presentation.  As a result the patient will be prescribed Percocet 325/5 by mouth as well as Flomax 0.3 mg by mouth  In order to best support the patient, they were prescribed these medications for home usage. The medication(s) use,  medication(s) safety and medication(s) interactions with already prescribed medication(s) have been explained and outlined for this encounter. The patient was educated that it is their responsibility to verify this information is correct at the time of discharge and to contact this department of any complications with the pharmacy providing this medication(s) or if their any difficulty in obtaining this medication(s). Final Impression    1. Left flank pain    2.  Nephrolithiasis              [address], DO  11/03/22 2024

## 2022-11-04 NOTE — ED NOTES
Discharge instructions and prescriptions reviewed with pt and verbalizes understanding.      Perla Mendoza RN  11/03/22 2030

## 2022-11-04 NOTE — ED NOTES
Dr Bangura Budds in to discuss test results and plan for discharge.      Susannah Douglas RN  11/03/22 2028

## 2022-11-07 ENCOUNTER — APPOINTMENT (OUTPATIENT)
Dept: CT IMAGING | Age: 47
End: 2022-11-07
Payer: COMMERCIAL

## 2022-11-07 ENCOUNTER — HOSPITAL ENCOUNTER (OUTPATIENT)
Age: 47
Setting detail: OBSERVATION
Discharge: HOME | End: 2022-11-09
Attending: EMERGENCY MEDICINE | Admitting: INTERNAL MEDICINE
Payer: COMMERCIAL

## 2022-11-07 ENCOUNTER — ANESTHESIA EVENT (OUTPATIENT)
Dept: OPERATING ROOM | Age: 47
End: 2022-11-07
Payer: COMMERCIAL

## 2022-11-07 DIAGNOSIS — N20.0 KIDNEY STONE: Primary | ICD-10-CM

## 2022-11-07 DIAGNOSIS — R10.9 FLANK PAIN: ICD-10-CM

## 2022-11-07 PROBLEM — N20.1 LEFT URETERAL CALCULUS: Status: ACTIVE | Noted: 2022-11-07

## 2022-11-07 LAB
ALBUMIN SERPL-MCNC: 3.9 GM/DL (ref 3.4–5)
ALP BLD-CCNC: 70 IU/L (ref 40–128)
ALT SERPL-CCNC: 14 U/L (ref 10–40)
ANION GAP SERPL CALCULATED.3IONS-SCNC: 12 MMOL/L (ref 4–16)
AST SERPL-CCNC: 15 IU/L (ref 15–37)
BACTERIA: NEGATIVE /HPF
BASOPHILS ABSOLUTE: 0 K/CU MM
BASOPHILS RELATIVE PERCENT: 0.3 % (ref 0–1)
BILIRUB SERPL-MCNC: 0.3 MG/DL (ref 0–1)
BILIRUBIN URINE: NEGATIVE MG/DL
BLOOD, URINE: ABNORMAL
BUN BLDV-MCNC: 8 MG/DL (ref 6–23)
CALCIUM SERPL-MCNC: 8.8 MG/DL (ref 8.3–10.6)
CHLORIDE BLD-SCNC: 106 MMOL/L (ref 99–110)
CLARITY: CLEAR
CO2: 25 MMOL/L (ref 21–32)
COLOR: YELLOW
CREAT SERPL-MCNC: 1.2 MG/DL (ref 0.9–1.3)
DIFFERENTIAL TYPE: ABNORMAL
EOSINOPHILS ABSOLUTE: 0.2 K/CU MM
EOSINOPHILS RELATIVE PERCENT: 2.8 % (ref 0–3)
GFR SERPL CREATININE-BSD FRML MDRD: >60 ML/MIN/1.73M2
GLUCOSE BLD-MCNC: 105 MG/DL (ref 70–99)
GLUCOSE, URINE: NEGATIVE MG/DL
HCT VFR BLD CALC: 46.1 % (ref 42–52)
HEMOGLOBIN: 15.8 GM/DL (ref 13.5–18)
IMMATURE NEUTROPHIL %: 0.3 % (ref 0–0.43)
KETONES, URINE: NEGATIVE MG/DL
LEUKOCYTE ESTERASE, URINE: NEGATIVE
LIPASE: 35 IU/L (ref 13–60)
LYMPHOCYTES ABSOLUTE: 1.6 K/CU MM
LYMPHOCYTES RELATIVE PERCENT: 21.5 % (ref 24–44)
MCH RBC QN AUTO: 30.9 PG (ref 27–31)
MCHC RBC AUTO-ENTMCNC: 34.3 % (ref 32–36)
MCV RBC AUTO: 90 FL (ref 78–100)
MONOCYTES ABSOLUTE: 0.5 K/CU MM
MONOCYTES RELATIVE PERCENT: 6.1 % (ref 0–4)
MUCUS: ABNORMAL HPF
NITRITE URINE, QUANTITATIVE: NEGATIVE
NUCLEATED RBC %: 0 %
PDW BLD-RTO: 12.2 % (ref 11.7–14.9)
PH, URINE: 6 (ref 5–8)
PLATELET # BLD: 267 K/CU MM (ref 140–440)
PMV BLD AUTO: 10.1 FL (ref 7.5–11.1)
POTASSIUM SERPL-SCNC: 3.9 MMOL/L (ref 3.5–5.1)
PROTEIN UA: NEGATIVE MG/DL
RBC # BLD: 5.12 M/CU MM (ref 4.6–6.2)
RBC URINE: 4 /HPF (ref 0–3)
RENAL EPITHELIAL, UA: <1 /HPF
SEGMENTED NEUTROPHILS ABSOLUTE COUNT: 5.2 K/CU MM
SEGMENTED NEUTROPHILS RELATIVE PERCENT: 69 % (ref 36–66)
SODIUM BLD-SCNC: 143 MMOL/L (ref 135–145)
SPECIFIC GRAVITY UA: 1.02 (ref 1–1.03)
TOTAL IMMATURE NEUTOROPHIL: 0.02 K/CU MM
TOTAL NUCLEATED RBC: 0 K/CU MM
TOTAL PROTEIN: 6.8 GM/DL (ref 6.4–8.2)
TRICHOMONAS: ABNORMAL /HPF
UROBILINOGEN, URINE: 0.2 MG/DL (ref 0.2–1)
WBC # BLD: 7.5 K/CU MM (ref 4–10.5)
WBC UA: 1 /HPF (ref 0–2)

## 2022-11-07 PROCEDURE — 6360000002 HC RX W HCPCS: Performed by: NURSE PRACTITIONER

## 2022-11-07 PROCEDURE — G0378 HOSPITAL OBSERVATION PER HR: HCPCS

## 2022-11-07 PROCEDURE — 81001 URINALYSIS AUTO W/SCOPE: CPT

## 2022-11-07 PROCEDURE — 96376 TX/PRO/DX INJ SAME DRUG ADON: CPT

## 2022-11-07 PROCEDURE — 6370000000 HC RX 637 (ALT 250 FOR IP): Performed by: NURSE PRACTITIONER

## 2022-11-07 PROCEDURE — 99285 EMERGENCY DEPT VISIT HI MDM: CPT

## 2022-11-07 PROCEDURE — 87086 URINE CULTURE/COLONY COUNT: CPT

## 2022-11-07 PROCEDURE — 2580000003 HC RX 258: Performed by: EMERGENCY MEDICINE

## 2022-11-07 PROCEDURE — 96375 TX/PRO/DX INJ NEW DRUG ADDON: CPT

## 2022-11-07 PROCEDURE — 94761 N-INVAS EAR/PLS OXIMETRY MLT: CPT

## 2022-11-07 PROCEDURE — 85025 COMPLETE CBC W/AUTO DIFF WBC: CPT

## 2022-11-07 PROCEDURE — 6360000002 HC RX W HCPCS: Performed by: EMERGENCY MEDICINE

## 2022-11-07 PROCEDURE — 96361 HYDRATE IV INFUSION ADD-ON: CPT

## 2022-11-07 PROCEDURE — 96374 THER/PROPH/DIAG INJ IV PUSH: CPT

## 2022-11-07 PROCEDURE — 83690 ASSAY OF LIPASE: CPT

## 2022-11-07 PROCEDURE — 74176 CT ABD & PELVIS W/O CONTRAST: CPT

## 2022-11-07 PROCEDURE — 80053 COMPREHEN METABOLIC PANEL: CPT

## 2022-11-07 PROCEDURE — 2580000003 HC RX 258: Performed by: NURSE PRACTITIONER

## 2022-11-07 RX ORDER — SODIUM CHLORIDE 9 MG/ML
INJECTION, SOLUTION INTRAVENOUS CONTINUOUS
Status: DISCONTINUED | OUTPATIENT
Start: 2022-11-07 | End: 2022-11-09 | Stop reason: HOSPADM

## 2022-11-07 RX ORDER — TAMSULOSIN HYDROCHLORIDE 0.4 MG/1
0.4 CAPSULE ORAL DAILY
Status: DISCONTINUED | OUTPATIENT
Start: 2022-11-07 | End: 2022-11-09 | Stop reason: HOSPADM

## 2022-11-07 RX ORDER — ONDANSETRON 4 MG/1
4 TABLET, ORALLY DISINTEGRATING ORAL EVERY 8 HOURS PRN
Status: DISCONTINUED | OUTPATIENT
Start: 2022-11-07 | End: 2022-11-09 | Stop reason: HOSPADM

## 2022-11-07 RX ORDER — ACETAMINOPHEN 325 MG/1
650 TABLET ORAL EVERY 6 HOURS PRN
Status: DISCONTINUED | OUTPATIENT
Start: 2022-11-07 | End: 2022-11-09 | Stop reason: HOSPADM

## 2022-11-07 RX ORDER — KETOROLAC TROMETHAMINE 30 MG/ML
30 INJECTION, SOLUTION INTRAMUSCULAR; INTRAVENOUS ONCE
Status: COMPLETED | OUTPATIENT
Start: 2022-11-07 | End: 2022-11-07

## 2022-11-07 RX ORDER — OXYCODONE HYDROCHLORIDE 5 MG/1
5 TABLET ORAL EVERY 8 HOURS PRN
Status: DISCONTINUED | OUTPATIENT
Start: 2022-11-07 | End: 2022-11-08

## 2022-11-07 RX ORDER — SODIUM CHLORIDE 0.9 % (FLUSH) 0.9 %
5-40 SYRINGE (ML) INJECTION PRN
Status: DISCONTINUED | OUTPATIENT
Start: 2022-11-07 | End: 2022-11-09 | Stop reason: HOSPADM

## 2022-11-07 RX ORDER — SODIUM CHLORIDE 9 MG/ML
INJECTION, SOLUTION INTRAVENOUS PRN
Status: DISCONTINUED | OUTPATIENT
Start: 2022-11-07 | End: 2022-11-09 | Stop reason: HOSPADM

## 2022-11-07 RX ORDER — 0.9 % SODIUM CHLORIDE 0.9 %
1000 INTRAVENOUS SOLUTION INTRAVENOUS ONCE
Status: COMPLETED | OUTPATIENT
Start: 2022-11-07 | End: 2022-11-07

## 2022-11-07 RX ORDER — MORPHINE SULFATE 2 MG/ML
2 INJECTION, SOLUTION INTRAMUSCULAR; INTRAVENOUS EVERY 4 HOURS PRN
Status: CANCELLED | OUTPATIENT
Start: 2022-11-07

## 2022-11-07 RX ORDER — SODIUM CHLORIDE 0.9 % (FLUSH) 0.9 %
5-40 SYRINGE (ML) INJECTION EVERY 12 HOURS SCHEDULED
Status: DISCONTINUED | OUTPATIENT
Start: 2022-11-07 | End: 2022-11-09 | Stop reason: HOSPADM

## 2022-11-07 RX ORDER — ONDANSETRON 2 MG/ML
4 INJECTION INTRAMUSCULAR; INTRAVENOUS EVERY 30 MIN PRN
Status: DISCONTINUED | OUTPATIENT
Start: 2022-11-07 | End: 2022-11-07

## 2022-11-07 RX ORDER — ENOXAPARIN SODIUM 100 MG/ML
40 INJECTION SUBCUTANEOUS DAILY
Status: DISCONTINUED | OUTPATIENT
Start: 2022-11-08 | End: 2022-11-09 | Stop reason: HOSPADM

## 2022-11-07 RX ORDER — ONDANSETRON 2 MG/ML
4 INJECTION INTRAMUSCULAR; INTRAVENOUS EVERY 6 HOURS PRN
Status: DISCONTINUED | OUTPATIENT
Start: 2022-11-07 | End: 2022-11-09 | Stop reason: HOSPADM

## 2022-11-07 RX ORDER — POLYETHYLENE GLYCOL 3350 17 G/17G
17 POWDER, FOR SOLUTION ORAL DAILY PRN
Status: DISCONTINUED | OUTPATIENT
Start: 2022-11-07 | End: 2022-11-09 | Stop reason: HOSPADM

## 2022-11-07 RX ADMIN — SODIUM CHLORIDE 1000 ML: 9 INJECTION, SOLUTION INTRAVENOUS at 13:30

## 2022-11-07 RX ADMIN — HYDROMORPHONE HYDROCHLORIDE 1 MG: 1 INJECTION, SOLUTION INTRAMUSCULAR; INTRAVENOUS; SUBCUTANEOUS at 13:25

## 2022-11-07 RX ADMIN — TAMSULOSIN HYDROCHLORIDE 0.4 MG: 0.4 CAPSULE ORAL at 19:49

## 2022-11-07 RX ADMIN — HYDROMORPHONE HYDROCHLORIDE 0.5 MG: 1 INJECTION, SOLUTION INTRAMUSCULAR; INTRAVENOUS; SUBCUTANEOUS at 16:39

## 2022-11-07 RX ADMIN — SODIUM CHLORIDE: 9 INJECTION, SOLUTION INTRAVENOUS at 19:43

## 2022-11-07 RX ADMIN — SODIUM CHLORIDE, PRESERVATIVE FREE 10 ML: 5 INJECTION INTRAVENOUS at 19:49

## 2022-11-07 RX ADMIN — ONDANSETRON 4 MG: 2 INJECTION INTRAMUSCULAR; INTRAVENOUS at 13:26

## 2022-11-07 RX ADMIN — KETOROLAC TROMETHAMINE 30 MG: 30 INJECTION, SOLUTION INTRAMUSCULAR at 13:26

## 2022-11-07 RX ADMIN — CEFTRIAXONE SODIUM 1000 MG: 1 INJECTION, POWDER, FOR SOLUTION INTRAMUSCULAR; INTRAVENOUS at 19:46

## 2022-11-07 RX ADMIN — SODIUM CHLORIDE 1000 ML: 9 INJECTION, SOLUTION INTRAVENOUS at 13:31

## 2022-11-07 RX ADMIN — ONDANSETRON 4 MG: 2 INJECTION INTRAMUSCULAR; INTRAVENOUS at 16:37

## 2022-11-07 ASSESSMENT — ENCOUNTER SYMPTOMS
NAUSEA: 1
EYES NEGATIVE: 1
ALLERGIC/IMMUNOLOGIC NEGATIVE: 1
ABDOMINAL PAIN: 1
VOMITING: 1
RESPIRATORY NEGATIVE: 1

## 2022-11-07 ASSESSMENT — PAIN DESCRIPTION - LOCATION: LOCATION: FLANK

## 2022-11-07 ASSESSMENT — PAIN SCALES - GENERAL
PAINLEVEL_OUTOF10: 10
PAINLEVEL_OUTOF10: 5

## 2022-11-07 NOTE — CARE COORDINATION
MCG criteria for kidney stonereviewed at this time, criteria supports Observation admission at this time, on initial review.  SEKOU,RN/CM

## 2022-11-07 NOTE — ED PROVIDER NOTES
[facility]      TRIAGE CHIEF COMPLAINT:   Flank Pain (L side for a few weeks)      [place]:  Catherine Ortiz is a 41 y.o. male that presents with complaint of left-sided flank pain abdominal pain. History of known kidney stone states he is on pain medicine at home he has not been able to follow-up with urology. States he has history of several kidney stones he states intractable pain nausea vomiting. Left-sided flank abdominal pain. Symptoms for the last couple weeks. Has a known 1 cm stone left ureter. Denies other questions or concerns. Alma Ruffing REVIEW OF SYSTEMS:  At least 10 systems reviewed and otherwise acutely negative except as in the [address] Review of Systems   Constitutional: Negative. HENT: Negative. Eyes: Negative. Respiratory: Negative. Cardiovascular: Negative. Gastrointestinal:  Positive for abdominal pain, nausea and vomiting. Endocrine: Negative. Genitourinary:  Positive for flank pain. Skin: Negative. Allergic/Immunologic: Negative. Neurological: Negative. Hematological: Negative. Psychiatric/Behavioral: Negative. All other systems reviewed and are negative. Past Medical History:   Diagnosis Date    Diverticulitis     Gall stone     Kidney stone     \"had kidney stone now- had them since age 17-had surgery and able to pass some\" follows with dr Karolina Corea    Kidney stone      Past Surgical History:   Procedure Laterality Date    CHOLECYSTECTOMY, LAPAROSCOPIC N/A 11/4/2020    CHOLECYSTECTOMY LAPAROSCOPIC WITH IOC performed by Chip Ambrose MD at [address] [facility]  [address]      \"had surgery multiple times for kidney stones last time 2018?     SMALL INTESTINE SURGERY N/A 10/6/2020    BOWEL RESECTION SIGMOID LAPAROSCOPIC ROBOTIC performed by Chip Ambrose MD at [facility] N/A 11/3/2020    EGD BIOPSY performed by Chip Ambrose MD at [address] [place] [address] Road  2012    left wrist\"no metal\"     Family History   Problem Relation Age of Onset    Cancer Mother         ovarian cancer     Early Death Mother     Kidney stones Father     Diabetes Father     Diabetes Brother      Social History     Socioeconomic History    Marital status:      Spouse name: Not on file    Number of children: Not on file    Years of education: Not on file    Highest education level: Not on file   Occupational History    Not on file   Tobacco Use    Smoking status: Never    Smokeless tobacco: Never   Vaping Use    Vaping Use: Never used   Substance and Sexual Activity    Alcohol use: No    Drug use: Yes     Frequency: 1.0 times per week     Types: Marijuana [place])    Sexual activity: Not Currently   Other Topics Concern    Not on file   Social History Narrative    Not on file     Social Determinants of Health     Financial Resource Strain: Not on file   Food Insecurity: Not on file   Transportation Needs: Not on file   Physical Activity: Not on file   Stress: Not on file   Social Connections: Not on file   Intimate Partner Violence: Not on file   Housing Stability: Not on file     Current Facility-Administered Medications   Medication Dose Route Frequency Provider Last Rate Last Admin    sodium chloride flush 0.9 % injection 5-40 mL  5-40 mL IntraVENous 2 times per day Kal Gonzalez APRN - CNP   10 mL at 11/07/22 1949    sodium chloride flush 0.9 % injection 5-40 mL  5-40 mL IntraVENous PRN Kal Lisa APRN - CNP        0.9 % sodium chloride infusion   IntraVENous PRN Kal Lisa, APRN - CNP        [START ON 11/8/2022] enoxaparin (LOVENOX) injection 40 mg  40 mg SubCUTAneous Daily URSULA Matson CNP        ondansetron (ZOFRAN-ODT) disintegrating tablet 4 mg  4 mg Oral Q8H PRN URSULA Baldwin - ANNE-MARIE        Or    ondansetron (ZOFRAN) injection 4 mg  4 mg IntraVENous Q6H PRN Kal Lisa, APRN - CNP        polyethylene glycol (GLYCOLAX) packet 17 g  17 g Oral Daily PRN Jacques PERES URSULA Bah CNP        acetaminophen (TYLENOL) tablet 650 mg  650 mg Oral Q6H PRN URSULA Ramirez CNP        Or    acetaminophen (TYLENOL) suppository 650 mg  650 mg Rectal Q6H PRN URSULA Ramirez - CNP        oxyCODONE (ROXICODONE) immediate release tablet 5 mg  5 mg Oral Q8H PRN Maya Kapoor APRN - CNP        tamsulosin (FLOMAX) capsule 0.4 mg  0.4 mg Oral Daily URSULA Ramirez - CNP   0.4 mg at 11/07/22 1949    cefTRIAXone (ROCEPHIN) 1,000 mg in dextrose 5 % 50 mL IVPB mini-bag  1,000 mg IntraVENous Q24H Maya Kapoor APRN -  mL/hr at 11/07/22 1946 1,000 mg at 11/07/22 1946    HYDROmorphone (DILAUDID) injection 0.5 mg  0.5 mg IntraVENous Q4H PRN URSULA Ramirez - CNP   0.5 mg at 11/07/22 1639    0.9 % sodium chloride infusion   IntraVENous Continuous URSULA Ramirez - CNP 75 mL/hr at 11/07/22 1943 New Bag at 11/07/22 1943      Allergies   Allergen Reactions    Vicodin [Hydrocodone-Acetaminophen] Other (See Comments)     Patient states that it is tolerable but state that makes his skin crawl     Current Facility-Administered Medications   Medication Dose Route Frequency Provider Last Rate Last Admin    sodium chloride flush 0.9 % injection 5-40 mL  5-40 mL IntraVENous 2 times per day URSULA Ramirez - CNP   10 mL at 11/07/22 1949    sodium chloride flush 0.9 % injection 5-40 mL  5-40 mL IntraVENous PRN Maya Kapoor APRN - CNP        0.9 % sodium chloride infusion   IntraVENous PRN URSULA Ramriez - CNP        [START ON 11/8/2022] enoxaparin (LOVENOX) injection 40 mg  40 mg SubCUTAneous Daily URSULA Matson CNP        ondansetron (ZOFRAN-ODT) disintegrating tablet 4 mg  4 mg Oral Q8H PRN URSULA Ramirez CNP        Or    ondansetron (ZOFRAN) injection 4 mg  4 mg IntraVENous Q6H PRN Maya Kapoor APRN - CNP        polyethylene glycol (GLYCOLAX) packet 17 g  17 g Oral Daily PRN URSULA Ramirez - CNP acetaminophen (TYLENOL) tablet 650 mg  650 mg Oral Q6H PRN Shawn Charless, APRN - CNP        Or    acetaminophen (TYLENOL) suppository 650 mg  650 mg Rectal Q6H PRN Shawn Charless, APRN - CNP        oxyCODONE (ROXICODONE) immediate release tablet 5 mg  5 mg Oral Q8H PRN Shawn Charless, APRN - CNP        tamsulosin (FLOMAX) capsule 0.4 mg  0.4 mg Oral Daily Shawn Rosado, APRN - CNP   0.4 mg at 11/07/22 1949    cefTRIAXone (ROCEPHIN) 1,000 mg in dextrose 5 % 50 mL IVPB mini-bag  1,000 mg IntraVENous Q24H Shawn Charless, APRN -  mL/hr at 11/07/22 1946 1,000 mg at 11/07/22 1946    HYDROmorphone (DILAUDID) injection 0.5 mg  0.5 mg IntraVENous Q4H PRN Shawn Rosado, APRN - CNP   0.5 mg at 11/07/22 1639    0.9 % sodium chloride infusion   IntraVENous Continuous Shawn Rosado, APRN - CNP 75 mL/hr at 11/07/22 1943 New Bag at 11/07/22 1943       Nursing Notes Reviewed    VITAL SIGNS:  ED Triage Vitals [11/07/22 1242]   Enc Vitals Group      BP (!) 138/104      Heart Rate 89      Resp 16      Temp 97.7 °F (36.5 °C)      Temp src       SpO2 98 %      Weight       Height       Head Circumference       Peak Flow       Pain Score       Pain Loc       Pain Edu? Excl. in [address]? PHYSICAL EXAM:  Physical Exam  Vitals and nursing note reviewed. Constitutional:       General: He is not in acute distress. Appearance: Normal appearance. He is ill-appearing. He is not toxic-appearing or diaphoretic. HENT:      Head: Normocephalic and atraumatic. Right Ear: External ear normal.      Left Ear: External ear normal.   Eyes:      General: No scleral icterus. Right eye: No discharge. Left eye: No discharge. Extraocular Movements: Extraocular movements intact. Conjunctiva/sclera: Conjunctivae normal.   Cardiovascular:      Rate and Rhythm: Normal rate and regular rhythm. Pulmonary:      Effort: Pulmonary effort is normal. No respiratory distress.       Breath sounds: Normal breath sounds. No stridor. No wheezing, rhonchi or rales. Abdominal:      General: Bowel sounds are normal.      Palpations: Abdomen is soft. There is no mass. Tenderness: There is abdominal tenderness. There is left CVA tenderness. There is no guarding or rebound. Hernia: No hernia is present. Musculoskeletal:         General: Tenderness present. No swelling, deformity or signs of injury. Normal range of motion. Cervical back: Normal range of motion. No rigidity. Right lower leg: No edema. Left lower leg: No edema. Skin:     General: Skin is warm. Coloration: Skin is not jaundiced or pale. Findings: No bruising, erythema, lesion or rash. Neurological:      General: No focal deficit present. Mental Status: He is alert and oriented to person, place, and time. Cranial Nerves: No cranial nerve deficit. Sensory: No sensory deficit. Motor: No weakness. Psychiatric:         Mood and Affect: Mood normal.         Behavior: Behavior normal.         Thought Content:  Thought content normal.         Judgment: Judgment normal.         I have reviewed andinterpreted all of the currently available lab results from this visit (if applicable):    Results for orders placed or performed during the hospital encounter of 11/07/22   CBC with Auto Differential   Result Value Ref Range    WBC 7.5 4.0 - 10.5 K/CU MM    RBC 5.12 4.6 - 6.2 M/CU MM    Hemoglobin 15.8 13.5 - 18.0 GM/DL    Hematocrit 46.1 42 - 52 %    MCV 90.0 78 - 100 FL    MCH 30.9 27 - 31 PG    MCHC 34.3 32.0 - 36.0 %    RDW 12.2 11.7 - 14.9 %    Platelets 979 481 - 747 K/CU MM    MPV 10.1 7.5 - 11.1 FL    Differential Type AUTOMATED DIFFERENTIAL     Segs Relative 69.0 (H) 36 - 66 %    Lymphocytes % 21.5 (L) 24 - 44 %    Monocytes % 6.1 (H) 0 - 4 %    Eosinophils % 2.8 0 - 3 %    Basophils % 0.3 0 - 1 %    Segs Absolute 5.2 K/CU MM    Lymphocytes Absolute 1.6 K/CU MM    Monocytes Absolute 0.5 K/CU MM Eosinophils Absolute 0.2 K/CU MM    Basophils Absolute 0.0 K/CU MM    Nucleated RBC % 0.0 %    Total Nucleated RBC 0.0 K/CU MM    Total Immature Neutrophil 0.02 K/CU MM    Immature Neutrophil % 0.3 0 - 0.43 %   Comprehensive Metabolic Panel   Result Value Ref Range    Sodium 143 135 - 145 MMOL/L    Potassium 3.9 3.5 - 5.1 MMOL/L    Chloride 106 99 - 110 mMol/L    CO2 25 21 - 32 MMOL/L    BUN 8 6 - 23 MG/DL    Creatinine 1.2 0.9 - 1.3 MG/DL    Est, Glom Filt Rate >60 >60 mL/min/1.73m2    Glucose 105 (H) 70 - 99 MG/DL    Calcium 8.8 8.3 - 10.6 MG/DL    Albumin 3.9 3.4 - 5.0 GM/DL    Total Protein 6.8 6.4 - 8.2 GM/DL    Total Bilirubin 0.3 0.0 - 1.0 MG/DL    ALT 14 10 - 40 U/L    AST 15 15 - 37 IU/L    Alkaline Phosphatase 70 40 - 128 IU/L    Anion Gap 12 4 - 16   Lipase   Result Value Ref Range    Lipase 35 13 - 60 IU/L   Urinalysis   Result Value Ref Range    Color, UA YELLOW YELLOW    Clarity, UA CLEAR CLEAR    Glucose, Urine NEGATIVE NEGATIVE MG/DL    Bilirubin Urine NEGATIVE NEGATIVE MG/DL    Ketones, Urine NEGATIVE NEGATIVE MG/DL    Specific Gravity, UA 1.025 1.001 - 1.035    Blood, Urine TRACE (A) NEGATIVE    pH, Urine 6.0 5.0 - 8.0    Protein, UA NEGATIVE NEGATIVE MG/DL    Urobilinogen, Urine 0.2 0.2 - 1.0 MG/DL    Nitrite Urine, Quantitative NEGATIVE NEGATIVE    Leukocyte Esterase, Urine NEGATIVE NEGATIVE   Microscopic Urinalysis   Result Value Ref Range    RBC, UA 4 (H) 0 - 3 /HPF    WBC, UA 1 0 - 2 /HPF    Bacteria, UA NEGATIVE NEGATIVE /HPF    Renal Epithelial, UA <1 /HPF    Mucus, UA FEW (A) NEGATIVE HPF    Trichomonas, UA NONE SEEN NONE SEEN /HPF        Radiographs (if obtained):  [] The following radiograph was interpreted by myself in the absence of a radiologist:  [x] Radiologist's Report Reviewed:    CT Abd/pelv    CT ABDOMEN PELVIS WO CONTRAST    Result Date: 11/3/2022  EXAMINATION: STONE PROTOCOL CT OF THE ABDOMEN AND PELVIS 11/3/2022 7:17 pm TECHNIQUE: CT of the abdomen and pelvis was performed without the administration of intravenous contrast. Multiplanar reformatted images are provided for review. Automated exposure control, iterative reconstruction, and/or weight based adjustment of the mA/kV was utilized to reduce the radiation dose to as low as reasonably achievable. COMPARISON: September 21, 2022 HISTORY: ORDERING SYSTEM PROVIDED HISTORY: flank pain TECHNOLOGIST PROVIDED HISTORY: Reason for exam:->flank pain Reason for Exam: flank pain Additional signs and symptoms: hx of kidney stones FINDINGS: LOWER CHEST: Visualized portion of the lower chest demonstrates no acute abnormality. KIDNEYS AND URINARY TRACT: Evidence of bilateral nephrolithiasis. Left obstructive uropathy related to 1 cm stone in the left upper ureter. No evidence of right obstructive uropathy. ORGANS: Visualized portions of the unenhanced liver, spleen, pancreas, and adrenal glands demonstrate no acute abnormality. No inflammatory changes in the gallbladder fossa. GI/BOWEL: No bowel obstruction. No evidence of acute appendicitis. Stable postsurgical changes in the sigmoid. PELVIS: The bladder and pelvic organs are unremarkable. PERITONEUM/RETROPERITONEUM: No lymphadenopathy is noted. BONES/SOFT TISSUES: The osseous structures demonstrate no acute abnormality. Redemonstration of chronic anterolisthesis of L5 over S1 with bilateral L5 spondylolysis also previously seen. 1. Left obstructive uropathy related to a left upper ureteric stone measuring 1 cm in diameter. Bilateral nephrolithiasis. 2. No acute gastrointestinal abnormality. Normal appendix. Postsurgical changes in the sigmoid appears stable. 3. Redemonstration of anterolisthesis of L5 over S1 with bilateral L5 spondylolysis. EKG (if obtained): (All EKG's are interpreted by myself in the absence of a cardiologist)    MDM:    Patient here with continued left-sided flank pain abdominal pain.   Again history of kidney stones he has no kidney stone left ureter, 1 cm in size. He has not been able to follow-up with urology he states 2-week follow-up. Has been taking his pain medicine not relieving the pain. He arrives he is rolling around writhing in pain otherwise stable vital signs are stable no fever. No tachycardia. Does have left-sided flank pain abdominal pain. We will give him pain nausea medicine IV fluids will check labs, imaging we will consult urology about intractable pain third visit recently for the same thing. Patient recheck doing well pain is improving but still present. Imaging shows centimeter stone left UVJ. Pending urinalysis. Given third visit for the same complaint intractable pain nausea vomiting admit to hospital medicine. Urology likely perform procedure tomorrow. CLINICAL IMPRESSION:  Final diagnoses:   Flank pain   Kidney stone       (Please note that portions of this note may have been completed with a voice recognition program. Efforts were made to edit the dictations but occasionally words aremis-transcribed.)    DISPOSITION REFERRAL (if applicable):  No follow-up provider specified.     DISPOSITION MEDICATIONS (if applicable):  Current Discharge Medication List             DO Manuel Jaime DO  11/07/22 2051

## 2022-11-07 NOTE — ANESTHESIA PRE PROCEDURE
Department of Anesthesiology  Preprocedure Note       Name:  Nazia Granger   Age:  41 y.o.  :  1975                                          MRN:  [id number]         Date:  2022      Surgeon: Carmelina Varma):  Sheldon Garcia MD    Procedure: Procedure(s):  LEFT CYSTOSCOPY URETEROSCOPY RETROGRADE PYELOGRAM STONE MANIPULATION WITH HOLIUM LASER LITHOTRIPSY VERSUS LEFT STENT PLACEMENT    Medications prior to admission:   Prior to Admission medications    Medication Sig Start Date End Date Taking? Authorizing Provider   oxyCODONE (ROXICODONE) 5 MG immediate release tablet Take 5 mg by mouth every 8 hours as needed for Pain.  21   Historical Provider, MD   tamsulosin (FLOMAX) 0.4 MG capsule Take 0.4 mg by mouth daily 21   Historical Provider, MD       Current medications:    Current Facility-Administered Medications   Medication Dose Route Frequency Provider Last Rate Last Admin    sodium chloride flush 0.9 % injection 5-40 mL  5-40 mL IntraVENous 2 times per day URSULA Bo CNP        sodium chloride flush 0.9 % injection 5-40 mL  5-40 mL IntraVENous PRN URSULA Bo CNP        0.9 % sodium chloride infusion   IntraVENous PRN URSULA Bo CNP        [START ON 2022] enoxaparin (LOVENOX) injection 40 mg  40 mg SubCUTAneous Daily URSULA Bo CNP        ondansetron (ZOFRAN-ODT) disintegrating tablet 4 mg  4 mg Oral Q8H PRN URSULA Bo CNP        Or    ondansetron (ZOFRAN) injection 4 mg  4 mg IntraVENous Q6H PRN URSULA Bo CNP        polyethylene glycol (GLYCOLAX) packet 17 g  17 g Oral Daily PRN URSULA Bo CNP        acetaminophen (TYLENOL) tablet 650 mg  650 mg Oral Q6H PRN URSULA Bo CNP        Or    acetaminophen (TYLENOL) suppository 650 mg  650 mg Rectal Q6H PRN URSULA Bo CNP        oxyCODONE (ROXICODONE) immediate release tablet 5 mg  5 mg Oral Q8H PRN Loly Colvin A Niurka, APRN - CNP        tamsulosin (FLOMAX) capsule 0.4 mg  0.4 mg Oral Daily Dora URSULA Weston CNP        cefTRIAXone (ROCEPHIN) 1,000 mg in dextrose 5 % 50 mL IVPB mini-bag  1,000 mg IntraVENous Q24H Andry ESTEVAN CollinsN - CNP        HYDROmorphone (DILAUDID) injection 0.5 mg  0.5 mg IntraVENous Q4H PRN Andry Karina, APRN - CNP   0.5 mg at 11/07/22 1639    0.9 % sodium chloride infusion   IntraVENous Continuous Andry Karina, APRN - CNP           Allergies: Allergies   Allergen Reactions    Vicodin [Hydrocodone-Acetaminophen] Other (See Comments)     Patient states that it is tolerable but state that makes his skin crawl       Problem List:    Patient Active Problem List   Diagnosis Code    Perforated sigmoid colon ([facility] [address] K63.1    Perforated diverticulum K57.80    S/P laparoscopic-assisted sigmoidectomy Z90.49    Abdominal pain R10.9    Acute calculous cholecystitis K80.00    LLQ pain R10.32    S/P laparoscopic cholecystectomy Z90.49    Kidney stone N20.0    S/P colectomy Z90.49    Acute superficial gastritis without hemorrhage K29.00    Left upper quadrant abdominal pain R10.12    Left ureteral calculus N20.1       Past Medical History:        Diagnosis Date    Diverticulitis     Gall stone     Kidney stone     \"had kidney stone now- had them since age 17-had surgery and able to pass some\" follows with dr Luke David    Kidney stone        Past Surgical History:        Procedure Laterality Date    CHOLECYSTECTOMY, LAPAROSCOPIC N/A 11/4/2020    CHOLECYSTECTOMY LAPAROSCOPIC WITH IOC performed by Luis Mccabe MD at [facility]      \"had surgery multiple times for kidney stones last time 2018?     SMALL INTESTINE SURGERY N/A 10/6/2020    BOWEL RESECTION SIGMOID LAPAROSCOPIC ROBOTIC performed by Luis Mccabe MD at [facility] 11/3/2020    EGD BIOPSY performed by Luis Mccabe MD at [address] [place] [address] SURGERY  2012    left wrist\"no metal\"       Social History:    Social History     Tobacco Use    Smoking status: Never    Smokeless tobacco: Never   Substance Use Topics    Alcohol use: No                                Counseling given: Not Answered      Vital Signs (Current):   Vitals:    11/07/22 1541 11/07/22 1615 11/07/22 1630 11/07/22 1700   BP:  (!) 140/82 (!) 144/98 126/87   Pulse: 63 67 59 63   Resp: 16 19 12 18   Temp:   98.1 °F (36.7 °C) 97.5 °F (36.4 °C)   TempSrc:    Oral   SpO2: 96% 98% 98% 97%                                              BP Readings from Last 3 Encounters:   11/07/22 126/87   11/03/22 129/88   11/02/22 (!) 143/98       NPO Status:                                                                                 BMI:   Wt Readings from Last 3 Encounters:   11/03/22 225 lb (102.1 kg)   11/02/22 225 lb (102.1 kg)   09/21/22 225 lb (102.1 kg)     There is no height or weight on file to calculate BMI.    CBC:   Lab Results   Component Value Date/Time    WBC 7.5 11/07/2022 01:15 PM    RBC 5.12 11/07/2022 01:15 PM    HGB 15.8 11/07/2022 01:15 PM    HCT 46.1 11/07/2022 01:15 PM    MCV 90.0 11/07/2022 01:15 PM    RDW 12.2 11/07/2022 01:15 PM     11/07/2022 01:15 PM       CMP:   Lab Results   Component Value Date/Time     11/07/2022 01:15 PM    K 3.9 11/07/2022 01:15 PM     11/07/2022 01:15 PM    CO2 25 11/07/2022 01:15 PM    BUN 8 11/07/2022 01:15 PM    CREATININE 1.2 11/07/2022 01:15 PM    GFRAA >60 09/21/2022 11:30 AM    LABGLOM >60 11/07/2022 01:15 PM    GLUCOSE 105 11/07/2022 01:15 PM    PROT 6.8 11/07/2022 01:15 PM    CALCIUM 8.8 11/07/2022 01:15 PM    BILITOT 0.3 11/07/2022 01:15 PM    ALKPHOS 70 11/07/2022 01:15 PM    AST 15 11/07/2022 01:15 PM    ALT 14 11/07/2022 01:15 PM       POC Tests: No results for input(s): POCGLU, POCNA, POCK, POCCL, POCBUN, POCHEMO, POCHCT in the last 72 hours. Coags: No results found for: PROTIME, INR, APTT    HCG (If Applicable):  No results found for: PREGTESTUR, PREGSERUM, HCG, HCGQUANT     ABGs: No results found for: PHART, PO2ART, JGV9IJP, GVV2IAY, BEART, Y4POZBOS     Type & Screen (If Applicable):  No results found for: LABABO, LABRH    Drug/Infectious Status (If Applicable):  No results found for: HIV, HEPCAB    COVID-19 Screening (If Applicable):   Lab Results   Component Value Date/Time    COVID19 NOT DETECTED 02/07/2021 02:45 PM           Anesthesia Evaluation   no history of anesthetic complications:   Airway: Mallampati: II  TM distance: >3 FB   Neck ROM: full  Mouth opening: < 3 FB   Dental: normal exam         Pulmonary:Negative Pulmonary ROS breath sounds clear to auscultation                             Cardiovascular:  Exercise tolerance: good (>4 METS),           Rhythm: regular  Rate: normal           Beta Blocker:  Not on Beta Blocker         Neuro/Psych:   Negative Neuro/Psych ROS  (+) dementia            GI/Hepatic/Renal:   (+) renal disease: kidney stones,          ROS comment: diverticulitis. Endo/Other: Negative Endo/Other ROS                    Abdominal:             Vascular: Other Findings:           Anesthesia Plan      general     ASA 2       Induction: intravenous. MIPS: Postoperative opioids intended and Prophylactic antiemetics administered. Anesthetic plan and risks discussed with patient. Plan discussed with CRNA. Pre Anesthesia Evaluation complete. Anesthesia plan, risks, benefits, alternatives, and personal involved discussed with patient. Patients and/or legal guardian verbalized an understanding  and agreed to proceed.   Brandt Howard DO  11/8/2022

## 2022-11-07 NOTE — ED NOTES
ED TO INPATIENT SBAR HANDOFF    Patient Name: Manuel Lantigua   :  1975  41 y.o. MRN:  [id number]  Preferred Name  [address]  ED Room #:  ED21/ED-21  Family/Caregiver Present yes   Restraints no   Sitter no   Sepsis Risk Score Sepsis Risk Score: 0.43    Situation  Code Status: Prior Full code. Allergies: Vicodin [hydrocodone-acetaminophen]  Weight: No data found. Arrived from: home  Chief Complaint:   Chief Complaint   Patient presents with    Flank Pain     L side for a few weeks     Hospital Problem/Diagnosis:  Principal Problem:    Left ureteral calculus  Resolved Problems:    * No resolved hospital problems. *    Imaging:   CT ABDOMEN PELVIS WO CONTRAST Additional Contrast? None   Final Result   1. Redemonstration of 7 mm obstructing stone at the level of the left   ureteropelvic junction.   This results in mild-to-moderate left hydronephrosis   similar to previous exam.           Abnormal labs:   Abnormal Labs Reviewed   CBC WITH AUTO DIFFERENTIAL - Abnormal; Notable for the following components:       Result Value    Segs Relative 69.0 (*)     Lymphocytes % 21.5 (*)     Monocytes % 6.1 (*)     All other components within normal limits   COMPREHENSIVE METABOLIC PANEL - Abnormal; Notable for the following components:    Glucose 105 (*)     All other components within normal limits     Critical values: no     Abnormal Assessment Findings: CT pt has a large kidney stone    Background  History:   Past Medical History:   Diagnosis Date    Diverticulitis     Gall stone     Kidney stone     \"had kidney stone now- had them since age 17-had surgery and able to pass some\" follows with dr Marie Hopkins    Kidney stone        Assessment    Vitals/MEWS:        Vitals:    22 1242 22 1415 22 1541 22 1615   BP: (!) 138/104 137/88  (!) 140/82   Pulse: 89 72 63 67   Resp: 16 16 16 19   Temp: 97.7 °F (36.5 °C)      SpO2: 98% 95% 96% 98%     FiO2 (%):   O2 Flow Rate:      Cardiac Rhythm:    Pain Assessment:  [x] Verbal [] Isaac Contras Scale  Pain Scale: Pain Assessment  Pain Level: 5  Patient's Stated Pain Goal: 0 - No pain  Pain Location: Flank  Last documented pain score (0-10 scale) Pain Level: 5  Last documented pain medication administered: 1635  Mental Status: oriented  NIH Score:    C-SSRS: Risk of Suicide: No Risk  Bedside swallow:    [place] Coma Scale (GCS): [place] Coma Scale  Eye Opening: Spontaneous  Best Verbal Response: Oriented  Best Motor Response: Obeys commands  [place] Coma Scale Score: 15  Active LDA's:   Peripheral IV 11/07/22 Right Antecubital (Active)   Site Assessment Clean, dry & intact; Clean;Dry; Intact 11/07/22 1318   Line Status Blood return noted; Flushed;Normal saline locked 11/07/22 1318   Phlebitis Assessment No symptoms 11/07/22 1318   Infiltration Assessment 0 11/07/22 1318     PO Status: Regular  Pertinent or High Risk Medications/Drips: no   If Yes, please provide details:   Pending Blood Product Administration: no     You may also review the ED PT Care Timeline found under the Summary Nursing Index tab. Recommendation    Pending orders   Plan for Discharge (if known):    Additional Comments:    If any further questions, please call Sending RN at [phone]    Electronically signed by: Electronically signed by Jayson Singh RN on 11/7/2022 at 4:30 PM      Jayson Singh RN  11/07/22 [id number]

## 2022-11-07 NOTE — H&P
History and Physical      Name:  Domenic Herrera /Age/Sex: 1975  (41 y.o. male)   MRN & CSN:  [id number] & [id number] Admission Date/Time: 2022 12:40 PM   Location:  ED21/ED-21 PCP: Carmen Oleary MD       Hospital Day: 1           Assessment and Plan:   # Left ureteral calculus with associated left hydronephrosis - CT abd/pelvis imaging showing re-demonstration of 7 mm at the level of the left ureteropelvic junction with associated mild to moderate left hydronephrosis, punctate nonobstructing right renal stone. No hydronephrosis or obstructive uropathy identified on the right. The more distal left ureter is unremarkable. Additional nonobstructing left renal stones also present. Renal function stable. Urology consulted for management. Plans Urologic intervention in am  - NPO after MN, IVF's, flomax, IV Ceftriaxone, pain and ant-emetic medications    # Bacteriuria - IV ceftriaxone, f/u urine cx adjust antibiotic per sensitivities    # Bilateral nephrolithiasis - CT imaging as noted. Pt follows with Urology as OP. # Hx diverticulitis s/p rectosigmoidectomy     Present on Admission:   Left ureteral calculus             Diet    DVT Prophylaxis [x] Lovenox, []  Heparin, [] SCDs, [] Ambulation  [] Long term AC   GI Prophylaxis [] PPI,  [] H2 Blocker,  [] Carafate,  [] Diet/Tube Feeds   Code Status Full code    Disposition Admit to [facility]. Patient plans to return home upon discharge         Chief Complaint: Flank Pain (L side for a few weeks)      History obtained from EHR and patient  History of Present Illness:   Domenic Herrera is a 41 y.o. male  with history of nephrolithiasis, diverticulitis status post rectosigmoidectomy  who presents with left flank pain. Patient reports 2-week history of left flank pain with varying intensity. He states his left flank pain was at its worse the past few days with associated nausea vomiting. No aggravating or relieving factors.  He states pain is similar to pain with prior kidney stones. He reports subjective fever chills. He reports dark urine. He denies constipation diarrhea. He denies chest pain shortness of breath. He denies any other acute issues. He presented to ED hemodynamically stable afebrile O2 sat 98% on room air. CT and abdomen pelvis performed showed punctate nonobstructing right renal stone. No hydronephrosis or obstructive uropathy identified on the right. Re demonstration of 7 mm at the level of the left ureteropelvic junction with mild-to-moderate left hydronephrosis. The more distal left ureter is unremarkable. Additional nonobstructing left renal stones also present; other prior findings noted below. Urinalysis positive for blood 5 WBCs, 12 RBCs rare bacteria. He had normal WBC and HGB. Chemistry panel unremarkable. Urology was consulted in ED and recommended admission for management. The patient was give 2 liters IVF;s, medicated for pain and nausea. He has been placed in observation for further management. Ten point ROS: reviewed and are negative, unless as noted in above HPI. Objective:   No intake or output data in the 24 hours ending 11/07/22 1556     Vitals:   Vitals:    11/07/22 1242 11/07/22 1415 11/07/22 1541   BP: (!) 138/104 137/88    Pulse: 89 72 63   Resp: 16 16 16   Temp: 97.7 °F (36.5 °C)     SpO2: 98% 95% 96%       Physical Exam: 11/07/22     GEN -Awake  appearing male, in NAD. Appears given age. EYES -anicteric, conjunctiva pink. HENT -Head is normocephalic, atraumatic. MM are moist. No evidence of thrush. NECK -Supple, no apparent thyromegaly or masses. RESP -CTA, no wheezes, rales or rhonchi. Symmetric chest movement   C/V -S1/S2 auscultated. RRR without appreciable M/R/G. No JVD. Cap refill <3 sec. No peripheral edema. GI -+Left flank pain tenderness to palpation. Abdomen is soft + BS. No masses or guarding.  +CVA/ flank tenderness.    LYMPH- No petechiae or ecchymoses. MS -No gross joint deformities. SKIN -Normal coloration, warm, dry. NEURO-Cranial nerves appear grossly intact, normal speech, no lateralizing weakness. PSYC-Awake, alert, oriented x 4. Appropriate affect. Past Medical History:      Past Medical History:   Diagnosis Date    Diverticulitis     Gall stone     Kidney stone     \"had kidney stone now- had them since age 17-had surgery and able to pass some\" follows with dr Viraj Shultz stone        [facility] reviewed  Past Surgical  History:    has a past surgical history that includes Wrist surgery (2012); Kidney stone surgery; Small intestine surgery (N/A, 10/6/2020); Upper gastrointestinal endoscopy (N/A, 11/3/2020); and Cholecystectomy, laparoscopic (N/A, 11/4/2020). Surgical history reviewed  Family  History:   family history includes Cancer in his mother; Diabetes in his brother and father; Early Death in his mother; Kidney stones in his father. Family history reviewed  Social History:     Social History     Socioeconomic History    Marital status:      Spouse name: None    Number of children: None    Years of education: None    Highest education level: None   Tobacco Use    Smoking status: Never    Smokeless tobacco: Never   Vaping Use    Vaping Use: Never used   Substance and Sexual Activity    Alcohol use: No    Drug use: Yes     Frequency: 1.0 times per week     Types: Marijuana (Weed)    Sexual activity: Not Currently      reports that he has never smoked. He has never used smokeless tobacco.   reports no history of alcohol use. reports current drug use. Frequency: 1.00 time per week. Drug: Marijuana Ramin Shoemaker). Social history reviewed  Allergies: Allergies   Allergen Reactions    Vicodin [Hydrocodone-Acetaminophen] Other (See Comments)     Patient states that it is tolerable but state that makes his skin crawl       Home Medications:     Prior to Admission medications    Medication Sig Start Date End Date Taking? Authorizing Provider   oxyCODONE (ROXICODONE) 5 MG immediate release tablet Take 5 mg by mouth every 8 hours as needed for Pain. 8/26/21   Historical Provider, MD   tamsulosin (FLOMAX) 0.4 MG capsule Take 0.4 mg by mouth daily 8/26/21   Historical Provider, MD         Medications:   Medications:    Infusions:   PRN Meds: HYDROmorphone, 1 mg, Q30 Min PRN  ondansetron, 4 mg, Q30 Min PRN  HYDROmorphone, 0.5 mg, Q4H PRN        Data:     Laboratory this visit:  Reviewed  Recent Labs     11/07/22  1315   WBC 7.5   HGB 15.8   HCT 46.1         Recent Labs     11/07/22  1315      K 3.9      CO2 25   BUN 8   CREATININE 1.2     Recent Labs     11/07/22  1315   AST 15   ALT 14   BILITOT 0.3   ALKPHOS 70     No results for input(s): INR in the last 72 hours. Radiology this visit:  Reviewed. CT ABDOMEN PELVIS WO CONTRAST Additional Contrast? None    Result Date: 11/7/2022  EXAMINATION: CT OF THE ABDOMEN AND PELVIS WITHOUT CONTRAST 11/7/2022 1:59 pm TECHNIQUE: CT of the abdomen and pelvis was performed without the administration of intravenous contrast. Multiplanar reformatted images are provided for review. Automated exposure control, iterative reconstruction, and/or weight based adjustment of the mA/kV was utilized to reduce the radiation dose to as low as reasonably achievable. COMPARISON: CT abdomen pelvis November 3, 2022 HISTORY: ORDERING SYSTEM PROVIDED HISTORY: L flank pain/kidney stone TECHNOLOGIST PROVIDED HISTORY: Reason for exam:->L flank pain/kidney stone Additional Contrast?->None Decision Support Exception - unselect if not a suspected or confirmed emergency medical condition->Emergency Medical Condition (MA) Reason for Exam: Flank Pain FINDINGS: Lower Chest: Mild dependent atelectasis at the lung bases. Lung bases otherwise appear clear. Organs: Evaluation of the solid organs is limited due to lack of intravenous contrast.  Hepatic cysts redemonstrated.   The gallbladder is surgically absent. The nonenhanced spleen, pancreas, and adrenal glands demonstrate no acute abnormality. Punctate nonobstructing right renal stone. No hydronephrosis or obstructive uropathy identified on the right. Redemonstration of 7 mm at the level of the left ureteropelvic junction (image 107 series 301; image 92 series 601). This results in mild-to-moderate left hydronephrosis. The more distal left ureter is unremarkable. Additional nonobstructing left renal stones also present. GI/Bowel: No bowel obstruction. Postoperative changes of the colon. Normal appendix. Small bowel is normal in caliber. Pelvis: The bladder and solid pelvic organs demonstrate no acute findings. Peritoneum/Retroperitoneum: The abdominal aorta is normal in caliber. No retroperitoneal adenopathy. No free fluid or free air identified. Bones/Soft Tissues: Small fat filled umbilical hernia. No acute osseous or soft tissue findings. Similar grade 1 anterolisthesis of L5 on S1 related to L5 spondylolysis. Stevo lumbarization of S1 also noted. 1. Redemonstration of 7 mm obstructing stone at the level of the left ureteropelvic junction. This results in mild-to-moderate left hydronephrosis similar to previous exam.     CT ABDOMEN PELVIS WO CONTRAST    Result Date: 11/3/2022  EXAMINATION: STONE PROTOCOL CT OF THE ABDOMEN AND PELVIS 11/3/2022 7:17 pm TECHNIQUE: CT of the abdomen and pelvis was performed without the administration of intravenous contrast. Multiplanar reformatted images are provided for review. Automated exposure control, iterative reconstruction, and/or weight based adjustment of the mA/kV was utilized to reduce the radiation dose to as low as reasonably achievable.  COMPARISON: September 21, 2022 HISTORY: ORDERING SYSTEM PROVIDED HISTORY: flank pain TECHNOLOGIST PROVIDED HISTORY: Reason for exam:->flank pain Reason for Exam: flank pain Additional signs and symptoms: hx of kidney stones FINDINGS: LOWER CHEST: Visualized portion of the lower chest demonstrates no acute abnormality. KIDNEYS AND URINARY TRACT: Evidence of bilateral nephrolithiasis. Left obstructive uropathy related to 1 cm stone in the left upper ureter. No evidence of right obstructive uropathy. ORGANS: Visualized portions of the unenhanced liver, spleen, pancreas, and adrenal glands demonstrate no acute abnormality. No inflammatory changes in the gallbladder fossa. GI/BOWEL: No bowel obstruction. No evidence of acute appendicitis. Stable postsurgical changes in the sigmoid. PELVIS: The bladder and pelvic organs are unremarkable. PERITONEUM/RETROPERITONEUM: No lymphadenopathy is noted. BONES/SOFT TISSUES: The osseous structures demonstrate no acute abnormality. Redemonstration of chronic anterolisthesis of L5 over S1 with bilateral L5 spondylolysis also previously seen. 1. Left obstructive uropathy related to a left upper ureteric stone measuring 1 cm in diameter. Bilateral nephrolithiasis. 2. No acute gastrointestinal abnormality. Normal appendix. Postsurgical changes in the sigmoid appears stable. 3. Redemonstration of anterolisthesis of L5 over S1 with bilateral L5 spondylolysis.            Electronically signed by URSULA Bo CNP on 11/7/2022 at 3:56 PM

## 2022-11-07 NOTE — ED NOTES
Medication History  [facility]    Patient Name: Ahmet Anaya 1975     Medication history has been completed by: Ana Maria Castro CPhT    Source(s) of information: patient and insurance claims     Primary Care Physician: Chicho Hernández MD     Pharmacy: [facility]    Allergies as of 11/07/2022 - Fully Reviewed 11/07/2022   Allergen Reaction Noted    Vicodin [hydrocodone-acetaminophen] Other (See Comments) 08/08/2012        Prior to Admission medications    Medication Sig Start Date End Date Taking? Authorizing Provider   oxyCODONE (ROXICODONE) 5 MG immediate release tablet Take 5 mg by mouth every 8 hours as needed. 8/26/21   Historical Provider, MD   tamsulosin (FLOMAX) 0.4 MG capsule Take 0.4 mg by mouth daily 8/26/21   Historical Provider, MD     Medications removed from list (include reason, ex. noncompliance, medication cost, therapy complete etc.):   Cyclobenzaprine not taking  Ketorolac not taking  Percocet patient states he is taking plain oxycodone    Comments:  Patient states he only takes oxycodone and tamsulosin. Questioned patient if he was taking percocet vs oxycodone patient states he is taking just plain oxycodone. However last prescription fill was for percocet on 11/04/22 for 3 day supply.     To my knowledge the above medication history is accurate as of 11/7/2022 2:38 PM.   Ana Maria Castro CPhT   11/7/2022 2:38 PM

## 2022-11-08 ENCOUNTER — ANESTHESIA (OUTPATIENT)
Dept: OPERATING ROOM | Age: 47
End: 2022-11-08
Payer: COMMERCIAL

## 2022-11-08 ENCOUNTER — APPOINTMENT (OUTPATIENT)
Dept: GENERAL RADIOLOGY | Age: 47
End: 2022-11-08
Payer: COMMERCIAL

## 2022-11-08 LAB
ANION GAP SERPL CALCULATED.3IONS-SCNC: 7 MMOL/L (ref 4–16)
BASOPHILS ABSOLUTE: 0 K/CU MM
BASOPHILS RELATIVE PERCENT: 0.4 % (ref 0–1)
BUN BLDV-MCNC: 8 MG/DL (ref 6–23)
CALCIUM SERPL-MCNC: 8.7 MG/DL (ref 8.3–10.6)
CHLORIDE BLD-SCNC: 106 MMOL/L (ref 99–110)
CO2: 30 MMOL/L (ref 21–32)
CREAT SERPL-MCNC: 1.3 MG/DL (ref 0.9–1.3)
CULTURE: NORMAL
DIFFERENTIAL TYPE: ABNORMAL
EOSINOPHILS ABSOLUTE: 0.2 K/CU MM
EOSINOPHILS RELATIVE PERCENT: 2.5 % (ref 0–3)
GFR SERPL CREATININE-BSD FRML MDRD: >60 ML/MIN/1.73M2
GLUCOSE BLD-MCNC: 101 MG/DL (ref 70–99)
HCT VFR BLD CALC: 44.6 % (ref 42–52)
HEMOGLOBIN: 15 GM/DL (ref 13.5–18)
IMMATURE NEUTROPHIL %: 0.3 % (ref 0–0.43)
LYMPHOCYTES ABSOLUTE: 1.8 K/CU MM
LYMPHOCYTES RELATIVE PERCENT: 22.8 % (ref 24–44)
Lab: NORMAL
MCH RBC QN AUTO: 30.7 PG (ref 27–31)
MCHC RBC AUTO-ENTMCNC: 33.6 % (ref 32–36)
MCV RBC AUTO: 91.4 FL (ref 78–100)
MONOCYTES ABSOLUTE: 0.5 K/CU MM
MONOCYTES RELATIVE PERCENT: 6.9 % (ref 0–4)
NUCLEATED RBC %: 0 %
PDW BLD-RTO: 12.3 % (ref 11.7–14.9)
PLATELET # BLD: 215 K/CU MM (ref 140–440)
PMV BLD AUTO: 9.9 FL (ref 7.5–11.1)
POTASSIUM SERPL-SCNC: 4.3 MMOL/L (ref 3.5–5.1)
RBC # BLD: 4.88 M/CU MM (ref 4.6–6.2)
SEGMENTED NEUTROPHILS ABSOLUTE COUNT: 5.2 K/CU MM
SEGMENTED NEUTROPHILS RELATIVE PERCENT: 67.1 % (ref 36–66)
SODIUM BLD-SCNC: 143 MMOL/L (ref 135–145)
SPECIMEN: NORMAL
TOTAL IMMATURE NEUTOROPHIL: 0.02 K/CU MM
TOTAL NUCLEATED RBC: 0 K/CU MM
WBC # BLD: 7.7 K/CU MM (ref 4–10.5)

## 2022-11-08 PROCEDURE — 2580000003 HC RX 258: Performed by: INTERNAL MEDICINE

## 2022-11-08 PROCEDURE — 6360000002 HC RX W HCPCS: Performed by: SPECIALIST

## 2022-11-08 PROCEDURE — 2580000003 HC RX 258: Performed by: NURSE PRACTITIONER

## 2022-11-08 PROCEDURE — 3700000000 HC ANESTHESIA ATTENDED CARE: Performed by: SPECIALIST

## 2022-11-08 PROCEDURE — 2580000003 HC RX 258: Performed by: SPECIALIST

## 2022-11-08 PROCEDURE — 2709999900 HC NON-CHARGEABLE SUPPLY: Performed by: SPECIALIST

## 2022-11-08 PROCEDURE — G0378 HOSPITAL OBSERVATION PER HR: HCPCS

## 2022-11-08 PROCEDURE — 3600000013 HC SURGERY LEVEL 3 ADDTL 15MIN: Performed by: SPECIALIST

## 2022-11-08 PROCEDURE — 6370000000 HC RX 637 (ALT 250 FOR IP): Performed by: SPECIALIST

## 2022-11-08 PROCEDURE — 6370000000 HC RX 637 (ALT 250 FOR IP): Performed by: NURSE PRACTITIONER

## 2022-11-08 PROCEDURE — C2617 STENT, NON-COR, TEM W/O DEL: HCPCS | Performed by: SPECIALIST

## 2022-11-08 PROCEDURE — 76000 FLUOROSCOPY <1 HR PHYS/QHP: CPT

## 2022-11-08 PROCEDURE — 6370000000 HC RX 637 (ALT 250 FOR IP): Performed by: INTERNAL MEDICINE

## 2022-11-08 PROCEDURE — 36415 COLL VENOUS BLD VENIPUNCTURE: CPT

## 2022-11-08 PROCEDURE — 7100000001 HC PACU RECOVERY - ADDTL 15 MIN: Performed by: SPECIALIST

## 2022-11-08 PROCEDURE — 3600000003 HC SURGERY LEVEL 3 BASE: Performed by: SPECIALIST

## 2022-11-08 PROCEDURE — 80048 BASIC METABOLIC PNL TOTAL CA: CPT

## 2022-11-08 PROCEDURE — 94761 N-INVAS EAR/PLS OXIMETRY MLT: CPT

## 2022-11-08 PROCEDURE — C1894 INTRO/SHEATH, NON-LASER: HCPCS | Performed by: SPECIALIST

## 2022-11-08 PROCEDURE — 6360000002 HC RX W HCPCS

## 2022-11-08 PROCEDURE — C1769 GUIDE WIRE: HCPCS | Performed by: SPECIALIST

## 2022-11-08 PROCEDURE — 6360000002 HC RX W HCPCS: Performed by: INTERNAL MEDICINE

## 2022-11-08 PROCEDURE — 6360000002 HC RX W HCPCS: Performed by: ANESTHESIOLOGY

## 2022-11-08 PROCEDURE — 85025 COMPLETE CBC W/AUTO DIFF WBC: CPT

## 2022-11-08 PROCEDURE — 2720000010 HC SURG SUPPLY STERILE: Performed by: SPECIALIST

## 2022-11-08 PROCEDURE — 7100000000 HC PACU RECOVERY - FIRST 15 MIN: Performed by: SPECIALIST

## 2022-11-08 PROCEDURE — 96361 HYDRATE IV INFUSION ADD-ON: CPT

## 2022-11-08 PROCEDURE — 3700000001 HC ADD 15 MINUTES (ANESTHESIA): Performed by: SPECIALIST

## 2022-11-08 DEVICE — VARIABLE LENGTH URETERAL STENT
Type: IMPLANTABLE DEVICE | Site: URETER | Status: FUNCTIONAL
Brand: CONTOUR VL™

## 2022-11-08 RX ORDER — HYDRALAZINE HYDROCHLORIDE 20 MG/ML
10 INJECTION INTRAMUSCULAR; INTRAVENOUS
Status: DISCONTINUED | OUTPATIENT
Start: 2022-11-08 | End: 2022-11-08 | Stop reason: HOSPADM

## 2022-11-08 RX ORDER — LABETALOL HYDROCHLORIDE 5 MG/ML
10 INJECTION, SOLUTION INTRAVENOUS
Status: DISCONTINUED | OUTPATIENT
Start: 2022-11-08 | End: 2022-11-08 | Stop reason: HOSPADM

## 2022-11-08 RX ORDER — PHENAZOPYRIDINE HYDROCHLORIDE 100 MG/1
200 TABLET, FILM COATED ORAL
Status: DISCONTINUED | OUTPATIENT
Start: 2022-11-08 | End: 2022-11-09 | Stop reason: HOSPADM

## 2022-11-08 RX ORDER — FENTANYL CITRATE 50 UG/ML
INJECTION, SOLUTION INTRAMUSCULAR; INTRAVENOUS PRN
Status: DISCONTINUED | OUTPATIENT
Start: 2022-11-08 | End: 2022-11-08 | Stop reason: SDUPTHER

## 2022-11-08 RX ORDER — PROPOFOL 10 MG/ML
INJECTION, EMULSION INTRAVENOUS PRN
Status: DISCONTINUED | OUTPATIENT
Start: 2022-11-08 | End: 2022-11-08 | Stop reason: SDUPTHER

## 2022-11-08 RX ORDER — IPRATROPIUM BROMIDE AND ALBUTEROL SULFATE 2.5; .5 MG/3ML; MG/3ML
1 SOLUTION RESPIRATORY (INHALATION)
Status: DISCONTINUED | OUTPATIENT
Start: 2022-11-08 | End: 2022-11-08 | Stop reason: HOSPADM

## 2022-11-08 RX ORDER — HALOPERIDOL 5 MG/ML
1 INJECTION INTRAMUSCULAR
Status: DISCONTINUED | OUTPATIENT
Start: 2022-11-08 | End: 2022-11-08 | Stop reason: HOSPADM

## 2022-11-08 RX ORDER — LIDOCAINE HYDROCHLORIDE 20 MG/ML
INJECTION, SOLUTION INTRAVENOUS PRN
Status: DISCONTINUED | OUTPATIENT
Start: 2022-11-08 | End: 2022-11-08 | Stop reason: SDUPTHER

## 2022-11-08 RX ORDER — DIPHENHYDRAMINE HYDROCHLORIDE 50 MG/ML
12.5 INJECTION INTRAMUSCULAR; INTRAVENOUS
Status: DISCONTINUED | OUTPATIENT
Start: 2022-11-08 | End: 2022-11-08 | Stop reason: HOSPADM

## 2022-11-08 RX ORDER — ONDANSETRON 2 MG/ML
INJECTION INTRAMUSCULAR; INTRAVENOUS PRN
Status: DISCONTINUED | OUTPATIENT
Start: 2022-11-08 | End: 2022-11-08 | Stop reason: SDUPTHER

## 2022-11-08 RX ORDER — PROCHLORPERAZINE EDISYLATE 5 MG/ML
5 INJECTION INTRAMUSCULAR; INTRAVENOUS
Status: DISCONTINUED | OUTPATIENT
Start: 2022-11-08 | End: 2022-11-08 | Stop reason: HOSPADM

## 2022-11-08 RX ORDER — MIDAZOLAM HYDROCHLORIDE 2 MG/2ML
2 INJECTION, SOLUTION INTRAMUSCULAR; INTRAVENOUS
Status: DISCONTINUED | OUTPATIENT
Start: 2022-11-08 | End: 2022-11-08 | Stop reason: HOSPADM

## 2022-11-08 RX ORDER — MEPERIDINE HYDROCHLORIDE 25 MG/ML
6.25 INJECTION INTRAMUSCULAR; INTRAVENOUS; SUBCUTANEOUS EVERY 5 MIN PRN
Status: DISCONTINUED | OUTPATIENT
Start: 2022-11-08 | End: 2022-11-08 | Stop reason: HOSPADM

## 2022-11-08 RX ORDER — FENTANYL CITRATE 50 UG/ML
50 INJECTION, SOLUTION INTRAMUSCULAR; INTRAVENOUS EVERY 5 MIN PRN
Status: COMPLETED | OUTPATIENT
Start: 2022-11-08 | End: 2022-11-08

## 2022-11-08 RX ORDER — OXYCODONE HYDROCHLORIDE 5 MG/1
5 TABLET ORAL EVERY 6 HOURS PRN
Status: DISCONTINUED | OUTPATIENT
Start: 2022-11-08 | End: 2022-11-09

## 2022-11-08 RX ORDER — DEXAMETHASONE SODIUM PHOSPHATE 4 MG/ML
INJECTION, SOLUTION INTRA-ARTICULAR; INTRALESIONAL; INTRAMUSCULAR; INTRAVENOUS; SOFT TISSUE PRN
Status: DISCONTINUED | OUTPATIENT
Start: 2022-11-08 | End: 2022-11-08 | Stop reason: SDUPTHER

## 2022-11-08 RX ADMIN — OXYCODONE HYDROCHLORIDE 5 MG: 5 TABLET ORAL at 14:01

## 2022-11-08 RX ADMIN — LIDOCAINE HYDROCHLORIDE 100 MG: 20 INJECTION, SOLUTION INTRAVENOUS at 11:00

## 2022-11-08 RX ADMIN — DEXAMETHASONE SODIUM PHOSPHATE 4 MG: 4 INJECTION, SOLUTION INTRAMUSCULAR; INTRAVENOUS at 11:26

## 2022-11-08 RX ADMIN — PHENAZOPYRIDINE HYDROCHLORIDE 200 MG: 100 TABLET ORAL at 16:50

## 2022-11-08 RX ADMIN — FENTANYL CITRATE 50 MCG: 50 INJECTION, SOLUTION INTRAMUSCULAR; INTRAVENOUS at 12:11

## 2022-11-08 RX ADMIN — FENTANYL CITRATE 50 MCG: 50 INJECTION, SOLUTION INTRAMUSCULAR; INTRAVENOUS at 12:05

## 2022-11-08 RX ADMIN — ONDANSETRON 4 MG: 2 INJECTION INTRAMUSCULAR; INTRAVENOUS at 11:26

## 2022-11-08 RX ADMIN — SODIUM CHLORIDE: 9 INJECTION, SOLUTION INTRAVENOUS at 09:30

## 2022-11-08 RX ADMIN — HYDROMORPHONE HYDROCHLORIDE 0.5 MG: 1 INJECTION, SOLUTION INTRAMUSCULAR; INTRAVENOUS; SUBCUTANEOUS at 12:57

## 2022-11-08 RX ADMIN — PROPOFOL 200 MG: 10 INJECTION, EMULSION INTRAVENOUS at 11:00

## 2022-11-08 RX ADMIN — CEFAZOLIN 2000 MG: 2 INJECTION, POWDER, FOR SOLUTION INTRAMUSCULAR; INTRAVENOUS at 11:12

## 2022-11-08 RX ADMIN — SODIUM CHLORIDE, PRESERVATIVE FREE 10 ML: 5 INJECTION INTRAVENOUS at 19:59

## 2022-11-08 RX ADMIN — SODIUM CHLORIDE: 9 INJECTION, SOLUTION INTRAVENOUS at 21:42

## 2022-11-08 RX ADMIN — FENTANYL CITRATE 100 MCG: 50 INJECTION, SOLUTION INTRAMUSCULAR; INTRAVENOUS at 11:20

## 2022-11-08 RX ADMIN — HYDROMORPHONE HYDROCHLORIDE 1 MG: 1 INJECTION, SOLUTION INTRAMUSCULAR; INTRAVENOUS; SUBCUTANEOUS at 16:32

## 2022-11-08 RX ADMIN — HYDROMORPHONE HYDROCHLORIDE 1 MG: 1 INJECTION, SOLUTION INTRAMUSCULAR; INTRAVENOUS; SUBCUTANEOUS at 20:02

## 2022-11-08 RX ADMIN — TAMSULOSIN HYDROCHLORIDE 0.4 MG: 0.4 CAPSULE ORAL at 08:50

## 2022-11-08 RX ADMIN — CEFTRIAXONE SODIUM 1000 MG: 1 INJECTION, POWDER, FOR SOLUTION INTRAMUSCULAR; INTRAVENOUS at 19:57

## 2022-11-08 ASSESSMENT — PAIN SCALES - GENERAL
PAINLEVEL_OUTOF10: 7
PAINLEVEL_OUTOF10: 8
PAINLEVEL_OUTOF10: 4
PAINLEVEL_OUTOF10: 8
PAINLEVEL_OUTOF10: 6
PAINLEVEL_OUTOF10: 6
PAINLEVEL_OUTOF10: 9
PAINLEVEL_OUTOF10: 10
PAINLEVEL_OUTOF10: 6
PAINLEVEL_OUTOF10: 7
PAINLEVEL_OUTOF10: 7

## 2022-11-08 ASSESSMENT — PAIN DESCRIPTION - FREQUENCY
FREQUENCY: CONTINUOUS

## 2022-11-08 ASSESSMENT — PAIN - FUNCTIONAL ASSESSMENT
PAIN_FUNCTIONAL_ASSESSMENT: ACTIVITIES ARE NOT PREVENTED
PAIN_FUNCTIONAL_ASSESSMENT: PREVENTS OR INTERFERES SOME ACTIVE ACTIVITIES AND ADLS
PAIN_FUNCTIONAL_ASSESSMENT: PREVENTS OR INTERFERES SOME ACTIVE ACTIVITIES AND ADLS

## 2022-11-08 ASSESSMENT — PAIN DESCRIPTION - ONSET
ONSET: ON-GOING
ONSET: ON-GOING
ONSET: PROGRESSIVE

## 2022-11-08 ASSESSMENT — PAIN DESCRIPTION - ORIENTATION
ORIENTATION: LEFT

## 2022-11-08 ASSESSMENT — PAIN DESCRIPTION - DESCRIPTORS
DESCRIPTORS: DISCOMFORT;SORE
DESCRIPTORS: ACHING;DISCOMFORT
DESCRIPTORS: DISCOMFORT

## 2022-11-08 ASSESSMENT — PAIN DESCRIPTION - PAIN TYPE
TYPE: SURGICAL PAIN
TYPE: SURGICAL PAIN;ACUTE PAIN
TYPE: SURGICAL PAIN;ACUTE PAIN

## 2022-11-08 ASSESSMENT — PAIN DESCRIPTION - LOCATION
LOCATION: FLANK
LOCATION: FLANK
LOCATION: GROIN
LOCATION: FLANK
LOCATION: GROIN

## 2022-11-08 NOTE — CONSULTS
[facility] [address]   Consult Note  [facility] 1 2 3 4 5  Date: 2022   Patient: Lavenia Klinefelter   : 1975   DOA: 2022   MRN: [id number]   ROOM#: [id number]     Reason for Consult:  Left ureteral stone  Requesting Physician:  Dr. Fadumo Tanner  Collaborating Urologist on Call at time of admission:  [facility]: Flank pain    History Obtained From:  patient, electronic medical record    HISTORY OF PRESENT ILLNESS:                The patient is a 41 y.o. male with significant past medical history of nephrolithiasis and diverticulitis s/p rectosigmoidectomy () who presented with left flank pain. Patient seen in the ED , 11/3, and  for complaints of left flank pain. CT abdomen/pelvis 11/3 revealed left proximal ureteral stone approximately 1 cm. CT  redemonstrated obstructing stone measured at 7 mm within the left UPJ and mild-moderate hydronephrosis similar to prior study. Due to intractable pain and nausea/vomiting, patient was admitted for further management. Labs show no overt DOC although creatinine slightly elevated at 1.3 from recent baseline 0.9-1.0. Afebrile, no leukocytosis. UA shows trace blood negative leuks, nitrates, no bacteria seen. Received Rocephin    Past Medical History:        Diagnosis Date    Diverticulitis     Gall stone     Kidney stone     \"had kidney stone now- had them since age 17-had surgery and able to pass some\" follows with dr Carlos Christianson    Kidney stone      Past Surgical History:        Procedure Laterality Date    CHOLECYSTECTOMY, LAPAROSCOPIC N/A 2020    CHOLECYSTECTOMY LAPAROSCOPIC WITH IOC performed by Amrit Cruz MD at [address] [facility]  [address]      \"had surgery multiple times for kidney stones last time ?     SMALL INTESTINE SURGERY N/A 10/6/2020    BOWEL RESECTION SIGMOID LAPAROSCOPIC ROBOTIC performed by Amrit Cruz MD at [address] 11/3/2020    EGD BIOPSY performed by Breanna Tanner MD at [address] [place] [address]  2012    left wrist\"no metal\"     Current Medications:   Current Facility-Administered Medications: sodium chloride flush 0.9 % injection 5-40 mL, 5-40 mL, IntraVENous, 2 times per day  sodium chloride flush 0.9 % injection 5-40 mL, 5-40 mL, IntraVENous, PRN  0.9 % sodium chloride infusion, , IntraVENous, PRN  enoxaparin (LOVENOX) injection 40 mg, 40 mg, SubCUTAneous, Daily  ondansetron (ZOFRAN-ODT) disintegrating tablet 4 mg, 4 mg, Oral, Q8H PRN **OR** ondansetron (ZOFRAN) injection 4 mg, 4 mg, IntraVENous, Q6H PRN  polyethylene glycol (GLYCOLAX) packet 17 g, 17 g, Oral, Daily PRN  acetaminophen (TYLENOL) tablet 650 mg, 650 mg, Oral, Q6H PRN **OR** acetaminophen (TYLENOL) suppository 650 mg, 650 mg, Rectal, Q6H PRN  oxyCODONE (ROXICODONE) immediate release tablet 5 mg, 5 mg, Oral, Q8H PRN  tamsulosin (FLOMAX) capsule 0.4 mg, 0.4 mg, Oral, Daily  cefTRIAXone (ROCEPHIN) 1,000 mg in dextrose 5 % 50 mL IVPB mini-bag, 1,000 mg, IntraVENous, Q24H  HYDROmorphone (DILAUDID) injection 0.5 mg, 0.5 mg, IntraVENous, Q4H PRN  0.9 % sodium chloride infusion, , IntraVENous, Continuous    Allergies:  Vicodin [hydrocodone-acetaminophen]    Social History:   TOBACCO:   reports that he has never smoked. He has never used smokeless tobacco.  ETOH:   reports no history of alcohol use. DRUGS:   reports current drug use. Frequency: 1.00 time per week. Drug: Marijuana Sharad Calamity).     Family History:       Problem Relation Age of Onset    Cancer Mother         ovarian cancer     Early Death Mother     Kidney stones Father     Diabetes Father     Diabetes Brother        REVIEW OF SYSTEMS:     CONSTITUTIONAL:  negative for  fevers and chills  RESPIRATORY:  negative for  dyspnea  CARDIOVASCULAR:  negative for  chest pain  GASTROINTESTINAL:  positive for nausea and vomiting  GENITOURINARY:  negative for dysuria    PHYSICAL EXAM:      VITALS:  /81   Pulse 73   Temp 98.2 °F (36.8 °C) (Oral)   Resp 16   Ht 5' 11\" (1.803 m)   Wt 231 lb 11.3 oz (105.1 kg)   SpO2 96%   BMI 32.32 kg/m²      TEMPERATURE:  Current - Temp: 98.2 °F (36.8 °C); Max - Temp  Av.9 °F (36.6 °C)  Min: 97.3 °F (36.3 °C)  Max: 98.6 °F (37 °C)  24HR BLOOD PRESSURE RANGE:  Systolic (62PMI), XOT:439 , Min:116 , CAJ:540   ; Diastolic (87FUB), ZCX:07, Min:81, Max:104    8HR INTAKE OUTPUT:  No intake/output data recorded. URINARY CATHETER OUTPUT (Donnelly):     DRAIN/TUBE OUTPUT:        Patient taken to OR prior to exam    DATA:    WBC:    Lab Results   Component Value Date/Time    WBC 7.7 2022 03:31 AM     Hemoglobin/Hematocrit:    Lab Results   Component Value Date/Time    HGB 15.0 2022 03:31 AM    HCT 44.6 2022 03:31 AM     BMP:    Lab Results   Component Value Date/Time     2022 03:31 AM    K 4.3 2022 03:31 AM     2022 03:31 AM    CO2 30 2022 03:31 AM    BUN 8 2022 03:31 AM    LABALBU 3.9 2022 01:15 PM    CREATININE 1.3 2022 03:31 AM    CALCIUM 8.7 2022 03:31 AM    GFRAA >60 2022 11:30 AM    LABGLOM >60 2022 03:31 AM     PT/INR:  No results found for: PROTIME, INR    Imaging:  CT ABDOMEN PELVIS WO CONTRAST Additional Contrast? None    Result Date: 2022  EXAMINATION: CT OF THE ABDOMEN AND PELVIS WITHOUT CONTRAST 2022 1:59 pm TECHNIQUE: CT of the abdomen and pelvis was performed without the administration of intravenous contrast. Multiplanar reformatted images are provided for review. Automated exposure control, iterative reconstruction, and/or weight based adjustment of the mA/kV was utilized to reduce the radiation dose to as low as reasonably achievable.  COMPARISON: CT abdomen pelvis November 3, 2022 HISTORY: ORDERING SYSTEM PROVIDED HISTORY: L flank pain/kidney stone TECHNOLOGIST PROVIDED HISTORY: Reason for exam:->L flank pain/kidney stone Additional Contrast?->None Decision Support Exception - unselect if not a suspected or confirmed emergency medical condition->Emergency Medical Condition (MA) Reason for Exam: Flank Pain FINDINGS: Lower Chest: Mild dependent atelectasis at the lung bases. Lung bases otherwise appear clear. Organs: Evaluation of the solid organs is limited due to lack of intravenous contrast.  Hepatic cysts redemonstrated. The gallbladder is surgically absent. The nonenhanced spleen, pancreas, and adrenal glands demonstrate no acute abnormality. Punctate nonobstructing right renal stone. No hydronephrosis or obstructive uropathy identified on the right. Redemonstration of 7 mm at the level of the left ureteropelvic junction (image 107 series 301; image 92 series 601). This results in mild-to-moderate left hydronephrosis. The more distal left ureter is unremarkable. Additional nonobstructing left renal stones also present. GI/Bowel: No bowel obstruction. Postoperative changes of the colon. Normal appendix. Small bowel is normal in caliber. Pelvis: The bladder and solid pelvic organs demonstrate no acute findings. Peritoneum/Retroperitoneum: The abdominal aorta is normal in caliber. No retroperitoneal adenopathy. No free fluid or free air identified. Bones/Soft Tissues: Small fat filled umbilical hernia. No acute osseous or soft tissue findings. Similar grade 1 anterolisthesis of L5 on S1 related to L5 spondylolysis. Stevo lumbarization of S1 also noted. 1. Redemonstration of 7 mm obstructing stone at the level of the left ureteropelvic junction. This results in mild-to-moderate left hydronephrosis similar to previous exam.     CT ABDOMEN PELVIS WO CONTRAST    Result Date: 11/3/2022  EXAMINATION: STONE PROTOCOL CT OF THE ABDOMEN AND PELVIS 11/3/2022 7:17 pm TECHNIQUE: CT of the abdomen and pelvis was performed without the administration of intravenous contrast. Multiplanar reformatted images are provided for review.  Automated exposure control, iterative reconstruction, and/or weight based adjustment of the mA/kV was utilized to reduce the radiation dose to as low as reasonably achievable. COMPARISON: September 21, 2022 HISTORY: ORDERING SYSTEM PROVIDED HISTORY: flank pain TECHNOLOGIST PROVIDED HISTORY: Reason for exam:->flank pain Reason for Exam: flank pain Additional signs and symptoms: hx of kidney stones FINDINGS: LOWER CHEST: Visualized portion of the lower chest demonstrates no acute abnormality. KIDNEYS AND URINARY TRACT: Evidence of bilateral nephrolithiasis. Left obstructive uropathy related to 1 cm stone in the left upper ureter. No evidence of right obstructive uropathy. ORGANS: Visualized portions of the unenhanced liver, spleen, pancreas, and adrenal glands demonstrate no acute abnormality. No inflammatory changes in the gallbladder fossa. GI/BOWEL: No bowel obstruction. No evidence of acute appendicitis. Stable postsurgical changes in the sigmoid. PELVIS: The bladder and pelvic organs are unremarkable. PERITONEUM/RETROPERITONEUM: No lymphadenopathy is noted. BONES/SOFT TISSUES: The osseous structures demonstrate no acute abnormality. Redemonstration of chronic anterolisthesis of L5 over S1 with bilateral L5 spondylolysis also previously seen. 1. Left obstructive uropathy related to a left upper ureteric stone measuring 1 cm in diameter. Bilateral nephrolithiasis. 2. No acute gastrointestinal abnormality. Normal appendix. Postsurgical changes in the sigmoid appears stable. 3. Redemonstration of anterolisthesis of L5 over S1 with bilateral L5 spondylolysis. Assessment & Plan:      Kartik Vaughn is a 41 y.o. male admitted 11/7/2022 for left ureteral stone    1) Left ureteral stone: Patient seen in the ED x3 recently with complaints of left flank pain. Attempted outpatient management and follow-up but continues to have intractable pain and nausea/vomiting.    CT abd/pelvis wo contrast 11/3 revealed obstructive uropathy due to left proximal ureteral stone, approximately 1 cm, bilateral nephrolithiasis   CT abd/pelvis wo contrast 11/7 redemonstrates obstructing left ureteral stone, measured at 7 mm within the left UPJ with mild-moderate left hydronephrosis similar to previous. Plan for cystoscopy, left RGPG, left ureteroscopy/laser lithotripsy/basket extraction and possible left ureteral stent placement today. NPO at [place]. No AC. Patient seen and examined, chart reviewed.    Electronically signed by OMAR Victoria on 11/8/2022 at 9:44 AM

## 2022-11-08 NOTE — ANESTHESIA POSTPROCEDURE EVALUATION
Department of Anesthesiology  Postprocedure Note    Patient: Ramona Blue  MRN: [id number]  YOB: 1975  Date of evaluation: 11/8/2022      Procedure Summary     Date: 11/08/22 Room / Location: [address] [place] [address] OR [address] [place] [address]    Anesthesia Start: 1286 Anesthesia Stop: 5467    Procedure: LEFT URETEROSCOPY WITH LEFT STENT PLACEMENT (Left: Ureter) Diagnosis:       Hydronephrosis with renal and ureteral calculus obstruction      (Hydronephrosis with renal and ureteral calculus obstruction [N13.2])    Surgeons: Lorilee Heimlich, MD Responsible Provider: Meenakshi Moreno DO    Anesthesia Type: General ASA Status: 2          Anesthesia Type: General    Kwesi Phase I: Kwesi Score: 8    Kwesi Phase II:        Anesthesia Post Evaluation    Patient location during evaluation: PACU  Patient participation: complete - patient participated  Level of consciousness: sleepy but conscious  Airway patency: patent  Nausea & Vomiting: no nausea  Complications: no  Cardiovascular status: hemodynamically stable  Respiratory status: acceptable  Hydration status: euvolemic

## 2022-11-08 NOTE — PROGRESS NOTES
1150: Pt arrived to PACU from OR. Monitors applied, alarms on. Report obtained from [facility].  7919: Dr. Ignacia Laboy at bedside speaking with pt at this time. 1205: Pt repositioned in bed, Pt states pain 9/10, medicated per pain scale. 1215: Pt able to tolerate ice chips at this time. 1217: Report called to [facility] for room 4006.   8411: Pt transferred to room 4006.

## 2022-11-08 NOTE — PROGRESS NOTES
V2.0  [facility] [place] Progress Note      Name:  Emma Ramirez /Age/Sex: 1975  (41 y.o. male)   MRN & CSN:  [id number] & [id number] Encounter Date/Time: 2022 4:22 PM EST    Location:  [address] [place] [address] PCP: Ketan Campbell MD       Hospital Day: 2    Assessment and Plan:   Emma Ramirez is a 41 y.o. male with PMH of recurrent nephrolithiasis who presents with Left ureteral calculus    #Left ureteral calculus with associated left hydronephrosis s/p cystoscopy and stenting  #Bilateral nephrolithiasis  - CT abd/pelvis imaging showing re-demonstration of 7 mm at the level of the left ureteropelvic junction with associated mild to moderate left hydronephrosis, punctate nonobstructing right renal stone. No hydronephrosis or obstructive uropathy identified on the right. Additional nonobstructing left renal stones also present.   -Renal function stable    Plan:  Urology on consult, appreciate recommendations --> only stenting was able to be done, stone was not removed, patient will have repeat procedure in a few weeks  continue tamsulosin  Continue ceftriaxone  Will increase IV Dilaudid to 1 mg every 3 hours and oxycodone 5 mg to every 6 hours from 8 hours     #Acute cystitis secondary to infected renal stone  -Patient still symptomatic    Plan:  Continue ceftriaxone  Follow-up on urine cultures     Chronic medical problems:  #Hx diverticulitis s/p rectosigmoidectomy       Diet ADULT DIET; Regular   DVT Prophylaxis [x] Lovenox, []  Heparin, [] SCDs, [] Ambulation,  [] Eliquis, [] Xarelto  [] Coumadin   Code Status Full Code   Disposition From: Home  Expected Disposition: Home  Estimated Date of Discharge: 2022  Patient requires continued admission due to pain management in the setting of renal stone   Surrogate Decision Maker/ POA na     Subjective:     Chief Complaint: Flank Pain (L side for a few weeks)       Patient was seen post procedurally today.   He states that they were unable to remove the stone and just a stent was placed. He states, that he does not do well when there is only a stent placed. He has had over 30 stones in the past and all have been like this. He states that the Dilaudid is not lasting the full 4 hours and the oxy does not work that well. Other than that, he is having pain with urination as well. Review of Systems:    General: No fever, no chills  Heart: No chest pain  Lungs: No shortness of breath, no cough  Abdomen: No abdominal pain, no nausea, no vomiting, no constipation, no diarrhea  : No frequency, +dysuria, no urgency, no decreased urination, + groin pain  MSK: No lower extremity swelling  Neuro: No confusion, no weakness  Skin: No rashes      Objective:      Intake/Output Summary (Last 24 hours) at 11/8/2022 1622  Last data filed at 11/8/2022 1200  Gross per 24 hour   Intake 770 ml   Output 125 ml   Net 645 ml        Vitals:   Vitals:    11/08/22 1507   BP: 125/84   Pulse: 85   Resp: 18   Temp: 97.7 °F (36.5 °C)   SpO2: 96%       Physical Exam:     General: in mild distress, AAO x3-4  Eyes: EOMI  HENT: Atraumatic  CVS: Normal S1 and S2, no murmurs, RRR  Respiratory: Normal breath sounds, clear to auscultation bilaterally, no respiratory distress  GI: Normal bowel sounds, soft, nondistended, nontender  MSK: no lower extremity edema  Skin: Intact, dry, warm, no rashes  Neuro: CN II to XII grossly intact  Psych: Normal mood    Medications:   Medications:    phenazopyridine  200 mg Oral TID WC    sodium chloride flush  5-40 mL IntraVENous 2 times per day    enoxaparin  40 mg SubCUTAneous Daily    tamsulosin  0.4 mg Oral Daily    cefTRIAXone (ROCEPHIN) IV  1,000 mg IntraVENous Q24H      Infusions:    sodium chloride      sodium chloride 175 mL/hr at 11/08/22 1604     PRN Meds: oxyCODONE, 5 mg, Q6H PRN  HYDROmorphone, 1 mg, Q3H PRN  sodium chloride flush, 5-40 mL, PRN  sodium chloride, , PRN  ondansetron, 4 mg, Q8H PRN   Or  ondansetron, 4 mg, Q6H PRN  polyethylene glycol, 17 g, Daily PRN  acetaminophen, 650 mg, Q6H PRN   Or  acetaminophen, 650 mg, Q6H PRN        Labs      Recent Results (from the past 24 hour(s))   Urinalysis    Collection Time: 11/07/22  4:27 PM   Result Value Ref Range    Color, UA YELLOW YELLOW    Clarity, UA CLEAR CLEAR    Glucose, Urine NEGATIVE NEGATIVE MG/DL    Bilirubin Urine NEGATIVE NEGATIVE MG/DL    Ketones, Urine NEGATIVE NEGATIVE MG/DL    Specific Gravity, UA 1.025 1.001 - 1.035    Blood, Urine TRACE (A) NEGATIVE    pH, Urine 6.0 5.0 - 8.0    Protein, UA NEGATIVE NEGATIVE MG/DL    Urobilinogen, Urine 0.2 0.2 - 1.0 MG/DL    Nitrite Urine, Quantitative NEGATIVE NEGATIVE    Leukocyte Esterase, Urine NEGATIVE NEGATIVE   Microscopic Urinalysis    Collection Time: 11/07/22  4:27 PM   Result Value Ref Range    RBC, UA 4 (H) 0 - 3 /HPF    WBC, UA 1 0 - 2 /HPF    Bacteria, UA NEGATIVE NEGATIVE /HPF    Renal Epithelial, UA <1 /HPF    Mucus, UA FEW (A) NEGATIVE HPF    Trichomonas, UA NONE SEEN NONE SEEN /HPF   Basic Metabolic Panel w/ Reflex to MG    Collection Time: 11/08/22  3:31 AM   Result Value Ref Range    Sodium 143 135 - 145 MMOL/L    Potassium 4.3 3.5 - 5.1 MMOL/L    Chloride 106 99 - 110 mMol/L    CO2 30 21 - 32 MMOL/L    Anion Gap 7 4 - 16    BUN 8 6 - 23 MG/DL    Creatinine 1.3 0.9 - 1.3 MG/DL    Est, Glom Filt Rate >60 >60 mL/min/1.73m2    Glucose 101 (H) 70 - 99 MG/DL    Calcium 8.7 8.3 - 10.6 MG/DL   CBC with Auto Differential    Collection Time: 11/08/22  3:31 AM   Result Value Ref Range    WBC 7.7 4.0 - 10.5 K/CU MM    RBC 4.88 4.6 - 6.2 M/CU MM    Hemoglobin 15.0 13.5 - 18.0 GM/DL    Hematocrit 44.6 42 - 52 %    MCV 91.4 78 - 100 FL    MCH 30.7 27 - 31 PG    MCHC 33.6 32.0 - 36.0 %    RDW 12.3 11.7 - 14.9 %    Platelets 129 760 - 037 K/CU MM    MPV 9.9 7.5 - 11.1 FL    Differential Type AUTOMATED DIFFERENTIAL     Segs Relative 67.1 (H) 36 - 66 %    Lymphocytes % 22.8 (L) 24 - 44 %    Monocytes % 6.9 (H) 0 - 4 %    Eosinophils % 2.5 0 - 3 % Basophils % 0.4 0 - 1 %    Segs Absolute 5.2 K/CU MM    Lymphocytes Absolute 1.8 K/CU MM    Monocytes Absolute 0.5 K/CU MM    Eosinophils Absolute 0.2 K/CU MM    Basophils Absolute 0.0 K/CU MM    Nucleated RBC % 0.0 %    Total Nucleated RBC 0.0 K/CU MM    Total Immature Neutrophil 0.02 K/CU MM    Immature Neutrophil % 0.3 0 - 0.43 %        Imaging/Diagnostics Last 24 Hours   CT ABDOMEN PELVIS WO CONTRAST Additional Contrast? None    Result Date: 11/7/2022  EXAMINATION: CT OF THE ABDOMEN AND PELVIS WITHOUT CONTRAST 11/7/2022 1:59 pm TECHNIQUE: CT of the abdomen and pelvis was performed without the administration of intravenous contrast. Multiplanar reformatted images are provided for review. Automated exposure control, iterative reconstruction, and/or weight based adjustment of the mA/kV was utilized to reduce the radiation dose to as low as reasonably achievable. COMPARISON: CT abdomen pelvis November 3, 2022 HISTORY: ORDERING SYSTEM PROVIDED HISTORY: L flank pain/kidney stone TECHNOLOGIST PROVIDED HISTORY: Reason for exam:->L flank pain/kidney stone Additional Contrast?->None Decision Support Exception - unselect if not a suspected or confirmed emergency medical condition->Emergency Medical Condition (MA) Reason for Exam: Flank Pain FINDINGS: Lower Chest: Mild dependent atelectasis at the lung bases. Lung bases otherwise appear clear. Organs: Evaluation of the solid organs is limited due to lack of intravenous contrast.  Hepatic cysts redemonstrated. The gallbladder is surgically absent. The nonenhanced spleen, pancreas, and adrenal glands demonstrate no acute abnormality. Punctate nonobstructing right renal stone. No hydronephrosis or obstructive uropathy identified on the right. Redemonstration of 7 mm at the level of the left ureteropelvic junction (image 107 series 301; image 92 series 601). This results in mild-to-moderate left hydronephrosis. The more distal left ureter is unremarkable.   Additional nonobstructing left renal stones also present. GI/Bowel: No bowel obstruction. Postoperative changes of the colon. Normal appendix. Small bowel is normal in caliber. Pelvis: The bladder and solid pelvic organs demonstrate no acute findings. Peritoneum/Retroperitoneum: The abdominal aorta is normal in caliber. No retroperitoneal adenopathy. No free fluid or free air identified. Bones/Soft Tissues: Small fat filled umbilical hernia. No acute osseous or soft tissue findings. Similar grade 1 anterolisthesis of L5 on S1 related to L5 spondylolysis. Stevo lumbarization of S1 also noted. 1. Redemonstration of 7 mm obstructing stone at the level of the left ureteropelvic junction. This results in mild-to-moderate left hydronephrosis similar to previous exam.     FL LESS THAN 1 HOUR    Result Date: 11/8/2022  Radiology exam is complete. No Radiologist dictation. Please follow up with ordering provider.        Electronically signed by Ji Edge MD on 11/8/2022 at 4:22 PM

## 2022-11-08 NOTE — BRIEF OP NOTE
Brief Postoperative Note      Patient: Catherine Ortiz  YOB: 1975  MRN: [id number]    Date of Procedure: 11/8/2022    Pre-Op Diagnosis: Hydronephrosis with renal and ureteral calculus obstruction [N13.2]    Post-Op Diagnosis: Same       Procedure(s):  LEFT URETEROSCOPY WITH LEFT STENT PLACEMENT    Surgeon(s):  Caffie Barthel, MD    Assistant:  * No surgical staff found *    Anesthesia: General    Estimated Blood Loss (mL): Minimal    Complications: None    Specimens:   * No specimens in log *    Implants:  Implant Name Type Inv.  Item Serial No.  Lot No. LRB No. Used Action   STENT URET 4.8FR [id number] PERCFLX HYDR+ SFT PGTL TAPR TIP - [id number]  STENT URET 4.8FR [id number] PERCFLX HYDR+ SFT PGTL TAPR TIP  Segment UROLOGY-WD [id number] Left 1 Implanted         Drains: * No LDAs found *    Findings: 7-9 mm left prox stone  NArrowing of left distal ureter    Electronically signed by Caffie Barthel, MD on 11/8/2022 at 11:46 AM

## 2022-11-09 VITALS
BODY MASS INDEX: 32.59 KG/M2 | DIASTOLIC BLOOD PRESSURE: 81 MMHG | HEIGHT: 71 IN | HEART RATE: 70 BPM | TEMPERATURE: 98.2 F | RESPIRATION RATE: 18 BRPM | WEIGHT: 232.81 LBS | OXYGEN SATURATION: 96 % | SYSTOLIC BLOOD PRESSURE: 124 MMHG

## 2022-11-09 PROCEDURE — 2580000003 HC RX 258: Performed by: INTERNAL MEDICINE

## 2022-11-09 PROCEDURE — 96372 THER/PROPH/DIAG INJ SC/IM: CPT

## 2022-11-09 PROCEDURE — 6360000002 HC RX W HCPCS: Performed by: INTERNAL MEDICINE

## 2022-11-09 PROCEDURE — 6360000002 HC RX W HCPCS: Performed by: SPECIALIST

## 2022-11-09 PROCEDURE — G0378 HOSPITAL OBSERVATION PER HR: HCPCS

## 2022-11-09 PROCEDURE — 6370000000 HC RX 637 (ALT 250 FOR IP): Performed by: PHYSICIAN ASSISTANT

## 2022-11-09 PROCEDURE — 2580000003 HC RX 258: Performed by: SPECIALIST

## 2022-11-09 PROCEDURE — 6370000000 HC RX 637 (ALT 250 FOR IP): Performed by: SPECIALIST

## 2022-11-09 PROCEDURE — 6370000000 HC RX 637 (ALT 250 FOR IP): Performed by: INTERNAL MEDICINE

## 2022-11-09 PROCEDURE — 94761 N-INVAS EAR/PLS OXIMETRY MLT: CPT

## 2022-11-09 RX ORDER — OXYBUTYNIN CHLORIDE 10 MG/1
10 TABLET, EXTENDED RELEASE ORAL NIGHTLY
Status: DISCONTINUED | OUTPATIENT
Start: 2022-11-09 | End: 2022-11-09

## 2022-11-09 RX ORDER — TAMSULOSIN HYDROCHLORIDE 0.4 MG/1
0.4 CAPSULE ORAL DAILY
Qty: 30 CAPSULE | Refills: 3 | Status: SHIPPED | OUTPATIENT
Start: 2022-11-10

## 2022-11-09 RX ORDER — OXYCODONE HYDROCHLORIDE 10 MG/1
10 TABLET ORAL EVERY 6 HOURS PRN
Qty: 12 TABLET | Refills: 0 | Status: SHIPPED | OUTPATIENT
Start: 2022-11-09 | End: 2022-11-12

## 2022-11-09 RX ORDER — PHENAZOPYRIDINE HYDROCHLORIDE 200 MG/1
200 TABLET, FILM COATED ORAL
Qty: 30 TABLET | Refills: 0 | Status: SHIPPED | OUTPATIENT
Start: 2022-11-09 | End: 2022-11-12

## 2022-11-09 RX ORDER — OXYBUTYNIN CHLORIDE 5 MG/1
5 TABLET ORAL 3 TIMES DAILY
Status: DISCONTINUED | OUTPATIENT
Start: 2022-11-09 | End: 2022-11-09 | Stop reason: HOSPADM

## 2022-11-09 RX ORDER — OXYCODONE HYDROCHLORIDE 10 MG/1
10 TABLET ORAL EVERY 6 HOURS PRN
Status: DISCONTINUED | OUTPATIENT
Start: 2022-11-09 | End: 2022-11-09 | Stop reason: HOSPADM

## 2022-11-09 RX ORDER — OXYBUTYNIN CHLORIDE 5 MG/1
5 TABLET ORAL 3 TIMES DAILY
Qty: 90 TABLET | Refills: 3 | Status: SHIPPED | OUTPATIENT
Start: 2022-11-09

## 2022-11-09 RX ADMIN — TAMSULOSIN HYDROCHLORIDE 0.4 MG: 0.4 CAPSULE ORAL at 08:33

## 2022-11-09 RX ADMIN — SODIUM CHLORIDE, PRESERVATIVE FREE 10 ML: 5 INJECTION INTRAVENOUS at 08:34

## 2022-11-09 RX ADMIN — PHENAZOPYRIDINE HYDROCHLORIDE 200 MG: 100 TABLET ORAL at 08:33

## 2022-11-09 RX ADMIN — OXYCODONE HYDROCHLORIDE 10 MG: 10 TABLET ORAL at 12:30

## 2022-11-09 RX ADMIN — PHENAZOPYRIDINE HYDROCHLORIDE 200 MG: 100 TABLET ORAL at 12:30

## 2022-11-09 RX ADMIN — SODIUM CHLORIDE: 9 INJECTION, SOLUTION INTRAVENOUS at 08:39

## 2022-11-09 RX ADMIN — OXYBUTYNIN CHLORIDE 5 MG: 5 TABLET ORAL at 12:30

## 2022-11-09 RX ADMIN — HYDROMORPHONE HYDROCHLORIDE 1 MG: 1 INJECTION, SOLUTION INTRAMUSCULAR; INTRAVENOUS; SUBCUTANEOUS at 00:27

## 2022-11-09 RX ADMIN — HYDROMORPHONE HYDROCHLORIDE 1 MG: 1 INJECTION, SOLUTION INTRAMUSCULAR; INTRAVENOUS; SUBCUTANEOUS at 06:46

## 2022-11-09 RX ADMIN — SODIUM CHLORIDE: 9 INJECTION, SOLUTION INTRAVENOUS at 03:30

## 2022-11-09 RX ADMIN — ENOXAPARIN SODIUM 40 MG: 40 INJECTION SUBCUTANEOUS at 08:33

## 2022-11-09 ASSESSMENT — PAIN DESCRIPTION - DESCRIPTORS
DESCRIPTORS: ACHING

## 2022-11-09 ASSESSMENT — PAIN DESCRIPTION - LOCATION
LOCATION: FLANK
LOCATION: BACK
LOCATION: FLANK
LOCATION: BACK

## 2022-11-09 ASSESSMENT — PAIN DESCRIPTION - ORIENTATION
ORIENTATION: LEFT
ORIENTATION: LOWER;LEFT
ORIENTATION: LEFT

## 2022-11-09 ASSESSMENT — PAIN SCALES - WONG BAKER: WONGBAKER_NUMERICALRESPONSE: 0

## 2022-11-09 ASSESSMENT — PAIN SCALES - GENERAL
PAINLEVEL_OUTOF10: 6
PAINLEVEL_OUTOF10: 8
PAINLEVEL_OUTOF10: 7
PAINLEVEL_OUTOF10: 0
PAINLEVEL_OUTOF10: 4
PAINLEVEL_OUTOF10: 7

## 2022-11-09 ASSESSMENT — PAIN - FUNCTIONAL ASSESSMENT
PAIN_FUNCTIONAL_ASSESSMENT: ACTIVITIES ARE NOT PREVENTED

## 2022-11-09 NOTE — PROGRESS NOTES
[facility] [address]   Progress Note  [facility] 0 1 2  Date: 2022   Patient: Felipe Patient   : 1975   DOA: 2022   MRN: [id number]   ROOM#: [id number]     Admit Date: 2022     Collaborating Urologist on Call at time of admission: Dr. Nahum Ware    CC: Flank pain    Subjective:     Pain: mild, no nausea and no vomiting,   Bowel Movement/Flatus:   Yes  Voiding:  easily, some dysuria 2/2 stent    Patient doing well. No new complaints except stent irritation. He has had a stent before with similar issues.     Objective:      Vitals:    /81   Pulse 70   Temp 98.2 °F (36.8 °C)   Resp 18   Ht 5' 11\" (1.803 m)   Wt 232 lb 12.9 oz (105.6 kg)   SpO2 96%   BMI 32.47 kg/m²    Temp  Av.8 °F (36.6 °C)  Min: 97.2 °F (36.2 °C)  Max: 98.6 °F (37 °C)     Intake/Output Summary (Last 24 hours) at 2022 1016  Last data filed at 2022 0833  Gross per 24 hour   Intake 780 ml   Output 125 ml   Net 655 ml       Physical Exam:   General appearance: alert, appears stated age, cooperative, and no distress  Head: Normocephalic, without obvious abnormality, atraumatic  Back:  No CVA tenderness  Abdomen:  Soft, non-tender, non-distended    Labs:   WBC:    Lab Results   Component Value Date/Time    WBC 7.7 2022 03:31 AM      Hemoglobin/Hematocrit:    Lab Results   Component Value Date/Time    HGB 15.0 2022 03:31 AM    HCT 44.6 2022 03:31 AM      BMP:   Lab Results   Component Value Date/Time     2022 03:31 AM    K 4.3 2022 03:31 AM     2022 03:31 AM    CO2 30 2022 03:31 AM    BUN 8 2022 03:31 AM    LABALBU 3.9 2022 01:15 PM    CREATININE 1.3 2022 03:31 AM    CALCIUM 8.7 2022 03:31 AM    GFRAA >60 2022 11:30 AM    LABGLOM >60 2022 03:31 AM      PT/INR:  No results found for: PROTIME, INR   PTT:  No results found for: APTT    Imaging:   CT ABDOMEN PELVIS WO CONTRAST Additional Contrast? None    Result Date: 11/7/2022  EXAMINATION: CT OF THE ABDOMEN AND PELVIS WITHOUT CONTRAST 11/7/2022 1:59 pm TECHNIQUE: CT of the abdomen and pelvis was performed without the administration of intravenous contrast. Multiplanar reformatted images are provided for review. Automated exposure control, iterative reconstruction, and/or weight based adjustment of the mA/kV was utilized to reduce the radiation dose to as low as reasonably achievable. COMPARISON: CT abdomen pelvis November 3, 2022 HISTORY: ORDERING SYSTEM PROVIDED HISTORY: L flank pain/kidney stone TECHNOLOGIST PROVIDED HISTORY: Reason for exam:->L flank pain/kidney stone Additional Contrast?->None Decision Support Exception - unselect if not a suspected or confirmed emergency medical condition->Emergency Medical Condition (MA) Reason for Exam: Flank Pain FINDINGS: Lower Chest: Mild dependent atelectasis at the lung bases. Lung bases otherwise appear clear. Organs: Evaluation of the solid organs is limited due to lack of intravenous contrast.  Hepatic cysts redemonstrated. The gallbladder is surgically absent. The nonenhanced spleen, pancreas, and adrenal glands demonstrate no acute abnormality. Punctate nonobstructing right renal stone. No hydronephrosis or obstructive uropathy identified on the right. Redemonstration of 7 mm at the level of the left ureteropelvic junction (image 107 series 301; image 92 series 601). This results in mild-to-moderate left hydronephrosis. The more distal left ureter is unremarkable. Additional nonobstructing left renal stones also present. GI/Bowel: No bowel obstruction. Postoperative changes of the colon. Normal appendix. Small bowel is normal in caliber. Pelvis: The bladder and solid pelvic organs demonstrate no acute findings. Peritoneum/Retroperitoneum: The abdominal aorta is normal in caliber. No retroperitoneal adenopathy. No free fluid or free air identified.  Bones/Soft Tissues: Small fat filled umbilical hernia. No acute osseous or soft tissue findings. Similar grade 1 anterolisthesis of L5 on S1 related to L5 spondylolysis. Stevo lumbarization of S1 also noted. 1. Redemonstration of 7 mm obstructing stone at the level of the left ureteropelvic junction. This results in mild-to-moderate left hydronephrosis similar to previous exam.     CT ABDOMEN PELVIS WO CONTRAST    Result Date: 11/3/2022  EXAMINATION: STONE PROTOCOL CT OF THE ABDOMEN AND PELVIS 11/3/2022 7:17 pm TECHNIQUE: CT of the abdomen and pelvis was performed without the administration of intravenous contrast. Multiplanar reformatted images are provided for review. Automated exposure control, iterative reconstruction, and/or weight based adjustment of the mA/kV was utilized to reduce the radiation dose to as low as reasonably achievable. COMPARISON: September 21, 2022 HISTORY: ORDERING SYSTEM PROVIDED HISTORY: flank pain TECHNOLOGIST PROVIDED HISTORY: Reason for exam:->flank pain Reason for Exam: flank pain Additional signs and symptoms: hx of kidney stones FINDINGS: LOWER CHEST: Visualized portion of the lower chest demonstrates no acute abnormality. KIDNEYS AND URINARY TRACT: Evidence of bilateral nephrolithiasis. Left obstructive uropathy related to 1 cm stone in the left upper ureter. No evidence of right obstructive uropathy. ORGANS: Visualized portions of the unenhanced liver, spleen, pancreas, and adrenal glands demonstrate no acute abnormality. No inflammatory changes in the gallbladder fossa. GI/BOWEL: No bowel obstruction. No evidence of acute appendicitis. Stable postsurgical changes in the sigmoid. PELVIS: The bladder and pelvic organs are unremarkable. PERITONEUM/RETROPERITONEUM: No lymphadenopathy is noted. BONES/SOFT TISSUES: The osseous structures demonstrate no acute abnormality. Redemonstration of chronic anterolisthesis of L5 over S1 with bilateral L5 spondylolysis also previously seen.      1. Left obstructive uropathy related to a left upper ureteric stone measuring 1 cm in diameter. Bilateral nephrolithiasis. 2. No acute gastrointestinal abnormality. Normal appendix. Postsurgical changes in the sigmoid appears stable. 3. Redemonstration of anterolisthesis of L5 over S1 with bilateral L5 spondylolysis. FL LESS THAN 1 HOUR    Result Date: 11/8/2022  Radiology exam is complete. No Radiologist dictation. Please follow up with ordering provider. Assessment & Plan:      Catherine Ortiz is a 41 y.o. male admitted 11/7/2022 for flank pain. 1) Left ureteral stone: Patient seen in the ED x3 recently with complaints of left flank pain. Attempted outpatient management and follow-up but continues to have intractable pain and nausea/vomiting. CT abd/pelvis wo contrast 11/3 revealed obstructive uropathy due to left proximal ureteral stone, approximately 1 cm, bilateral nephrolithiasis              CT abd/pelvis wo contrast 11/7 redemonstrates obstructing left ureteral stone, measured at 7 mm within the left UPJ with mild-moderate left hydronephrosis similar to previous. POD #1 cystoscopy, left retrograde pyelogram, and left ureteral stent insertion - unable to pass scope past the mid ureter due to anatomical narrowing, no strictures. Plan for ureteroscopy as outpatient in 2-3 weeks to allow for passive dilation. Flomax, Pridium, and oxybutynin for stent irritation. Pain meds prn per hospitalist.    Patient stable from a  standpoint. Will sign off. Please call with any questions. Outpatient follow-up as soon as able to schedule left ureteroscopy in 2 to 3 weeks. Patient seen and examined, chart reviewed.      Electronically signed by OMAR Clemens on 11/9/2022 at 10:16 AM

## 2022-11-09 NOTE — PLAN OF CARE
Problem: Discharge Planning  Goal: Discharge to home or other facility with appropriate resources  Outcome: Completed  Flowsheets (Taken 11/9/2022 4341)  Discharge to home or other facility with appropriate resources:   Identify barriers to discharge with patient and caregiver   Arrange for needed discharge resources and transportation as appropriate   Identify discharge learning needs (meds, wound care, etc)     Problem: Pain  Goal: Verbalizes/displays adequate comfort level or baseline comfort level  Outcome: Completed

## 2022-11-09 NOTE — DISCHARGE SUMMARY
V2.0  Discharge Summary    Name:  Ramona Blue /Age/Sex: 1975 (41 y.o. male)   Admit Date: 2022  Discharge Date: 22    MRN & CSN:  [id number] & [id number] Encounter Date and Time 22 6:20 PM EST    Attending:  No att. providers found Discharging Provider: Evan Hill MD       Hospital Course:     Brief HPI: Ramona Blue is a 41 y.o. male who presented with 2 weeks of left flank pain. Found to have a 7 mm kidney stone in the left ureteropelvic junction with mild to moderate left hydronephrosis. UA was positive for blood. Urology was consulted, patient received IV fluid and pain medication. Patient was kept n.p.o. at midnight for cystoscopy and stent placement in the morning. Patient underwent left ureteroscopy with a left stent placement with no complications. Patient was kept overnight for pain management and IV antibiotics. Patient stable for discharge. Discharged home with oxycodone, Pyridium, Flomax and oxybutynin. Patient to follow-up with urology and primary care physician. Brief Problem Based Course:     #Left ureteral calculus with associated left hydronephrosis s/p cystoscopy and stenting  #Bilateral nephrolithiasis  - CT abd/pelvis imaging showing re-demonstration of 7 mm at the level of the left ureteropelvic junction with associated mild to moderate left hydronephrosis, punctate nonobstructing right renal stone. No hydronephrosis or obstructive uropathy identified on the right.   Additional nonobstructing left renal stones also present.   -Renal function stable  -Urology consulted--> only stenting was able to be done, stone was not removed, patient will have repeat procedure in a few weeks  -Received ceftriaxone while inpatient     Plan:  Follow-up with urology  continue tamsulosin, oxybutynin, Pyridium  Oxycodone for pain management at home  Follow-up with primary care physician     #Acute cystitis secondary to infected renal stone-resolved  -Patient still symptomatic     Chronic medical problems:  #Hx diverticulitis s/p rectosigmoidectomy 2020      The patient expressed appropriate understanding of, and agreement with the discharge recommendations, medications, and plan. Consults this admission:  IP CONSULT TO UROLOGY    Discharge Diagnosis:   Left ureteral calculus      Discharge Instruction:   Follow up appointments: urology  Primary care physician: Anjel Irving MD within 2 weeks  Diet: regular diet   Activity: activity as tolerated  Disposition: Discharged to:   [x]Home, [][facility], []SNF, []Acute Rehab, []Hospice   Condition on discharge: Stable  Labs and Tests to be Followed up as an outpatient by PCP or Specialist: urine cultures    Discharge Medications:        Medication List        START taking these medications      oxybutynin 5 MG tablet  Commonly known as: DITROPAN  Take 1 tablet by mouth 3 times daily            CONTINUE taking these medications      tamsulosin 0.4 MG capsule  Commonly known as: FLOMAX  Take 1 capsule by mouth daily            STOP taking these medications      oxyCODONE 5 MG immediate release tablet  Commonly known as: Madhavi Pump your doctor about these medications      oxyCODONE HCl 10 MG immediate release tablet  Commonly known as: OXY-IR  Take 1 tablet by mouth every 6 hours as needed for Pain for up to 3 days. Ask about: Should I take this medication? phenazopyridine 200 MG tablet  Commonly known as: PYRIDIUM  Take 1 tablet by mouth 3 times daily (with meals) for 3 days  Ask about: Should I take this medication?                Where to Get Your Medications        These medications were sent to [facility], [address] [place] [address], [address] [place] [address]      Phone: [phone]   oxybutynin 5 MG tablet  oxyCODONE HCl 10 MG immediate release tablet  phenazopyridine 200 MG tablet  tamsulosin 0.4 MG capsule        Objective Findings at Discharge:   /81 Pulse 70   Temp 98.2 °F (36.8 °C)   Resp 18   Ht 5' 11\" (1.803 m)   Wt 232 lb 12.9 oz (105.6 kg)   SpO2 96%   BMI 32.47 kg/m²       Physical Exam:   General: in mild distress, AAO x3-4  Eyes: EOMI  HENT: Atraumatic  Respiratory: no respiratory distress  GI:  soft, nondistended, nontender  MSK: no lower extremity edema  Skin: Intact, dry, warm, no rashes  Neuro: CN II to XII grossly intact  Psych: Normal mood  Labs and Imaging   FL LESS THAN 1 HOUR    Result Date: 11/8/2022  Radiology exam is complete. No Radiologist dictation. Please follow up with ordering provider. CBC: No results for input(s): WBC, HGB, PLT in the last 72 hours. BMP:  No results for input(s): NA, K, CL, CO2, BUN, CREATININE, GLUCOSE in the last 72 hours. Hepatic: No results for input(s): AST, ALT, ALB, BILITOT, ALKPHOS in the last 72 hours. Lipids:   Lab Results   Component Value Date/Time    TRIG 92 08/20/2020 07:00 AM     Hemoglobin A1C: No results found for: LABA1C  TSH: No results found for: TSH  Troponin: No results found for: TROPONINT  Lactic Acid: No results for input(s): LACTA in the last 72 hours. BNP: No results for input(s): PROBNP in the last 72 hours.   UA:  Lab Results   Component Value Date/Time    NITRU NEGATIVE 11/07/2022 04:27 PM    COLORU YELLOW 11/07/2022 04:27 PM    WBCUA 1 11/07/2022 04:27 PM    RBCUA 4 11/07/2022 04:27 PM    MUCUS FEW 11/07/2022 04:27 PM    TRICHOMONAS NONE SEEN 11/07/2022 04:27 PM    BACTERIA NEGATIVE 11/07/2022 04:27 PM    CLARITYU CLEAR 11/07/2022 04:27 PM    SPECGRAV 1.025 11/07/2022 04:27 PM    LEUKOCYTESUR NEGATIVE 11/07/2022 04:27 PM    UROBILINOGEN 0.2 11/07/2022 04:27 PM    BILIRUBINUR NEGATIVE 11/07/2022 04:27 PM    BLOODU TRACE 11/07/2022 04:27 PM    KETUA NEGATIVE 11/07/2022 04:27 PM     Urine Cultures: No results found for: LABURIN  Blood Cultures: No results found for: BC  No results found for: BLOODCULT2  Organism: No results found for: ORG    Time Spent Discharging patient <30 minutes    Electronically signed by Lakeisha Quintero MD on 11/14/2022 at 6:20 PM

## 2022-11-09 NOTE — OP NOTE
[address] [place] [address], [address] [place] [address]                                OPERATIVE REPORT    PATIENT NAME: Kinga Amador                    :        1975  MED REC NO:   [id number]                          ROOM:       4006  ACCOUNT NO:   [de-identified]                           ADMIT DATE: 2022  PROVIDER:     Candis Mcnulty MD    DATE OF PROCEDURE:  2022    PREOPERATIVE DIAGNOSIS:  Left ureteral calculus. POSTOPERATIVE DIAGNOSIS:  Left ureteral calculus. OPERATION PERFORMED:  Cystoscopy, left ureteroscopy, and left ureteral  stent placement. SURGEON:  Candis Mcnulty MD.    ANESTHESIA:  General anesthesia. ESTIMATED BLOOD LOSS:  None. COMPLICATIONS:  None. BRIEF HISTORY:  This is a 56-year-old male with a history of recurrent  nephrolithiasis, who presents now with left flank pain, third ER visit,  admitted for pain control and the above-said procedure. There is a 7-9  mm left proximal ureteral stone. DESCRIPTION OF PROCEDURE:  The patient was identified in the holding  area, taken to the procedure room, and placed in the dorsal lithotomy  position. The patient's genital region was prepped and draped in a  sterile fashion. A 21-Greek cystoscope sheath was introduced per  urethra under direct visualization. Under  film, I can see the  radio-opaque stone in the left proximal ureter area. It was pretty  close to 9 mm. At this point, I did pass two Sensor wires to pass the  stone into the kidney. I did place a navigator ureteral access sheath  into the left distal ureter using LithoVue flexible ureteroscope and  passed via the access sheath into the left distal ureter. I could not  get above the left distal ureter or the left mid ureter. There was no  stricture but it was just extremely narrow.   Due to this finding and  also location, it was not amenable for dilation of the ureter; to go  head and place the stent in for the next couple of weeks and bring him  back for ureteroscopy stone manipulation. At this point then, an access sheath and ureteroscope was removed. A  safety wire was left in place. Cystoscope was reintroduced over  indwelling safety wire. A 4.8-Jamaican variable length double J ureteral  stent was passed over the guidewire in a standard fashion. Position  confirmed using fluoroscopy and cystoscopy and deemed good. The  patient's bladder emptied and taken to the recovery room, having  tolerated the procedure well. DISCHARGE SUMMARY:  The patient was discharged from same-day surgery in  good condition. He will be recovering in the floor later today for  discharge later today or tomorrow, depending on the his condition. We  will make plan to see him in two to three weeks for ureteroscopy when  there is more dilation of the left ureter.         Shantanu Berrios MD    D: 11/08/2022 11:52:23       T: 11/08/2022 15:41:29     [id number]  Job#: [id number]     Doc#: [id number]    CC:

## 2022-11-09 NOTE — DISCHARGE INSTRUCTIONS
You were admitted for a kidney stone, urology put in a stent. Please follow up with them in 2-3 weeks to schedule another procedure. Also follow up with your primary care doctor in 1 week. If your symptoms come back or worsen, please return to the emergency room.

## 2022-11-15 ENCOUNTER — HOSPITAL ENCOUNTER (OUTPATIENT)
Age: 47
Setting detail: SPECIMEN
Discharge: HOME | End: 2022-11-15

## 2022-11-15 PROCEDURE — 82360 CALCULUS ASSAY QUANT: CPT

## 2022-11-21 LAB
STONE COMPOSITION: NORMAL
STONE DESCRIPTION: NORMAL
STONE MASS: 56 MG

## 2023-01-24 NOTE — ED NOTES
Problem: At Risk for Falls  Goal: # Patient does not fall  Outcome: Outcome Met, Continue evaluating goal progress toward completion  Goal: # Takes action to control fall-related risks  Outcome: Outcome Met, Continue evaluating goal progress toward completion  Goal: # Verbalizes understanding of fall risk/precautions  Description: Document education using the patient education activity  Outcome: Outcome Met, Continue evaluating goal progress toward completion      [address] contacted, spoke with Melina Hoskins - requested [facility] hospitalist.     Marylou Gordon, POONAM  08/15/20 1567

## 2023-12-16 ENCOUNTER — APPOINTMENT (OUTPATIENT)
Dept: CT IMAGING | Age: 48
End: 2023-12-16
Payer: COMMERCIAL

## 2023-12-16 ENCOUNTER — HOSPITAL ENCOUNTER (EMERGENCY)
Age: 48
Discharge: HOME | End: 2023-12-16
Attending: EMERGENCY MEDICINE
Payer: COMMERCIAL

## 2023-12-16 VITALS
WEIGHT: 225 LBS | HEIGHT: 70 IN | RESPIRATION RATE: 18 BRPM | BODY MASS INDEX: 32.21 KG/M2 | HEART RATE: 74 BPM | TEMPERATURE: 98.7 F | OXYGEN SATURATION: 95 % | SYSTOLIC BLOOD PRESSURE: 136 MMHG | DIASTOLIC BLOOD PRESSURE: 86 MMHG

## 2023-12-16 DIAGNOSIS — R10.32 ABDOMINAL PAIN, LEFT LOWER QUADRANT: Primary | ICD-10-CM

## 2023-12-16 LAB
ALBUMIN SERPL-MCNC: 4.2 GM/DL (ref 3.4–5)
ALP BLD-CCNC: 80 IU/L (ref 40–129)
ALT SERPL-CCNC: 20 U/L (ref 10–40)
ANION GAP SERPL CALCULATED.3IONS-SCNC: 15 MMOL/L (ref 4–16)
AST SERPL-CCNC: 20 IU/L (ref 15–37)
BASOPHILS ABSOLUTE: 0 K/CU MM
BASOPHILS RELATIVE PERCENT: 0.4 % (ref 0–1)
BILIRUB SERPL-MCNC: 1.3 MG/DL (ref 0–1)
BILIRUBIN DIRECT: 0.2 MG/DL (ref 0–0.3)
BILIRUBIN, INDIRECT: 1.1 MG/DL (ref 0–0.7)
BUN SERPL-MCNC: 16 MG/DL (ref 6–23)
CALCIUM SERPL-MCNC: 9.2 MG/DL (ref 8.3–10.6)
CHLORIDE BLD-SCNC: 104 MMOL/L (ref 99–110)
CO2: 22 MMOL/L (ref 21–32)
CREAT SERPL-MCNC: 0.8 MG/DL (ref 0.9–1.3)
DIFFERENTIAL TYPE: ABNORMAL
EOSINOPHILS ABSOLUTE: 0.1 K/CU MM
EOSINOPHILS RELATIVE PERCENT: 0.8 % (ref 0–3)
GFR SERPL CREATININE-BSD FRML MDRD: >60 ML/MIN/1.73M2
GLUCOSE SERPL-MCNC: 89 MG/DL (ref 70–99)
HCT VFR BLD CALC: 51.1 % (ref 42–52)
HEMOGLOBIN: 17.5 GM/DL (ref 13.5–18)
IMMATURE NEUTROPHIL %: 0.1 % (ref 0–0.43)
LIPASE: 35 IU/L (ref 13–60)
LYMPHOCYTES ABSOLUTE: 2.1 K/CU MM
LYMPHOCYTES RELATIVE PERCENT: 26.9 % (ref 24–44)
MCH RBC QN AUTO: 30.5 PG (ref 27–31)
MCHC RBC AUTO-ENTMCNC: 34.2 % (ref 32–36)
MCV RBC AUTO: 89.2 FL (ref 78–100)
MONOCYTES ABSOLUTE: 0.5 K/CU MM
MONOCYTES RELATIVE PERCENT: 5.9 % (ref 0–4)
PDW BLD-RTO: 12.6 % (ref 11.7–14.9)
PLATELET # BLD: 269 K/CU MM (ref 140–440)
PMV BLD AUTO: 10.4 FL (ref 7.5–11.1)
POTASSIUM SERPL-SCNC: 3.8 MMOL/L (ref 3.5–5.1)
RBC # BLD: 5.73 M/CU MM (ref 4.6–6.2)
SEGMENTED NEUTROPHILS ABSOLUTE COUNT: 5.1 K/CU MM
SEGMENTED NEUTROPHILS RELATIVE PERCENT: 65.9 % (ref 36–66)
SODIUM BLD-SCNC: 141 MMOL/L (ref 135–145)
TOTAL IMMATURE NEUTOROPHIL: 0.01 K/CU MM
TOTAL PROTEIN: 7.7 GM/DL (ref 6.4–8.2)
WBC # BLD: 7.8 K/CU MM (ref 4–10.5)

## 2023-12-16 PROCEDURE — 2580000003 HC RX 258: Performed by: EMERGENCY MEDICINE

## 2023-12-16 PROCEDURE — 82248 BILIRUBIN DIRECT: CPT

## 2023-12-16 PROCEDURE — 80053 COMPREHEN METABOLIC PANEL: CPT

## 2023-12-16 PROCEDURE — 74176 CT ABD & PELVIS W/O CONTRAST: CPT

## 2023-12-16 PROCEDURE — 83690 ASSAY OF LIPASE: CPT

## 2023-12-16 PROCEDURE — 6360000002 HC RX W HCPCS: Performed by: EMERGENCY MEDICINE

## 2023-12-16 PROCEDURE — 85025 COMPLETE CBC W/AUTO DIFF WBC: CPT

## 2023-12-16 RX ORDER — KETOROLAC TROMETHAMINE 10 MG/1
10 TABLET, FILM COATED ORAL EVERY 6 HOURS PRN
COMMUNITY

## 2023-12-16 RX ORDER — 0.9 % SODIUM CHLORIDE 0.9 %
1000 INTRAVENOUS SOLUTION INTRAVENOUS ONCE
Status: COMPLETED | OUTPATIENT
Start: 2023-12-16 | End: 2023-12-16

## 2023-12-16 RX ORDER — MORPHINE SULFATE 4 MG/ML
4 INJECTION, SOLUTION INTRAMUSCULAR; INTRAVENOUS
Status: COMPLETED | OUTPATIENT
Start: 2023-12-16 | End: 2023-12-16

## 2023-12-16 RX ORDER — AMOXICILLIN AND CLAVULANATE POTASSIUM 500; 125 MG/1; MG/1
1 TABLET, FILM COATED ORAL 3 TIMES DAILY
Qty: 15 TABLET | Refills: 0 | Status: SHIPPED | OUTPATIENT
Start: 2023-12-16 | End: 2023-12-26

## 2023-12-16 RX ORDER — ONDANSETRON 2 MG/ML
4 INJECTION INTRAMUSCULAR; INTRAVENOUS ONCE
Status: COMPLETED | OUTPATIENT
Start: 2023-12-16 | End: 2023-12-16

## 2023-12-16 RX ADMIN — SODIUM CHLORIDE 1000 ML: 9 INJECTION, SOLUTION INTRAVENOUS at 16:05

## 2023-12-16 RX ADMIN — MORPHINE SULFATE 4 MG: 4 INJECTION, SOLUTION INTRAMUSCULAR; INTRAVENOUS at 16:06

## 2023-12-16 RX ADMIN — ONDANSETRON 4 MG: 2 INJECTION INTRAMUSCULAR; INTRAVENOUS at 16:05

## 2023-12-16 ASSESSMENT — PAIN DESCRIPTION - ORIENTATION
ORIENTATION: LEFT
ORIENTATION: LEFT;UPPER

## 2023-12-16 ASSESSMENT — PAIN SCALES - GENERAL
PAINLEVEL_OUTOF10: 3
PAINLEVEL_OUTOF10: 3

## 2023-12-16 ASSESSMENT — PAIN - FUNCTIONAL ASSESSMENT: PAIN_FUNCTIONAL_ASSESSMENT: PREVENTS OR INTERFERES SOME ACTIVE ACTIVITIES AND ADLS

## 2023-12-16 ASSESSMENT — PAIN DESCRIPTION - LOCATION
LOCATION: ABDOMEN
LOCATION: ABDOMEN

## 2023-12-16 ASSESSMENT — PAIN DESCRIPTION - DESCRIPTORS: DESCRIPTORS: PRESSURE

## 2023-12-16 ASSESSMENT — LIFESTYLE VARIABLES
HOW OFTEN DO YOU HAVE A DRINK CONTAINING ALCOHOL: NEVER
HOW MANY STANDARD DRINKS CONTAINING ALCOHOL DO YOU HAVE ON A TYPICAL DAY: PATIENT DECLINED

## 2023-12-16 ASSESSMENT — PAIN DESCRIPTION - PAIN TYPE: TYPE: ACUTE PAIN

## 2023-12-16 NOTE — ED NOTES
Discharge instructions reviewed with patient. Reviewed medications with patient. No additional questions asked. Voiced understanding. Encouraged patient to follow up as discussed by the ED physician.      Joseph Christianson RN  12/16/23 [id number]

## 2024-01-03 ENCOUNTER — HOSPITAL ENCOUNTER (EMERGENCY)
Age: 49
Discharge: HOME | End: 2024-01-03
Attending: EMERGENCY MEDICINE
Payer: COMMERCIAL

## 2024-01-03 VITALS
HEIGHT: 70 IN | BODY MASS INDEX: 32.21 KG/M2 | SYSTOLIC BLOOD PRESSURE: 148 MMHG | WEIGHT: 225 LBS | DIASTOLIC BLOOD PRESSURE: 104 MMHG | TEMPERATURE: 97.6 F | RESPIRATION RATE: 16 BRPM | HEART RATE: 70 BPM | OXYGEN SATURATION: 100 %

## 2024-01-03 DIAGNOSIS — R10.84 GENERALIZED ABDOMINAL PAIN: ICD-10-CM

## 2024-01-03 DIAGNOSIS — R11.2 NAUSEA AND VOMITING IN ADULT PATIENT: Primary | ICD-10-CM

## 2024-01-03 LAB
ALBUMIN SERPL-MCNC: 4 GM/DL (ref 3.4–5)
ALP BLD-CCNC: 70 IU/L (ref 40–129)
ALT SERPL-CCNC: 25 U/L (ref 10–40)
ANION GAP SERPL CALCULATED.3IONS-SCNC: 11 MMOL/L (ref 7–16)
AST SERPL-CCNC: 17 IU/L (ref 15–37)
BASOPHILS ABSOLUTE: 0 K/CU MM
BASOPHILS RELATIVE PERCENT: 0.4 % (ref 0–1)
BILIRUB SERPL-MCNC: 0.8 MG/DL (ref 0–1)
BUN SERPL-MCNC: 13 MG/DL (ref 6–23)
CALCIUM SERPL-MCNC: 8.5 MG/DL (ref 8.3–10.6)
CHLORIDE BLD-SCNC: 105 MMOL/L (ref 99–110)
CO2: 23 MMOL/L (ref 21–32)
CREAT SERPL-MCNC: 0.8 MG/DL (ref 0.9–1.3)
DIFFERENTIAL TYPE: ABNORMAL
EOSINOPHILS ABSOLUTE: 0.1 K/CU MM
EOSINOPHILS RELATIVE PERCENT: 1.2 % (ref 0–3)
GFR SERPL CREATININE-BSD FRML MDRD: >60 ML/MIN/1.73M2
GLUCOSE SERPL-MCNC: 113 MG/DL (ref 70–99)
HCT VFR BLD CALC: 49.1 % (ref 42–52)
HEMOGLOBIN: 16.3 GM/DL (ref 13.5–18)
IMMATURE NEUTROPHIL %: 0.1 % (ref 0–0.43)
LIPASE: 39 IU/L (ref 13–60)
LYMPHOCYTES ABSOLUTE: 1.8 K/CU MM
LYMPHOCYTES RELATIVE PERCENT: 24 % (ref 24–44)
MCH RBC QN AUTO: 30.3 PG (ref 27–31)
MCHC RBC AUTO-ENTMCNC: 33.2 % (ref 32–36)
MCV RBC AUTO: 91.3 FL (ref 78–100)
MONOCYTES ABSOLUTE: 0.4 K/CU MM
MONOCYTES RELATIVE PERCENT: 6 % (ref 0–4)
PDW BLD-RTO: 12.5 % (ref 11.7–14.9)
PLATELET # BLD: 236 K/CU MM (ref 140–440)
PMV BLD AUTO: 10 FL (ref 7.5–11.1)
POTASSIUM SERPL-SCNC: 3.7 MMOL/L (ref 3.5–5.1)
RBC # BLD: 5.38 M/CU MM (ref 4.6–6.2)
SEGMENTED NEUTROPHILS ABSOLUTE COUNT: 5 K/CU MM
SEGMENTED NEUTROPHILS RELATIVE PERCENT: 68.3 % (ref 36–66)
SODIUM BLD-SCNC: 139 MMOL/L (ref 135–145)
TOTAL IMMATURE NEUTOROPHIL: 0.01 K/CU MM
TOTAL PROTEIN: 6.8 GM/DL (ref 6.4–8.2)
WBC # BLD: 7.4 K/CU MM (ref 4–10.5)

## 2024-01-03 PROCEDURE — C9113 INJ PANTOPRAZOLE SODIUM, VIA: HCPCS | Performed by: EMERGENCY MEDICINE

## 2024-01-03 PROCEDURE — 99284 EMERGENCY DEPT VISIT MOD MDM: CPT

## 2024-01-03 PROCEDURE — 85025 COMPLETE CBC W/AUTO DIFF WBC: CPT

## 2024-01-03 PROCEDURE — 83690 ASSAY OF LIPASE: CPT

## 2024-01-03 PROCEDURE — 6360000002 HC RX W HCPCS: Performed by: EMERGENCY MEDICINE

## 2024-01-03 PROCEDURE — 2580000003 HC RX 258: Performed by: EMERGENCY MEDICINE

## 2024-01-03 PROCEDURE — 80053 COMPREHEN METABOLIC PANEL: CPT

## 2024-01-03 PROCEDURE — 96374 THER/PROPH/DIAG INJ IV PUSH: CPT

## 2024-01-03 PROCEDURE — 96375 TX/PRO/DX INJ NEW DRUG ADDON: CPT

## 2024-01-03 RX ORDER — PANTOPRAZOLE SODIUM 40 MG/10ML
40 INJECTION, POWDER, LYOPHILIZED, FOR SOLUTION INTRAVENOUS ONCE
Status: COMPLETED | OUTPATIENT
Start: 2024-01-03 | End: 2024-01-03

## 2024-01-03 RX ORDER — MORPHINE SULFATE 4 MG/ML
4 INJECTION, SOLUTION INTRAMUSCULAR; INTRAVENOUS ONCE
Status: COMPLETED | OUTPATIENT
Start: 2024-01-03 | End: 2024-01-03

## 2024-01-03 RX ORDER — 0.9 % SODIUM CHLORIDE 0.9 %
1000 INTRAVENOUS SOLUTION INTRAVENOUS ONCE
Status: COMPLETED | OUTPATIENT
Start: 2024-01-03 | End: 2024-01-03

## 2024-01-03 RX ORDER — ONDANSETRON 2 MG/ML
8 INJECTION INTRAMUSCULAR; INTRAVENOUS ONCE
Status: COMPLETED | OUTPATIENT
Start: 2024-01-03 | End: 2024-01-03

## 2024-01-03 RX ORDER — ONDANSETRON 4 MG/1
4 TABLET, ORALLY DISINTEGRATING ORAL 3 TIMES DAILY PRN
Qty: 21 TABLET | Refills: 0 | Status: SHIPPED | OUTPATIENT
Start: 2024-01-03

## 2024-01-03 RX ORDER — DICYCLOMINE HCL 20 MG
20 TABLET ORAL 4 TIMES DAILY
Qty: 28 TABLET | Refills: 0 | Status: SHIPPED | OUTPATIENT
Start: 2024-01-03 | End: 2024-01-10

## 2024-01-03 RX ADMIN — PANTOPRAZOLE SODIUM 40 MG: 40 INJECTION, POWDER, FOR SOLUTION INTRAVENOUS at 03:42

## 2024-01-03 RX ADMIN — MORPHINE SULFATE 4 MG: 4 INJECTION, SOLUTION INTRAMUSCULAR; INTRAVENOUS at 04:16

## 2024-01-03 RX ADMIN — ONDANSETRON 8 MG: 2 INJECTION INTRAMUSCULAR; INTRAVENOUS at 03:42

## 2024-01-03 RX ADMIN — SODIUM CHLORIDE 1000 ML: 9 INJECTION, SOLUTION INTRAVENOUS at 03:41

## 2024-01-03 ASSESSMENT — PAIN - FUNCTIONAL ASSESSMENT: PAIN_FUNCTIONAL_ASSESSMENT: 0-10

## 2024-01-03 ASSESSMENT — ENCOUNTER SYMPTOMS
ABDOMINAL PAIN: 1
DIARRHEA: 0
COUGH: 0
SORE THROAT: 0
EYES NEGATIVE: 1
NAUSEA: 1
RESPIRATORY NEGATIVE: 1

## 2024-01-03 ASSESSMENT — PAIN SCALES - GENERAL: PAINLEVEL_OUTOF10: 5

## 2024-01-03 ASSESSMENT — PAIN DESCRIPTION - LOCATION: LOCATION: ABDOMEN

## 2024-01-03 NOTE — ED PROVIDER NOTES
following components:    Glucose 113 (*)     Creatinine 0.8 (*)     All other components within normal limits   LIPASE       Procedures: None      ED Course, and Reassessment: Negative work up and he ws given zofrn and IV fluids,. He felt better and he has GI follow up today at 0900. He was advised to follow up        History from : Patient    Limitations to history : None    Patient was given the following medications:  Medications   sodium chloride 0.9 % bolus 1,000 mL (0 mLs IntraVENous Stopped 1/3/24 0432)   ondansetron (ZOFRAN) injection 8 mg (8 mg IntraVENous Given 1/3/24 0342)   pantoprazole (PROTONIX) injection 40 mg (40 mg IntraVENous Given 1/3/24 0342)   morphine sulfate (PF) injection 4 mg (4 mg IntraVENous Given 1/3/24 0416)       Prescription medication: phenergan and bentyl    Independent Imaging Interpretation by Radiology  No orders to display       EKG (if obtained): (All EKG's are interpreted by myself in the absence of a cardiologist)  None     Chronic conditions affecting care: None     Discussion with Other Profesionals : None    Social Determinants : None    Records Reviewed : None    Disposition   Appropriate for outpatient management      Condition and plan discussed with patient  in detail. Patient  agrees with plan and verbalizes no questions or concerns.  I discussed at length the patient's presentation and possible differential diagnoses that could be associated with this presentation.  I also discussed at length concerning worsening findings, new signs and symptoms that may warrant repeat examination as well as the patient just returning if they are not feeling any better or having problems with follow-up In addition, risk, benefits, and side effects of medicines discussed with the patient ,  and patient  agrees with plan. For any new medications prescribed today, patient  was educated about indications for the medication, how to take the medication, and potential side effects of the

## 2024-01-10 ENCOUNTER — OFFICE VISIT (OUTPATIENT)
Dept: SURGERY | Age: 49
End: 2024-01-10
Payer: COMMERCIAL

## 2024-01-10 VITALS
BODY MASS INDEX: 32.4 KG/M2 | HEIGHT: 70 IN | WEIGHT: 226.3 LBS | DIASTOLIC BLOOD PRESSURE: 84 MMHG | SYSTOLIC BLOOD PRESSURE: 120 MMHG

## 2024-01-10 DIAGNOSIS — K57.92 DIVERTICULITIS: Primary | ICD-10-CM

## 2024-01-10 PROCEDURE — 99203 OFFICE O/P NEW LOW 30 MIN: CPT | Performed by: SURGERY

## 2024-01-10 RX ORDER — HYOSCYAMINE SULFATE 0.125 MG
125 TABLET ORAL EVERY 4 HOURS PRN
COMMUNITY

## 2024-01-10 RX ORDER — AMOXICILLIN AND CLAVULANATE POTASSIUM 875; 125 MG/1; MG/1
1 TABLET, FILM COATED ORAL 2 TIMES DAILY
Qty: 20 TABLET | Refills: 0 | Status: SHIPPED | OUTPATIENT
Start: 2024-01-10 | End: 2024-01-20

## 2024-01-10 NOTE — PROGRESS NOTES
education level: Not on file   Occupational History    Not on file   Tobacco Use    Smoking status: Never    Smokeless tobacco: Never   Vaping Use    Vaping Use: Never used   Substance and Sexual Activity    Alcohol use: No    Drug use: Yes     Frequency: 1.0 times per week     Types: Marijuana (Weed)    Sexual activity: Not Currently   Other Topics Concern    Not on file   Social History Narrative    Not on file     Social Determinants of Health     Financial Resource Strain: Not on file   Food Insecurity: Not on file   Transportation Needs: Not on file   Physical Activity: Not on file   Stress: Not on file   Social Connections: Not on file   Intimate Partner Violence: Not on file   Housing Stability: Not on file       Current Outpatient Medications   Medication Sig Dispense Refill    hyoscyamine (ANASPAZ;LEVSIN) 125 MCG tablet Take 1 tablet by mouth every 4 hours as needed for Cramping      amoxicillin-clavulanate (AUGMENTIN) 875-125 MG per tablet Take 1 tablet by mouth 2 times daily for 10 days 20 tablet 0    ondansetron (ZOFRAN-ODT) 4 MG disintegrating tablet Take 1 tablet by mouth 3 times daily as needed for Nausea or Vomiting (Patient not taking: Reported on 1/10/2024) 21 tablet 0    dicyclomine (BENTYL) 20 MG tablet Take 1 tablet by mouth 4 times daily for 7 days (Patient not taking: Reported on 1/10/2024) 28 tablet 0    ketorolac (TORADOL) 10 MG tablet Take 1 tablet by mouth every 6 hours as needed for Pain (Patient not taking: Reported on 1/10/2024)      tamsulosin (FLOMAX) 0.4 MG capsule Take 1 capsule by mouth daily (Patient not taking: Reported on 12/16/2023) 30 capsule 3    oxybutynin (DITROPAN) 5 MG tablet Take 1 tablet by mouth 3 times daily (Patient not taking: Reported on 12/16/2023) 90 tablet 3     No current facility-administered medications for this visit.      Allergies   Allergen Reactions    Vicodin [Hydrocodone-Acetaminophen] Other (See Comments)     Patient states that it is tolerable but

## 2024-06-17 ENCOUNTER — APPOINTMENT (OUTPATIENT)
Dept: CT IMAGING | Age: 49
End: 2024-06-17
Payer: COMMERCIAL

## 2024-06-17 ENCOUNTER — HOSPITAL ENCOUNTER (EMERGENCY)
Age: 49
Discharge: HOME | End: 2024-06-17
Attending: EMERGENCY MEDICINE
Payer: COMMERCIAL

## 2024-06-17 VITALS
HEIGHT: 70 IN | DIASTOLIC BLOOD PRESSURE: 95 MMHG | HEART RATE: 61 BPM | TEMPERATURE: 98.4 F | RESPIRATION RATE: 18 BRPM | SYSTOLIC BLOOD PRESSURE: 128 MMHG | OXYGEN SATURATION: 100 % | BODY MASS INDEX: 31.5 KG/M2 | WEIGHT: 220 LBS

## 2024-06-17 DIAGNOSIS — N20.0 RIGHT NEPHROLITHIASIS: ICD-10-CM

## 2024-06-17 DIAGNOSIS — R10.9 ACUTE RIGHT FLANK PAIN: Primary | ICD-10-CM

## 2024-06-17 LAB
ALBUMIN SERPL-MCNC: 4.1 GM/DL (ref 3.4–5)
ALP BLD-CCNC: 77 IU/L (ref 40–129)
ALT SERPL-CCNC: 19 U/L (ref 10–40)
ANION GAP SERPL CALCULATED.3IONS-SCNC: 13 MMOL/L (ref 7–16)
AST SERPL-CCNC: 19 IU/L (ref 15–37)
BACTERIA: 0 /HPF
BASOPHILS ABSOLUTE: 0 K/CU MM
BASOPHILS RELATIVE PERCENT: 0.3 % (ref 0–1)
BILIRUB SERPL-MCNC: 1.2 MG/DL (ref 0–1)
BILIRUBIN, URINE: NEGATIVE MG/DL
BLOOD, URINE: ABNORMAL
BUN SERPL-MCNC: 10 MG/DL (ref 6–23)
CALCIUM SERPL-MCNC: 8.8 MG/DL (ref 8.3–10.6)
CAST TYPE: ABNORMAL /HPF
CHLORIDE BLD-SCNC: 107 MMOL/L (ref 99–110)
CLARITY: CLEAR
CO2: 20 MMOL/L (ref 21–32)
COLOR: YELLOW
COMMENT UA: ABNORMAL
CREAT SERPL-MCNC: 0.8 MG/DL (ref 0.9–1.3)
CRYSTAL TYPE: NEGATIVE /HPF
DIFFERENTIAL TYPE: ABNORMAL
EOSINOPHILS ABSOLUTE: 0.1 K/CU MM
EOSINOPHILS RELATIVE PERCENT: 1.3 % (ref 0–3)
EPITHELIAL CELLS, UA: 1 /HPF
GFR, ESTIMATED: >90 ML/MIN/1.73M2
GLUCOSE SERPL-MCNC: 111 MG/DL (ref 70–99)
GLUCOSE URINE: NEGATIVE MG/DL
HCT VFR BLD CALC: 51.1 % (ref 42–52)
HEMOGLOBIN: 17.3 GM/DL (ref 13.5–18)
IMMATURE NEUTROPHIL %: 0.3 % (ref 0–0.43)
KETONES, URINE: ABNORMAL MG/DL
LEUKOCYTE ESTERASE, URINE: NEGATIVE
LIPASE: 37 IU/L (ref 13–60)
LYMPHOCYTES ABSOLUTE: 1.7 K/CU MM
LYMPHOCYTES RELATIVE PERCENT: 22.1 % (ref 24–44)
MCH RBC QN AUTO: 30.7 PG (ref 27–31)
MCHC RBC AUTO-ENTMCNC: 33.9 % (ref 32–36)
MCV RBC AUTO: 90.6 FL (ref 78–100)
MONOCYTES ABSOLUTE: 0.3 K/CU MM
MONOCYTES RELATIVE PERCENT: 4.1 % (ref 0–4)
NEUTROPHILS ABSOLUTE: 5.4 K/CU MM
NEUTROPHILS RELATIVE PERCENT: 71.9 % (ref 36–66)
NITRITE URINE, QUANTITATIVE: NEGATIVE
PDW BLD-RTO: 12.9 % (ref 11.7–14.9)
PH, URINE: 5.5 (ref 5–8)
PLATELET # BLD: 261 K/CU MM (ref 140–440)
PMV BLD AUTO: 10.2 FL (ref 7.5–11.1)
POTASSIUM SERPL-SCNC: 4 MMOL/L (ref 3.5–5.1)
PROTEIN UA: NEGATIVE MG/DL
RBC # BLD: 5.64 M/CU MM (ref 4.6–6.2)
RBC URINE: 1 /HPF (ref 0–3)
SODIUM BLD-SCNC: 140 MMOL/L (ref 135–145)
SPECIFIC GRAVITY UA: >1.03 (ref 1–1.03)
TOTAL IMMATURE NEUTOROPHIL: 0.02 K/CU MM
TOTAL PROTEIN: 7.3 GM/DL (ref 6.4–8.2)
UROBILINOGEN, URINE: 0.2 MG/DL (ref 0.2–1)
WBC # BLD: 7.5 K/CU MM (ref 4–10.5)
WBC UA: 0 /HPF (ref 0–2)

## 2024-06-17 PROCEDURE — 99284 EMERGENCY DEPT VISIT MOD MDM: CPT

## 2024-06-17 PROCEDURE — 81001 URINALYSIS AUTO W/SCOPE: CPT

## 2024-06-17 PROCEDURE — 85025 COMPLETE CBC W/AUTO DIFF WBC: CPT

## 2024-06-17 PROCEDURE — 83690 ASSAY OF LIPASE: CPT

## 2024-06-17 PROCEDURE — 74176 CT ABD & PELVIS W/O CONTRAST: CPT

## 2024-06-17 PROCEDURE — 96376 TX/PRO/DX INJ SAME DRUG ADON: CPT

## 2024-06-17 PROCEDURE — 96374 THER/PROPH/DIAG INJ IV PUSH: CPT

## 2024-06-17 PROCEDURE — 80053 COMPREHEN METABOLIC PANEL: CPT

## 2024-06-17 PROCEDURE — 6360000002 HC RX W HCPCS: Performed by: EMERGENCY MEDICINE

## 2024-06-17 PROCEDURE — 96375 TX/PRO/DX INJ NEW DRUG ADDON: CPT

## 2024-06-17 RX ORDER — ONDANSETRON 2 MG/ML
4 INJECTION INTRAMUSCULAR; INTRAVENOUS ONCE
Status: COMPLETED | OUTPATIENT
Start: 2024-06-17 | End: 2024-06-17

## 2024-06-17 RX ORDER — ONDANSETRON 4 MG/1
4 TABLET, ORALLY DISINTEGRATING ORAL 3 TIMES DAILY PRN
Qty: 21 TABLET | Refills: 0 | Status: SHIPPED | OUTPATIENT
Start: 2024-06-17

## 2024-06-17 RX ORDER — KETOROLAC TROMETHAMINE 30 MG/ML
30 INJECTION, SOLUTION INTRAMUSCULAR; INTRAVENOUS ONCE
Status: COMPLETED | OUTPATIENT
Start: 2024-06-17 | End: 2024-06-17

## 2024-06-17 RX ORDER — NAPROXEN 500 MG/1
500 TABLET ORAL 2 TIMES DAILY WITH MEALS
Qty: 60 TABLET | Refills: 0 | Status: SHIPPED | OUTPATIENT
Start: 2024-06-17

## 2024-06-17 RX ORDER — MORPHINE SULFATE 4 MG/ML
4 INJECTION, SOLUTION INTRAMUSCULAR; INTRAVENOUS ONCE
Status: COMPLETED | OUTPATIENT
Start: 2024-06-17 | End: 2024-06-17

## 2024-06-17 RX ADMIN — MORPHINE SULFATE 4 MG: 4 INJECTION, SOLUTION INTRAMUSCULAR; INTRAVENOUS at 10:24

## 2024-06-17 RX ADMIN — MORPHINE SULFATE 4 MG: 4 INJECTION, SOLUTION INTRAMUSCULAR; INTRAVENOUS at 11:16

## 2024-06-17 RX ADMIN — ONDANSETRON 4 MG: 2 INJECTION INTRAMUSCULAR; INTRAVENOUS at 10:23

## 2024-06-17 RX ADMIN — KETOROLAC TROMETHAMINE 30 MG: 30 INJECTION, SOLUTION INTRAMUSCULAR; INTRAVENOUS at 10:23

## 2024-06-17 ASSESSMENT — PAIN DESCRIPTION - DESCRIPTORS: DESCRIPTORS: ACHING

## 2024-06-17 ASSESSMENT — PAIN SCALES - GENERAL
PAINLEVEL_OUTOF10: 9
PAINLEVEL_OUTOF10: 8

## 2024-06-17 ASSESSMENT — PAIN - FUNCTIONAL ASSESSMENT: PAIN_FUNCTIONAL_ASSESSMENT: 0-10

## 2024-06-17 NOTE — ED PROVIDER NOTES
Emergency Department Encounter    Patient: Shantanu Husain  MRN: [id number]  : 1975  Date of Evaluation: 2024  ED Provider:  Minda Benites DO    Triage Chief Complaint:   Flank Pain (H/o kidney stones ) and Nausea    Koi:  Shantanu Husain is a 49 y.o. male with history of kidney stones, diverticulitis that presents to the emergency department complaint of some right flank pain radiating to the right lower quadrant.  Patient states that for started this past Saturday.  Patient states some nausea unable to get comfortable.  Patient states the pain 10 out of 10 on the pain scale.  Patient states he has had a least 6 kidney stone surgeries.  States his last kidney stone about 3 years ago.  Patient admits to dysuria and hematuria.  Patient here for evaluation.    ROS - see HPI, below listed is current ROS at time of my eval:  General:  No fevers, no chills, no weakness  Eyes:  No recent vison changes, no discharge  ENT:  No sore throat, no nasal congestion, no hearing changes  Cardiovascular:  No chest pain, no palpitations  Respiratory:  No shortness of breath, no cough, no wheezing  Gastrointestinal:  No pain, no nausea, no vomiting, no diarrhea  Musculoskeletal: Positive for right flank pain no muscle pain, no joint pain  Skin:  No rash, no pruritis, no easy bruising  Neurologic:  No speech problems, no headache, no extremity numbness, no extremity tingling, no extremity weakness  Psychiatric:  No anxiety  Genitourinary:  No dysuria, no hematuria  Endocrine:  No unexpected weight gain, no unexpected weight loss  Extremities:  no edema, no pain    Past Medical History:   Diagnosis Date    Diverticulitis     Gall stone     Kidney stone     \"had kidney stone now- had them since age 15-had surgery and able to pass some\" follows with dr Porter    Kidney stone      Past Surgical History:   Procedure Laterality Date    CHOLECYSTECTOMY, LAPAROSCOPIC N/A 2020    CHOLECYSTECTOMY LAPAROSCOPIC WITH IOC

## 2024-06-17 NOTE — DISCHARGE INSTRUCTIONS
Follow-up with urologist Dr. Hand in 1 to 2 days for reevaluation.  Call for an appointment  Take Tylenol alternate with Motrin for any pain aches and fevers  Return to the emergency department immediately any pain fever chills nausea vomiting is lightheadedness or any worsening symptoms.

## (undated) DEVICE — Z DISCONTINUED (USE MFG CAT MVABO)  TUBING GAS SAMPLING STD 6.5 FT FEMALE CONN SMRT CAPNOLINE

## (undated) DEVICE — TUBING FLTR PLUME AWAY EVAC W/ SUCT DEV DISP PUREVIEW

## (undated) DEVICE — ARM DRAPE

## (undated) DEVICE — STAPLER INT L28CM DIA29MM CLS STPL H10-2.5MM OPN LEG L5.5MM

## (undated) DEVICE — SUTURE STRATAFIX SYMMETRIC SZ 1 L18IN ABSRB VLT CT1 L36CM SXPP1A404

## (undated) DEVICE — GLOVE SURG SZ 65 THK91MIL LTX FREE SYN POLYISOPRENE

## (undated) DEVICE — 3M™ IOBAN™ 2 ANTIMICROBIAL INCISE DRAPE 6650EZ: Brand: IOBAN™ 2

## (undated) DEVICE — PROTECTOR EYE PT SELF ADH NS OPT GRD LF

## (undated) DEVICE — TRAY PREP DRY W/ PREM GLV 2 APPL 6 SPNG 2 UNDPD 1 OVERWRAP

## (undated) DEVICE — SOLUTION ANTIFOG VIS SYS CLEARIFY LAPSCP

## (undated) DEVICE — GOWN,ECLIPSE,POLYRNF,BRTHSLV,XL,30/CS: Brand: MEDLINE

## (undated) DEVICE — TIP COVER ACCESSORY

## (undated) DEVICE — SET TBNG DISP TIP FOR AHTO

## (undated) DEVICE — DBD-PACK,CYSTOSCOPY,PK VI,AURORA: Brand: MEDLINE

## (undated) DEVICE — Device

## (undated) DEVICE — BALLOON DILATATION CATHETER: Brand: HURRICANE™ RX

## (undated) DEVICE — GLOVE SURG SZ 7 CRM LTX FREE POLYISOPRENE POLYMER BEAD ANTI

## (undated) DEVICE — FORCEPS BX L240CM JAW DIA2.8MM L CAP W/ NDL MIC MESH TOOTH

## (undated) DEVICE — GLOVE ORANGE PI 7   MSG9070

## (undated) DEVICE — APPLIER CLP M/L SHFT DIA5MM 15 LIG LIGAMAX 5

## (undated) DEVICE — TOWEL,OR,DSP,ST,BLUE,STD,6/PK,12PK/CS: Brand: MEDLINE

## (undated) DEVICE — CANNULA SEAL

## (undated) DEVICE — SUTURE PROL SZ 2-0 L30IN NONABSORBABLE BLU L26MM SH 1/2 CIR 8833H

## (undated) DEVICE — BLADELESS OBTURATOR: Brand: WECK VISTA

## (undated) DEVICE — CATHETER CHOLGM 4.5FR L18IN W/ MTL SUPP TB

## (undated) DEVICE — Z INACTIVE USE 2735373 APPLICATOR FBR LAIN COT WOOD TIP ECONOMICAL

## (undated) DEVICE — SOLUTION IV IRRIG WATER 1000ML POUR BRL 2F7114

## (undated) DEVICE — JELLY,LUBE,STERILE,FLIP TOP,TUBE,2-OZ: Brand: MEDLINE

## (undated) DEVICE — TROCARS: Brand: KII® BALLOON BLUNT TIP SYSTEM

## (undated) DEVICE — DRAIN SURG 19FR 100% SIL RADPQ RND CHN FULL FLUT

## (undated) DEVICE — SUTURE STRATAFIX SPRL PDS + VLT 3-0 15CM DISPOSABLE/SNGLE SXPP1B420

## (undated) DEVICE — TROCAR: Brand: KII FIOS FIRST ENTRY

## (undated) DEVICE — SOLUTION IV 1000ML 0.9% SOD CHL FOR IRRIG PLAS CONT

## (undated) DEVICE — STAPLER 60 RELOAD GREEN: Brand: SUREFORM

## (undated) DEVICE — UROLOGIC DRAIN BAG: Brand: UNBRANDED

## (undated) DEVICE — 34" SINGLE PATIENT USE HOVERMATT BREATHABLE: Brand: SINGLE PATIENT USE HOVERMATT

## (undated) DEVICE — RESERVOIR,SUCTION,100CC,SILICONE: Brand: MEDLINE

## (undated) DEVICE — SUTURE VCRL SZ 4-0 L18IN ABSRB UD L19MM PS-2 3/8 CIR PRIM J496H

## (undated) DEVICE — TUBING INSUFFLATOR HEAT HI FLO SET PNEUMOCLEAR

## (undated) DEVICE — STAPLER 60: Brand: SUREFORM

## (undated) DEVICE — APPLICATOR MEDICATED 26 CC SOLUTION HI LT ORNG CHLORAPREP

## (undated) DEVICE — TOTAL TRAY, 16FR 10ML SIL FOLEY, URN: Brand: MEDLINE

## (undated) DEVICE — SEAL

## (undated) DEVICE — BLADE CLIPPER GEN PURP NS

## (undated) DEVICE — TOWEL OR BLUEE 16X26IN ST 8 PACK ORB08 16X26ORTWL

## (undated) DEVICE — VESSEL SEALER EXTEND: Brand: ENDOWRIST

## (undated) DEVICE — BAG SPEC REM 224ML W4XL6IN DIA10MM 1 HND GYN DISP ENDOPCH

## (undated) DEVICE — SUTURE ETHBND EXCEL SZ 0 L36IN NONABSORBABLE GRN SH L26MM X524H

## (undated) DEVICE — LOOP LIG SUT SZ 0 L18IN ABSRB POLYDIOXANONE MFIL PDS II

## (undated) DEVICE — SOLUTION IV IRRIG POUR BRL 0.9% SODIUM CHL 2F7124

## (undated) DEVICE — INTENDED FOR TISSUE SEPARATION, AND OTHER PROCEDURES THAT REQUIRE A SHARP SURGICAL BLADE TO PUNCTURE OR CUT.: Brand: BARD-PARKER ® STAINLESS STEEL BLADES

## (undated) DEVICE — SIGMOIDOSCOPE MED L25CM DIA20MM W/ OBT DISP KLEENSPEC

## (undated) DEVICE — GUIDEWIRE ENDOSCP L150CM DIA0.035IN TIP 3CM PTFE NIT

## (undated) DEVICE — GOWN,ECLIPSE,POLYRNF,BRTHSLV,L,30/CS: Brand: MEDLINE

## (undated) DEVICE — AGENT HEMSTAT W2XL14IN OXIDIZED REGENERATED CELOS ABSRB FOR

## (undated) DEVICE — ADHESIVE SKIN CLSR 0.7ML TOP DERMBND ADV

## (undated) DEVICE — GUIDEWIRE VASC L260CM DIA0.035IN L1CM TIP PTFE S STL SHT

## (undated) DEVICE — SURGICAL PROCEDURE PACK LITH ROBOTIC

## (undated) DEVICE — SUTURE SZ 0 27IN 5/8 CIR UR-6  TAPER PT VIOLET ABSRB VICRYL J603H

## (undated) DEVICE — DISCONTINUED USE 111569 BF APPLICATOR COTN TIP 6IN ST

## (undated) DEVICE — SUTURE PDS II SZ 0 L60IN ABSRB VLT L48MM CTX 1/2 CIR Z990G

## (undated) DEVICE — SUTURE VCRL SZ 3-0 L18IN ABSRB UD W/O NDL POLYGLACTIN 910 J110T

## (undated) DEVICE — TUBING, SUCTION, 9/32" X 10', STRAIGHT: Brand: MEDLINE

## (undated) DEVICE — SYRINGE MED 20ML STD CLR PLAS LUERLOCK TIP N CTRL DISP

## (undated) DEVICE — TROCAR: Brand: KII® SLEEVE

## (undated) DEVICE — DRAPE SHEET ULTRAGARD: Brand: MEDLINE

## (undated) DEVICE — SINGLE-USE DIGITAL FLEXIBLE URETEROSCOPE: Brand: LITHOVUE

## (undated) DEVICE — YANKAUER,FLEXIBLE HANDLE,REGLR CAPACITY: Brand: MEDLINE INDUSTRIES, INC.

## (undated) DEVICE — COLUMN DRAPE

## (undated) DEVICE — ELECTRODE ES AD CRDLSS PT RET REM POLYHESIVE

## (undated) DEVICE — STAPLER 60 RELOAD BLACK: Brand: SUREFORM

## (undated) DEVICE — MAT ABSRB W28XL48IN C6L FLR ULT W POLY BK

## (undated) DEVICE — GARMENT,MEDLINE,DVT,INT,CALF,MED, GEN2: Brand: MEDLINE

## (undated) DEVICE — GOWN,SIRUS,POLYRNF,BRTHSLV,XLN/XL,20/CS: Brand: MEDLINE

## (undated) DEVICE — SUTURE VCRL SZ 3-0 L27IN ABSRB VLT L26MM SH 1/2 CIR J316H

## (undated) DEVICE — URETERAL ACCESS SHEATH SET: Brand: NAVIGATOR HD

## (undated) DEVICE — EXCEL 10FT (3.05 M) INSUFFLATION TUBING SET WITH 0.1 MICRON FILTER: Brand: EXCEL

## (undated) DEVICE — WOUND RETRACTOR AND PROTECTOR: Brand: ALEXIS WOUND PROTECTOR-RETRACTOR

## (undated) DEVICE — GLOVE SURG SZ 65 CRM LTX FREE POLYISOPRENE POLYMER BEAD ANTI

## (undated) DEVICE — GLOVE ORANGE PI 8   MSG9080

## (undated) DEVICE — COVER,C-ARM,41X74: Brand: MEDLINE

## (undated) DEVICE — SUTURE VCRL SZ 3-0 L27IN ABSRB UD L36MM CT-1 1/2 CIR J258H

## (undated) DEVICE — Device: Brand: DISPOSABLE BULB & BLADDER

## (undated) DEVICE — REDUCER: Brand: ENDOWRIST